# Patient Record
Sex: FEMALE | Race: WHITE | Employment: FULL TIME | URBAN - METROPOLITAN AREA
[De-identification: names, ages, dates, MRNs, and addresses within clinical notes are randomized per-mention and may not be internally consistent; named-entity substitution may affect disease eponyms.]

---

## 2021-08-24 ENCOUNTER — OFFICE VISIT (OUTPATIENT)
Dept: SURGERY | Age: 56
End: 2021-08-24
Payer: COMMERCIAL

## 2021-08-24 VITALS
BODY MASS INDEX: 26.52 KG/M2 | DIASTOLIC BLOOD PRESSURE: 97 MMHG | SYSTOLIC BLOOD PRESSURE: 175 MMHG | WEIGHT: 165 LBS | HEIGHT: 66 IN

## 2021-08-24 DIAGNOSIS — N61.1 BREAST ABSCESS: Primary | ICD-10-CM

## 2021-08-24 PROCEDURE — 10160 PNXR ASPIR ABSC HMTMA BULLA: CPT | Performed by: NURSE PRACTITIONER

## 2021-08-24 PROCEDURE — 76642 ULTRASOUND BREAST LIMITED: CPT | Performed by: NURSE PRACTITIONER

## 2021-08-24 PROCEDURE — 99203 OFFICE O/P NEW LOW 30 MIN: CPT | Performed by: NURSE PRACTITIONER

## 2021-08-24 RX ORDER — SULFAMETHOXAZOLE AND TRIMETHOPRIM 800; 160 MG/1; MG/1
TABLET ORAL
COMMUNITY
Start: 2021-08-23 | End: 2022-01-20

## 2021-08-24 NOTE — PROGRESS NOTES
HISTORY OF PRESENT ILLNESS  Christi Villegas is a 61 y.o. female. HPI  New patient presents for evaluation of LEFT breast abscess. About 6 weeks ago noticed a red spot to LEFT lateral breast.  Became larger, felt like there was fluid under it and had some \"prickly\" pain. She started Bactrim yesterday and reports that it is improving. Breast history -  Referring - Felipa Dogal      Family history -   Mom - breast cancer at 76      OB History    No obstetric history on file. Obstetric Comments   Menarche 14, LMP 2018, # of children 2, age of 4st delivery 33, Hysterectomy/oophorectomy no/no, Breast bx no, history of breast feeding yes, BCP no, Hormone therapy no               ROS    Physical Exam  Chest:         BREAST ULTRASOUND  Indication: Left  breast mass - 9:00  Technique: The area was scanned using a high-frequency linear-array near-field transducer  Findings: Approx 2 cm hypoechoic area  Impression: LEFT breast abscess  Disposition: observation with antibiotics vs aspiration vs incision and drainage - opted for aspiration     [facility]  OFFICE PROCEDURE PROGRESS NOTE        Chart reviewed for the following:               I, Ellie Dailey NP, have reviewed the History, Physical and updated the Allergic reactions for [address] [place] [address] performed immediately prior to start of procedure:               Martha Harris NP, have performed the following reviews on Christi Villegas prior to the start of the procedure:            * Patient was identified by name and date of birth   * Agreement on procedure being performed was verified  * Risks and Benefits explained to the patient  * Procedure site verified and marked as necessary  * Patient was positioned for comfort  * Consent was signed and verified                 Time: 230 PM     Date of procedure: 8/24/21     Procedure performed by:   Ellie Dailey NP     Provider assisted by: None     Patient assisted by: Self     How tolerated by patient: tolerated the procedure well with no complications     Post Procedural Pain Scale: 3 - No Hurt     Comments: none    Aspiration of an abscess:  Indication : Left breast abscess 9:00  Prep : alcohol. Anesthesia : Lidocaine 1% with epi, 3ml   Yield : 7ml purulent fluid drained  Irrigation: none  Diposition: Keep wound covered PRN      Visit Vitals  BP (!) 175/97 (BP 1 Location: Right arm, BP Patient Position: Sitting, BP Cuff Size: Adult)   Ht 5' 6\" (1.676 m)   Wt 165 lb (74.8 kg)   BMI 26.63 kg/m²       ASSESSMENT and PLAN  Encounter Diagnoses   Name Primary?  Breast abscess Yes       LEFT breast abscess on exam and US. No skin breakdown and discussed aspiration vs incision and drainage. Opted for aspiration. Procedure as above with 7ml of purulent fluid aspirated. Continue on Bactrim - prescribed 10 days of treatment by GYN yesterday. Reviewed s/sx of worsening abscess and sepsis. Discussed need for possible additional aspiration or incision and drainage if abscess does not resolve. Follow-up here PRN. She is comfortable with this plan. All questions answered and she stated understanding. Total time spent for this patient - 30 minutes.

## 2021-08-24 NOTE — PATIENT INSTRUCTIONS

## 2021-09-02 ENCOUNTER — TELEPHONE (OUTPATIENT)
Dept: SURGERY | Age: 56
End: 2021-09-02

## 2021-09-02 NOTE — TELEPHONE ENCOUNTER
Returned pt's call, re: still feels a hard \"quarter-size\" area to her breast which she describes as still uncomfortable and red. She completed antibiotics today. Transferred her call to Hossein Uriostegui, to make appt with Agatha Diaz tomorrow.

## 2021-09-03 ENCOUNTER — OFFICE VISIT (OUTPATIENT)
Dept: SURGERY | Age: 56
End: 2021-09-03
Payer: COMMERCIAL

## 2021-09-03 VITALS
HEART RATE: 85 BPM | HEIGHT: 66 IN | BODY MASS INDEX: 26.52 KG/M2 | WEIGHT: 165 LBS | SYSTOLIC BLOOD PRESSURE: 175 MMHG | DIASTOLIC BLOOD PRESSURE: 81 MMHG

## 2021-09-03 DIAGNOSIS — N61.1 BREAST ABSCESS: Primary | ICD-10-CM

## 2021-09-03 PROCEDURE — 99213 OFFICE O/P EST LOW 20 MIN: CPT | Performed by: NURSE PRACTITIONER

## 2021-09-03 PROCEDURE — 76642 ULTRASOUND BREAST LIMITED: CPT | Performed by: NURSE PRACTITIONER

## 2021-09-03 PROCEDURE — 10060 I&D ABSCESS SIMPLE/SINGLE: CPT | Performed by: NURSE PRACTITIONER

## 2021-09-03 NOTE — PROGRESS NOTES
HISTORY OF PRESENT ILLNESS  Angel Farias is a 61 y.o. female. HPI Established patient presents for evaluation of LEFT breast abscess. Breast abscess was aspirated 10 days ago, but has not healed and is larger and tender. No drainage, but reports a fluctuant skin lesion. Denies fever and chills.       Breast history -  Referring - Bryan Leigh MD  8/24/21 - LEFT breast abscess - Aspiration and course of Bactrim   9/3/21 - LEFT breast abscess - I&D      Family history -   Mom - breast cancer at 76      OB History    No obstetric history on file. Obstetric Comments   Menarche 14, LMP 2018, # of children 2, age of 4st delivery 33, Hysterectomy/oophorectomy no/no, Breast bx no, history of breast feeding yes, BCP no, Hormone therapy no               ROS    Physical Exam  Chest:           BREAST ULTRASOUND  Indication: Left  breast abscess  Technique: The area was scanned using a high-frequency linear-array near-field transducer  Findings: Large hypoechoic area underlying skin changes  Impression: Breast abscess  Disposition: I&D since redeveloped post aspiration          [facility]  OFFICE PROCEDURE PROGRESS NOTE        Chart reviewed for the following:               I, Emilia Gaspar NP, have reviewed the History, Physical and updated the Allergic reactions for [address] [place] [address] performed immediately prior to start of procedure:               Chelsey Little NP, have performed the following reviews on Angel Farias prior to the start of the procedure:            * Patient was identified by name and date of birth   * Agreement on procedure being performed was verified  * Risks and Benefits explained to the patient  * Procedure site verified and marked as necessary  * Patient was positioned for comfort  * Consent was signed and verified                 Time: 1130     Date of procedure: 9/3/21     Procedure performed by:   Emilia Gaspar NP     Provider assisted by: None     Patient assisted by: Self     How tolerated by patient: tolerated the procedure well with no complications     Post Procedural Pain Scale: 3 - No Hurt     Comments: none        Incision and Drainage  Indication : Left breast abscess  Prep : alcohol. Anesthesia : Lidocaine 1% with epi 4ml   Yield : 15ml purulent fluid drained  Irrigation: None    Packin/\" nugauze, approximately 8 cm  Diposition: Okay to shower. Change dressing as needed. Follow up 4 to have packing removed. Visit Vitals  BP (!) 175/81 (BP 1 Location: Left upper arm, BP Patient Position: Sitting, BP Cuff Size: Adult)   Pulse 85   Ht 5' 6\" (1.676 m)   Wt 165 lb (74.8 kg)   BMI 26.63 kg/m²       ASSESSMENT and PLAN  Encounter Diagnoses   Name Primary?  Breast abscess Yes       LEFT breast abscess - I&D after abscess persisted s/sp aspiration. Continue antibiotics. Changes dressing as needed. Follow-up in 4 days to have packing removed/changed. She is comfortable with this plan. All questions answered and she stated understanding. Total time spent for this patient - 20 minutes.

## 2021-09-03 NOTE — PATIENT INSTRUCTIONS

## 2021-09-07 ENCOUNTER — OFFICE VISIT (OUTPATIENT)
Dept: SURGERY | Age: 56
End: 2021-09-07
Payer: COMMERCIAL

## 2021-09-07 VITALS
WEIGHT: 165 LBS | SYSTOLIC BLOOD PRESSURE: 155 MMHG | DIASTOLIC BLOOD PRESSURE: 96 MMHG | HEIGHT: 66 IN | BODY MASS INDEX: 26.52 KG/M2 | HEART RATE: 109 BPM

## 2021-09-07 DIAGNOSIS — N61.1 BREAST ABSCESS: Primary | ICD-10-CM

## 2021-09-07 PROCEDURE — 99024 POSTOP FOLLOW-UP VISIT: CPT | Performed by: NURSE PRACTITIONER

## 2021-09-07 NOTE — PROGRESS NOTES
HISTORY OF PRESENT ILLNESS  Chad Chamorro is a 61 y.o. female. HPI Established patient presents for follow-up for LEFT breast abscess I&D. Reports it is healing well, but still draining and feels hard. Breast history -  Referring - Betty Contreras MD  8/24/21 - LEFT breast abscess - Aspiration and course of Bactrim   9/3/21 - LEFT breast abscess - I&D      Family history -   Mom - breast cancer at 76    ROS    Physical Exam  Chest:           Visit Vitals  BP (!) 155/96 (BP 1 Location: Right arm, BP Patient Position: Sitting, BP Cuff Size: Adult)   Pulse (!) 109   Ht 5' 6\" (1.676 m)   Wt 165 lb (74.8 kg)   BMI 26.63 kg/m²         ASSESSMENT and PLAN  Encounter Diagnoses   Name Primary?  Breast abscess Yes      LEFT breast abscess - continues to drain, but granulating. Completed antibiotics. Changes dressings at home. Follow-up on Friday for packing change. She is comfortable with this plan. All questions answered and she stated understanding.

## 2021-09-07 NOTE — PATIENT INSTRUCTIONS

## 2021-09-09 NOTE — PROGRESS NOTES
HISTORY OF PRESENT ILLNESS  Jesika Blanchard is a 61 y.o. female. HPI Established patient presents for follow-up for LEFT breast abscess I&D. Reports it is healing well, but still draining and feels hard.          Breast history -  Referring - Shy Roque MD  8/24/21 - LEFT breast abscess - Aspiration and course of Bactrim   9/3/21 - LEFT breast abscess - I&D      Family history -   Mom - breast cancer at 76    ROS    Physical Exam  Chest:           Visit Vitals  BP (!) 160/88   Pulse 78   Ht 5' 6\" (1.676 m)   Wt 165 lb (74.8 kg)   BMI 26.63 kg/m²         ASSESSMENT and PLAN  Encounter Diagnoses   Name Primary?  Breast abscess Yes        LEFT breast abscess - continues to drain, but granulating. .  Changes dressings at home. Follow-up on Tuesday and then Thursday for packing change. She is comfortable with this plan. All questions answered and she stated understanding. 59.84

## 2021-09-10 ENCOUNTER — OFFICE VISIT (OUTPATIENT)
Dept: SURGERY | Age: 56
End: 2021-09-10
Payer: COMMERCIAL

## 2021-09-10 VITALS
DIASTOLIC BLOOD PRESSURE: 88 MMHG | WEIGHT: 165 LBS | HEIGHT: 66 IN | HEART RATE: 78 BPM | BODY MASS INDEX: 26.52 KG/M2 | SYSTOLIC BLOOD PRESSURE: 160 MMHG

## 2021-09-10 DIAGNOSIS — N61.1 BREAST ABSCESS: Primary | ICD-10-CM

## 2021-09-10 PROCEDURE — 99024 POSTOP FOLLOW-UP VISIT: CPT | Performed by: NURSE PRACTITIONER

## 2021-09-10 NOTE — PATIENT INSTRUCTIONS

## 2021-09-14 ENCOUNTER — OFFICE VISIT (OUTPATIENT)
Dept: SURGERY | Age: 56
End: 2021-09-14
Payer: COMMERCIAL

## 2021-09-14 VITALS
DIASTOLIC BLOOD PRESSURE: 49 MMHG | WEIGHT: 165 LBS | BODY MASS INDEX: 26.52 KG/M2 | HEIGHT: 66 IN | SYSTOLIC BLOOD PRESSURE: 173 MMHG

## 2021-09-14 DIAGNOSIS — N61.1 BREAST ABSCESS: Primary | ICD-10-CM

## 2021-09-14 PROCEDURE — 99213 OFFICE O/P EST LOW 20 MIN: CPT | Performed by: NURSE PRACTITIONER

## 2021-09-14 PROCEDURE — 76642 ULTRASOUND BREAST LIMITED: CPT | Performed by: NURSE PRACTITIONER

## 2021-09-14 NOTE — PATIENT INSTRUCTIONS

## 2021-09-14 NOTE — PROGRESS NOTES
HISTORY OF PRESENT ILLNESS  Jackson Stroud is a 61 y.o. female. HPI Established patient presents for follow-up for LEFT breast abscess I&D.  Reports it is healing well, but still draining and feels hard.       Breast history -  Referring - Brianna Burden MD  8/24/21 - LEFT breast abscess - Aspiration and course of Bactrim   9/3/21 - LEFT breast abscess - I&D      Family history -   Mom - breast cancer at 76    OB History    No obstetric history on file. Obstetric Comments   Menarche 14, LMP 2018, # of children 2, age of 4st delivery 33, Hysterectomy/oophorectomy no/no, Breast bx no, history of breast feeding yes, BCP no, Hormone therapy no               ROS    Physical Exam  Constitutional:       Appearance: Normal appearance. Chest:       Neurological:      Mental Status: She is alert. Psychiatric:         Attention and Perception: Attention normal.         Mood and Affect: Mood normal.         Speech: Speech normal.         Behavior: Behavior normal.         BREAST ULTRASOUND  Indication: Left  Breast abscess  Technique: The area was scanned using a high-frequency linear-array near-field transducer  Findings: 2.5 cm hypoechoic area  Impression: Breast abscess cavity  Disposition: Observation - see A/P        Visit Vitals  BP (!) 173/49 (BP 1 Location: Right arm, BP Patient Position: Sitting, BP Cuff Size: Adult)   Ht 5' 6\" (1.676 m)   Wt 165 lb (74.8 kg)   BMI 26.63 kg/m²         ASSESSMENT and PLAN  Encounter Diagnoses   Name Primary?  Breast abscess Yes        LEFT breast abscess - continues to drain, but granulating. Resistance when removing the packing as the skin has been healing and trying to approximate. Abscess cavity still present. Discussed continued packing (with possible extension of the incision to allow for this) vs allowing the area to close. Discussed possibility of abscess recurrence and subsequent I&D. She opted to discontinue packing at this time and monitor the area. Changes dressings at home PRN until wound is closed. Follow-up PRN.  She is comfortable with this plan.  All questions answered and she stated understanding. Total time spent for this patient - 20 minutes.

## 2021-10-04 ENCOUNTER — TELEPHONE (OUTPATIENT)
Dept: SURGERY | Age: 56
End: 2021-10-04

## 2021-10-06 ENCOUNTER — OFFICE VISIT (OUTPATIENT)
Dept: SURGERY | Age: 56
End: 2021-10-06
Payer: COMMERCIAL

## 2021-10-06 ENCOUNTER — DOCUMENTATION ONLY (OUTPATIENT)
Dept: SURGERY | Age: 56
End: 2021-10-06

## 2021-10-06 ENCOUNTER — HOSPITAL ENCOUNTER (OUTPATIENT)
Dept: LAB | Age: 56
Discharge: HOME | End: 2021-10-06

## 2021-10-06 VITALS — HEIGHT: 66 IN | BODY MASS INDEX: 26.52 KG/M2 | WEIGHT: 165 LBS

## 2021-10-06 DIAGNOSIS — N63.20 LEFT BREAST MASS: ICD-10-CM

## 2021-10-06 DIAGNOSIS — R92.8 ABNORMAL ULTRASOUND OF BREAST: Primary | ICD-10-CM

## 2021-10-06 DIAGNOSIS — N61.1 ABSCESS OF BREAST, LEFT: ICD-10-CM

## 2021-10-06 PROCEDURE — 76642 ULTRASOUND BREAST LIMITED: CPT | Performed by: SURGERY

## 2021-10-06 PROCEDURE — 99213 OFFICE O/P EST LOW 20 MIN: CPT | Performed by: SURGERY

## 2021-10-06 PROCEDURE — 19083 BX BREAST 1ST LESION US IMAG: CPT | Performed by: SURGERY

## 2021-10-06 RX ORDER — HYDROCODONE BITARTRATE AND ACETAMINOPHEN 5; 325 MG/1; MG/1
1 TABLET ORAL
Qty: 25 TABLET | Refills: 0 | Status: SHIPPED | OUTPATIENT
Start: 2021-10-06 | End: 2021-10-13

## 2021-10-06 RX ORDER — AMOXICILLIN AND CLAVULANATE POTASSIUM 875; 125 MG/1; MG/1
1 TABLET, FILM COATED ORAL 2 TIMES DAILY
Qty: 20 TABLET | Refills: 0 | Status: SHIPPED | OUTPATIENT
Start: 2021-10-06 | End: 2022-03-24

## 2021-10-06 NOTE — PROGRESS NOTES
HISTORY OF PRESENT ILLNESS  Roberto Carlos Rogel is a 61 y.o. female. HPI  ESTABLISHED patient here for follow up for LEFT breast abscess that is not healing. She states that the area is painful and has not improved since visit with Rylie Crawford NP on 9-14-21. She would like to discuss surgical options.      Breast history -  Referring - Renee Abad MD  8/24/21 - LEFT breast abscess - Aspiration and course of Bactrim   9/3/21 - LEFT breast abscess - I&D       No past medical history on file. No past surgical history on file. Social History     Socioeconomic History    Marital status:      Spouse name: Not on file    Number of children: Not on file    Years of education: Not on file    Highest education level: Not on file   Occupational History    Not on file   Tobacco Use    Smoking status: Never Smoker    Smokeless tobacco: Never Used   Substance and Sexual Activity    Alcohol use: Not on file    Drug use: Not on file    Sexual activity: Not on file   Other Topics Concern    Not on file   Social History Narrative    Not on file     Social Determinants of Health     Financial Resource Strain:     Difficulty of Paying Living Expenses:    Food Insecurity:     Worried About Running Out of Food in the Last Year:     [address] in the Last Year:    Transportation Needs:     Lack of Transportation (Medical):      Lack of Transportation (Non-Medical):    Physical Activity:     Days of Exercise per Week:     Minutes of Exercise per Session:    Stress:     Feeling of Stress :    Social Connections:     Frequency of Communication with Friends and Family:     Frequency of Social Gatherings with Friends and Family:     Attends Mosque Services:     Active Member of Clubs or Organizations:     Attends Club or Organization Meetings:     Marital Status:    Intimate Partner Violence:     Fear of Current or Ex-Partner:     Emotionally Abused:     Physically Abused:     Sexually Abused: Current Outpatient Medications on File Prior to Visit   Medication Sig Dispense Refill    trimethoprim-sulfamethoxazole (BACTRIM DS, SEPTRA DS) 160-800 mg per tablet        No current facility-administered medications on file prior to visit. No Known Allergies    OB History    No obstetric history on file. Obstetric Comments   Menarche 14, LMP 2018, # of children 2, age of 4st delivery 33, Hysterectomy/oophorectomy no/no, Breast bx no, history of breast feeding yes, BCP no, Hormone therapy no             ROS    Physical Exam  Exam conducted with a chaperone present. Cardiovascular:      Rate and Rhythm: Normal rate and regular rhythm. Heart sounds: Normal heart sounds. Pulmonary:      Breath sounds: Normal breath sounds. Chest:      Breasts: Breasts are symmetrical.         Right: Normal. No swelling, bleeding, inverted nipple, mass, nipple discharge, skin change or tenderness. Left: Mass and tenderness present. No swelling, bleeding, inverted nipple, nipple discharge or skin change. Lymphadenopathy:      Cervical:      Right cervical: No superficial, deep or posterior cervical adenopathy. Left cervical: No superficial, deep or posterior cervical adenopathy. Upper Body:      Right upper body: No supraclavicular or axillary adenopathy. Left upper body: No supraclavicular or axillary adenopathy. BREAST ULTRASOUND  Indication: Superficial fluctuant area at 3:00 lateral LEFT breast, with draining sinus in the center  Technique: The area was scanned using a high-frequency linear-array near-field transducer  Findings: What appears to be a sold mass that is bigger than the abscess cavity  Impression: Breast abscess or suspicious mass  Disposition: Aspiration      Abscess Aspiration - US Guided  Indication: LEFT breast abscess, 3:00  Findings: We used Ultrasound for Lesion location and guidance for the procedure. Prep:  We cleaned the skin with alcohol. Anesthesia: We anesthetized with Xylocaine. Guidance: We used Ultrasound for guidance. Aspiration: We advanced an 18 gauge needle into the fluid collection. Yield: Nil. Disposition: Ultrasound-guided biopsy performed      US-GUIDED CORE BIOPSY  Following detailed explanation and description of the biopsy procedure, its risks, benefits and possible alternatives, the patient signed the informed consent. Indication: Mass, Ultrasound Visible, LEFT breast 3:00   Prep: We cleansed the skin with alcohol. Anesthesia: We anesthetized the skin and underlying tissues with 1% lidocaine with epinephrine. Device: We advanced the [facility] device through the lesion and captured tissue with real-time ultrasound confirmation. Core Sampling: We repeated this sampling for the following number of cores, 3. Marker: We placed a marking clip to godwin the biopsy site. Marker Type: HydroMARK. Dressing: We then closed the incision with steristrips and placed a sterile dressing. Instructions: The patient was instructed regarding post-procedure care and activities. Pathology: Pending at this time. Patient tolerated procedure well and discharged in stable condition. Informed patient that they will be notified of pathology results in 3 to 5 days. ASSESSMENT and PLAN    ICD-10-CM ICD-9-CM    1. Abnormal ultrasound of breast  R92.8 793.89 SURGICAL PATHOLOGY   2. Abscess of breast, left  N61.1 611.0 HYDROcodone-acetaminophen (NORCO) 5-325 mg per tablet   3. Left breast mass  N63.20 611.72 SURGICAL PATHOLOGY      Patient presents to follow up on LEFT breast abscess, and is doing well overall. Tender, palpable superficial fluctuant area on exam at 3:00 lateral LEFT breast, with draining sinus in the center. LEFT breast US visualizes what appears to be a sold mass that is bigger than the abscess cavity. Discussed aspiration, and pt elected to proceed.  No fluid aspirated, so we discussed bx of this mass and pt agreed to proceed. She tolerated the procedure well, and 3 cores were taken, [facility] marker placed. Prescriptions sent for Augmentin and Lortab. Pt may have her COVID-19 vaccine in the RIGHT arm. Will send bx specimens for PATH and f/u with results and for further planning. This plan was reviewed with the patient and patient agrees. All questions were answered. Total time spent was 20 minutes.     Written by Jasvir Nguyen, as dictated by Dr. Renuka Saravia MD.

## 2021-10-06 NOTE — PROGRESS NOTES
[facility]  OFFICE PROCEDURE PROGRESS NOTE        Chart reviewed for the following:   I, Dr. Lucía Baltazar, have reviewed the History, Physical and updated the Allergic reactions for [address] performed immediately prior to start of procedure:   I, Dr. Lucía Baltazar, have performed the following reviews on April Honer prior to the start of the procedure:            * Patient was identified by name and date of birth   * Agreement on procedure being performed was verified  * Risks and Benefits explained to the patient  * Procedure site verified and marked as necessary  * Patient was positioned for comfort  * Consent was signed and verified     Time: 11:40am      Date of procedure: 10/6/2021    Procedure performed by:  Gi Monroy MD    Provider assisted by: Ra Rao MA    Patient assisted by: nursing attendant    How tolerated by patient: tolerated the procedure well with no complications    Post Procedural Pain Scale: 0 - No Hurt    Comments: none, Patient tolerated the procedure well without complications. Denies pain post biopsy.

## 2021-10-06 NOTE — PROGRESS NOTES
HISTORY OF PRESENT ILLNESS  Alejo Zamora is a 61 y.o. female. HPI ESTABLISHED Patient here for follow up abscess that is not healing. She states that the area is painful and has not improved since visit with Alec Sagastume on 9-14-21. She would like to discuss surgical options.      Breast history -  Referring - Milo Delgado MD  8/24/21 - LEFT breast abscess - Aspiration and course of Bactrim   9/3/21 - LEFT breast abscess - I&D     ROS    Physical Exam    ASSESSMENT and PLAN  {ASSESSMENT/PLAN:59350}

## 2021-10-10 ENCOUNTER — TELEPHONE (OUTPATIENT)
Dept: SURGERY | Age: 56
End: 2021-10-10

## 2021-10-10 DIAGNOSIS — C50.912 BREAST CANCER, STAGE 2, LEFT (HCC): Primary | ICD-10-CM

## 2021-10-11 ENCOUNTER — NURSE NAVIGATOR (OUTPATIENT)
Dept: CASE MANAGEMENT | Age: 56
End: 2021-10-11

## 2021-10-11 ENCOUNTER — TELEPHONE (OUTPATIENT)
Dept: SURGERY | Age: 56
End: 2021-10-11

## 2021-10-11 ENCOUNTER — DOCUMENTATION ONLY (OUTPATIENT)
Dept: SURGERY | Age: 56
End: 2021-10-11

## 2021-10-11 DIAGNOSIS — C50.912 MALIGNANT NEOPLASM OF LEFT FEMALE BREAST, UNSPECIFIED ESTROGEN RECEPTOR STATUS, UNSPECIFIED SITE OF BREAST (HCC): Primary | ICD-10-CM

## 2021-10-11 NOTE — PROGRESS NOTES
[facility]   Breast Navigator Encounter    Name:    Kimberly Alvarez  Age:    61 y.o. Diagnosis:   LEFT breast cancer, HER-2+    Interdisciplinary Team:  Med-Onc:    Dr. Glenis Reyes  Surg-Onc:    Dr. Ohara Smaller:    Plastics:    :    Johanna Harvey LCSW  Nurse Navigator:  Janneth Hernandez RN, BSN, CBCN      Encounter type:  []Patient Initiated  []Navigator Follow-up []Pre-op  []Post-op  []Check-in Prior to First Treatment []Treatment Modality Change  []Survivorship Transition [x]Other:   Initial Navigator Encounter    Narrative:    Dr. Vi Sheppard called patient yesterday to inform her of new diagnosis of LEFT breast cancer. I called her today to follow-up. We discussed:    · Chemo regimen. Probable TCH-P x 6, three weeks apart. Discussed that she would like and can work from home, but needs a note. I told her Dr. Valarie Burleson office can supply this for her. Discussed that Herceptin/Perjeta can be given in an injection now, does not have to be an infusion. Asked about time at chemo. I told her to plan on the majority of the day. Explained that pharmacy at [facility] is under construction so meds have to come from [facility] which adds some time onto the day. Explained that she would have labs, see Dr. Danna Tavarez and then go back down for her infusion. · Cold caps. She is interested. Advised she go on to the [facility] site where there is a lot of information. · Port-a cath, to be placed on RIGHT side, may be quite tender at first, can be used immediately. Explained Lidocaine Cream and how the port is accessed. I told her this will probably be scheduled for next week, and chemo will start soon after. · Visit with Dr. Glenis Reyes later in the week. Recommended that she bring her . Discussion will be about treatment and plan. · She may not see Dr. Vi Sheppard again until later in her treatment, maybe correction or shortly thereafter to discuss surgery.   I let her know surgery will be performed even if she has a complete response. · Short term disability. Recommended she reach out to her HR department to get forms needed. · She has great family support. She is not interested in talking about her diagnosis, as in support groups, etc.  She just wants to get her treatment and continue on as best she can. I advised her of the multiple resources available if she changes her mind and is interested. Recommended some apps for her phone with meditation and mindfulness. Provided the patient with my contact information and discussed my role in her care. Will continue to follow patient throughout  the care continuum. Provided her with my e-mail so she can e-mail me questions when she thinks of them. Referrals/Handouts:    Recommended support groups, meditation, but she is not interested at this time.           Carissa Sims RN, BSN, [facility]  Oncology Breast Navigator     [address] [place] [address]   [address] [place] [address], [address] [place] [address] 22.  W: [phone]  F: [phone]  [email or web address]  Good Help to Those in [facility]

## 2021-10-11 NOTE — TELEPHONE ENCOUNTER
Called and spoke with patient - let her know that I was aware of her diagnosis and to let us know if she had any questions as her treatment progressed.

## 2021-10-11 NOTE — PROGRESS NOTES
[facility] to request last office note with Dr. Roselia Juan and latest mammogram.  Was transferred to MR. Left patient info, needed documents and ED [facility] fax number to be faxed. Skyler King

## 2021-10-13 ENCOUNTER — DOCUMENTATION ONLY (OUTPATIENT)
Dept: ONCOLOGY | Age: 56
End: 2021-10-13

## 2021-10-13 ENCOUNTER — TELEPHONE (OUTPATIENT)
Dept: SURGERY | Age: 56
End: 2021-10-13

## 2021-10-13 ENCOUNTER — NURSE NAVIGATOR (OUTPATIENT)
Dept: CASE MANAGEMENT | Age: 56
End: 2021-10-13

## 2021-10-13 ENCOUNTER — HOSPITAL ENCOUNTER (OUTPATIENT)
Dept: MRI IMAGING | Age: 56
Discharge: HOME | End: 2021-10-13
Attending: SURGERY
Payer: COMMERCIAL

## 2021-10-13 ENCOUNTER — OFFICE VISIT (OUTPATIENT)
Dept: ONCOLOGY | Age: 56
End: 2021-10-13
Payer: COMMERCIAL

## 2021-10-13 ENCOUNTER — TRANSCRIBE ORDER (OUTPATIENT)
Dept: REGISTRATION | Age: 56
End: 2021-10-13

## 2021-10-13 VITALS
DIASTOLIC BLOOD PRESSURE: 84 MMHG | HEART RATE: 77 BPM | SYSTOLIC BLOOD PRESSURE: 149 MMHG | WEIGHT: 164.5 LBS | TEMPERATURE: 97.9 F | OXYGEN SATURATION: 97 % | BODY MASS INDEX: 26.44 KG/M2 | HEIGHT: 66 IN

## 2021-10-13 DIAGNOSIS — C50.912 BREAST CANCER, STAGE 2, LEFT (HCC): ICD-10-CM

## 2021-10-13 DIAGNOSIS — Z17.1 MALIGNANT NEOPLASM OF LEFT BREAST IN FEMALE, ESTROGEN RECEPTOR NEGATIVE, UNSPECIFIED SITE OF BREAST (HCC): Primary | ICD-10-CM

## 2021-10-13 DIAGNOSIS — N64.4 BREAST PAIN, LEFT: ICD-10-CM

## 2021-10-13 DIAGNOSIS — Z78.0 POST-MENOPAUSAL: ICD-10-CM

## 2021-10-13 DIAGNOSIS — C50.912 MALIGNANT NEOPLASM OF LEFT BREAST IN FEMALE, ESTROGEN RECEPTOR NEGATIVE, UNSPECIFIED SITE OF BREAST (HCC): Primary | ICD-10-CM

## 2021-10-13 DIAGNOSIS — C50.912 MALIGNANT NEOPLASM OF LEFT FEMALE BREAST, UNSPECIFIED ESTROGEN RECEPTOR STATUS, UNSPECIFIED SITE OF BREAST (HCC): Primary | ICD-10-CM

## 2021-10-13 DIAGNOSIS — Z01.812 PRE-PROCEDURE LAB EXAM: Primary | ICD-10-CM

## 2021-10-13 PROCEDURE — 99205 OFFICE O/P NEW HI 60 MIN: CPT | Performed by: INTERNAL MEDICINE

## 2021-10-13 PROCEDURE — 77049 MRI BREAST C-+ W/CAD BI: CPT

## 2021-10-13 PROCEDURE — A9575 INJ GADOTERATE MEGLUMI 0.1ML: HCPCS | Performed by: SURGERY

## 2021-10-13 PROCEDURE — 74011000258 HC RX REV CODE- 258: Performed by: SURGERY

## 2021-10-13 PROCEDURE — 74011250636 HC RX REV CODE- 250/636: Performed by: SURGERY

## 2021-10-13 RX ORDER — LIDOCAINE AND PRILOCAINE 25; 25 MG/G; MG/G
CREAM TOPICAL AS NEEDED
Qty: 30 G | Refills: 0 | Status: SHIPPED | OUTPATIENT
Start: 2021-10-13 | End: 2022-03-24

## 2021-10-13 RX ORDER — PROCHLORPERAZINE MALEATE 5 MG
TABLET ORAL
Qty: 30 TABLET | Refills: 1 | Status: SHIPPED | OUTPATIENT
Start: 2021-10-13 | End: 2022-03-24

## 2021-10-13 RX ORDER — GADOTERATE MEGLUMINE 376.9 MG/ML
15 INJECTION INTRAVENOUS
Status: COMPLETED | OUTPATIENT
Start: 2021-10-13 | End: 2021-10-13

## 2021-10-13 RX ORDER — GADOTERATE MEGLUMINE 376.9 MG/ML
INJECTION INTRAVENOUS
Status: DISPENSED
Start: 2021-10-13 | End: 2021-10-13

## 2021-10-13 RX ORDER — DEXAMETHASONE 4 MG/1
TABLET ORAL
Qty: 48 TABLET | Refills: 0 | Status: SHIPPED | OUTPATIENT
Start: 2021-10-13 | End: 2022-01-20 | Stop reason: SDUPTHER

## 2021-10-13 RX ORDER — SODIUM CHLORIDE 0.9 % (FLUSH) 0.9 %
10 SYRINGE (ML) INJECTION
Status: COMPLETED | OUTPATIENT
Start: 2021-10-13 | End: 2021-10-13

## 2021-10-13 RX ORDER — ONDANSETRON 4 MG/1
4-8 TABLET, ORALLY DISINTEGRATING ORAL
Qty: 60 TABLET | Refills: 1 | Status: SHIPPED | OUTPATIENT
Start: 2021-10-13 | End: 2022-03-24

## 2021-10-13 RX ADMIN — Medication 10 ML: at 12:00

## 2021-10-13 RX ADMIN — GADOTERATE MEGLUMINE 15 ML: 376.9 INJECTION INTRAVENOUS at 12:00

## 2021-10-13 RX ADMIN — SODIUM CHLORIDE 100 ML: 900 INJECTION, SOLUTION INTRAVENOUS at 12:00

## 2021-10-13 NOTE — PROGRESS NOTES
[address]  Breast Navigator Encounter    Name:    Bev Crane  Age:    61 y.o. Diagnosis: LEFT breast cancer    Interdisciplinary Team:  Med-Onc:    Dr. Kala Andujar  Surg-Onc:    Dr. Lianet Adams:    Plastics:    :    JESSE TilleyW  Nurse Navigator:  Winnie Guzman RN, BSN, [facility]      Encounter type:  [x]Patient Initiated  []Navigator Follow-up []Pre-op  []Post-op  []Check-in Prior to First Treatment []Treatment Modality Change  []Survivorship Transition []Other:       Narrative:    Left a voicemail asking whether she can still have an MRI with a metal post and crown in her mouth. Checked with Pennie Buerger at [facility]. She says that this is fine. I called patient to let her know.             Winnie Guzman RN, BSN, [facility]  Oncology Breast Navigator     [address]  [address] [place], [place] [address]jhon Delvinivy  22.  W: [phone]  F: [phone]  [email or web address]  Good Help to Those in [place]

## 2021-10-13 NOTE — PROGRESS NOTES
Gera Garcia is a 61 y.o. female  Chief Complaint   Patient presents with    New Patient    Breast Cancer     1. Have you been to the ER, urgent care clinic since your last visit? Hospitalized since your last visit? No.  2. Have you seen or consulted any other health care providers outside of the [address] [place] [address] since your last visit? Include any pap smears or colon screening.  No.

## 2021-10-13 NOTE — PROGRESS NOTES
Pharmacy Note- Chemotherapy Education    Kailash Sahrp is a  61 y. o.female  diagnosed with breast cancer here today for chemotherapy counseling and consent. Ms. Cruz Meek is being treated with TC. Provided education on DOCEtaxel, CARBOplatin, trastuzumab, pertuzumab and premedications - fosaprepitant, palonestron and dexamethasone. Reviewed Pegfilgrastim. Reviewed IV vs. SQ options for the trastuzumab and pertuzumab, patient would like SQ. Side effects of chemotherapy reviewed included s/s infection, anemia, appetite changes, thromboyctopenia, fatigue, hair loss/alopecia, bone pain, skin and nail changes, diarrhea/constipation, fluid retention/infusion reactions and cardiac toxicity. Patient given ways to manage these side effects and when to contact office. [facility] Handout of medications provided to patient. Ms. Cruz Meek verbalized understanding of the information presented and all of the patient's questions were answered.     Charles Pacheco, PharmD, BCPS, BCOP    For Pharmacy Admin Tracking Only     CPA in place: No   Recommendation Provided To: Patient/Caregiver: 1 via In person     Intervention Accepted By: Patient/Caregiver: 1   Time Spent (min): 30

## 2021-10-13 NOTE — PROGRESS NOTES
Neoadjuvant TCHP chemotherapy orders entered into [facility] to start around 10/21/21. Anti-emetics, Decadron, and EMLA sent to pharmacy on file.

## 2021-10-13 NOTE — PROGRESS NOTES
Opportunity to meet with patient and spouse at in office appt today. Patient has been prescribed TCHP and would like to possibly use cold cap therapy for hair loss prevention. Provided information regarding Paxman system and relayed that there is a local [employer] rep who was a cold cap user during her own treatment. Reviewed with patient the Paxman kit, how to use Paxman during treatment to prevent hair loss. Understands that company cannot guarantee full hair loss prevention but that should be 50% or less and that when hair regrows it is expected to return sooner than fully unprotected hair follicles. Patient agreed to proceed with cap fitting and was determined to wear a medium cap with medium cover. Patient remains undecided and agreed to call or send [facility] to RN regarding cold cap use. Patient also received script for cranial prosthesis if decides against Cold Cap product. [employer] enrollment forms completed and signed in anticipation of patient's decision. Forms to MD for signature.

## 2021-10-13 NOTE — PROGRESS NOTES
Oncology Social Work  Psychosocial Assessment    Reason for Assessment:      [] Social Work Referral [x] Initial Assessment [] New Diagnosis [] Other    Advance Care Planning:  Patient does not have an advanced medical directive, and did not express interest in completing one today. Sources of Information:    [x]Patient  [x]Family  []Staff  []Medical Record    Mental Status:    [x]Alert  []Lethargic  []Unresponsive   [] Unable to assess   Oriented to:  [x]Person  [x]Place  [x]Time  [x]Situation      Relationship Status:  []Single  [x]  []Significant Other/Life Partner  []  []  []    Living Circumstances:  []Lives Alone  [x]Family/Significant Other in Household  []Roommates  []Children in the Home  []Paid Caregivers  []Assisted Living Facility/Group Home  []Skilled [address]  []Homeless  []Incarcerated  []Environmental/Care Concerns  []Other:    Employment Status:  [x]Employed Full-time []Employed Part-time []Homemaker  [] Retired [] Short-Term Disability [] [facility]  [] Unemployed     Barriers to Learning:    []Language  []Developmental  []Cognitive  []Altered Mental Status  []Visual/Hearing Impairment  []Unable to Read/Write  []Motivational  [] Challenges Understanding Medical Jargon [x]No Barriers Identified      Financial/Legal Concerns:    []Uninsured  []Limited Income/Resources  []Non-Citizen  []Food Insecurity [x]No Concerns Identified   []Other:    Yazidism/Spiritual/Existential:  Does patient have any spiritual or Pentecostal beliefs? [] Yes [] No  Is the patient involved in a spiritual, larry or Pentecostal community? [] Yes [] No  Patient expressing spiritual/existential angst? [] Yes [x] No  Notes: Patient did not express any spiritual or Pentecostal preferences today.      Support System:    Identified Support Person/Group:  [x]Strong  []Fair  []Limited    Coping with Illness:   [x]  Coping Well  [] Challenges Coping with Serious Illness [] Situational Depression [] Situational Anxiety [] Anticipatory Grief  [] Recent Loss [] Caregiver [place]            Narrative:  Met with the patient and her  Kenya Ramso) during her initial office visit today. Patient is being seen for breast cancer. Living Situation - Patient lives with her . She does not use any DME, and is independent with all ADLs and IADLs. Employment Status - The patient is employed by [employer] in [employer]. She is able to work from home. Her  owns his own architectural and residential [address] [place] [address]. Insurance - [employer] PPO     Social Support - The patient has good support from family and friends. She has a 28year old step-daughter, Jessica Charles, who lives locally. They have two children together - a 32year old daughter, Tyrel Aggarwal, who lives in [place], [place], and a 22year old son, Guru Jose, who lives in [place]. Resources provided -  The patient does not have a PCP. Provided a detailed list of [address] [place] [address] Providers, outlining the various practices around [place] and [facility] that offer primary care services. The patient plans to choose a provider, and call to make an appointment as soon as possible. Patient is interested in the [employer]. Provided education regarding this system, along with pricing information. Explained that if she is interested, nursing would later determine her cap size, and complete paperwork. Explained that more information can be obtained by visiting [employer] and watching educational videos, or by calling Kathleen directly at (P#[phone]). Provided the patient with a list of places to obtain wigs, head scarves, mastectomy bras, prosthesis, and other accessories. Invited the patient to the upcoming Virtual Support Group meetings, led by this .       Provided this 's contact information as well as information regarding the complementary services provided by the [facility], explaining that the center is currently closed due to COVID-19 restrictions. Explained that meditation, yoga, relationship counseling, and music therapy, are being offered virtually. Plan:   1. Introduced self and role of this  in the [employer]. 2.  Informed the patient of the [facility] and available resources there. 3.  Continue to meet with the patient when she returns to the clinic for ongoing assessment of the patients adjustment to her diagnosis and treatment. 4.  Ongoing psychosocial support as desired by patient.       Referral:   Support Groups referral  DME/[facility] referral  Primary Care referral  [facility] Cold Cap referral   Polo Nichols LCSW

## 2021-10-13 NOTE — PROGRESS NOTES
[facility] [place] [address] [address]  W: [phone]  F: [phone]    Reason for Visit:   Noy Sainz is a 61 y.o. female who is seen in consultation at the request of Dr. Ralph Simmons for evaluation of breast cancer. Treatment History:   LEFT breast biopsy 10/6/21      STAGE: clinical 2 ER/NH negative Her2+    History of Present Illness:     Pt seen today for office consult for new breast cancer ER/NH negative Her2 + post biopsy. Initially pt was seeing breast surgery for possible abscess that was draining since at least end of June. Had drained then had biopsy. Path IDC grade 3 ER/NH negative Her2+. Had breast MRI today and report pending. Has mass on LEFT breast that is open/ draining. States has pain at breast site. Here today to set up neoadjuvant chemo. Healthy overall. Ready to do chemo asap. Post menopausal.   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 Here with  today. For second dose vaccine/ hx of COVID. No fevers/ chills/ chest pain/ SOB/ nausea/ vomiting/diarrhea/ pain/fatigue          Past Medical History:   Diagnosis Date    Malignant neoplasm of left breast in female, estrogen receptor negative ([facility] [address] 10/13/2021      History reviewed. No pertinent surgical history. Social History     Tobacco Use    Smoking status: Never Smoker    Smokeless tobacco: Never Used   Substance Use Topics    Alcohol use: Not on file      History reviewed. No pertinent family history. Current Outpatient Medications   Medication Sig    lidocaine-prilocaine (EMLA) topical cream Apply  to affected area as needed for Pain. Apply to port-a-cath site 30-60 minutes prior to chemo    ondansetron (ZOFRAN ODT) 4 mg disintegrating tablet Take 1-2 Tablets by mouth every eight (8) hours as needed for Nausea or Vomiting.     prochlorperazine (Compazine) 5 mg tablet Take 1 tab by mouth every 6 hours as needed for nausea or vomiting    dexAMETHasone (DECADRON) 4 mg tablet Take 2 tabs (8mg) by mouth twice daily the day before chemotherapy and the day after chemotherapy    CRANIAL PROSTHESIS misc Alopecia due to chemo/ wig    HYDROcodone-acetaminophen (NORCO) 5-325 mg per tablet Take 1 Tablet by mouth every four (4) hours as needed for Pain for up to 7 days. Max Daily Amount: 6 Tablets.  amoxicillin-clavulanate (AUGMENTIN) 875-125 mg per tablet Take 1 Tablet by mouth two (2) times a day.  trimethoprim-sulfamethoxazole (BACTRIM DS, SEPTRA DS) 160-800 mg per tablet      No current facility-administered medications for this visit. Facility-Administered Medications Ordered in Other Visits   Medication Dose Route Frequency    gadoterate meglumine (DOTAREM) 0.5 mmol/mL (376.9 mg/mL) contrast solution          No Known Allergies     A complete review of systems was obtained, negative except as described above and as reported on ROS sheet scanned into system. Physical Exam:     Visit Vitals  BP (!) 149/84 (BP 1 Location: Left upper arm, BP Patient Position: Sitting)   Pulse 77   Temp 97.9 °F (36.6 °C) (Temporal)   Ht 5' 6\" (1.676 m)   Wt 164 lb 8 oz (74.6 kg)   SpO2 97%   BMI 26.55 kg/m²     ECOG PS: 0  General: No distress  Eyes: PERRLA, anicteric sclerae  HENT: Atraumatic, wearing mask  Neck: Supple  Lymphatic: No cervical, supraclavicular  Respiratory: CTAB, normal respiratory effort  CV: Normal rate, regular rhythm, no murmurs, no peripheral edema  GI: Soft, nontender, nondistended, no masses  MS: Normal gait and station. Digits without clubbing or cyanosis. Skin: No rashes, ecchymoses, or petechiae. Normal temperature, turgor, and texture. Psych: Alert, oriented, appropriate affect, normal judgment/insight  Neuro non focal  Breast LEFT breast mass with skin changes/ draining. Mass about 5-6 cm.          Results:   No results found for: WBC, HGB, HCT, PLT, MCV, ANEU, HGBPOC, HCTPOC, HGBEXT, HCTEXT, PLTEXT, HGBEXT, HCTEXT, PLTEXT  No results found for: NA, K, CL, CO2, GLU, BUN, CREA, GFRAA, GFRNA, CA, NAPOC, KPOCT, CLPOC, GLUCPOC, IBUN, CREAPOC, ICAI  No results found for: TBILI, ALT, AP, TP, ALB, GLOB      Records reviewed and summarized above. Pathology report(s) reviewed above. Radiology report(s) reviewed above. Assessment:/PLAN     1)  Clinical stage 2B T3N0 LEFT breast cancer ER/VT negative Her2+  post breast biopsy only 10/21. Records and history reviewed with pt today. Reviewed path and data specifically with pt. Discussed dx, stage and treatment of breast cancer. Discussed neoadjuvant and adjuvant chemo today. Discussed no hormonal therapy options. Surgery prefers neoadjuvant chemo. Not sure about type of surgery yet. Discussed staging studies. Pt wants to wait on doing scans for a few weeks but does want to do them. Will order CTs/ bone scan. Discussed chemo options today. Discussed clinical trial options today. Pt does not want to do clinical trial.   Decided on TCHP. We discussed the risks and benefits of TCH-P chemotherapy. Potential side effects include, but are not limited to, nausea, vomiting, constipation, diarrhea, taste changes, myelosuppression, increased risk for infection, myalgias, fatigue, alopecia, mucositis, neuropathy, fluid retention, renal failure, cardiac damage, allergic reactions, infertility, and rarely, death. The patient has consented to beginning chemotherapy. MRI done today and pending  Ordered ECHO today. For port next week  Labs at chemo. Clinically pt is healthy overall. Does not have a PCP and will refer. To start TCHP chemo post port. 2)  Post menopausal. Routine GYN. 3) Psychosocial.  Mood good. Coping well.  here today. Can work from home. Has good support. Fu here in a week or at chemo. Call if questions    I appreciate the opportunity to participate in Ms. Robertha Homans White's care.     Signed By: Rajendra Marroquin,

## 2021-10-14 ENCOUNTER — HOSPITAL ENCOUNTER (OUTPATIENT)
Dept: PREADMISSION TESTING | Age: 56
Discharge: HOME | End: 2021-10-14
Payer: COMMERCIAL

## 2021-10-14 DIAGNOSIS — Z01.812 PRE-PROCEDURE LAB EXAM: ICD-10-CM

## 2021-10-14 PROCEDURE — U0005 INFEC AGEN DETEC AMPLI PROBE: HCPCS

## 2021-10-15 ENCOUNTER — TELEPHONE (OUTPATIENT)
Dept: ONCOLOGY | Age: 56
End: 2021-10-15

## 2021-10-15 LAB
SARS-COV-2, XPLCVT: NOT DETECTED
SOURCE, COVRS: NORMAL

## 2021-10-15 NOTE — PROGRESS NOTES
Patient returned RN, ID verified X 2. States that after careful consideration has elected to not pursue scalp cooling with [employer] system. Plans to obtain wig instead. Has obtained her pre/post meds from pharmacy and is aware of how/when to use. Reviewed location of [facility] and appt times. Requests scheduling for pre chemo labs on day before treatment. Will be using intermittent FMLA while on treatment and will bring her forms to first appointment. Update to Provider/NN/.

## 2021-10-15 NOTE — PROGRESS NOTES
Follow up call to patient regarding [employer] Cold Cap decision. Supplied RN contact info if wishes to call or may send [employer] message. Update to Provider.

## 2021-10-18 NOTE — TELEPHONE ENCOUNTER
10/15/21   Follow up call to patient, ID verified X 2. Patient states has elected to not proceed with [employer]man Cold Cap therapy during chemotherapy.     Update to Provider/NN

## 2021-10-19 ENCOUNTER — ANESTHESIA EVENT (OUTPATIENT)
Dept: SURGERY | Age: 56
End: 2021-10-19
Payer: COMMERCIAL

## 2021-10-19 ENCOUNTER — HOSPITAL ENCOUNTER (OUTPATIENT)
Age: 56
Setting detail: OUTPATIENT SURGERY
Discharge: HOME | End: 2021-10-19
Attending: SURGERY | Admitting: SURGERY
Payer: COMMERCIAL

## 2021-10-19 ENCOUNTER — ANESTHESIA (OUTPATIENT)
Dept: SURGERY | Age: 56
End: 2021-10-19
Payer: COMMERCIAL

## 2021-10-19 ENCOUNTER — APPOINTMENT (OUTPATIENT)
Dept: GENERAL RADIOLOGY | Age: 56
End: 2021-10-19
Attending: SURGERY
Payer: COMMERCIAL

## 2021-10-19 ENCOUNTER — TELEPHONE (OUTPATIENT)
Dept: ONCOLOGY | Age: 56
End: 2021-10-19

## 2021-10-19 ENCOUNTER — HOSPITAL ENCOUNTER (OUTPATIENT)
Dept: GENERAL RADIOLOGY | Age: 56
Setting detail: OUTPATIENT SURGERY
Discharge: HOME | End: 2021-10-19
Attending: SURGERY | Admitting: SURGERY
Payer: COMMERCIAL

## 2021-10-19 VITALS
OXYGEN SATURATION: 96 % | WEIGHT: 164 LBS | DIASTOLIC BLOOD PRESSURE: 77 MMHG | TEMPERATURE: 98.1 F | SYSTOLIC BLOOD PRESSURE: 159 MMHG | BODY MASS INDEX: 26.36 KG/M2 | HEART RATE: 83 BPM | HEIGHT: 66 IN | RESPIRATION RATE: 13 BRPM

## 2021-10-19 DIAGNOSIS — Z17.1 MALIGNANT NEOPLASM OF LEFT BREAST IN FEMALE, ESTROGEN RECEPTOR NEGATIVE, UNSPECIFIED SITE OF BREAST (HCC): ICD-10-CM

## 2021-10-19 DIAGNOSIS — C50.912 MALIGNANT NEOPLASM OF LEFT FEMALE BREAST, UNSPECIFIED ESTROGEN RECEPTOR STATUS, UNSPECIFIED SITE OF BREAST (HCC): ICD-10-CM

## 2021-10-19 DIAGNOSIS — C50.912 MALIGNANT NEOPLASM OF LEFT BREAST IN FEMALE, ESTROGEN RECEPTOR NEGATIVE, UNSPECIFIED SITE OF BREAST (HCC): ICD-10-CM

## 2021-10-19 PROCEDURE — 74011250636 HC RX REV CODE- 250/636

## 2021-10-19 PROCEDURE — C1788 PORT, INDWELLING, IMP: HCPCS | Performed by: SURGERY

## 2021-10-19 PROCEDURE — 2709999900 HC NON-CHARGEABLE SUPPLY: Performed by: SURGERY

## 2021-10-19 PROCEDURE — 76060000033 HC ANESTHESIA 1 TO 1.5 HR: Performed by: SURGERY

## 2021-10-19 PROCEDURE — 10035 PLMT SFT TISS LOCLZJ DEV 1ST: CPT | Performed by: SURGERY

## 2021-10-19 PROCEDURE — 76210000000 HC OR PH I REC 2 TO 2.5 HR: Performed by: SURGERY

## 2021-10-19 PROCEDURE — 77001 FLUOROGUIDE FOR VEIN DEVICE: CPT | Performed by: SURGERY

## 2021-10-19 PROCEDURE — 77030002996 HC SUT SLK J&J -A: Performed by: SURGERY

## 2021-10-19 PROCEDURE — 77030040922 HC BLNKT HYPOTHRM STRY -A

## 2021-10-19 PROCEDURE — 77030031139 HC SUT VCRL2 J&J -A: Performed by: SURGERY

## 2021-10-19 PROCEDURE — 74011000250 HC RX REV CODE- 250: Performed by: NURSE ANESTHETIST, CERTIFIED REGISTERED

## 2021-10-19 PROCEDURE — 74011250636 HC RX REV CODE- 250/636: Performed by: NURSE ANESTHETIST, CERTIFIED REGISTERED

## 2021-10-19 PROCEDURE — 77030002933 HC SUT MCRYL J&J -A: Performed by: SURGERY

## 2021-10-19 PROCEDURE — 76210000020 HC REC RM PH II FIRST 0.5 HR: Performed by: SURGERY

## 2021-10-19 PROCEDURE — 76010000138 HC OR TIME 0.5 TO 1 HR: Performed by: SURGERY

## 2021-10-19 PROCEDURE — 36561 INSERT TUNNELED CV CATH: CPT | Performed by: SURGERY

## 2021-10-19 PROCEDURE — 88305 TISSUE EXAM BY PATHOLOGIST: CPT

## 2021-10-19 PROCEDURE — 38505 NEEDLE BIOPSY LYMPH NODES: CPT | Performed by: SURGERY

## 2021-10-19 PROCEDURE — 74011250637 HC RX REV CODE- 250/637

## 2021-10-19 PROCEDURE — 74011000250 HC RX REV CODE- 250: Performed by: SURGERY

## 2021-10-19 PROCEDURE — 74011250636 HC RX REV CODE- 250/636: Performed by: SURGERY

## 2021-10-19 PROCEDURE — 74011250636 HC RX REV CODE- 250/636: Performed by: ANESTHESIOLOGY

## 2021-10-19 PROCEDURE — 88360 TUMOR IMMUNOHISTOCHEM/MANUAL: CPT

## 2021-10-19 PROCEDURE — 71045 X-RAY EXAM CHEST 1 VIEW: CPT

## 2021-10-19 DEVICE — POWERPORT IMPLANTABLE PORT WITH ATTACHABLE 8F CHRONOFLEX OPEN-ENDED SINGLE-LUMEN VENOUS CATHETER INTERMEDIATE KIT (WITH SUTURE PLUGS)
Type: IMPLANTABLE DEVICE | Site: CHEST | Status: FUNCTIONAL
Brand: POWERPORT, CHRONOFLEX

## 2021-10-19 RX ORDER — DEXAMETHASONE SODIUM PHOSPHATE 4 MG/ML
INJECTION, SOLUTION INTRA-ARTICULAR; INTRALESIONAL; INTRAMUSCULAR; INTRAVENOUS; SOFT TISSUE AS NEEDED
Status: DISCONTINUED | OUTPATIENT
Start: 2021-10-19 | End: 2021-10-19

## 2021-10-19 RX ORDER — MIDAZOLAM HYDROCHLORIDE 1 MG/ML
INJECTION, SOLUTION INTRAMUSCULAR; INTRAVENOUS AS NEEDED
Status: DISCONTINUED | OUTPATIENT
Start: 2021-10-19 | End: 2021-10-19 | Stop reason: HOSPADM

## 2021-10-19 RX ORDER — HEPARIN 100 UNIT/ML
SYRINGE INTRAVENOUS AS NEEDED
Status: DISCONTINUED | OUTPATIENT
Start: 2021-10-19 | End: 2021-10-19 | Stop reason: HOSPADM

## 2021-10-19 RX ORDER — PROPOFOL 10 MG/ML
INJECTION, EMULSION INTRAVENOUS AS NEEDED
Status: DISCONTINUED | OUTPATIENT
Start: 2021-10-19 | End: 2021-10-19 | Stop reason: HOSPADM

## 2021-10-19 RX ORDER — DEXAMETHASONE SODIUM PHOSPHATE 4 MG/ML
INJECTION, SOLUTION INTRA-ARTICULAR; INTRALESIONAL; INTRAMUSCULAR; INTRAVENOUS; SOFT TISSUE AS NEEDED
Status: DISCONTINUED | OUTPATIENT
Start: 2021-10-19 | End: 2021-10-19 | Stop reason: HOSPADM

## 2021-10-19 RX ORDER — EPHEDRINE SULFATE/0.9% NACL/PF 50 MG/5 ML
SYRINGE (ML) INTRAVENOUS AS NEEDED
Status: DISCONTINUED | OUTPATIENT
Start: 2021-10-19 | End: 2021-10-19 | Stop reason: HOSPADM

## 2021-10-19 RX ORDER — SODIUM CHLORIDE, SODIUM LACTATE, POTASSIUM CHLORIDE, CALCIUM CHLORIDE 600; 310; 30; 20 MG/100ML; MG/100ML; MG/100ML; MG/100ML
INJECTION, SOLUTION INTRAVENOUS
Status: DISCONTINUED | OUTPATIENT
Start: 2021-10-19 | End: 2021-10-19 | Stop reason: HOSPADM

## 2021-10-19 RX ORDER — FENTANYL CITRATE 50 UG/ML
INJECTION, SOLUTION INTRAMUSCULAR; INTRAVENOUS
Status: COMPLETED
Start: 2021-10-19 | End: 2021-10-19

## 2021-10-19 RX ORDER — LIDOCAINE HYDROCHLORIDE AND EPINEPHRINE 10; 10 MG/ML; UG/ML
30 INJECTION, SOLUTION INFILTRATION; PERINEURAL ONCE
Status: DISCONTINUED | OUTPATIENT
Start: 2021-10-19 | End: 2021-10-19 | Stop reason: HOSPADM

## 2021-10-19 RX ORDER — DEXMEDETOMIDINE HYDROCHLORIDE 100 UG/ML
INJECTION, SOLUTION INTRAVENOUS AS NEEDED
Status: DISCONTINUED | OUTPATIENT
Start: 2021-10-19 | End: 2021-10-19 | Stop reason: HOSPADM

## 2021-10-19 RX ORDER — ONDANSETRON 2 MG/ML
INJECTION INTRAMUSCULAR; INTRAVENOUS AS NEEDED
Status: DISCONTINUED | OUTPATIENT
Start: 2021-10-19 | End: 2021-10-19 | Stop reason: HOSPADM

## 2021-10-19 RX ORDER — HYDROCODONE BITARTRATE AND ACETAMINOPHEN 5; 325 MG/1; MG/1
1 TABLET ORAL
Qty: 15 TABLET | Refills: 0 | Status: SHIPPED | OUTPATIENT
Start: 2021-10-19 | End: 2021-10-26

## 2021-10-19 RX ORDER — HYDROCODONE BITARTRATE AND ACETAMINOPHEN 5; 325 MG/1; MG/1
TABLET ORAL
Status: COMPLETED
Start: 2021-10-19 | End: 2021-10-19

## 2021-10-19 RX ORDER — PROPOFOL 10 MG/ML
INJECTION, EMULSION INTRAVENOUS
Status: DISCONTINUED | OUTPATIENT
Start: 2021-10-19 | End: 2021-10-19 | Stop reason: HOSPADM

## 2021-10-19 RX ORDER — HYDROCODONE BITARTRATE AND ACETAMINOPHEN 5; 325 MG/1; MG/1
1 TABLET ORAL
Status: COMPLETED | OUTPATIENT
Start: 2021-10-19 | End: 2021-10-19

## 2021-10-19 RX ORDER — BUPIVACAINE HYDROCHLORIDE AND EPINEPHRINE 5; 5 MG/ML; UG/ML
30 INJECTION, SOLUTION EPIDURAL; INTRACAUDAL; PERINEURAL ONCE
Status: DISCONTINUED | OUTPATIENT
Start: 2021-10-19 | End: 2021-10-19 | Stop reason: HOSPADM

## 2021-10-19 RX ORDER — FENTANYL CITRATE 50 UG/ML
25 INJECTION, SOLUTION INTRAMUSCULAR; INTRAVENOUS
Status: DISCONTINUED | OUTPATIENT
Start: 2021-10-19 | End: 2021-10-19 | Stop reason: HOSPADM

## 2021-10-19 RX ORDER — FENTANYL CITRATE 50 UG/ML
INJECTION, SOLUTION INTRAMUSCULAR; INTRAVENOUS AS NEEDED
Status: DISCONTINUED | OUTPATIENT
Start: 2021-10-19 | End: 2021-10-19 | Stop reason: HOSPADM

## 2021-10-19 RX ADMIN — FENTANYL CITRATE 25 MCG: 50 INJECTION, SOLUTION INTRAMUSCULAR; INTRAVENOUS at 08:54

## 2021-10-19 RX ADMIN — Medication 10 MG: at 09:21

## 2021-10-19 RX ADMIN — DEXMEDETOMIDINE HYDROCHLORIDE 4 MCG: 100 INJECTION, SOLUTION, CONCENTRATE INTRAVENOUS at 08:52

## 2021-10-19 RX ADMIN — ONDANSETRON HYDROCHLORIDE 4 MG: 2 INJECTION, SOLUTION INTRAMUSCULAR; INTRAVENOUS at 09:10

## 2021-10-19 RX ADMIN — DEXMEDETOMIDINE HYDROCHLORIDE 8 MCG: 100 INJECTION, SOLUTION, CONCENTRATE INTRAVENOUS at 08:50

## 2021-10-19 RX ADMIN — PROPOFOL 75 MCG/KG/MIN: 10 INJECTION, EMULSION INTRAVENOUS at 08:55

## 2021-10-19 RX ADMIN — FENTANYL CITRATE 25 MCG: 50 INJECTION, SOLUTION INTRAMUSCULAR; INTRAVENOUS at 09:13

## 2021-10-19 RX ADMIN — HYDROCODONE BITARTRATE AND ACETAMINOPHEN 1 TABLET: 5; 325 TABLET ORAL at 11:24

## 2021-10-19 RX ADMIN — WATER 2 G: 1 INJECTION INTRAMUSCULAR; INTRAVENOUS; SUBCUTANEOUS at 08:59

## 2021-10-19 RX ADMIN — FENTANYL CITRATE 25 MCG: 50 INJECTION, SOLUTION INTRAMUSCULAR; INTRAVENOUS at 08:58

## 2021-10-19 RX ADMIN — DEXAMETHASONE SODIUM PHOSPHATE 4 MG: 4 INJECTION, SOLUTION INTRAMUSCULAR; INTRAVENOUS at 09:10

## 2021-10-19 RX ADMIN — FENTANYL CITRATE 25 MCG: 50 INJECTION INTRAMUSCULAR; INTRAVENOUS at 11:22

## 2021-10-19 RX ADMIN — PROPOFOL 40 MG: 10 INJECTION, EMULSION INTRAVENOUS at 08:58

## 2021-10-19 RX ADMIN — MIDAZOLAM 2 MG: 1 INJECTION INTRAMUSCULAR; INTRAVENOUS at 08:47

## 2021-10-19 RX ADMIN — FENTANYL CITRATE 25 MCG: 50 INJECTION INTRAMUSCULAR; INTRAVENOUS at 11:16

## 2021-10-19 RX ADMIN — SODIUM CHLORIDE, POTASSIUM CHLORIDE, SODIUM LACTATE AND CALCIUM CHLORIDE: 600; 310; 30; 20 INJECTION, SOLUTION INTRAVENOUS at 08:46

## 2021-10-19 RX ADMIN — PROPOFOL 40 MG: 10 INJECTION, EMULSION INTRAVENOUS at 08:55

## 2021-10-19 RX ADMIN — DEXMEDETOMIDINE HYDROCHLORIDE 4 MCG: 100 INJECTION, SOLUTION, CONCENTRATE INTRAVENOUS at 08:54

## 2021-10-19 RX ADMIN — FENTANYL CITRATE 25 MCG: 50 INJECTION, SOLUTION INTRAMUSCULAR; INTRAVENOUS at 08:56

## 2021-10-19 RX ADMIN — DEXMEDETOMIDINE HYDROCHLORIDE 4 MCG: 100 INJECTION, SOLUTION, CONCENTRATE INTRAVENOUS at 08:56

## 2021-10-19 NOTE — H&P
HISTORY OF PRESENT ILLNESS  Amita Cabrera is a 61 y.o. female. HPI  ESTABLISHED patient here for follow up for LEFT breast abscess that is not healing. She states that the area is painful and has not improved since visit with Christina Thompson NP on 9-14-21. She would like to discuss surgical options.      Breast history -  Referring - Negar Shelton MD  8/24/21 - LEFT breast abscess - Aspiration and course of Bactrim   9/3/21 - LEFT breast abscess - I&D         No past medical history on file.     No past surgical history on file.     Social History            Socioeconomic History    Marital status:        Spouse name: Not on file    Number of children: Not on file    Years of education: Not on file    Highest education level: Not on file   Occupational History    Not on file   Tobacco Use    Smoking status: Never Smoker    Smokeless tobacco: Never Used   Substance and Sexual Activity    Alcohol use: Not on file    Drug use: Not on file    Sexual activity: Not on file   Other Topics Concern    Not on file   Social History Narrative    Not on file      Social Determinants of Health          Financial Resource Strain:     Difficulty of Paying Living Expenses:    Food Insecurity:     Worried About Running Out of Food in the Last Year:     Ran Out of Food in the Last Year:    Transportation Needs:     Lack of Transportation (Medical):      Lack of Transportation (Non-Medical):    Physical Activity:     Days of Exercise per Week:     Minutes of Exercise per Session:    Stress:     Feeling of Stress :    Social Connections:     Frequency of Communication with Friends and Family:     Frequency of Social Gatherings with Friends and Family:     Attends Christianity Services:     Active Member of Clubs or Organizations:     Attends Club or Organization Meetings:     Marital Status:    Intimate Partner Violence:     Fear of Current or Ex-Partner:     Emotionally Abused:     Physically Abused:     Sexually Abused:                 Current Outpatient Medications on File Prior to Visit   Medication Sig Dispense Refill    trimethoprim-sulfamethoxazole (BACTRIM DS, SEPTRA DS) 160-800 mg per tablet            No current facility-administered medications on file prior to visit.         No Known Allergies     OB History    No obstetric history on file.       Obstetric Comments   Menarche 14, LMP 2018, # of children 2, age of 1st delivery 33, Hysterectomy/oophorectomy no/no, Breast bx no, history of breast feeding yes, BCP no, Hormone therapy no                ROS     Physical Exam  Exam conducted with a chaperone present. Cardiovascular:      Rate and Rhythm: Normal rate and regular rhythm. Heart sounds: Normal heart sounds. Pulmonary:      Breath sounds: Normal breath sounds. Chest:      Breasts: Breasts are symmetrical.         Right: Normal. No swelling, bleeding, inverted nipple, mass, nipple discharge, skin change or tenderness. Left: Mass and tenderness present. No swelling, bleeding, inverted nipple, nipple discharge or skin change. Lymphadenopathy:      Cervical:      Right cervical: No superficial, deep or posterior cervical adenopathy. Left cervical: No superficial, deep or posterior cervical adenopathy. Upper Body:      Right upper body: No supraclavicular or axillary adenopathy. Left upper body: No supraclavicular or axillary adenopathy.       BREAST ULTRASOUND  Indication: Superficial fluctuant area at 3:00 lateral LEFT breast, with draining sinus in the center  Technique: The area was scanned using a high-frequency linear-array near-field transducer  Findings: What appears to be a sold mass that is bigger than the abscess cavity  Impression: Breast abscess or suspicious mass  Disposition: Aspiration        Abscess Aspiration  US Guided  Indication: LEFT breast abscess, 3:00  Findings: We used Ultrasound for Lesion location and guidance for the procedure.   Prep: We cleaned the skin with alcohol. Anesthesia: We anesthetized with Xylocaine. Guidance: We used Ultrasound for guidance. Aspiration: We advanced an 18 gauge needle into the fluid collection. Yield: Nil. Disposition: Ultrasound-guided biopsy performed        US-GUIDED CORE BIOPSY  Following detailed explanation and description of the biopsy procedure, its risks, benefits and possible alternatives, the patient signed the informed consent. Indication: Mass, Ultrasound Visible, LEFT breast 3:00   Prep: We cleansed the skin with alcohol. Anesthesia: We anesthetized the skin and underlying tissues with 1% lidocaine with epinephrine.      Device: We advanced the BARD Marquee device through the lesion and captured tissue with real-time ultrasound confirmation. Core Sampling: We repeated this sampling for the following number of cores, 3. Marker: We placed a marking clip to godwin the biopsy site. Marker Type: HydroMARK. Dressing: We then closed the incision with steristrips and placed a sterile dressing. Instructions: The patient was instructed regarding post-procedure care and activities. Pathology: Pending at this time.      Patient tolerated procedure well and discharged in stable condition. Informed patient that they will be notified of pathology results in 3 to 5 days.        ASSESSMENT and PLAN      ICD-10-CM ICD-9-CM     1. Abnormal ultrasound of breast  R92.8 793.89 SURGICAL PATHOLOGY   2. Abscess of breast, left  N61.1 611.0 HYDROcodone-acetaminophen (NORCO) 5-325 mg per tablet   3. Left breast mass  N63.20 611.72 SURGICAL PATHOLOGY      Patient presents to follow up on LEFT breast abscess, and is doing well overall. Tender, palpable superficial fluctuant area on exam at 3:00 lateral LEFT breast, with draining sinus in the center. LEFT breast US visualizes what appears to be a sold mass that is bigger than the abscess cavity.     Discussed aspiration, and pt elected to proceed.  No fluid aspirated, so we discussed bx of this mass and pt agreed to proceed. She tolerated the procedure well, and 3 cores were taken, HydroMARK marker placed.     Prescriptions sent for Augmentin and Lortab. Pt may have her COVID-19 vaccine in the RIGHT arm. Will send bx specimens for PATH and f/u with results and for further planning. This plan was reviewed with the patient and patient agrees. All questions were answered.

## 2021-10-19 NOTE — TELEPHONE ENCOUNTER
Returned call to [facility], [phone]. Spoke with [employer]. Patient ID verified X 2. Supplied ICD-10 code of C 50.912 per request.  Understanding verbalized. Update to Provider.

## 2021-10-19 NOTE — ANESTHESIA POSTPROCEDURE EVALUATION
Procedure(s):  RIGHT CHEST PORTACATH INSERTION AND LEFT AXILLARY LYMPH NODE CORE BIOPSY. MAC    <BSHSIANPOST>    INITIAL Post-op Vital signs:   Vitals Value Taken Time   BP     Temp     Pulse     Resp 17 10/19/21 0953   SpO2 96 % 10/19/21 0953   Vitals shown include unvalidated device data.

## 2021-10-19 NOTE — PERIOP NOTES
WAITING ON RESULTS OF XRAY. PATIENT GETTING RESTLESS AND WANTED TO GET DRESSED SO I TOOK HER OFF MONITOR AND HEPLOCKED HER IV AND LET HER CHANGE.

## 2021-10-19 NOTE — ROUTINE PROCESS
Patient: Bambi Chang MRN: [id number]  SSN: [id number]   YOB: 1965  Age: 61 y.o. Sex: female     Patient is status post Procedure(s):  RIGHT CHEST PORTACATH INSERTION AND LEFT AXILLARY LYMPH NODE CORE BIOPSY.     Surgeon(s) and Role:     * Jason Carter MD - Primary    Local/Dose/Irrigation:  SEE INTRAOP MEDS                Venous Access Device power port 8fr implantable 10/19/21 Upper chest (subclavicular area, right (Active)      Peripheral IV 10/19/21 Right Hand (Active)   Site Assessment Clean, dry, & intact 10/19/21 0809   Phlebitis Assessment 0 10/19/21 0809   Dressing Status Clean, dry, & intact 10/19/21 0809   Dressing Type Transparent 10/19/21 0809   Hub Color/Line Status Infusing 10/19/21 0809   Alcohol Cap Used Yes 10/19/21 0809                           Dressing/Packing:  Incision 10/19/21 Chest Right-Dressing/Treatment:  (DERMABOND) (10/19/21 0900)  Incision 10/19/21 Axilla Left-Dressing/Treatment:  (DERMABOND) (10/19/21 0900) Consent: Written consent was obtained and risks were reviewed including but not limited to scarring, infection, bleeding, scabbing, incomplete removal, nerve damage and allergy to anesthesia.

## 2021-10-19 NOTE — OP NOTES
[address]   OPERATIVE REPORT    Name:  Brandon Quintero  MR#:  [id number]  :  1965  ACCOUNT #:  [de-identified]  DATE OF SERVICE:  10/19/2021      PREOPERATIVE DIAGNOSES:  1. Carcinoma of the left breast with need for neoadjuvant chemotherapy. 2.  Abnormal MRI with left axillary adenopathy. POSTOPERATIVE DIAGNOSES:  1. Carcinoma of the left breast with need for neoadjuvant chemotherapy. 2.  Abnormal MRI with left axillary adenopathy. PROCEDURES PERFORMED:  Insertion of venous Port-A-Cath for neoadjuvant chemotherapy and ultrasound-guided core biopsy of left axillary lymph node. SURGEON:  Eve Alvarez MD    ASSISTANT:  OR staff. ANESTHESIA:  Local standby. COMPLICATIONS:  None. SPECIMENS REMOVED:  Left axillary lymph node core biopsy. IMPLANTS:  None. ESTIMATED BLOOD LOSS:  Minimal.    INDICATIONS:  The patient is a 13-year-old female who has a locally advanced carcinoma of the left breast with need for neoadjuvant chemotherapy and an abnormal lymph node on MRI. PROCEDURE:  After adequate IV sedation, sterile prep and drape, local anesthesia with 1% lidocaine mixed with 0.5% Marcaine, attention was turned to left axilla where an intraoperative ultrasound was performed. Pathologic lymph node was identified in the axilla and using ultrasound guidance, 3 core biopsies were taken with Bard Marquee device. HydroMARK clip was placed in the lymph node and the specimen was sent to Pathology. The stick site was closed with Dermabond and was hemostatic with direct pressure. Attention was then turned to the right side where the patient was prepped and draped and the right subclavian vein was cannulated on the first pass after anesthesia with 0.5% Marcaine and lidocaine. Wire was passed into superior vena cava and position confirmed fluoroscopy. An anterior chest wall pocket was made.   The 8-Iraqi PowerPort was tunneled from the chest wall pocket to the original stick site, cut to length using a combination of dilator breakaway sheath. Catheter was placed in the superior vena cava and position again confirmed fluoroscopy. The catheter aspirated easily and was flushed with heparinized saline and final Hep-Lock flush. The stick site was closed with a single U stitch of 4-0 Monocryl and the skin was closed with interrupted 3-0 Vicryl and a running subcuticular 4-0 Monocryl on the skin. The patient tolerated the procedure well. No immediate complications. She was taken to recovery room in stable condition.         Remy Robertson MD      [id number]  D:  10/19/2021 9:48  T:  10/19/2021 12:46  JOB #:  [id number]  CC:  Marilu Marine, MD Jayme Apley, DO

## 2021-10-19 NOTE — DISCHARGE INSTRUCTIONS
Discharge Instructions from Dr. Vinayak Evans    · I will call you with the pathology results, typically within 1 week from today. · You may shower, but no hot tubs, swimming pools, or baths until your incision is healed. · No heavy lifting with the affected extremity (nothing greater than 5 pounds), and limit its use for the next 4-5 days. · You may use an ice pack for comfort for the next couple of days, but do not place ice directly on the skin. Rather, use a towel or clothing to serve as a barrier between skin and ice to prevent injury. · If I placed a drain, follow the drain instructions provided, especially as you keep a record of the drain output. · Follow medication instructions carefully. · Watch for signs of infection as listed below. · Redness  · Swelling  · Drainage from the incision or from your nipple that appears infected  · Fever over 101 degrees for consecutive readings, or over 99.5 if you are currently undergoing chemotherapy. · Call our office (number is below) for a follow-up appointment. · If you have any problems, our phone number is [phone].    ______________________________________________________________________    Anesthesia Discharge Instructions    After general anesthesia or intervenous sedation, for 24 hours or while taking prescription Narcotics:  · Limit your activities  · Do not drive or operate hazardous machinery  · If you have not urinated within 8 hours after discharge, please contact your surgeon on call.   · Do not make important personal or business decisions  · Do not drink alcoholic beverages    Report the following to your surgeon:  · Excessive pain, swelling, redness or odor of or around the surgical area  · Temperature over 100.5 degrees  · Nausea and vomiting lasting longer than 4 hours or if unable to take medication  · Any signs of decreased circulation or nerve impairment to extremity:  Change in color, persistent numbness, tingling, coldness or increased pain.   · Any questions

## 2021-10-19 NOTE — TELEPHONE ENCOUNTER
[facility] called needing a DX for her wig. Nothing listed on the RX that was sent.        # [phone]

## 2021-10-20 ENCOUNTER — HOSPITAL ENCOUNTER (OUTPATIENT)
Dept: INFUSION THERAPY | Age: 56
Discharge: HOME | End: 2021-10-20
Payer: COMMERCIAL

## 2021-10-20 ENCOUNTER — TELEPHONE (OUTPATIENT)
Dept: SURGERY | Age: 56
End: 2021-10-20

## 2021-10-20 ENCOUNTER — TELEPHONE (OUTPATIENT)
Dept: ONCOLOGY | Age: 56
End: 2021-10-20

## 2021-10-20 VITALS
DIASTOLIC BLOOD PRESSURE: 86 MMHG | RESPIRATION RATE: 18 BRPM | HEART RATE: 81 BPM | SYSTOLIC BLOOD PRESSURE: 150 MMHG | TEMPERATURE: 97.1 F | OXYGEN SATURATION: 97 %

## 2021-10-20 LAB
ALBUMIN SERPL-MCNC: 3.6 G/DL (ref 3.5–5)
ALBUMIN/GLOB SERPL: 0.8 {RATIO} (ref 1.1–2.2)
ALP SERPL-CCNC: 113 U/L (ref 45–117)
ALT SERPL-CCNC: 41 U/L (ref 12–78)
ANION GAP SERPL CALC-SCNC: 5 MMOL/L (ref 5–15)
AST SERPL-CCNC: 28 U/L (ref 15–37)
BASOPHILS # BLD: 0 K/UL (ref 0–0.1)
BASOPHILS NFR BLD: 0 % (ref 0–1)
BILIRUB SERPL-MCNC: 0.5 MG/DL (ref 0.2–1)
BUN SERPL-MCNC: 14 MG/DL (ref 6–20)
BUN/CREAT SERPL: 21 (ref 12–20)
CALCIUM SERPL-MCNC: 9.4 MG/DL (ref 8.5–10.1)
CHLORIDE SERPL-SCNC: 104 MMOL/L (ref 97–108)
CO2 SERPL-SCNC: 26 MMOL/L (ref 21–32)
CREAT SERPL-MCNC: 0.66 MG/DL (ref 0.55–1.02)
DIFFERENTIAL METHOD BLD: ABNORMAL
EOSINOPHIL # BLD: 0 K/UL (ref 0–0.4)
EOSINOPHIL NFR BLD: 0 % (ref 0–7)
ERYTHROCYTE [DISTWIDTH] IN BLOOD BY AUTOMATED COUNT: 12.8 % (ref 11.5–14.5)
GLOBULIN SER CALC-MCNC: 4.7 G/DL (ref 2–4)
GLUCOSE SERPL-MCNC: 141 MG/DL (ref 65–100)
HBV SURFACE AB SER QL: NONREACTIVE
HBV SURFACE AB SER-ACNC: <3.1 MIU/ML
HBV SURFACE AG SER QL: 0.5 INDEX
HBV SURFACE AG SER QL: NEGATIVE
HCT VFR BLD AUTO: 36.5 % (ref 35–47)
HGB BLD-MCNC: 12.6 G/DL (ref 11.5–16)
IMM GRANULOCYTES # BLD AUTO: 0.1 K/UL (ref 0–0.04)
IMM GRANULOCYTES NFR BLD AUTO: 1 % (ref 0–0.5)
LYMPHOCYTES # BLD: 1.1 K/UL (ref 0.8–3.5)
LYMPHOCYTES NFR BLD: 11 % (ref 12–49)
MCH RBC QN AUTO: 31.8 PG (ref 26–34)
MCHC RBC AUTO-ENTMCNC: 34.5 G/DL (ref 30–36.5)
MCV RBC AUTO: 92.2 FL (ref 80–99)
MONOCYTES # BLD: 0.1 K/UL (ref 0–1)
MONOCYTES NFR BLD: 1 % (ref 5–13)
NEUTS SEG # BLD: 9.1 K/UL (ref 1.8–8)
NEUTS SEG NFR BLD: 87 % (ref 32–75)
NRBC # BLD: 0 K/UL (ref 0–0.01)
NRBC BLD-RTO: 0 PER 100 WBC
PLATELET # BLD AUTO: 188 K/UL (ref 150–400)
PMV BLD AUTO: 10.5 FL (ref 8.9–12.9)
POTASSIUM SERPL-SCNC: 4 MMOL/L (ref 3.5–5.1)
PROT SERPL-MCNC: 8.3 G/DL (ref 6.4–8.2)
RBC # BLD AUTO: 3.96 M/UL (ref 3.8–5.2)
SODIUM SERPL-SCNC: 135 MMOL/L (ref 136–145)
WBC # BLD AUTO: 10.4 K/UL (ref 3.6–11)

## 2021-10-20 PROCEDURE — 86704 HEP B CORE ANTIBODY TOTAL: CPT

## 2021-10-20 PROCEDURE — 87340 HEPATITIS B SURFACE AG IA: CPT

## 2021-10-20 PROCEDURE — 86706 HEP B SURFACE ANTIBODY: CPT

## 2021-10-20 PROCEDURE — 36415 COLL VENOUS BLD VENIPUNCTURE: CPT

## 2021-10-20 PROCEDURE — 85025 COMPLETE CBC W/AUTO DIFF WBC: CPT

## 2021-10-20 PROCEDURE — 80053 COMPREHEN METABOLIC PANEL: CPT

## 2021-10-20 NOTE — PROGRESS NOTES
[facility] Lab Visit:        1600:  Pt arrived ambulatory to [facility] in stable condition, for lab draw. Labs drawn peripherally from Right AC arm and sent for processing. Pt tolerated well. Visit Vitals  BP (!) 150/86 (BP 1 Location: Right upper arm, BP Patient Position: Sitting)   Pulse 81   Temp 97.1 °F (36.2 °C)   Resp 18   SpO2 97%       1615: No new concerns voiced. D/cd home ambulatory in no distress. Pt aware of next [facility] appointment scheduled. Labs available in CC once resulted.

## 2021-10-20 NOTE — TELEPHONE ENCOUNTER
Call to patient, left VM to contact RN ASAP. Contact info supplied. [id number] Follow up call to patient, ID verified X 2. Provider message relayed that treatment on hold until ECHO completed, has ECHO scheduled for 11/1, but understands must complete prior to start of therapy. Understandably upset. Also had prechemo labs done today. Call to JACQUELINE Domingo in [facility], able to schedule patient at [facility] for 10/22/21 at 0900. She will contact [facility] ECHO on 10/21 to see if scan can be moved there. Updated patient with appt for ECHO, location of [facility]. Understands will try to move scan to [facility] and that chemo will be moved. Notified [facility] Charge YRIS Baca RN.

## 2021-10-21 ENCOUNTER — HOSPITAL ENCOUNTER (OUTPATIENT)
Dept: NON INVASIVE DIAGNOSTICS | Age: 56
Discharge: HOME | End: 2021-10-21
Attending: NURSE PRACTITIONER
Payer: COMMERCIAL

## 2021-10-21 ENCOUNTER — TELEPHONE (OUTPATIENT)
Dept: MRI IMAGING | Age: 56
End: 2021-10-21

## 2021-10-21 ENCOUNTER — HOSPITAL ENCOUNTER (OUTPATIENT)
Dept: INFUSION THERAPY | Age: 56
End: 2021-10-21

## 2021-10-21 DIAGNOSIS — Z79.899 ENCOUNTER FOR MONITORING CARDIOTOXIC DRUG THERAPY: ICD-10-CM

## 2021-10-21 DIAGNOSIS — Z51.81 ENCOUNTER FOR MONITORING CARDIOTOXIC DRUG THERAPY: ICD-10-CM

## 2021-10-21 DIAGNOSIS — C50.912 MALIGNANT NEOPLASM OF LEFT BREAST IN FEMALE, ESTROGEN RECEPTOR NEGATIVE, UNSPECIFIED SITE OF BREAST (HCC): Primary | ICD-10-CM

## 2021-10-21 DIAGNOSIS — C50.912 MALIGNANT NEOPLASM OF LEFT BREAST IN FEMALE, ESTROGEN RECEPTOR NEGATIVE, UNSPECIFIED SITE OF BREAST (HCC): ICD-10-CM

## 2021-10-21 DIAGNOSIS — Z17.1 MALIGNANT NEOPLASM OF LEFT BREAST IN FEMALE, ESTROGEN RECEPTOR NEGATIVE, UNSPECIFIED SITE OF BREAST (HCC): ICD-10-CM

## 2021-10-21 DIAGNOSIS — Z17.1 MALIGNANT NEOPLASM OF LEFT BREAST IN FEMALE, ESTROGEN RECEPTOR NEGATIVE, UNSPECIFIED SITE OF BREAST (HCC): Primary | ICD-10-CM

## 2021-10-21 LAB
ECHO AO ROOT DIAM: 3.41 CM
ECHO AV AREA PEAK VELOCITY: 2.1 CM2
ECHO AV PEAK GRADIENT: 8.93 MMHG
ECHO AV PEAK VELOCITY: 149.42 CM/S
ECHO EST RA PRESSURE: 5 MMHG
ECHO LA AREA 4C: 20.06 CM2
ECHO LA MAJOR AXIS: 3.86 CM
ECHO LA VOL 2C: 82.01 ML (ref 22–52)
ECHO LA VOL 4C: 56.1 ML (ref 22–52)
ECHO LA VOL BP: 76.36 ML (ref 22–52)
ECHO LV INTERNAL DIMENSION DIASTOLIC: 3.94 CM (ref 3.9–5.3)
ECHO LV INTERNAL DIMENSION SYSTOLIC: 2.52 CM
ECHO LV IVSD: 1.07 CM (ref 0.6–0.9)
ECHO LV MASS 2D: 124.1 G (ref 67–162)
ECHO LV POSTERIOR WALL DIASTOLIC: 0.93 CM (ref 0.6–0.9)
ECHO LVOT DIAM: 1.82 CM
ECHO LVOT PEAK GRADIENT: 5.77 MMHG
ECHO LVOT PEAK VELOCITY: 120.13 CM/S
ECHO MV A VELOCITY: 78.77 CM/S
ECHO MV E DECELERATION TIME (DT): 152.75 MS
ECHO MV E VELOCITY: 67.13 CM/S
ECHO MV E/A RATIO: 0.85
ECHO PV PEAK INSTANTANEOUS GRADIENT SYSTOLIC: 2.83 MMHG
ECHO RIGHT VENTRICULAR SYSTOLIC PRESSURE (RVSP): 27.48 MMHG
ECHO RV TAPSE: 2.63 CM (ref 1.5–2)
ECHO TV REGURGITANT MAX VELOCITY: 237.09 CM/S
ECHO TV REGURGITANT PEAK GRADIENT: 22.48 MMHG
GLOBAL LONGITUDINAL STRAIN 4 CHAMBER: -19.4 PERCENT
GLOBAL LONGITUDINAL STRAIN LONG AXIS: -15.4 PERCENT
HBV CORE AB SERPL QL IA: NEGATIVE
HBV CORE AB SERPL QL IA: NEGATIVE
HBV CORE IGM SERPL QL IA: NEGATIVE
LA VOL DISK BP: 70.11 ML (ref 22–52)

## 2021-10-21 PROCEDURE — 93306 TTE W/DOPPLER COMPLETE: CPT

## 2021-10-21 PROCEDURE — 93306 TTE W/DOPPLER COMPLETE: CPT | Performed by: SPECIALIST

## 2021-10-21 RX ORDER — DIPHENHYDRAMINE HYDROCHLORIDE 50 MG/ML
50 INJECTION, SOLUTION INTRAMUSCULAR; INTRAVENOUS AS NEEDED
Status: CANCELLED
Start: 2021-10-25

## 2021-10-21 RX ORDER — ONDANSETRON 2 MG/ML
8 INJECTION INTRAMUSCULAR; INTRAVENOUS AS NEEDED
Status: CANCELLED | OUTPATIENT
Start: 2021-10-25

## 2021-10-21 RX ORDER — SODIUM CHLORIDE 9 MG/ML
25 INJECTION, SOLUTION INTRAVENOUS CONTINUOUS
Status: CANCELLED | OUTPATIENT
Start: 2021-10-25

## 2021-10-21 RX ORDER — DIPHENHYDRAMINE HYDROCHLORIDE 50 MG/ML
25 INJECTION, SOLUTION INTRAMUSCULAR; INTRAVENOUS AS NEEDED
Status: CANCELLED
Start: 2021-10-25

## 2021-10-21 RX ORDER — ACETAMINOPHEN 325 MG/1
650 TABLET ORAL AS NEEDED
Status: CANCELLED
Start: 2021-10-25

## 2021-10-21 RX ORDER — PALONOSETRON 0.05 MG/ML
0.25 INJECTION, SOLUTION INTRAVENOUS ONCE
Status: CANCELLED | OUTPATIENT
Start: 2021-10-25 | End: 2021-10-21

## 2021-10-21 RX ORDER — ALBUTEROL SULFATE 0.83 MG/ML
2.5 SOLUTION RESPIRATORY (INHALATION) AS NEEDED
Status: CANCELLED
Start: 2021-10-25

## 2021-10-21 RX ORDER — SODIUM CHLORIDE 0.9 % (FLUSH) 0.9 %
10 SYRINGE (ML) INJECTION AS NEEDED
Status: CANCELLED | OUTPATIENT
Start: 2021-10-25

## 2021-10-21 RX ORDER — HEPARIN 100 UNIT/ML
300-500 SYRINGE INTRAVENOUS AS NEEDED
Status: CANCELLED
Start: 2021-10-25

## 2021-10-21 RX ORDER — SODIUM CHLORIDE 9 MG/ML
10 INJECTION INTRAMUSCULAR; INTRAVENOUS; SUBCUTANEOUS AS NEEDED
Status: CANCELLED | OUTPATIENT
Start: 2021-10-25

## 2021-10-21 RX ORDER — DEXAMETHASONE SODIUM PHOSPHATE 100 MG/10ML
10 INJECTION INTRAMUSCULAR; INTRAVENOUS ONCE
Status: CANCELLED | OUTPATIENT
Start: 2021-10-25 | End: 2021-10-21

## 2021-10-21 RX ORDER — EPINEPHRINE 1 MG/ML
0.3 INJECTION, SOLUTION, CONCENTRATE INTRAVENOUS AS NEEDED
Status: CANCELLED | OUTPATIENT
Start: 2021-10-25

## 2021-10-21 RX ORDER — HYDROCORTISONE SODIUM SUCCINATE 100 MG/2ML
100 INJECTION, POWDER, FOR SOLUTION INTRAMUSCULAR; INTRAVENOUS AS NEEDED
Status: CANCELLED | OUTPATIENT
Start: 2021-10-25

## 2021-10-22 ENCOUNTER — PATIENT MESSAGE (OUTPATIENT)
Dept: SURGERY | Age: 56
End: 2021-10-22

## 2021-10-22 ENCOUNTER — TELEPHONE (OUTPATIENT)
Dept: CARDIOLOGY CLINIC | Age: 56
End: 2021-10-22

## 2021-10-22 DIAGNOSIS — R93.1 ABNORMAL ECHOCARDIOGRAM: ICD-10-CM

## 2021-10-22 DIAGNOSIS — Z17.1 MALIGNANT NEOPLASM OF LEFT BREAST IN FEMALE, ESTROGEN RECEPTOR NEGATIVE, UNSPECIFIED SITE OF BREAST (HCC): Primary | ICD-10-CM

## 2021-10-22 DIAGNOSIS — C50.912 MALIGNANT NEOPLASM OF LEFT BREAST IN FEMALE, ESTROGEN RECEPTOR NEGATIVE, UNSPECIFIED SITE OF BREAST (HCC): Primary | ICD-10-CM

## 2021-10-22 NOTE — TELEPHONE ENCOUNTER
10/21/21    ECHO ADULT COMPLETE 10/21/2021 10/21/2021    Interpretation Summary  · LV: Estimated LVEF is 55 - 60%. Normal cavity size, wall thickness and systolic function (ejection fraction normal). Wall motion: normal. Abnormal left ventricular strain. Global longitudinal strain is -15.4%. · TV: Pulmonary hypertension not suggested by Doppler findings.     Signed by: Herberth Daigle MD on 10/21/2021  8:32 PM

## 2021-10-22 NOTE — PROGRESS NOTES
[facility] [place] [address], [address], [place]: [phone]  F: [phone]    Reason for Visit:   Tyrel Zhou is a 61 y.o. female seen today in office for follow up of Left Breast Cancer. Treatment History:   · LEFT Breast Biopsy 10/6/21: PATH - Invasive ductal carcinoma, favor grade 3  · ER/[place] negative, HER2+  · Breast MRI 10/13/21: RIGHT BREAST: Background parenchymal enhancement: Mild. There is no suspicious masslike or nonmasslike enhancement within the right breast to indicate breast carcinoma. There are no suspicious internal mammary or axillary chain lymph nodes. LEFT BREAST: Background parenchymal enhancement: Mild There is a rounded mass with central fluid attenuation/necrosis in the lateral aspect of the left breast, deep 3rd, measuring approximately 3.3 cm in diameter with enhancement extending along the adjacent dermis suggesting skin invasion. There is nonmasslike enhancement extending both inferior, medial and anterior to the primary mass over approximately 6 x 5.5 x 4 cm. There are discontinuous areas of fluid attenuation associated with the nonmasslike enhancement which may represent cystic spaces and/or necrosis. There is an enlarged, left axillary lymph node with other adjacent, prominent lymph nodes. There is a lymph node node deep to the pectoralis minor muscle which measures 1.1 x 0.7 cm  · Left Axillary LN Biopsy 10/19/21: PATH - Metastatic breast cancer, measuring up to 17 mm in a single core  · ER/WY negative, HER2+  · Neoadjuvant TCHP 10/21/21 -    STAGE: Clinical 2 ER/WY negative Her2+    History of Present Illness:   Tyrel Zhou is a 61 y.o. female seen today in office for follow up of new left breast cancer ER/WY negative Her2 + post biopsy. She had a port placed on 10/19/21 without issues. She had left axillary LN biopsy on 10/19/21 and path showed metastatic beast cancer, same markers.  She is here today for Cycle 1 of neoadjuvant TCHP chemotherapy. She reports that she feels well overall today. She continues to have some breast pain and bruising from biopies. She denies fever, chills, mouth sores, cough, SOB, CP, nausea, vomiting, diarrhea, and constipation. She has a history of COVID-19 infection and is due for second dose of vaccine soon. She will have another MRI biopsy on 10/27/21. CBC and CMP were stable/good on 10/20/21. She is ready to start chemo today. Her supportive  is here today. Past Medical History:   Diagnosis Date    Malignant neoplasm of left breast in female, estrogen receptor negative ([facility] [address] 10/13/2021      History reviewed. No pertinent surgical history. Social History     Tobacco Use    Smoking status: Never Smoker    Smokeless tobacco: Never Used   Substance Use Topics    Alcohol use: Not on file      History reviewed. No pertinent family history. Current Outpatient Medications   Medication Sig    lidocaine-prilocaine (EMLA) topical cream Apply  to affected area as needed for Pain. Apply to port-a-cath site 30-60 minutes prior to chemo    dexAMETHasone (DECADRON) 4 mg tablet Take 2 tabs (8mg) by mouth twice daily the day before chemotherapy and the day after chemotherapy    HYDROcodone-acetaminophen (NORCO) 5-325 mg per tablet Take 1 Tablet by mouth every four (4) hours as needed for Pain for up to 7 days. Max Daily Amount: 6 Tablets.  ondansetron (ZOFRAN ODT) 4 mg disintegrating tablet Take 1-2 Tablets by mouth every eight (8) hours as needed for Nausea or Vomiting. (Patient not taking: Reported on 10/19/2021)    prochlorperazine (Compazine) 5 mg tablet Take 1 tab by mouth every 6 hours as needed for nausea or vomiting (Patient not taking: Reported on 10/19/2021)    CRANIAL PROSTHESIS misc Alopecia due to chemo/ wig (Patient not taking: Reported on 10/19/2021)    amoxicillin-clavulanate (AUGMENTIN) 875-125 mg per tablet Take 1 Tablet by mouth two (2) times a day.     trimethoprim-sulfamethoxazole (BACTRIM DS, SEPTRA DS) 160-800 mg per tablet  (Patient not taking: Reported on 10/25/2021)     No current facility-administered medications for this visit. Facility-Administered Medications Ordered in Other Visits   Medication Dose Route Frequency    0.9% sodium chloride infusion  25 mL/hr IntraVENous CONTINUOUS    pertuzumab 1,200 mg-trastuzumab 600 mg-hyaluronidase-zzxf 30,000 units/15 mL  15 mL SubCUTAneous ONCE    fosaprepitant (EMEND) 150 mg in 0.9% sodium chloride 150 mL IVPB  150 mg IntraVENous ONCE    dexamethasone (PF) (DECADRON) 10 mg/mL injection 10 mg  10 mg IntraVENous ONCE    palonosetron HCl (ALOXI) injection 0.25 mg  0.25 mg IntraVENous ONCE    DOCEtaxeL (TAXOTERE) 140 mg in 0.9% sodium chloride 250 mL, overfill volume 25 mL chemo infusion  75 mg/m2 (Treatment Plan Recorded) IntraVENous ONCE    CARBOplatin (PARAPLATIN) 817 mg in 0.9% sodium chloride 250 mL, overfill volume 25 mL chemo infusion  817 mg IntraVENous ONCE    pegfilgrastim (NEULASTA) wearable SQ injector 6 mg  6 mg SubCUTAneous ONCE    sodium chloride (NS) flush 10 mL  10 mL IntraVENous PRN    0.9% sodium chloride injection 10 mL  10 mL IntraVENous PRN    heparin (porcine) pf 300-500 Units  300-500 Units InterCATHeter PRN      No Known Allergies     Review of Systems:  A complete review of systems was obtained, negative except as described above and as reported on ROS sheet scanned into system.      Physical Exam:     Visit Vitals  /75 (BP 1 Location: Left upper arm, BP Patient Position: Sitting)   Pulse (!) 105   Temp 97.3 °F (36.3 °C) (Temporal)   Ht 5' 6\" (1.676 m)   Wt 167 lb 1.6 oz (75.8 kg)   BMI 26.97 kg/m²     ECOG PS: 0  General: No distress  Eyes: Anicteric sclerae  HENT: Atraumatic, wearing a mask  Neck: Supple  Lymphatic: No cervical, supraclavicular LAD  Respiratory: CTAB, normal respiratory effort  CV: Normal rate, regular rhythm, no murmurs, no peripheral edema  GI: Soft, nontender, non-distended   MS: Normal gait and station. Skin: No rashes, ecchymoses, or petechiae. Normal temperature, turgor, and texture. Port site without redness/swelling  Psych: Alert, oriented, appropriate affect, normal judgment/insight  Breast: LEFT breast mass with bruising from biopsies. Mass about 5-6 cm. Neuro: Non focal        10/25/21      Results:     Lab Results   Component Value Date/Time    WBC 10.4 10/20/2021 04:11 PM    HGB 12.6 10/20/2021 04:11 PM    HCT 36.5 10/20/2021 04:11 PM    PLATELET 351 18/78/1393 04:11 PM    MCV 92.2 10/20/2021 04:11 PM    ABS. NEUTROPHILS 9.1 (H) 10/20/2021 04:11 PM     Lab Results   Component Value Date/Time    Sodium 135 (L) 10/20/2021 04:11 PM    Potassium 4.0 10/20/2021 04:11 PM    Chloride 104 10/20/2021 04:11 PM    CO2 26 10/20/2021 04:11 PM    Glucose 141 (H) 10/20/2021 04:11 PM    BUN 14 10/20/2021 04:11 PM    Creatinine 0.66 10/20/2021 04:11 PM    GFR est AA >60 10/20/2021 04:11 PM    GFR est non-AA >60 10/20/2021 04:11 PM    Calcium 9.4 10/20/2021 04:11 PM     Lab Results   Component Value Date/Time    Bilirubin, total 0.5 10/20/2021 04:11 PM    ALT (SGPT) 41 10/20/2021 04:11 PM    Alk. phosphatase 113 10/20/2021 04:11 PM    Protein, total 8.3 (H) 10/20/2021 04:11 PM    Albumin 3.6 10/20/2021 04:11 PM    Globulin 4.7 (H) 10/20/2021 04:11 PM     10/6/21  FINAL PATHOLOGIC DIAGNOSIS   Breast, left mass, core biopsy:   Invasive ductal carcinoma, favor grade 3 (see synoptic table)   INVASIVE CARCINOMA OF THE BREAST: Biopsy   TUMOR   Tumor Site: Clock position - 3 o'clock   Histologic Type:  Invasive carcinoma of no special type (ductal)   Glandular (Acinar) / Tubular Differentiation: Score 3   Nuclear Pleomorphism: Score 3   Mitotic Rate: Score 3   Overall Grade: Grade 3 (scores of 8 or 9)   Tumor Size: 13 mm   Ductal Carcinoma In Situ (DCIS): Not identified   Lymphovascular Invasion: Not identified   Microcalcifications: Not identified     10/19/21  FINAL PATHOLOGIC DIAGNOSIS   Lymph node, left axillary, core biopsy:   Metastatic breast cancer, measuring up to 17 mm in a single core     10/21/21    ECHO ADULT COMPLETE 10/21/2021 10/21/2021    Interpretation Summary  · LV: Estimated LVEF is 55 - 60%. Normal cavity size, wall thickness and systolic function (ejection fraction normal). Wall motion: normal. Abnormal left ventricular strain. Global longitudinal strain is -15.4%. · TV: Pulmonary hypertension not suggested by Doppler findings. Signed by: Maya Torres MD on 10/21/2021  8:32 PM    Records reviewed and summarized above. Pathology report(s) reviewed above. Radiology report(s) reviewed above. Assessment:/PLAN     1) Clinical Stage 2B T3N0 LEFT Breast Cancer ER/RI negative Her2+  post breast biopsy only 10/21  Records and history reviewed. Reviewed path and data. Discussed dx, stage and treatment of breast cancer. Discussed neoadjuvant and adjuvant chemo today. Discussed no hormonal therapy options. Surgery prefers neoadjuvant chemo. Not sure about type of surgery yet. Discussed staging studies. Patient wants to wait on doing scans for a few weeks but does want to do them. Discussed chemo options. Discussed clinical trial options. Patient does not want to do clinical trial.   Decided on neoadjuvant TCHP. Teaching and consent with pharmacy. MRI Breast 10/13/21 showed a rounded mass with central fluid attenuation/necrosis in the lateral aspect of the left breast, deep 3rd, measuring approximately 3.3 cm in diameter with enhancement extending along the adjacent dermis suggesting skin invasion. There is nonmasslike enhancement extending both inferior, medial and anterior to the primary mass over approximately 6 x 5.5 x 4 cm. There are discontinuous areas of fluid attenuation associated with the nonmasslike enhancement which may represent cystic spaces and/or necrosis.   There is an enlarged, left axillary lymph node with other adjacent, prominent lymph nodes. There is a lymph node node deep to the pectoralis minor muscle which measures 1.1 x 0.7 cm  She had a left axillary LN biopsy on 10/19/21 and path showed metastatic beast cancer. She had a port placed on 10/19/21 with no issues. Pre chemo ECHO showed EF 55-60% with abnormal left ventricular strain. Referred to Cardiology for further evaluation. Patient is here today for Cycle 1 of neoadjuvant TCHP chemotherapy. She is clinically healthy overall and stable today. Labs (CBC and CMP) from 10/20/21 reviewed. She will have another MRI biopsy on 10/27/21. Patient is ready for chemo today. Follow up in 3 weeks with Cycle 2. Patient agrees with plan. We discussed the risks and benefits of TCH-P chemotherapy. Potential side effects include, but are not limited to, nausea, vomiting, constipation, diarrhea, taste changes, myelosuppression, increased risk for infection, myalgias, fatigue, alopecia, mucositis, neuropathy, fluid retention, renal failure, cardiac damage, allergic reactions, infertility, and rarely, death. The patient has consented to beginning chemotherapy. 2)  Postmenopausal  Continue routine GYN follow up. 3) Management of High Risk Medications - Chemotherapy  No toxicities as patient is starting treatment today. Pre chemo ECHO from 10/21/21 reviewed - EF 55-60% with abnormal left ventricular strain. Will proceed with chemo today but refer to Cardiology for further evaluation/management of strain. Labs (CBC and CMP) from 10/20/21 reviewed today. No dose adjustments needed today. Will monitor for side effects. 4) Psychosocial  Mood good, coping well. 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 Her  is very supportive and is here today. SW/NN support as needed. Call if questions. Follow up in 3 weeks with Cycle 2. This patient was seen in conjunction with Renetta Howard NP.   I personally performed a face to face diagnostic evaluation on this patient. I personally reviewed the history and performed the key points on the exam.   I personally reviewed all points in the assessment and created treatment plan with the patient. Specifically, pt seen by me today  Feels well today  Ready for chemo TCHP today  Pt is for another MRI biopsy in two days. Not sure about this since has a lot of bruising. Will d/w surgery. Labs good today. Proceed with chemo as ordered. I appreciate the opportunity to participate in Ms. Eve Landin's care.     Signed By: Sharon Rosario, DO

## 2021-10-22 NOTE — PROGRESS NOTES
ILDEFONSO Verified. Called pt on mobile phone number listed. Patient was made aware of her ECHO results show abnormal ventricular strain and per these results Manav Barrios is referring her to Cardiology with [facility]. Patient is concerned over these results but was made aware Manav Barrios will discuss into further detail at her next appointment 10/25/21. Patient verbalized understanding and expressed no question! MA has faxed Cardiology referral to [place] [address] office to fax number: [phone], fax confirmation was received back! Their office should be contacting patient once records/referral are reviewed. Patient is aware of this! Will follow up.   Ana Moore

## 2021-10-22 NOTE — TELEPHONE ENCOUNTER
----- Message from Bruce [facility] MD sent at 10/22/2021  3:04 PM EDT -----  Absolutely  We can get her in week of Nov 1 for an OV and go over echo monitoring, and then we can repeat GLS study in office in next few weeks  I dont think she needs a beta blocker just yet, lets watch her and compare to her baseline  My nurse can call her monday  ----- Message -----  From: Chio Arango NP  Sent: 10/22/2021  11:57 AM EDT  To: Bruce [facility] MD    Hi Dr. Nicky Singh,     This patient had her pre-chemo ECHO yesterday and you read the results:    LV: Estimated LVEF is 55 - 60%. Normal cavity size, wall thickness and systolic function (ejection fraction normal). Wall motion: normal. Abnormal left ventricular strain. Global longitudinal strain is -15.4%. She is scheduled to start TCHP (Taxotere, Carboplatin, Herceptin, Perjeta) on Monday 10/25/21. I talked with Dr. Joaquín Mendez and she she thinks it would be okay to start as scheduled on Monday but wants patient to see Cardio for further eval of abnormal strain. Would you be willing to see her in clinic or give us your recommendations? Thanks so much!   MICHELLE Serrato

## 2021-10-22 NOTE — PROGRESS NOTES
Discussed ECHO with Dr. Lili will to start chemo as planned on 10/25/21. Will refer to Cardiology for further evaluation of abnormal left ventricular strain. Please let patient know that EF is good but ECHO showed abnormal left ventricular strain and we are referring to Cardiology for further evaluation. We can discuss this in more detail at her appointment on Monday.

## 2021-10-25 ENCOUNTER — DOCUMENTATION ONLY (OUTPATIENT)
Dept: ONCOLOGY | Age: 56
End: 2021-10-25

## 2021-10-25 ENCOUNTER — OFFICE VISIT (OUTPATIENT)
Dept: ONCOLOGY | Age: 56
End: 2021-10-25

## 2021-10-25 ENCOUNTER — HOSPITAL ENCOUNTER (OUTPATIENT)
Dept: INFUSION THERAPY | Age: 56
Discharge: HOME | End: 2021-10-25
Payer: COMMERCIAL

## 2021-10-25 VITALS
HEART RATE: 80 BPM | DIASTOLIC BLOOD PRESSURE: 92 MMHG | HEIGHT: 66 IN | TEMPERATURE: 97.3 F | SYSTOLIC BLOOD PRESSURE: 144 MMHG | BODY MASS INDEX: 26.52 KG/M2 | RESPIRATION RATE: 18 BRPM | WEIGHT: 165 LBS

## 2021-10-25 VITALS
TEMPERATURE: 97.3 F | WEIGHT: 167.1 LBS | BODY MASS INDEX: 26.86 KG/M2 | HEART RATE: 105 BPM | SYSTOLIC BLOOD PRESSURE: 123 MMHG | DIASTOLIC BLOOD PRESSURE: 75 MMHG | HEIGHT: 66 IN

## 2021-10-25 DIAGNOSIS — Z17.1 MALIGNANT NEOPLASM OF LEFT BREAST IN FEMALE, ESTROGEN RECEPTOR NEGATIVE, UNSPECIFIED SITE OF BREAST (HCC): Primary | ICD-10-CM

## 2021-10-25 DIAGNOSIS — N63.20 LEFT BREAST MASS: ICD-10-CM

## 2021-10-25 DIAGNOSIS — Z78.0 POST-MENOPAUSAL: ICD-10-CM

## 2021-10-25 DIAGNOSIS — C50.912 MALIGNANT NEOPLASM OF LEFT BREAST IN FEMALE, ESTROGEN RECEPTOR NEGATIVE, UNSPECIFIED SITE OF BREAST (HCC): Primary | ICD-10-CM

## 2021-10-25 DIAGNOSIS — Z95.828 PORT-A-CATH IN PLACE: ICD-10-CM

## 2021-10-25 DIAGNOSIS — Z51.11 ENCOUNTER FOR ANTINEOPLASTIC CHEMOTHERAPY: ICD-10-CM

## 2021-10-25 DIAGNOSIS — Z79.899 ENCOUNTER FOR MONITORING CARDIOTOXIC DRUG THERAPY: ICD-10-CM

## 2021-10-25 DIAGNOSIS — Z51.81 ENCOUNTER FOR MONITORING CARDIOTOXIC DRUG THERAPY: ICD-10-CM

## 2021-10-25 PROCEDURE — 74011250636 HC RX REV CODE- 250/636: Performed by: INTERNAL MEDICINE

## 2021-10-25 PROCEDURE — 96377 APPLICATON ON-BODY INJECTOR: CPT

## 2021-10-25 PROCEDURE — 74011000258 HC RX REV CODE- 258: Performed by: INTERNAL MEDICINE

## 2021-10-25 PROCEDURE — 96375 TX/PRO/DX INJ NEW DRUG ADDON: CPT

## 2021-10-25 PROCEDURE — 96417 CHEMO IV INFUS EACH ADDL SEQ: CPT

## 2021-10-25 PROCEDURE — 96367 TX/PROPH/DG ADDL SEQ IV INF: CPT

## 2021-10-25 PROCEDURE — 96401 CHEMO ANTI-NEOPL SQ/IM: CPT

## 2021-10-25 PROCEDURE — 96413 CHEMO IV INFUSION 1 HR: CPT

## 2021-10-25 PROCEDURE — 99215 OFFICE O/P EST HI 40 MIN: CPT | Performed by: INTERNAL MEDICINE

## 2021-10-25 PROCEDURE — 77030012965 HC NDL HUBR BBMI -A

## 2021-10-25 RX ORDER — PALONOSETRON 0.05 MG/ML
0.25 INJECTION, SOLUTION INTRAVENOUS ONCE
Status: COMPLETED | OUTPATIENT
Start: 2021-10-25 | End: 2021-10-25

## 2021-10-25 RX ORDER — HEPARIN 100 UNIT/ML
300-500 SYRINGE INTRAVENOUS AS NEEDED
Status: ACTIVE | OUTPATIENT
Start: 2021-10-25 | End: 2021-10-25

## 2021-10-25 RX ORDER — SODIUM CHLORIDE 9 MG/ML
25 INJECTION, SOLUTION INTRAVENOUS CONTINUOUS
Status: DISPENSED | OUTPATIENT
Start: 2021-10-25 | End: 2021-10-25

## 2021-10-25 RX ORDER — DEXAMETHASONE SODIUM PHOSPHATE 10 MG/ML
10 INJECTION INTRAMUSCULAR; INTRAVENOUS ONCE
Status: COMPLETED | OUTPATIENT
Start: 2021-10-25 | End: 2021-10-25

## 2021-10-25 RX ORDER — SODIUM CHLORIDE 9 MG/ML
10 INJECTION INTRAMUSCULAR; INTRAVENOUS; SUBCUTANEOUS AS NEEDED
Status: ACTIVE | OUTPATIENT
Start: 2021-10-25 | End: 2021-10-25

## 2021-10-25 RX ORDER — SODIUM CHLORIDE 0.9 % (FLUSH) 0.9 %
10 SYRINGE (ML) INJECTION AS NEEDED
Status: DISPENSED | OUTPATIENT
Start: 2021-10-25 | End: 2021-10-25

## 2021-10-25 RX ADMIN — SODIUM CHLORIDE 25 ML/HR: 900 INJECTION, SOLUTION INTRAVENOUS at 10:43

## 2021-10-25 RX ADMIN — SODIUM CHLORIDE 150 MG: 900 INJECTION, SOLUTION INTRAVENOUS at 10:53

## 2021-10-25 RX ADMIN — DOCETAXEL ANHYDROUS 140 MG: 10 INJECTION, SOLUTION INTRAVENOUS at 12:01

## 2021-10-25 RX ADMIN — CARBOPLATIN 817 MG: 10 INJECTION, SOLUTION INTRAVENOUS at 13:04

## 2021-10-25 RX ADMIN — PERTUZUMAB, TRASTUZUMAB, AND HYALURONIDASE-ZZXF 15 ML: 1200; 600; 2000 INJECTION, SOLUTION SUBCUTANEOUS at 11:20

## 2021-10-25 RX ADMIN — HEPARIN 500 UNITS: 100 SYRINGE at 13:39

## 2021-10-25 RX ADMIN — SODIUM CHLORIDE 10 ML: 9 INJECTION INTRAMUSCULAR; INTRAVENOUS; SUBCUTANEOUS at 13:39

## 2021-10-25 RX ADMIN — PALONOSETRON 0.25 MG: 0.05 INJECTION, SOLUTION INTRAVENOUS at 10:43

## 2021-10-25 RX ADMIN — PEGFILGRASTIM 6 MG: KIT SUBCUTANEOUS at 13:45

## 2021-10-25 RX ADMIN — DEXAMETHASONE SODIUM PHOSPHATE 10 MG: 10 INJECTION INTRAMUSCULAR; INTRAVENOUS at 10:45

## 2021-10-25 NOTE — PROGRESS NOTES
Pharmacy Note- Chemotherapy Education     Kathy Sherman is a  61 y. o.female  diagnosed with breast cancer here today for Cycle 1, Day 1 chemotherapy counseling. Ms. Delfina Ramirez is being treated with TCHP. Provided education on DOCEtaxel, CARBOplatin, trastuzumab, pertuzumab and premedications - fosaprepitant, palonestron and dexamethasone.     Provided Ms. Landin with handouts on home supportive care regimen and Neulasta On-Pro, and each was reviewed. Provided Neulasta On-Pro demonstration.      Reviewed  SQ administration for the trastuzumab and pertuzumab.      Reviewed side effects of chemotherapy to include s/s infection, anemia, appetite changes, thromboyctopenia, fatigue, hair loss/alopecia, bone pain, skin and nail changes, diarrhea/constipation, fluid retention/infusion reactions, peripheral neuropathy and cardiac toxicity.     Patient given ways to manage these side effects and when to contact office.      Ms. Landin verbalized understanding of the information presented and all of the patient's questions were answered.     Bulmaro Ibarra, PharmD, BCPS, [place] [address] in place: No   Recommendation Provided To: Patient/Caregiver: 1 via In person     Intervention Accepted By: Patient/Caregiver: 1   Time Spent (min): 20

## 2021-10-25 NOTE — PROGRESS NOTES
Jeanna Flores is a 61 y.o. female  Chief Complaint   Patient presents with    Follow-up    Breast Cancer     1. Have you been to the ER, urgent care clinic since your last visit? Hospitalized since your last visit? No.    2. Have you seen or consulted any other health care providers outside of the [address] [place] [address] since your last visit? Include any pap smears or colon screening.  No.

## 2021-10-25 NOTE — PROGRESS NOTES
\[facility]\"" Chemo Progress Note    Date: October 25, 2021      0730: Ms. Maged Fletcher Arrived to [facility] for  C1D1 Phesgo + Taxotere + Cytoxan ambulatory in stable condition. Assessment was completed. Echo completed on 10/21/21. EF = 55 to 60%. Port accessed with positive blood return. Port site with healing ecchymosis. Labs drawn on 10/20/21, reviewed today. Went to provider appointment with Medical Oncology. 0840: Returned from provider appointment. Chemotherapy Flowsheet 10/25/2021   Cycle C1D1   Date 10/25/2021   Drug / Regimen Phesgo/Taxotere/Cytoxan   Pre Meds Given   Notes Given + OB Neulasta       Ms. Landin's vitals were reviewed. Patient Vitals for the past 12 hrs:   Temp Pulse Resp BP   10/25/21 1358 -- 80 18 (!) 144/92   10/25/21 0732 97.3 °F (36.3 °C) (!) 105 18 (!) 157/100       Pre-medications  were administered as ordered and chemotherapy was initiated.   Medications Administered     0.9% sodium chloride infusion     Admin Date  10/25/2021 Action  New Bag Dose  25 mL/hr Rate  25 mL/hr Route  IntraVENous Administered By  Rodrigo Pope          0.9% sodium chloride injection 10 mL     Admin Date  10/25/2021 Action  Given Dose  10 mL Route  IntraVENous Administered By  Rodrigo Pope          CARBOplatin (PARAPLATIN) 817 mg in 0.9% sodium chloride 250 mL, overfill volume 25 mL chemo infusion     Admin Date  10/25/2021 Action  New Bag Dose  817 mg Rate  713.4 mL/hr Route  IntraVENous Administered By  Rodrigo Pope          dexamethasone (PF) (DECADRON) 10 mg/mL injection 10 mg     Admin Date  10/25/2021 Action  Given Dose  10 mg Route  IntraVENous Administered By  Rodrigo Pope          DOCEtaxeL (TAXOTERE) 140 mg in 0.9% sodium chloride 250 mL, overfill volume 25 mL chemo infusion     Admin Date  10/25/2021 Action  New Bag Dose  140 mg Rate  289 mL/hr Route  IntraVENous Administered By  Rodrigo Pope          fosaprepitant (EMEND) 150 mg in 0.9% sodium chloride 150 mL IVPB     Admin Date  10/25/2021 Action  New Bag Dose  150 mg Rate  450 mL/hr Route  IntraVENous Administered By  Giuliana Guerrero          heparin (porcine) pf 300-500 Units     Admin Date  10/25/2021 Action  Given Dose  500 Units Route  InterCATHeter Administered By  Giuliana Guerrero          palonosetron HCl (ALOXI) injection 0.25 mg     Admin Date  10/25/2021 Action  Given Dose  0.25 mg Route  IntraVENous Administered By  Giuliana Guerrero          pegfilgrastim (NEULASTA) wearable SQ injector 6 mg     Admin Date  10/25/2021 Action  Given Dose  6 mg Route  SubCUTAneous Administered By  Giuliana Guerrero          pertuzumab 1,200 mg-trastuzumab 600 mg-hyaluronidase-zzxf 30,000 units/15 mL     Admin Date  10/25/2021 Action  Given Dose  15 mL Route  SubCUTAneous Administered By  Giuliana Guerrero                OB Neulasta placed to right upper arm. Education provided to patient about Neulasta On Body Injector including: side effects, how/when to remove the device, as well as what to do in the event of device malfunction. Opportunity for questions was provided and all questions were answered. Patient verbalized understanding. 1400: Patient tolerated treatment well. Port maintained positive blood return throughout treatment. Port flushed, heparinized and de accessed per protocol. Patient was discharged in stable condition. Patient is aware of next scheduled [facility] appointment on 11/17/21.     Future Appointments   Date Time Provider Ryan Daly   10/25/2021  8:15 AM Leobardo Chairez  [address] BS AMB   10/27/2021  7:45 AM [facility] 2 [facility]   11/17/2021  4:00 PM H3 [facility]FI LAB [facility]   11/18/2021  8:30 AM F1 RAFI LONG TX [facility]   12/8/2021  4:00 PM H3 RAFI LAB [facility]   12/9/2021  8:30 AM F1 RAFI LONG TX [facility]   12/29/2021  4:00 PM H3 RAFI LAB [facility]   12/30/2021  8:30 AM F1 RAFI LONG TX [facility]   1/19/2022  4:00 PM H3 RAFI LAB [facility]   1/20/2022  8:30 AM F1 RAFI LONG [address] [facility]   2/9/2022  4:00 PM H3 RAFI LAB [facility]   2/10/2022  8:30 AM F1 RAFI LONG [address] [facility]         Rubi Riley RN  October 25, 2021

## 2021-10-26 ENCOUNTER — TELEPHONE (OUTPATIENT)
Dept: ONCOLOGY | Age: 56
End: 2021-10-26

## 2021-10-26 NOTE — TELEPHONE ENCOUNTER
Follow up call to patient, [phone]. S/P C 1, D 1 TCHP. Left VM to contact RN. Contact info supplied.

## 2021-10-27 ENCOUNTER — HOSPITAL ENCOUNTER (OUTPATIENT)
Dept: MRI IMAGING | Age: 56
Discharge: HOME | End: 2021-10-27
Attending: SURGERY
Payer: COMMERCIAL

## 2021-10-27 ENCOUNTER — HOSPITAL ENCOUNTER (OUTPATIENT)
Dept: MAMMOGRAPHY | Age: 56
Discharge: HOME | End: 2021-10-27
Attending: SURGERY
Payer: COMMERCIAL

## 2021-10-27 DIAGNOSIS — R92.8 ABNORMAL MRI, BREAST: ICD-10-CM

## 2021-10-27 PROCEDURE — 74011250636 HC RX REV CODE- 250/636

## 2021-10-27 PROCEDURE — 88305 TISSUE EXAM BY PATHOLOGIST: CPT

## 2021-10-27 PROCEDURE — A9575 INJ GADOTERATE MEGLUMI 0.1ML: HCPCS

## 2021-10-27 PROCEDURE — 74011000258 HC RX REV CODE- 258: Performed by: SURGERY

## 2021-10-27 PROCEDURE — 74011000250 HC RX REV CODE- 250: Performed by: SURGERY

## 2021-10-27 PROCEDURE — 77065 DX MAMMO INCL CAD UNI: CPT

## 2021-10-27 PROCEDURE — 77012000007 MRI BX BREAST VAC LT 1ST LESION W/CLIP AND SPECIMEN

## 2021-10-27 RX ORDER — SODIUM BICARBONATE 84 MG/ML
10 INJECTION, SOLUTION INTRAVENOUS ONCE
Status: COMPLETED | OUTPATIENT
Start: 2021-10-27 | End: 2021-10-27

## 2021-10-27 RX ORDER — LIDOCAINE HYDROCHLORIDE AND EPINEPHRINE 10; 10 MG/ML; UG/ML
24 INJECTION, SOLUTION INFILTRATION; PERINEURAL ONCE
Status: COMPLETED | OUTPATIENT
Start: 2021-10-27 | End: 2021-10-27

## 2021-10-27 RX ORDER — SODIUM BICARBONATE 84 MG/ML
INJECTION, SOLUTION INTRAVENOUS
Status: DISPENSED
Start: 2021-10-27 | End: 2021-10-27

## 2021-10-27 RX ORDER — LIDOCAINE HYDROCHLORIDE 10 MG/ML
16 INJECTION, SOLUTION EPIDURAL; INFILTRATION; INTRACAUDAL; PERINEURAL ONCE
Status: COMPLETED | OUTPATIENT
Start: 2021-10-27 | End: 2021-10-27

## 2021-10-27 RX ORDER — GADOTERATE MEGLUMINE 376.9 MG/ML
INJECTION INTRAVENOUS
Status: COMPLETED
Start: 2021-10-27 | End: 2021-10-27

## 2021-10-27 RX ORDER — LIDOCAINE HYDROCHLORIDE AND EPINEPHRINE 10; 10 MG/ML; UG/ML
INJECTION, SOLUTION INFILTRATION; PERINEURAL
Status: DISPENSED
Start: 2021-10-27 | End: 2021-10-27

## 2021-10-27 RX ORDER — SODIUM CHLORIDE 0.9 % (FLUSH) 0.9 %
10 SYRINGE (ML) INJECTION ONCE
Status: COMPLETED | OUTPATIENT
Start: 2021-10-27 | End: 2021-10-27

## 2021-10-27 RX ORDER — LIDOCAINE HYDROCHLORIDE 10 MG/ML
INJECTION, SOLUTION EPIDURAL; INFILTRATION; INTRACAUDAL; PERINEURAL
Status: DISPENSED
Start: 2021-10-27 | End: 2021-10-27

## 2021-10-27 RX ADMIN — SODIUM BICARBONATE 10 MEQ: 84 INJECTION, SOLUTION INTRAVENOUS at 08:10

## 2021-10-27 RX ADMIN — Medication 10 ML: at 08:16

## 2021-10-27 RX ADMIN — SODIUM CHLORIDE 75 ML: 900 INJECTION, SOLUTION INTRAVENOUS at 08:17

## 2021-10-27 RX ADMIN — GADOTERATE MEGLUMINE 15 ML: 376.9 INJECTION INTRAVENOUS at 08:15

## 2021-10-27 RX ADMIN — LIDOCAINE HYDROCHLORIDE AND EPINEPHRINE 240 MG: 10; 10 INJECTION, SOLUTION INFILTRATION; PERINEURAL at 08:10

## 2021-10-27 RX ADMIN — LIDOCAINE HYDROCHLORIDE 16 ML: 10 INJECTION, SOLUTION EPIDURAL; INFILTRATION; INTRACAUDAL; PERINEURAL at 08:10

## 2021-10-27 NOTE — TELEPHONE ENCOUNTER
Follow up call to patient, ID verified X 2. S/P C 1, D 1 TCHP with Neulasta On Pro. States feels \"better than expected. \"  Denies issues at this time, understands to contact office with questions or issues. Informed that Provider had signed FMLA forms, requested fax to [facility], does not need copy at this time. To Provider.

## 2021-10-28 NOTE — PROGRESS NOTES
Patient tolerated left breast biopsy well with minimal bleeding. Biopsy site was bandaged using steri strips, gauze and tegaderm, ice pack placed on site. Discharge instructions were reviewed with the patient who expresses understanding. She was provided with a written copy as well. Advised patient that results should be available by Friday, and that she will receive a phone call with these results. Encouraged her to call with any questions or concerns.

## 2021-10-29 ENCOUNTER — TELEPHONE (OUTPATIENT)
Dept: SURGERY | Age: 56
End: 2021-10-29

## 2021-11-04 ENCOUNTER — HOSPITAL ENCOUNTER (OUTPATIENT)
Dept: INFUSION THERAPY | Age: 56
End: 2021-11-04

## 2021-11-04 ENCOUNTER — OFFICE VISIT (OUTPATIENT)
Dept: CARDIOLOGY CLINIC | Age: 56
End: 2021-11-04
Payer: COMMERCIAL

## 2021-11-04 VITALS
RESPIRATION RATE: 14 BRPM | SYSTOLIC BLOOD PRESSURE: 150 MMHG | HEIGHT: 66 IN | HEART RATE: 97 BPM | BODY MASS INDEX: 26.2 KG/M2 | OXYGEN SATURATION: 98 % | DIASTOLIC BLOOD PRESSURE: 90 MMHG | WEIGHT: 163 LBS

## 2021-11-04 DIAGNOSIS — R93.1 ABNORMAL ECHOCARDIOGRAM: ICD-10-CM

## 2021-11-04 DIAGNOSIS — Z17.1 MALIGNANT NEOPLASM OF LEFT BREAST IN FEMALE, ESTROGEN RECEPTOR NEGATIVE, UNSPECIFIED SITE OF BREAST (HCC): ICD-10-CM

## 2021-11-04 DIAGNOSIS — C50.912 MALIGNANT NEOPLASM OF LEFT BREAST IN FEMALE, ESTROGEN RECEPTOR NEGATIVE, UNSPECIFIED SITE OF BREAST (HCC): ICD-10-CM

## 2021-11-04 DIAGNOSIS — Z79.899 ENCOUNTER FOR MONITORING CARDIOTOXIC DRUG THERAPY: Primary | ICD-10-CM

## 2021-11-04 DIAGNOSIS — Z51.81 ENCOUNTER FOR MONITORING CARDIOTOXIC DRUG THERAPY: Primary | ICD-10-CM

## 2021-11-04 PROCEDURE — 99204 OFFICE O/P NEW MOD 45 MIN: CPT | Performed by: SPECIALIST

## 2021-11-04 PROCEDURE — 93000 ELECTROCARDIOGRAM COMPLETE: CPT | Performed by: SPECIALIST

## 2021-11-04 NOTE — PATIENT INSTRUCTIONS
You have been scheduled for a limited echocardiogram. We will send results via [employer] and see you back in 2 months.

## 2021-11-04 NOTE — PROGRESS NOTES
Ho Landin     1965       Ludin Brown MD, [facility]  Date of Visit-11/4/2021   PCP is None   [facility] [place]  [facility]  HPI:  Gisela Porter is a 61 y.o. female   Pt had pre-chemo echo with an EF of 55-60%. The GLS was -15. She was to start on [facility] which includes herceptin. She has been treated for breast cancer. EKG shows incomplete BBB. She takes no cardiac meds. Today. .. Pt states that her original diagnosis was that it was a cyst with an abscess, and was determined to be breast cancer. Denies chest pain, edema, syncope or shortness of breath at rest, has no tachycardia, palpitations or sense of arrhythmia. EKG: Sinus  Rhythm Low voltage in precordial leads.  -Incomplete right bundle branch block.  -Left atrial enlargement. Assessment/Plan:     Patient Instructions   You have been scheduled for a limited echocardiogram. We will send results via [employer] and see you back in 2 months. 1. Encounter for monitoring cardiotoxic drug therapy  Pt has a GLS of -15.4 %. She is asymptomatic with no evidence of heart failure and her EF is normal. We have discussed continuing chemo with monitoring. I will get an echo in a months and anticipate we will have a frequency of 1-3 months during chemo. We discussed the implications of GLS and EF.   - AMB POC EKG ROUTINE W/ 12 LEADS, INTER & REP    2. Abnormal echocardiogram  EF as stated is normal. GLS number is compared to normative for current echo machine. Will try to keep testing on consistent basis with type of machine and reader. 3. Malignant neoplasm of left breast in female, estrogen receptor negative, unspecified site of breast ([facility] [address]  F/u with Dr. Teresa Lott and Dr. Jose F Aguilar. F/u in 2 months     Impression:   1. Encounter for monitoring cardiotoxic drug therapy    2. Abnormal echocardiogram    3.  Malignant neoplasm of left breast in female, estrogen receptor negative, unspecified site of breast [facility])       Cardiac History:   No specialty comments available. NKDA   No prior cath, non smoker, no etoh, one cup coffee , A/P specialist, , reading piano  Mother 80 with dementia, Father 80 no CAD   ROS see scanned negative  Future Appointments   Date Time Provider Ryan Daly   11/4/2021  1:30 PM H3 RAFI LAB [facility]   11/17/2021  4:00 PM H3 RAFI LAB [facility]   11/18/2021  8:30 AM F1 RAFI LONG TX [facility]   11/18/2021  8:45 AM John Jorge Alberto,  [address] BS AMB   12/8/2021  4:00 PM H3 RAFI LAB [facility]   12/9/2021  8:30 AM F1 RAFI LONG TX [facility]   12/29/2021  4:00 PM H3 RAFI LAB [facility]   12/30/2021  8:30 AM F1 RAFI LONG TX [facility]   1/19/2022  4:00 PM H3 RAFI LAB [facility]   1/20/2022  8:30 AM F1 RAFI LONG TX [facility]   2/9/2022  4:00 PM H3 RAFI LAB [facility]   2/10/2022  8:30 AM F1 RAFI LONG TX [facility]        ROS-except as noted above. . A complete cardiac and respiratory are reviewed and negative except as above ; Resp-denies wheezing  or productive cough,. Const- No unusual weight loss or fever; Neuro-no recent seizure or CVA ; GI- No BRBPR, abdom pain, bloating ; - no  hematuria   see supplement sheet, initialed and to be scanned by staff  Past Medical History:   Diagnosis Date    Malignant neoplasm of left breast in female, estrogen receptor negative ([facility] [address] 10/13/2021      Social Hx= reports that she has never smoked. She has never used smokeless tobacco.     Exam and Labs:  BP (!) 150/90 (BP 1 Location: Left arm, BP Patient Position: Sitting, BP Cuff Size: Adult)   Pulse 97   Resp 14   Ht 5' 6\" (1.676 m)   Wt 163 lb (73.9 kg)   SpO2 98%   BMI 26.31 kg/m² Constitutional:  NAD, comfortable  Head: NC,AT. Eyes: No scleral icterus. Neck:  Neck supple. No JVD present. Throat: moist mucous membranes.   Chest: Effort normal & normal respiratory excursion .Neurological: alert, conversant and oriented . Skin: Skin is not cold. No obvious systemic rash noted. Not diaphoretic. No erythema. Psychiatric:  Grossly normal mood and affect. Behavior appears normal. Extremities:  no clubbing or cyanosis. Abdomen: non distended    Lungs:breath sounds normal. No stridor. distress, wheezes or  Rales. Heart: normal rate, regular rhythm, normal S1, S2, no murmurs, rubs, clicks or gallops , PMI non displaced. Edema: Edema is none. No results found for: CHOL, CHOLX, CHLST, CHOLV, HDL, HDLP, LDL, LDLC, DLDLP, TGLX, TRIGL, TRIGP, CHHD, CHHDX  Lab Results   Component Value Date/Time    Sodium 135 (L) 10/20/2021 04:11 PM    Potassium 4.0 10/20/2021 04:11 PM    Chloride 104 10/20/2021 04:11 PM    CO2 26 10/20/2021 04:11 PM    Anion gap 5 10/20/2021 04:11 PM    Glucose 141 (H) 10/20/2021 04:11 PM    BUN 14 10/20/2021 04:11 PM    Creatinine 0.66 10/20/2021 04:11 PM    BUN/Creatinine ratio 21 (H) 10/20/2021 04:11 PM    GFR est AA >60 10/20/2021 04:11 PM    GFR est non-AA >60 10/20/2021 04:11 PM    Calcium 9.4 10/20/2021 04:11 PM      Wt Readings from Last 3 Encounters:   11/04/21 163 lb (73.9 kg)   10/25/21 165 lb (74.8 kg)   10/25/21 167 lb 1.6 oz (75.8 kg)      BP Readings from Last 3 Encounters:   11/04/21 (!) 150/90   10/25/21 (!) 144/92   10/25/21 123/75      Current Outpatient Medications   Medication Sig    lidocaine-prilocaine (EMLA) topical cream Apply  to affected area as needed for Pain. Apply to port-a-cath site 30-60 minutes prior to chemo    ondansetron (ZOFRAN ODT) 4 mg disintegrating tablet Take 1-2 Tablets by mouth every eight (8) hours as needed for Nausea or Vomiting.     prochlorperazine (Compazine) 5 mg tablet Take 1 tab by mouth every 6 hours as needed for nausea or vomiting    dexAMETHasone (DECADRON) 4 mg tablet Take 2 tabs (8mg) by mouth twice daily the day before chemotherapy and the day after chemotherapy    CRANIAL PROSTHESIS misc Alopecia due to chemo/ wig (Patient not taking: Reported on 10/19/2021)    amoxicillin-clavulanate (AUGMENTIN) 875-125 mg per tablet Take 1 Tablet by mouth two (2) times a day. (Patient not taking: Reported on 11/4/2021)    trimethoprim-sulfamethoxazole (BACTRIM DS, SEPTRA DS) 160-800 mg per tablet  (Patient not taking: Reported on 10/25/2021)     No current facility-administered medications for this visit. Impression see above.       Written by Fatmata Anaya, as dictated by Chris Ramey MD.

## 2021-11-10 ENCOUNTER — APPOINTMENT (OUTPATIENT)
Dept: INFUSION THERAPY | Age: 56
End: 2021-11-10

## 2021-11-10 RX ORDER — HYDROCORTISONE SODIUM SUCCINATE 100 MG/2ML
100 INJECTION, POWDER, FOR SOLUTION INTRAMUSCULAR; INTRAVENOUS AS NEEDED
Status: CANCELLED | OUTPATIENT
Start: 2021-11-18

## 2021-11-10 RX ORDER — DIPHENHYDRAMINE HYDROCHLORIDE 50 MG/ML
50 INJECTION, SOLUTION INTRAMUSCULAR; INTRAVENOUS AS NEEDED
Status: CANCELLED
Start: 2021-11-18

## 2021-11-10 RX ORDER — HEPARIN 100 UNIT/ML
300-500 SYRINGE INTRAVENOUS AS NEEDED
Status: CANCELLED
Start: 2021-11-18

## 2021-11-10 RX ORDER — DIPHENHYDRAMINE HYDROCHLORIDE 50 MG/ML
25 INJECTION, SOLUTION INTRAMUSCULAR; INTRAVENOUS AS NEEDED
Status: CANCELLED
Start: 2021-11-18

## 2021-11-10 RX ORDER — SODIUM CHLORIDE 9 MG/ML
25 INJECTION, SOLUTION INTRAVENOUS CONTINUOUS
Status: CANCELLED | OUTPATIENT
Start: 2021-11-18

## 2021-11-10 RX ORDER — SODIUM CHLORIDE 9 MG/ML
10 INJECTION INTRAMUSCULAR; INTRAVENOUS; SUBCUTANEOUS AS NEEDED
Status: CANCELLED | OUTPATIENT
Start: 2021-11-18

## 2021-11-10 RX ORDER — PALONOSETRON 0.05 MG/ML
0.25 INJECTION, SOLUTION INTRAVENOUS ONCE
Status: CANCELLED | OUTPATIENT
Start: 2021-11-18 | End: 2021-11-18

## 2021-11-10 RX ORDER — DEXAMETHASONE SODIUM PHOSPHATE 100 MG/10ML
10 INJECTION INTRAMUSCULAR; INTRAVENOUS ONCE
Status: CANCELLED | OUTPATIENT
Start: 2021-11-18 | End: 2021-11-18

## 2021-11-10 RX ORDER — SODIUM CHLORIDE 0.9 % (FLUSH) 0.9 %
10 SYRINGE (ML) INJECTION AS NEEDED
Status: CANCELLED | OUTPATIENT
Start: 2021-11-18

## 2021-11-10 RX ORDER — DIPHENHYDRAMINE HYDROCHLORIDE 50 MG/ML
50 INJECTION, SOLUTION INTRAMUSCULAR; INTRAVENOUS
Status: CANCELLED | OUTPATIENT
Start: 2021-11-18

## 2021-11-10 RX ORDER — EPINEPHRINE 1 MG/ML
0.3 INJECTION, SOLUTION, CONCENTRATE INTRAVENOUS AS NEEDED
Status: CANCELLED | OUTPATIENT
Start: 2021-11-18

## 2021-11-10 RX ORDER — ALBUTEROL SULFATE 0.83 MG/ML
2.5 SOLUTION RESPIRATORY (INHALATION) AS NEEDED
Status: CANCELLED
Start: 2021-11-18

## 2021-11-10 RX ORDER — ONDANSETRON 2 MG/ML
8 INJECTION INTRAMUSCULAR; INTRAVENOUS AS NEEDED
Status: CANCELLED | OUTPATIENT
Start: 2021-11-18

## 2021-11-10 RX ORDER — ACETAMINOPHEN 325 MG/1
650 TABLET ORAL
Status: CANCELLED | OUTPATIENT
Start: 2021-11-18

## 2021-11-10 RX ORDER — ACETAMINOPHEN 325 MG/1
650 TABLET ORAL AS NEEDED
Status: CANCELLED
Start: 2021-11-18

## 2021-11-11 ENCOUNTER — APPOINTMENT (OUTPATIENT)
Dept: INFUSION THERAPY | Age: 56
End: 2021-11-11

## 2021-11-17 ENCOUNTER — HOSPITAL ENCOUNTER (OUTPATIENT)
Dept: INFUSION THERAPY | Age: 56
Discharge: HOME | End: 2021-11-17
Payer: COMMERCIAL

## 2021-11-17 VITALS
HEART RATE: 91 BPM | DIASTOLIC BLOOD PRESSURE: 83 MMHG | RESPIRATION RATE: 18 BRPM | OXYGEN SATURATION: 98 % | SYSTOLIC BLOOD PRESSURE: 136 MMHG | TEMPERATURE: 97 F

## 2021-11-17 LAB
ALBUMIN SERPL-MCNC: 3.5 G/DL (ref 3.5–5)
ALBUMIN/GLOB SERPL: 0.9 {RATIO} (ref 1.1–2.2)
ALP SERPL-CCNC: 142 U/L (ref 45–117)
ALT SERPL-CCNC: 77 U/L (ref 12–78)
ANION GAP SERPL CALC-SCNC: 6 MMOL/L (ref 5–15)
AST SERPL-CCNC: 35 U/L (ref 15–37)
BASOPHILS # BLD: 0 K/UL (ref 0–0.1)
BASOPHILS NFR BLD: 0 % (ref 0–1)
BILIRUB SERPL-MCNC: 0.3 MG/DL (ref 0.2–1)
BUN SERPL-MCNC: 16 MG/DL (ref 6–20)
BUN/CREAT SERPL: 23 (ref 12–20)
CALCIUM SERPL-MCNC: 9.1 MG/DL (ref 8.5–10.1)
CHLORIDE SERPL-SCNC: 108 MMOL/L (ref 97–108)
CO2 SERPL-SCNC: 24 MMOL/L (ref 21–32)
CREAT SERPL-MCNC: 0.69 MG/DL (ref 0.55–1.02)
DIFFERENTIAL METHOD BLD: ABNORMAL
EOSINOPHIL # BLD: 0 K/UL (ref 0–0.4)
EOSINOPHIL NFR BLD: 0 % (ref 0–7)
ERYTHROCYTE [DISTWIDTH] IN BLOOD BY AUTOMATED COUNT: 13.5 % (ref 11.5–14.5)
GLOBULIN SER CALC-MCNC: 3.7 G/DL (ref 2–4)
GLUCOSE SERPL-MCNC: 166 MG/DL (ref 65–100)
HCT VFR BLD AUTO: 35.1 % (ref 35–47)
HGB BLD-MCNC: 11.6 G/DL (ref 11.5–16)
IMM GRANULOCYTES # BLD AUTO: 0.1 K/UL (ref 0–0.04)
IMM GRANULOCYTES NFR BLD AUTO: 1 % (ref 0–0.5)
LYMPHOCYTES # BLD: 0.9 K/UL (ref 0.8–3.5)
LYMPHOCYTES NFR BLD: 11 % (ref 12–49)
MCH RBC QN AUTO: 31.5 PG (ref 26–34)
MCHC RBC AUTO-ENTMCNC: 33 G/DL (ref 30–36.5)
MCV RBC AUTO: 95.4 FL (ref 80–99)
MONOCYTES # BLD: 0.1 K/UL (ref 0–1)
MONOCYTES NFR BLD: 1 % (ref 5–13)
NEUTS SEG # BLD: 7.2 K/UL (ref 1.8–8)
NEUTS SEG NFR BLD: 87 % (ref 32–75)
NRBC # BLD: 0 K/UL (ref 0–0.01)
NRBC BLD-RTO: 0 PER 100 WBC
PLATELET # BLD AUTO: 298 K/UL (ref 150–400)
PMV BLD AUTO: 9.8 FL (ref 8.9–12.9)
POTASSIUM SERPL-SCNC: 4 MMOL/L (ref 3.5–5.1)
PROT SERPL-MCNC: 7.2 G/DL (ref 6.4–8.2)
RBC # BLD AUTO: 3.68 M/UL (ref 3.8–5.2)
SODIUM SERPL-SCNC: 138 MMOL/L (ref 136–145)
WBC # BLD AUTO: 8.2 K/UL (ref 3.6–11)

## 2021-11-17 PROCEDURE — 36415 COLL VENOUS BLD VENIPUNCTURE: CPT

## 2021-11-17 PROCEDURE — 80053 COMPREHEN METABOLIC PANEL: CPT

## 2021-11-17 PROCEDURE — 85025 COMPLETE CBC W/AUTO DIFF WBC: CPT

## 2021-11-18 ENCOUNTER — HOSPITAL ENCOUNTER (OUTPATIENT)
Dept: INFUSION THERAPY | Age: 56
Discharge: HOME | End: 2021-11-18
Payer: COMMERCIAL

## 2021-11-18 ENCOUNTER — OFFICE VISIT (OUTPATIENT)
Dept: ONCOLOGY | Age: 56
End: 2021-11-18
Payer: COMMERCIAL

## 2021-11-18 VITALS
HEIGHT: 66 IN | DIASTOLIC BLOOD PRESSURE: 85 MMHG | WEIGHT: 168.7 LBS | BODY MASS INDEX: 27.11 KG/M2 | HEART RATE: 101 BPM | SYSTOLIC BLOOD PRESSURE: 136 MMHG | OXYGEN SATURATION: 97 % | TEMPERATURE: 97 F

## 2021-11-18 VITALS — DIASTOLIC BLOOD PRESSURE: 88 MMHG | SYSTOLIC BLOOD PRESSURE: 133 MMHG | HEART RATE: 76 BPM

## 2021-11-18 DIAGNOSIS — Z17.1 MALIGNANT NEOPLASM OF LEFT BREAST IN FEMALE, ESTROGEN RECEPTOR NEGATIVE, UNSPECIFIED SITE OF BREAST (HCC): Primary | ICD-10-CM

## 2021-11-18 DIAGNOSIS — T45.1X5A CHEMOTHERAPY-INDUCED NAUSEA: ICD-10-CM

## 2021-11-18 DIAGNOSIS — C50.912 MALIGNANT NEOPLASM OF LEFT BREAST IN FEMALE, ESTROGEN RECEPTOR NEGATIVE, UNSPECIFIED SITE OF BREAST (HCC): Primary | ICD-10-CM

## 2021-11-18 DIAGNOSIS — Z95.828 PORT-A-CATH IN PLACE: ICD-10-CM

## 2021-11-18 DIAGNOSIS — Z09 CHEMOTHERAPY FOLLOW-UP EXAMINATION: ICD-10-CM

## 2021-11-18 DIAGNOSIS — R11.0 CHEMOTHERAPY-INDUCED NAUSEA: ICD-10-CM

## 2021-11-18 DIAGNOSIS — Z78.0 POST-MENOPAUSAL: ICD-10-CM

## 2021-11-18 DIAGNOSIS — N63.20 LEFT BREAST MASS: ICD-10-CM

## 2021-11-18 DIAGNOSIS — T45.1X5A CHEMOTHERAPY-INDUCED FATIGUE: ICD-10-CM

## 2021-11-18 DIAGNOSIS — R53.83 CHEMOTHERAPY-INDUCED FATIGUE: ICD-10-CM

## 2021-11-18 PROCEDURE — 74011250636 HC RX REV CODE- 250/636: Performed by: INTERNAL MEDICINE

## 2021-11-18 PROCEDURE — 77030012965 HC NDL HUBR BBMI -A

## 2021-11-18 PROCEDURE — 96417 CHEMO IV INFUS EACH ADDL SEQ: CPT

## 2021-11-18 PROCEDURE — 99215 OFFICE O/P EST HI 40 MIN: CPT | Performed by: NURSE PRACTITIONER

## 2021-11-18 PROCEDURE — 96375 TX/PRO/DX INJ NEW DRUG ADDON: CPT

## 2021-11-18 PROCEDURE — 96413 CHEMO IV INFUSION 1 HR: CPT

## 2021-11-18 PROCEDURE — 96402 CHEMO HORMON ANTINEOPL SQ/IM: CPT

## 2021-11-18 PROCEDURE — 96377 APPLICATON ON-BODY INJECTOR: CPT

## 2021-11-18 PROCEDURE — 74011000258 HC RX REV CODE- 258: Performed by: INTERNAL MEDICINE

## 2021-11-18 PROCEDURE — 96367 TX/PROPH/DG ADDL SEQ IV INF: CPT

## 2021-11-18 RX ORDER — SODIUM CHLORIDE 9 MG/ML
25 INJECTION, SOLUTION INTRAVENOUS CONTINUOUS
Status: DISPENSED | OUTPATIENT
Start: 2021-11-18 | End: 2021-11-18

## 2021-11-18 RX ORDER — DEXAMETHASONE SODIUM PHOSPHATE 10 MG/ML
10 INJECTION INTRAMUSCULAR; INTRAVENOUS ONCE
Status: COMPLETED | OUTPATIENT
Start: 2021-11-18 | End: 2021-11-18

## 2021-11-18 RX ORDER — PALONOSETRON 0.05 MG/ML
0.25 INJECTION, SOLUTION INTRAVENOUS ONCE
Status: COMPLETED | OUTPATIENT
Start: 2021-11-18 | End: 2021-11-18

## 2021-11-18 RX ORDER — SODIUM CHLORIDE 9 MG/ML
10 INJECTION INTRAMUSCULAR; INTRAVENOUS; SUBCUTANEOUS AS NEEDED
Status: ACTIVE | OUTPATIENT
Start: 2021-11-18 | End: 2021-11-18

## 2021-11-18 RX ORDER — HEPARIN 100 UNIT/ML
300-500 SYRINGE INTRAVENOUS AS NEEDED
Status: ACTIVE | OUTPATIENT
Start: 2021-11-18 | End: 2021-11-18

## 2021-11-18 RX ORDER — SODIUM CHLORIDE 0.9 % (FLUSH) 0.9 %
10 SYRINGE (ML) INJECTION AS NEEDED
Status: DISPENSED | OUTPATIENT
Start: 2021-11-18 | End: 2021-11-18

## 2021-11-18 RX ADMIN — Medication 10 ML: at 13:27

## 2021-11-18 RX ADMIN — PEGFILGRASTIM 6 MG: KIT SUBCUTANEOUS at 13:27

## 2021-11-18 RX ADMIN — PALONOSETRON 0.25 MG: 0.05 INJECTION, SOLUTION INTRAVENOUS at 10:50

## 2021-11-18 RX ADMIN — FOSAPREPITANT 150 MG: 150 INJECTION, POWDER, LYOPHILIZED, FOR SOLUTION INTRAVENOUS at 10:25

## 2021-11-18 RX ADMIN — SODIUM CHLORIDE 10 ML: 9 INJECTION INTRAMUSCULAR; INTRAVENOUS; SUBCUTANEOUS at 10:08

## 2021-11-18 RX ADMIN — DOCETAXEL 140 MG: 10 INJECTION, SOLUTION INTRAVENOUS at 11:35

## 2021-11-18 RX ADMIN — DEXAMETHASONE SODIUM PHOSPHATE 10 MG: 10 INJECTION INTRAMUSCULAR; INTRAVENOUS at 10:55

## 2021-11-18 RX ADMIN — SODIUM CHLORIDE 25 ML/HR: 900 INJECTION, SOLUTION INTRAVENOUS at 10:10

## 2021-11-18 RX ADMIN — CARBOPLATIN 802 MG: 10 INJECTION INTRAVENOUS at 12:51

## 2021-11-18 RX ADMIN — PERTUZUMAB, TRASTUZUMAB, AND HYALURONIDASE-ZZXF 10 ML: 600; 600; 2000 INJECTION, SOLUTION SUBCUTANEOUS at 11:18

## 2021-11-18 RX ADMIN — HEPARIN 500 UNITS: 100 SYRINGE at 13:27

## 2021-11-18 NOTE — PROGRESS NOTES
[facility] [place] at [address], [id number] [place] [address] Road, [facility] [phone]  F: [phone]    Reason for Visit:   Ebony Florez is a 61 y.o. female seen today in office for follow up of Left Breast Cancer. Treatment History:   · LEFT Breast Biopsy 10/6/21: PATH - Invasive ductal carcinoma, favor grade 3  · ER/TN negative, HER2+  · Breast MRI 10/13/21: RIGHT BREAST: Background parenchymal enhancement: Mild. There is no suspicious masslike or nonmasslike enhancement within the right breast to indicate breast carcinoma. There are no suspicious internal mammary or axillary chain lymph nodes. LEFT BREAST: Background parenchymal enhancement: Mild There is a rounded mass with central fluid attenuation/necrosis in the lateral aspect of the left breast, deep 3rd, measuring approximately 3.3 cm in diameter with enhancement extending along the adjacent dermis suggesting skin invasion. There is nonmasslike enhancement extending both inferior, medial and anterior to the primary mass over approximately 6 x 5.5 x 4 cm. There are discontinuous areas of fluid attenuation associated with the nonmasslike enhancement which may represent cystic spaces and/or necrosis. There is an enlarged, left axillary lymph node with other adjacent, prominent lymph nodes. There is a lymph node node deep to the pectoralis minor muscle which measures 1.1 x 0.7 cm  · Left Axillary LN Biopsy 10/19/21: PATH - Metastatic breast cancer, measuring up to 17 mm in a single core  · ER/TN negative, HER2+  · Neoadjuvant TCHP 10/21/21 - Current   · Breast Biopsy 10/27/21: PATH - 5 mm IDC with DCIS    STAGE: Clinical 2 ER/TN negative Her2+    History of Present Illness:   Ebony Florez is a 61 y.o. female seen today in office for follow up of new left breast cancer ER/TN negative Her2 + post biopsy. She had a port placed on 10/19/21 without issues.  She had left axillary LN biopsy on 10/19/21 and path showed metastatic beast cancer, same markers. She started neoadjuvant TCHP chemotherapy on 10/21/21. She had another breast biopsy on 10/27/21 that showed 5 mm IDC with DCIS. She is here today for Cycle 2 of neoadjuvant TCHP chemotherapy. She reports that she feels well overall today. She tolerated first chemo well overall with some mild side effects. She had some mild nausea and took anti-emetics which helped. She had no vomiting. She also had some fatigue. Her appetite is stable although she has lost taste buds. She had some constipation and then some diarrhea but nothing too bad per patient. She continues to have some breast pain and bruising from biopsies but much better overall. She denies fever, chills, mouth sores, cough, SOB, CP, vomiting, and neuropathy. She has a history of COVID-19 infection and is due for second dose of vaccine soon. CBC and CMP were stable/good on 11/1721. She is ready for chemo today. She is here alone today. Past Medical History:   Diagnosis Date    Malignant neoplasm of left breast in female, estrogen receptor negative ([facility] [address] 10/13/2021      History reviewed. No pertinent surgical history. Social History     Tobacco Use    Smoking status: Never Smoker    Smokeless tobacco: Never Used   Substance Use Topics    Alcohol use: Not on file      History reviewed. No pertinent family history. Current Outpatient Medications   Medication Sig    lidocaine-prilocaine (EMLA) topical cream Apply  to affected area as needed for Pain. Apply to port-a-cath site 30-60 minutes prior to chemo    ondansetron (ZOFRAN ODT) 4 mg disintegrating tablet Take 1-2 Tablets by mouth every eight (8) hours as needed for Nausea or Vomiting.     prochlorperazine (Compazine) 5 mg tablet Take 1 tab by mouth every 6 hours as needed for nausea or vomiting    dexAMETHasone (DECADRON) 4 mg tablet Take 2 tabs (8mg) by mouth twice daily the day before chemotherapy and the day after chemotherapy    CRANIAL PROSTHESIS misc Alopecia due to chemo/ wig (Patient not taking: Reported on 10/19/2021)    amoxicillin-clavulanate (AUGMENTIN) 875-125 mg per tablet Take 1 Tablet by mouth two (2) times a day. (Patient not taking: Reported on 11/4/2021)    trimethoprim-sulfamethoxazole (BACTRIM DS, SEPTRA DS) 160-800 mg per tablet  (Patient not taking: Reported on 10/25/2021)     No current facility-administered medications for this visit. Facility-Administered Medications Ordered in Other Visits   Medication Dose Route Frequency    pertuzumab 600 mg-trastuzumab 600 mg-hyaluronidase-zzxf 20,000 units/10 mL  10 mL SubCUTAneous ONCE    fosaprepitant (EMEND) 150 mg in 0.9% sodium chloride 150 mL IVPB  150 mg IntraVENous ONCE    dexamethasone (DECADRON) 10 mg/mL injection 10 mg  10 mg IntraVENous ONCE    dexamethasone (DECADRON) 12 mg in 0.9% sodium chloride 50 mL IVPB  12 mg IntraVENous ONCE    palonosetron HCl (ALOXI) injection 0.25 mg  0.25 mg IntraVENous ONCE    DOCEtaxeL (TAXOTERE) 140 mg in 0.9% sodium chloride 250 mL, overfill volume 25 mL chemo infusion  75 mg/m2 (Treatment Plan Recorded) IntraVENous ONCE    CARBOplatin (PARAPLATIN) 802 mg in 0.9% sodium chloride 250 mL, overfill volume 25 mL chemo infusion  802 mg IntraVENous ONCE    pegfilgrastim (NEULASTA) wearable SQ injector 6 mg  6 mg SubCUTAneous ONCE    sodium chloride (NS) flush 10 mL  10 mL IntraVENous PRN    0.9% sodium chloride injection 10 mL  10 mL IntraVENous PRN    heparin (porcine) pf 300-500 Units  300-500 Units InterCATHeter PRN    0.9% sodium chloride infusion  25 mL/hr IntraVENous CONTINUOUS      No Known Allergies     Review of Systems:  A complete review of systems was obtained, negative except as described above and as reported on ROS sheet scanned into system.      Physical Exam:     Visit Vitals  /85 (BP 1 Location: Right arm, BP Patient Position: Sitting)   Pulse (!) 101   Temp 97 °F (36.1 °C) (Temporal)   Ht 5' 6\" (1.676 m)   Wt 168 lb 11.2 oz (76.5 kg)   SpO2 97%   BMI 27.23 kg/m²     ECOG PS: 0  General: No distress  Eyes: Anicteric sclerae  HENT: Atraumatic, wearing a mask  Neck: Supple  Lymphatic: No cervical, supraclavicular LAD  Respiratory: CTAB, normal respiratory effort  CV: Normal rate, regular rhythm, no murmurs, no peripheral edema  GI: Soft, nontender, non-distended   MS: Normal gait and station. Skin: No rashes, ecchymoses, or petechiae. Normal temperature, turgor, and texture. Port site without redness/swelling  Psych: Alert, oriented, appropriate affect, normal judgment/insight  Breast: LEFT breast mass with bruising from biopsies. Mass about 5-6 cm. Neuro: Non focal        10/25/21      Results:     Lab Results   Component Value Date/Time    WBC 8.2 11/17/2021 04:07 PM    HGB 11.6 11/17/2021 04:07 PM    HCT 35.1 11/17/2021 04:07 PM    PLATELET 932 40/66/9565 04:07 PM    MCV 95.4 11/17/2021 04:07 PM    ABS. NEUTROPHILS 7.2 11/17/2021 04:07 PM     Lab Results   Component Value Date/Time    Sodium 138 11/17/2021 04:07 PM    Potassium 4.0 11/17/2021 04:07 PM    Chloride 108 11/17/2021 04:07 PM    CO2 24 11/17/2021 04:07 PM    Glucose 166 (H) 11/17/2021 04:07 PM    BUN 16 11/17/2021 04:07 PM    Creatinine 0.69 11/17/2021 04:07 PM    GFR est AA >60 11/17/2021 04:07 PM    GFR est non-AA >60 11/17/2021 04:07 PM    Calcium 9.1 11/17/2021 04:07 PM     Lab Results   Component Value Date/Time    Bilirubin, total 0.3 11/17/2021 04:07 PM    ALT (SGPT) 77 11/17/2021 04:07 PM    Alk. phosphatase 142 (H) 11/17/2021 04:07 PM    Protein, total 7.2 11/17/2021 04:07 PM    Albumin 3.5 11/17/2021 04:07 PM    Globulin 3.7 11/17/2021 04:07 PM     10/6/21  FINAL PATHOLOGIC DIAGNOSIS   Breast, left mass, core biopsy:   Invasive ductal carcinoma, favor grade 3 (see synoptic table)   INVASIVE CARCINOMA OF THE BREAST: Biopsy   TUMOR   Tumor Site: Clock position - 3 o'clock   Histologic Type:  Invasive carcinoma of no special type (ductal)   Glandular (Acinar) / Tubular Differentiation: Score 3   Nuclear Pleomorphism: Score 3   Mitotic Rate: Score 3   Overall Grade: Grade 3 (scores of 8 or 9)   Tumor Size: 13 mm   Ductal Carcinoma In Situ (DCIS): Not identified   Lymphovascular Invasion: Not identified   Microcalcifications: Not identified     10/19/21  FINAL PATHOLOGIC DIAGNOSIS   Lymph node, left axillary, core biopsy:   Metastatic breast cancer, measuring up to 17 mm in a single core     10/21/21    ECHO ADULT COMPLETE 10/21/2021 10/21/2021    Interpretation Summary  · LV: Estimated LVEF is 55 - 60%. Normal cavity size, wall thickness and systolic function (ejection fraction normal). Wall motion: normal. Abnormal left ventricular strain. Global longitudinal strain is -15.4%. · TV: Pulmonary hypertension not suggested by Doppler findings. 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 Pathology report(s) reviewed above. Radiology report(s) reviewed above. Assessment:/PLAN     1) Clinical Stage 2B T3N0 LEFT Breast Cancer ER/VT negative Her2+  post breast biopsy only 10/21  Records and history reviewed. Reviewed path and data. Discussed dx, stage and treatment of breast cancer. Discussed neoadjuvant and adjuvant chemo. Discussed no hormonal therapy options. Surgery prefers neoadjuvant chemo. Not sure about type of surgery yet. Discussed chemo options. Discussed clinical trial options. Patient does not want to do clinical trial.   Decided on neoadjuvant TCHP. Teaching and consent with pharmacy.     MRI Breast 10/13/21 showed a rounded mass with central fluid attenuation/necrosis in the lateral aspect of the left breast, deep 3rd, measuring approximately 3.3 cm in diameter with enhancement extending along the adjacent dermis suggesting skin invasion. There is nonmasslike enhancement extending both inferior, medial and anterior to the primary mass over approximately 6 x 5.5 x 4 cm. There are discontinuous areas of fluid attenuation associated with the nonmasslike enhancement which may represent cystic spaces and/or necrosis. There is an enlarged, left axillary lymph node with other adjacent, prominent lymph nodes. There is a lymph node node deep to the pectoralis minor muscle which measures 1.1 x 0.7 cm  She had a left axillary LN biopsy on 10/19/21 and path showed metastatic beast cancer. She had a port placed on 10/19/21 with no issues. Pre chemo ECHO showed EF 55-60% with abnormal left ventricular strain. Referred to Cardiology for further evaluation - has follow up ECHO on 12/6/21 per Cardio. Patient started neoadjuvant TCHP chemotherapy on 10/25/21. She had another breast biopsy on 10/27/21 that showed 5 mm IDC with DCIS. Patient is here today for Cycle 2 of neoadjuvant TCHP chemotherapy. She tolerated first chemo well overall with some side nausea/fatigue. She is clinically healthy overall and stable today. Labs (CBC and CMP) from 11/17/21 reviewed. No dose adjustments needed today. Patient is ready for chemo today. Follow up in 3 weeks with Cycle 3. Patient agrees with plan. 2)  Postmenopausal  Continue routine GYN follow up. 3) Management of High Risk Medications - Chemotherapy  Toxicities include Grade 1 Nausea and Grade 1 Fatigue. Pre chemo ECHO from 10/21/21 reviewed - EF 55-60% with abnormal left ventricular strain. Referred to Cardiology for further evaluation/management of strain. She will have a follow up ECHO on 12/6/21 per Cardiology. Labs (CBC and CMP) from 11/17/21 reviewed today. No dose adjustments needed today. Will monitor for side effects. 4) Psychosocial  Mood good, coping well. 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 Her  is very supportive. SW/NN support as needed. She is here alone today. Call if questions. Follow up in 3 weeks with Cycle 3. This patient was seen in conjunction with Henrietta Burgos NP. I personally performed a face to face diagnostic evaluation on this patient. I personally reviewed the history and performed the key points on the exam.   I personally reviewed all points in the assessment and created treatment plan with the patient. Specifically, pt seen by me today  Feels well today  Tolerated cycle 1 of chemo TCHP with manageable side effects. Had another MRI biopsy and path same as prior. No new markers. Labs good today. Breast mass with significant response to treatment on exam today. Very good. Proceed with chemo as ordered. I appreciate the opportunity to participate in Ms. Kaylie Landin's care.     Signed By: Alice Schlatter, DO

## 2021-11-18 NOTE — PROGRESS NOTES
[facility] Chem[facility] Progress Note    Date: November 18, 2021      0825: Ms. Robert Young Arrived to [facility] for  C2 Phesgo + Taxotere + Carbo ambulatory in stable condition. Assessment was completed. No new concerns voiced. Port accessed with positive blood return. Labs drawn on 11/17/21, reviewed today. Went to provider appointment with Medical Oncology. Chemotherapy Flowsheet 11/18/2021   Cycle C2   Date 11/18/2021   Drug / Regimen Phesgo/Taxotere/   Pre Meds given   Notes given (left thigh)+ bindu REDDYI       Ms. Landin's vitals were reviewed. Patient Vitals for the past 12 hrs:   Pulse BP   11/18/21 1339 76 133/88       Pre-medications  were administered as ordered and chemotherapy was initiated.   Medications Administered     0.9% sodium chloride infusion     Admin Date  11/18/2021 Action  New Bag Dose  25 mL/hr Rate  25 mL/hr Route  IntraVENous Administered By  Gracia Ramirez RN          0.9% sodium chloride injection 10 mL     Admin Date  11/18/2021 Action  Given Dose  10 mL Route  IntraVENous Administered By  Gracia Ramirez RN          CARBOplatin (PARAPLATIN) 802 mg in 0.9% sodium chloride 250 mL, overfill volume 25 mL chemo infusion     Admin Date  11/18/2021 Action  New Bag Dose  802 mg Rate  710.4 mL/hr Route  IntraVENous Administered By  Gracia Ramirez RN          dexamethasone (PF) (DECADRON) 10 mg/mL injection 10 mg     Admin Date  11/18/2021 Action  Given Dose  10 mg Route  IntraVENous Administered By  Gracia Ramirez RN          DOCEtaxeL (TAXOTERE) 140 mg in 0.9% sodium chloride 250 mL, overfill volume 25 mL chemo infusion     Admin Date  11/18/2021 Action  New Bag Dose  140 mg Rate  289 mL/hr Route  IntraVENous Administered By  Gracia Ramirez RN          fosaprepitant (EMEND) 150 mg in 0.9% sodium chloride 150 mL IVPB     Admin Date  11/18/2021 Action  New Bag Dose  150 mg Rate  450 mL/hr Route  IntraVENous Administered By  Gracia Ramirez RN          heparin (porcine) pf 300-500 Units     Admin Date  11/18/2021 Action  Given Dose  500 Units Route  InterCATHeter Administered By  Elizabeth Collins RN          palonosetron HCl (ALOXI) injection 0.25 mg     Admin Date  11/18/2021 Action  Given Dose  0.25 mg Route  IntraVENous Administered By  Elizabeth Collins RN          pegfilgrastim (NEULASTA) wearable SQ injector 6 mg     Admin Date  11/18/2021 Action  Given Dose  6 mg Route  SubCUTAneous Administered By  Elizabeth Collins RN          pertuzumab 600 mg-trastuzumab 600 mg-hyaluronidase-zzxf 20,000 units/10 mL     Admin Date  11/18/2021 Action  Given Dose  10 mL Route  SubCUTAneous Administered By  Elizabeth Collins RN          sodium chloride (NS) flush 10 mL     Admin Date  11/18/2021 Action  Given Dose  10 mL Route  IntraVENous Administered By  Elizabeth Collins RN            Phesgo-SQ left thigh  OBI-left arm          OBI Neulasta placed to left upper arm. Education provided; Patient verbalized understanding. 1340: Patient tolerated treatment well. Port maintained positive blood return throughout treatment. Port flushed, heparinized and de accessed per protocol. Patient was discharged in stable condition.  Patient is aware of next scheduled [facility] appointment on  Future Appointments   Date Time Provider Ryan Daly   12/6/2021  3:00 PM BONI DU BS AMB   12/8/2021  4:00 [facility]   12/9/2021  8:30 AM F1 RAFI LONG [place] [facility]   12/9/2021  8:45 AM Jessica Parmar,  [address] BS AMB   12/29/2021  4:00 PM H3 RAFI LAB [facility]   12/30/2021  8:30 AM F1 RAFI LONG [facility] H   1/6/2022  3:20 PM Ludin Euceda MD [facility] BS AMB   1/19/2022  4:00 PM H3 RAFI LAB [facility]   1/20/2022  8:30 AM F1 RAFI LONG [place] [facility]   2/9/2022  4:00 PM H3 RAFI LAB [facility]   2/10/2022  8:30 AM F1 RAFI LONG [address] RN, RN  November 18, 2021

## 2021-11-18 NOTE — PROGRESS NOTES
Panda Cardenas is a 61 y.o. female  Chief Complaint   Patient presents with    Breast Cancer    Chemotherapy     1. Have you been to the ER, urgent care clinic since your last visit? Hospitalized since your last visit? No.  2. Have you seen or consulted any other health care providers outside of the [address] [place] [address] since your last visit? Include any pap smears or colon screening.  No.

## 2021-12-01 ENCOUNTER — APPOINTMENT (OUTPATIENT)
Dept: INFUSION THERAPY | Age: 56
End: 2021-12-01

## 2021-12-01 RX ORDER — SODIUM CHLORIDE 9 MG/ML
25 INJECTION, SOLUTION INTRAVENOUS CONTINUOUS
Status: CANCELLED | OUTPATIENT
Start: 2021-12-09

## 2021-12-01 RX ORDER — SODIUM CHLORIDE 9 MG/ML
10 INJECTION INTRAMUSCULAR; INTRAVENOUS; SUBCUTANEOUS AS NEEDED
Status: CANCELLED | OUTPATIENT
Start: 2021-12-09

## 2021-12-01 RX ORDER — DEXAMETHASONE SODIUM PHOSPHATE 100 MG/10ML
10 INJECTION INTRAMUSCULAR; INTRAVENOUS ONCE
Status: CANCELLED | OUTPATIENT
Start: 2021-12-09 | End: 2021-12-09

## 2021-12-01 RX ORDER — DIPHENHYDRAMINE HYDROCHLORIDE 50 MG/ML
25 INJECTION, SOLUTION INTRAMUSCULAR; INTRAVENOUS AS NEEDED
Status: CANCELLED
Start: 2021-12-09

## 2021-12-01 RX ORDER — HYDROCORTISONE SODIUM SUCCINATE 100 MG/2ML
100 INJECTION, POWDER, FOR SOLUTION INTRAMUSCULAR; INTRAVENOUS AS NEEDED
Status: CANCELLED | OUTPATIENT
Start: 2021-12-09

## 2021-12-01 RX ORDER — DIPHENHYDRAMINE HYDROCHLORIDE 50 MG/ML
50 INJECTION, SOLUTION INTRAMUSCULAR; INTRAVENOUS
Status: CANCELLED | OUTPATIENT
Start: 2021-12-09

## 2021-12-01 RX ORDER — PALONOSETRON 0.05 MG/ML
0.25 INJECTION, SOLUTION INTRAVENOUS ONCE
Status: CANCELLED | OUTPATIENT
Start: 2021-12-09 | End: 2021-12-09

## 2021-12-01 RX ORDER — HEPARIN 100 UNIT/ML
300-500 SYRINGE INTRAVENOUS AS NEEDED
Status: CANCELLED
Start: 2021-12-09

## 2021-12-01 RX ORDER — EPINEPHRINE 1 MG/ML
0.3 INJECTION, SOLUTION, CONCENTRATE INTRAVENOUS AS NEEDED
Status: CANCELLED | OUTPATIENT
Start: 2021-12-09

## 2021-12-01 RX ORDER — ACETAMINOPHEN 325 MG/1
650 TABLET ORAL AS NEEDED
Status: CANCELLED
Start: 2021-12-09

## 2021-12-01 RX ORDER — ALBUTEROL SULFATE 0.83 MG/ML
2.5 SOLUTION RESPIRATORY (INHALATION) AS NEEDED
Status: CANCELLED
Start: 2021-12-09

## 2021-12-01 RX ORDER — SODIUM CHLORIDE 0.9 % (FLUSH) 0.9 %
10 SYRINGE (ML) INJECTION AS NEEDED
Status: CANCELLED | OUTPATIENT
Start: 2021-12-09

## 2021-12-01 RX ORDER — ONDANSETRON 2 MG/ML
8 INJECTION INTRAMUSCULAR; INTRAVENOUS AS NEEDED
Status: CANCELLED | OUTPATIENT
Start: 2021-12-09

## 2021-12-01 RX ORDER — DIPHENHYDRAMINE HYDROCHLORIDE 50 MG/ML
50 INJECTION, SOLUTION INTRAMUSCULAR; INTRAVENOUS AS NEEDED
Status: CANCELLED
Start: 2021-12-09

## 2021-12-01 RX ORDER — ACETAMINOPHEN 325 MG/1
650 TABLET ORAL
Status: CANCELLED | OUTPATIENT
Start: 2021-12-09

## 2021-12-02 ENCOUNTER — APPOINTMENT (OUTPATIENT)
Dept: INFUSION THERAPY | Age: 56
End: 2021-12-02

## 2021-12-06 ENCOUNTER — ANCILLARY PROCEDURE (OUTPATIENT)
Dept: CARDIOLOGY CLINIC | Age: 56
End: 2021-12-06
Payer: COMMERCIAL

## 2021-12-06 VITALS — HEIGHT: 66 IN | BODY MASS INDEX: 27 KG/M2 | WEIGHT: 168 LBS

## 2021-12-06 DIAGNOSIS — Z09 CHEMOTHERAPY FOLLOW-UP EXAMINATION: ICD-10-CM

## 2021-12-06 DIAGNOSIS — Z51.81 ENCOUNTER FOR THERAPEUTIC DRUG MONITORING: ICD-10-CM

## 2021-12-06 PROCEDURE — 93308 TTE F-UP OR LMTD: CPT | Performed by: SPECIALIST

## 2021-12-08 ENCOUNTER — HOSPITAL ENCOUNTER (OUTPATIENT)
Dept: INFUSION THERAPY | Age: 56
Discharge: HOME | End: 2021-12-08
Payer: COMMERCIAL

## 2021-12-08 VITALS
DIASTOLIC BLOOD PRESSURE: 81 MMHG | OXYGEN SATURATION: 99 % | BODY MASS INDEX: 27.22 KG/M2 | SYSTOLIC BLOOD PRESSURE: 142 MMHG | HEART RATE: 84 BPM | RESPIRATION RATE: 18 BRPM | TEMPERATURE: 97.9 F | WEIGHT: 168.65 LBS

## 2021-12-08 LAB
ALBUMIN SERPL-MCNC: 3.6 G/DL (ref 3.5–5)
ALBUMIN/GLOB SERPL: 0.8 {RATIO} (ref 1.1–2.2)
ALP SERPL-CCNC: 216 U/L (ref 45–117)
ALT SERPL-CCNC: 71 U/L (ref 12–78)
ANION GAP SERPL CALC-SCNC: 9 MMOL/L (ref 5–15)
AST SERPL-CCNC: 44 U/L (ref 15–37)
BASOPHILS # BLD: 0 K/UL (ref 0–0.1)
BASOPHILS NFR BLD: 0 % (ref 0–1)
BILIRUB SERPL-MCNC: 0.5 MG/DL (ref 0.2–1)
BUN SERPL-MCNC: 9 MG/DL (ref 6–20)
BUN/CREAT SERPL: 16 (ref 12–20)
CALCIUM SERPL-MCNC: 9.8 MG/DL (ref 8.5–10.1)
CHLORIDE SERPL-SCNC: 100 MMOL/L (ref 97–108)
CO2 SERPL-SCNC: 24 MMOL/L (ref 21–32)
CREAT SERPL-MCNC: 0.55 MG/DL (ref 0.55–1.02)
DIFFERENTIAL METHOD BLD: ABNORMAL
EOSINOPHIL # BLD: 0 K/UL (ref 0–0.4)
EOSINOPHIL NFR BLD: 0 % (ref 0–7)
ERYTHROCYTE [DISTWIDTH] IN BLOOD BY AUTOMATED COUNT: 14.4 % (ref 11.5–14.5)
GLOBULIN SER CALC-MCNC: 4.5 G/DL (ref 2–4)
GLUCOSE SERPL-MCNC: 141 MG/DL (ref 65–100)
HCT VFR BLD AUTO: 32.4 % (ref 35–47)
HGB BLD-MCNC: 11 G/DL (ref 11.5–16)
IMM GRANULOCYTES # BLD AUTO: 0 K/UL (ref 0–0.04)
IMM GRANULOCYTES NFR BLD AUTO: 0 % (ref 0–0.5)
LYMPHOCYTES # BLD: 1.1 K/UL (ref 0.8–3.5)
LYMPHOCYTES NFR BLD: 14 % (ref 12–49)
MCH RBC QN AUTO: 31.9 PG (ref 26–34)
MCHC RBC AUTO-ENTMCNC: 34 G/DL (ref 30–36.5)
MCV RBC AUTO: 93.9 FL (ref 80–99)
MONOCYTES # BLD: 0.1 K/UL (ref 0–1)
MONOCYTES NFR BLD: 1 % (ref 5–13)
NEUTS SEG # BLD: 6.4 K/UL (ref 1.8–8)
NEUTS SEG NFR BLD: 85 % (ref 32–75)
NRBC # BLD: 0 K/UL (ref 0–0.01)
NRBC BLD-RTO: 0 PER 100 WBC
PLATELET # BLD AUTO: 272 K/UL (ref 150–400)
PMV BLD AUTO: 9.5 FL (ref 8.9–12.9)
POTASSIUM SERPL-SCNC: 3.4 MMOL/L (ref 3.5–5.1)
PROT SERPL-MCNC: 8.1 G/DL (ref 6.4–8.2)
RBC # BLD AUTO: 3.45 M/UL (ref 3.8–5.2)
SODIUM SERPL-SCNC: 133 MMOL/L (ref 136–145)
WBC # BLD AUTO: 7.7 K/UL (ref 3.6–11)

## 2021-12-08 PROCEDURE — 85025 COMPLETE CBC W/AUTO DIFF WBC: CPT

## 2021-12-08 PROCEDURE — 80053 COMPREHEN METABOLIC PANEL: CPT

## 2021-12-08 PROCEDURE — 36415 COLL VENOUS BLD VENIPUNCTURE: CPT

## 2021-12-09 ENCOUNTER — OFFICE VISIT (OUTPATIENT)
Dept: ONCOLOGY | Age: 56
End: 2021-12-09

## 2021-12-09 ENCOUNTER — HOSPITAL ENCOUNTER (OUTPATIENT)
Dept: INFUSION THERAPY | Age: 56
Discharge: HOME | End: 2021-12-09
Payer: COMMERCIAL

## 2021-12-09 VITALS
SYSTOLIC BLOOD PRESSURE: 152 MMHG | HEART RATE: 81 BPM | TEMPERATURE: 97.3 F | BODY MASS INDEX: 25.78 KG/M2 | WEIGHT: 160.4 LBS | OXYGEN SATURATION: 98 % | DIASTOLIC BLOOD PRESSURE: 70 MMHG | RESPIRATION RATE: 16 BRPM | HEIGHT: 66 IN

## 2021-12-09 VITALS
SYSTOLIC BLOOD PRESSURE: 169 MMHG | DIASTOLIC BLOOD PRESSURE: 81 MMHG | RESPIRATION RATE: 18 BRPM | WEIGHT: 160 LBS | HEART RATE: 80 BPM | TEMPERATURE: 97.3 F | OXYGEN SATURATION: 98 % | BODY MASS INDEX: 25.82 KG/M2

## 2021-12-09 DIAGNOSIS — Z51.81 ENCOUNTER FOR MONITORING CARDIOTOXIC DRUG THERAPY: ICD-10-CM

## 2021-12-09 DIAGNOSIS — Z17.1 MALIGNANT NEOPLASM OF LEFT BREAST IN FEMALE, ESTROGEN RECEPTOR NEGATIVE, UNSPECIFIED SITE OF BREAST (HCC): Primary | ICD-10-CM

## 2021-12-09 DIAGNOSIS — R53.83 CHEMOTHERAPY-INDUCED FATIGUE: ICD-10-CM

## 2021-12-09 DIAGNOSIS — R11.0 CHEMOTHERAPY-INDUCED NAUSEA: ICD-10-CM

## 2021-12-09 DIAGNOSIS — Z09 CHEMOTHERAPY FOLLOW-UP EXAMINATION: ICD-10-CM

## 2021-12-09 DIAGNOSIS — Z78.0 POST-MENOPAUSAL: ICD-10-CM

## 2021-12-09 DIAGNOSIS — C50.912 MALIGNANT NEOPLASM OF LEFT BREAST IN FEMALE, ESTROGEN RECEPTOR NEGATIVE, UNSPECIFIED SITE OF BREAST (HCC): Primary | ICD-10-CM

## 2021-12-09 DIAGNOSIS — T45.1X5A CHEMOTHERAPY-INDUCED NAUSEA: ICD-10-CM

## 2021-12-09 DIAGNOSIS — N63.20 LEFT BREAST MASS: ICD-10-CM

## 2021-12-09 DIAGNOSIS — T45.1X5A CHEMOTHERAPY-INDUCED FATIGUE: ICD-10-CM

## 2021-12-09 DIAGNOSIS — Z95.828 PORT-A-CATH IN PLACE: ICD-10-CM

## 2021-12-09 DIAGNOSIS — Z79.899 ENCOUNTER FOR MONITORING CARDIOTOXIC DRUG THERAPY: ICD-10-CM

## 2021-12-09 PROCEDURE — 77030012965 HC NDL HUBR BBMI -A

## 2021-12-09 PROCEDURE — 96375 TX/PRO/DX INJ NEW DRUG ADDON: CPT

## 2021-12-09 PROCEDURE — 99214 OFFICE O/P EST MOD 30 MIN: CPT | Performed by: INTERNAL MEDICINE

## 2021-12-09 PROCEDURE — 74011000258 HC RX REV CODE- 258: Performed by: INTERNAL MEDICINE

## 2021-12-09 PROCEDURE — 74011250636 HC RX REV CODE- 250/636: Performed by: INTERNAL MEDICINE

## 2021-12-09 PROCEDURE — 96413 CHEMO IV INFUSION 1 HR: CPT

## 2021-12-09 PROCEDURE — 96417 CHEMO IV INFUS EACH ADDL SEQ: CPT

## 2021-12-09 PROCEDURE — 96401 CHEMO ANTI-NEOPL SQ/IM: CPT

## 2021-12-09 PROCEDURE — 74011250637 HC RX REV CODE- 250/637: Performed by: NURSE PRACTITIONER

## 2021-12-09 PROCEDURE — 96367 TX/PROPH/DG ADDL SEQ IV INF: CPT

## 2021-12-09 RX ORDER — SODIUM CHLORIDE 9 MG/ML
10 INJECTION INTRAMUSCULAR; INTRAVENOUS; SUBCUTANEOUS AS NEEDED
Status: ACTIVE | OUTPATIENT
Start: 2021-12-09 | End: 2021-12-09

## 2021-12-09 RX ORDER — HEPARIN 100 UNIT/ML
300-500 SYRINGE INTRAVENOUS AS NEEDED
Status: ACTIVE | OUTPATIENT
Start: 2021-12-09 | End: 2021-12-09

## 2021-12-09 RX ORDER — PALONOSETRON 0.05 MG/ML
0.25 INJECTION, SOLUTION INTRAVENOUS ONCE
Status: COMPLETED | OUTPATIENT
Start: 2021-12-09 | End: 2021-12-09

## 2021-12-09 RX ORDER — POTASSIUM CHLORIDE 750 MG/1
40 TABLET, FILM COATED, EXTENDED RELEASE ORAL
Status: COMPLETED | OUTPATIENT
Start: 2021-12-09 | End: 2021-12-09

## 2021-12-09 RX ORDER — DEXAMETHASONE SODIUM PHOSPHATE 4 MG/ML
10 INJECTION, SOLUTION INTRA-ARTICULAR; INTRALESIONAL; INTRAMUSCULAR; INTRAVENOUS; SOFT TISSUE ONCE
Status: COMPLETED | OUTPATIENT
Start: 2021-12-09 | End: 2021-12-09

## 2021-12-09 RX ORDER — SODIUM CHLORIDE 9 MG/ML
25 INJECTION, SOLUTION INTRAVENOUS CONTINUOUS
Status: DISPENSED | OUTPATIENT
Start: 2021-12-09 | End: 2021-12-09

## 2021-12-09 RX ADMIN — PEGFILGRASTIM 6 MG: KIT SUBCUTANEOUS at 14:27

## 2021-12-09 RX ADMIN — SODIUM CHLORIDE 25 ML/HR: 900 INJECTION, SOLUTION INTRAVENOUS at 11:37

## 2021-12-09 RX ADMIN — CARBOPLATIN 900 MG: 10 INJECTION, SOLUTION INTRAVENOUS at 13:47

## 2021-12-09 RX ADMIN — SODIUM CHLORIDE 10 ML: 9 INJECTION INTRAMUSCULAR; INTRAVENOUS; SUBCUTANEOUS at 14:30

## 2021-12-09 RX ADMIN — HEPARIN 500 UNITS: 100 SYRINGE at 14:30

## 2021-12-09 RX ADMIN — POTASSIUM CHLORIDE 40 MEQ: 750 TABLET, EXTENDED RELEASE ORAL at 11:37

## 2021-12-09 RX ADMIN — PERTUZUMAB, TRASTUZUMAB, AND HYALURONIDASE-ZZXF 10 ML: 600; 600; 2000 INJECTION, SOLUTION SUBCUTANEOUS at 10:58

## 2021-12-09 RX ADMIN — FOSAPREPITANT 150 MG: 150 INJECTION, POWDER, LYOPHILIZED, FOR SOLUTION INTRAVENOUS at 11:45

## 2021-12-09 RX ADMIN — PALONOSETRON 0.25 MG: 0.05 INJECTION, SOLUTION INTRAVENOUS at 11:38

## 2021-12-09 RX ADMIN — DEXAMETHASONE SODIUM PHOSPHATE 10 MG: 4 INJECTION, SOLUTION INTRA-ARTICULAR; INTRALESIONAL; INTRAMUSCULAR; INTRAVENOUS; SOFT TISSUE at 11:37

## 2021-12-09 RX ADMIN — DOCETAXEL ANHYDROUS 140 MG: 10 INJECTION, SOLUTION INTRAVENOUS at 12:16

## 2021-12-09 NOTE — PROGRESS NOTES
[facility] Chemo Progress Note    Date: 2021    Name: Noy Sainz    MRN: [id number]         : 1965    0820 Ms. Landin Arrived to [place] for Phesgo/TC ambulatory in stable condition. Assessment was completed, no acute issues at this time, no new complaints voiced. Port accessed with positive blood return. Went to provider appointment with Medical Oncology. Chemotherapy Flowsheet 2021   Cycle C3   Date 2021   Drug / Regimen Phesgo/Taxotere/Carbo   Pre Meds give   Notes given+OBI         Patient denies SOB, fever, cough, general not feeling well. Patient denies recent exposure to someone who has tested positive for COVID-19. Patient denies having contact with anyone who has a pending COVID test.      Ms. Yeny Zimmerman vitals were reviewed. Patient Vitals for the past 12 hrs:   Temp Pulse Resp BP SpO2   21 1424 -- 81 -- (!) 152/70 --   21 0819 97.3 °F (36.3 °C) 80 16 (!) 169/81 98 %           Pre-medications  were administered as ordered and chemotherapy was initiated.   Medications Administered     0.9% sodium chloride infusion     Admin Date  2021 Action  New Bag Dose  25 mL/hr Rate  25 mL/hr Route  IntraVENous Administered By  Rashard Butler RN          0.9% sodium chloride injection 10 mL     Admin Date  2021 Action  Given Dose  10 mL Route  IntraVENous Administered By  Rashard Butler RN          CARBOplatin (PARAPLATIN) 900 mg in 0.9% sodium chloride 250 mL, overfill volume 25 mL chemo infusion     Admin Date  2021 Action  New Bag Dose  900 mg Rate  730 mL/hr Route  IntraVENous Administered By  Rashard Butler RN          dexamethasone (DECADRON) 4 mg/mL injection 10 mg     Admin Date  2021 Action  Given Dose  10 mg Route  IntraVENous Administered By  Rashard Butler RN          DOCEtaxeL (TAXOTERE) 140 mg in 0.9% sodium chloride 250 mL, overfill volume 25 mL chemo infusion     Admin Date  2021 Action  New Bag Dose  140 mg Rate  289 mL/hr Route  IntraVENous Administered By  Wilfredo George RN          fosaprepitant (EMEND) 150 mg in 0.9% sodium chloride 150 mL IVPB     Admin Date  12/09/2021 Action  New Bag Dose  150 mg Rate  450 mL/hr Route  IntraVENous Administered By  Wilfredo George RN          heparin (porcine) pf 300-500 Units     Admin Date  12/09/2021 Action  Given Dose  500 Units Route  InterCATHeter Administered By  Wilfredo George RN          palonosetron HCl (ALOXI) injection 0.25 mg     Admin Date  12/09/2021 Action  Given Dose  0.25 mg Route  IntraVENous Administered By  Wilfredo George RN          pegfilgrastim (NEULASTA) wearable SQ injector 6 mg     Admin Date  12/09/2021 Action  Given Dose  6 mg Route  SubCUTAneous Administered By  iWlfredo George RN          pertuzumab 600 mg-trastuzumab 600 mg-hyaluronidase-zzxf 20,000 units/10 mL     Admin Date  12/09/2021 Action  Given Dose  10 mL Route  SubCUTAneous Administered By  Wilfredo George RN          potassium chloride SR (KLOR-CON 10) tablet 40 mEq     Admin Date  12/09/2021 Action  Given Dose  40 mEq Route  Oral Administered By  Wilfredo George RN              Phesgo: given in the Right Thigh   OBI placed on the Left arm       1430 Patient tolerated treatment well. Port maintained positive blood return throughout treatment. Port flushed, heparinized and de accessed per protocol. Patient was discharged from [facility] in stable condition. Patient aware of next appointment.      Future Appointments   Date Time Provider Ryan Malika   [id number]  2:88 PM Tomy Ball MD [facility] BS AMB   12/29/2021  4:00 PM H3 RAFI LAB [facility]   12/30/2021  8:30 AM F1 RAFI LONG [place] [facility]   12/30/2021  8:45 AM John Azul, MICHELLE [address] BS AMB   1/6/2022  3:20 PM Ludin Euceda MD [facility] BS AMB   1/19/2022  4:00 PM H3 RAFI LAB [facility]   1/20/2022  8:30 AM F1 RAFI LONG [place] [facility]   2/9/2022  4:00 PM H3 RAFI LAB [facility]   2/10/2022  8:30 AM  RAFI SOFIA Steve RN  December 9, 2021

## 2021-12-09 NOTE — PROGRESS NOTES
[facility] [place] [address] [address], [address], [place]: [phone]  F: [phone]    Reason for Visit:   Ebony Florez is a 61 y.o. female seen today in office for follow up of Left Breast Cancer. Treatment History:   · LEFT Breast Biopsy 10/6/21: PATH - Invasive ductal carcinoma, favor grade 3  · ER/KY negative, HER2+  · Breast MRI 10/13/21: RIGHT BREAST: Background parenchymal enhancement: Mild. There is no suspicious masslike or nonmasslike enhancement within the right breast to indicate breast carcinoma. There are no suspicious internal mammary or axillary chain lymph nodes. LEFT BREAST: Background parenchymal enhancement: Mild There is a rounded mass with central fluid attenuation/necrosis in the lateral aspect of the left breast, deep 3rd, measuring approximately 3.3 cm in diameter with enhancement extending along the adjacent dermis suggesting skin invasion. There is nonmasslike enhancement extending both inferior, medial and anterior to the primary mass over approximately 6 x 5.5 x 4 cm. There are discontinuous areas of fluid attenuation associated with the nonmasslike enhancement which may represent cystic spaces and/or necrosis. There is an enlarged, left axillary lymph node with other adjacent, prominent lymph nodes. There is a lymph node node deep to the pectoralis minor muscle which measures 1.1 x 0.7 cm  · Left Axillary LN Biopsy 10/19/21: PATH - Metastatic breast cancer, measuring up to 17 mm in a single core  · ER/KY negative, HER2+  · Neoadjuvant TCHP 10/21/21 - Current   · Breast Biopsy 10/27/21: PATH - 5 mm IDC with DCIS    STAGE: Clinical 2 ER/KY negative Her2+    History of Present Illness:   Ebony Florez is a 61 y.o. female seen today in office for follow up of new left breast cancer ER/KY negative Her2 + post biopsy. She had a port placed on 10/19/21 without issues.  She had left axillary LN biopsy on 10/19/21 and path showed metastatic beast cancer, same markers. She started neoadjuvant TCHP chemotherapy on 10/21/21. She had another breast biopsy on 10/27/21 that showed 5 mm IDC with DCIS. She is here today for Cycle 3 of neoadjuvant TCHP chemotherapy. She reports that she feels well overall today. She is tolerating chemo well overall with some mild side effects. She had some mild nausea and took anti-emetics which helped. She had no vomiting. She continues to have some fatigue. Her appetite is stable although she has lost taste buds and taste continues to be impaired. She had very mild diarrhea. She denies fever, chills, mouth sores, cough, SOB, CP, vomiting, constipation, and neuropathy. She denies pain today. She has a history of COVID-19 infection and is due for second dose of vaccine soon. CBC and CMP were stable/good on 12/8/21. She is ready for chemo today. She is here alone today. Past Medical History:   Diagnosis Date    Malignant neoplasm of left breast in female, estrogen receptor negative ([facility] [address] 10/13/2021      No past surgical history on file. Social History     Tobacco Use    Smoking status: Never Smoker    Smokeless tobacco: Never Used   Substance Use Topics    Alcohol use: Not on file      No family history on file. Current Outpatient Medications   Medication Sig    lidocaine-prilocaine (EMLA) topical cream Apply  to affected area as needed for Pain. Apply to port-a-cath site 30-60 minutes prior to chemo    ondansetron (ZOFRAN ODT) 4 mg disintegrating tablet Take 1-2 Tablets by mouth every eight (8) hours as needed for Nausea or Vomiting.     prochlorperazine (Compazine) 5 mg tablet Take 1 tab by mouth every 6 hours as needed for nausea or vomiting    dexAMETHasone (DECADRON) 4 mg tablet Take 2 tabs (8mg) by mouth twice daily the day before chemotherapy and the day after chemotherapy    CRANIAL PROSTHESIS misc Alopecia due to chemo/ wig    amoxicillin-clavulanate (AUGMENTIN) 875-125 mg per tablet Take 1 Tablet by mouth two (2) times a day.  trimethoprim-sulfamethoxazole (BACTRIM DS, SEPTRA DS) 160-800 mg per tablet      No current facility-administered medications for this visit. No Known Allergies     Review of Systems:  A complete review of systems was obtained, negative except as described above and as reported on ROS sheet scanned into system. Physical Exam:     Visit Vitals  BP (!) 169/81 (BP 1 Location: Left upper arm, BP Patient Position: Sitting)   Pulse 80   Temp 97.3 °F (36.3 °C)   Resp 18   Wt 160 lb (72.6 kg)   SpO2 98%   BMI 25.82 kg/m²     ECOG PS: 0  General: No distress  Eyes: Anicteric sclerae  HENT: Atraumatic, wearing a mask  Neck: Supple  Lymphatic: No cervical, supraclavicular LAD  Respiratory: CTAB, normal respiratory effort  CV: Normal rate, regular rhythm, no murmurs, no peripheral edema  GI: Soft, nontender, non-distended   MS: Normal gait and station. Skin: No rashes, ecchymoses, or petechiae. Normal temperature, turgor, and texture. Port site without redness/swelling  Psych: Alert, oriented, appropriate affect, normal judgment/insight  Breast: LEFT breast mass with bruising from biopsies. Mass was initially 5-6 cm and much smaller/softer overall today   Neuro: Non focal    Results:     Lab Results   Component Value Date/Time    WBC 7.7 12/08/2021 04:12 PM    HGB 11.0 (L) 12/08/2021 04:12 PM    HCT 32.4 (L) 12/08/2021 04:12 PM    PLATELET 998 94/50/1282 04:12 PM    MCV 93.9 12/08/2021 04:12 PM    ABS.  NEUTROPHILS 6.4 12/08/2021 04:12 PM     Lab Results   Component Value Date/Time    Sodium 133 (L) 12/08/2021 04:12 PM    Potassium 3.4 (L) 12/08/2021 04:12 PM    Chloride 100 12/08/2021 04:12 PM    CO2 24 12/08/2021 04:12 PM    Glucose 141 (H) 12/08/2021 04:12 PM    BUN 9 12/08/2021 04:12 PM    Creatinine 0.55 12/08/2021 04:12 PM    GFR est AA >60 12/08/2021 04:12 PM    GFR est non-AA >60 12/08/2021 04:12 PM    Calcium 9.8 12/08/2021 04:12 PM     Lab Results   Component Value Date/Time    Bilirubin, total 0.5 12/08/2021 04:12 PM    ALT (SGPT) 71 12/08/2021 04:12 PM    Alk. phosphatase 216 (H) 12/08/2021 04:12 PM    Protein, total 8.1 12/08/2021 04:12 PM    Albumin 3.6 12/08/2021 04:12 PM    Globulin 4.5 (H) 12/08/2021 04:12 PM     10/6/21  FINAL PATHOLOGIC DIAGNOSIS   Breast, left mass, core biopsy:   Invasive ductal carcinoma, favor grade 3 (see synoptic table)   INVASIVE CARCINOMA OF THE BREAST: Biopsy   TUMOR   Tumor Site: Clock position - 3 o'clock   Histologic Type: Invasive carcinoma of no special type (ductal)   Glandular (Acinar) / Tubular Differentiation: Score 3   Nuclear Pleomorphism: Score 3   Mitotic Rate: Score 3   Overall Grade: Grade 3 (scores of 8 or 9)   Tumor Size: 13 mm   Ductal Carcinoma In Situ (DCIS): Not identified   Lymphovascular Invasion: Not identified   Microcalcifications: Not identified     10/19/21  FINAL PATHOLOGIC DIAGNOSIS   Lymph node, left axillary, core biopsy:   Metastatic breast cancer, measuring up to 17 mm in a single core     10/21/21    ECHO ADULT COMPLETE 10/21/2021 10/21/2021    Interpretation Summary  · LV: Estimated LVEF is 55 - 60%. Normal cavity size, wall thickness and systolic function (ejection fraction normal). Wall motion: normal. Abnormal left ventricular strain. Global longitudinal strain is -15.4%. · TV: Pulmonary hypertension not suggested by Doppler findings. 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 Pathology report(s) reviewed above. Radiology report(s) reviewed above.     Assessment:/PLAN     1) Clinical Stage 2B T3N0 LEFT Breast Cancer ER/ND negative Her2+  post breast biopsy only 10/21  Records and history reviewed. Reviewed path and data. Discussed dx, stage and treatment of breast cancer. Discussed neoadjuvant and adjuvant chemo. Discussed no hormonal therapy options. Surgery prefers neoadjuvant chemo. Not sure about type of surgery yet. Discussed chemo options. Discussed clinical trial options. Patient does not want to do clinical trial.   Decided on neoadjuvant TCHP. Teaching and consent with pharmacy. MRI Breast 10/13/21 showed a rounded mass with central fluid attenuation/necrosis in the lateral aspect of the left breast, deep 3rd, measuring approximately 3.3 cm in diameter with enhancement extending along the adjacent dermis suggesting skin invasion. There is nonmasslike enhancement extending both inferior, medial and anterior to the primary mass over approximately 6 x 5.5 x 4 cm. There are discontinuous areas of fluid attenuation associated with the nonmasslike enhancement which may represent cystic spaces and/or necrosis. There is an enlarged, left axillary lymph node with other adjacent, prominent lymph nodes. There is a lymph node node deep to the pectoralis minor muscle which measures 1.1 x 0.7 cm  She had a left axillary LN biopsy on 10/19/21 and path showed metastatic beast cancer. She had a port placed on 10/19/21 with no issues. Pre chemo ECHO showed EF 55-60% with abnormal left ventricular strain. Referred to Cardiology for further evaluation - has follow up ECHO on 12/6/21 per Cardio. Patient started neoadjuvant TCHP chemotherapy on 10/25/21. She had another breast biopsy on 10/27/21 that showed 5 mm IDC with DCIS. She had a follow up ECHO on 12/6/21 per Cardiology - EF 58% with no strain. Patient is here today for Cycle 3 of neoadjuvant TCHP chemotherapy. She is tolerating chemo well overall with some side nausea/fatigue. She is clinically healthy overall and stable today. Labs (CBC and CMP) from 11/17/21 reviewed. K is 3.4 and will replace orally today in [facility]. No dose adjustments needed today. Patient is ready for chemo today. Follow up in 3 weeks with Cycle 4. Patient agrees with plan. 2)  Postmenopausal  Continue routine GYN follow up. 3) Management of High Risk Medications - Chemotherapy  Toxicities include Grade 1 Nausea and Grade 1 Fatigue. Pre chemo ECHO from 10/21/21 reviewed - EF 55-60% with abnormal left ventricular strain. Referred to Cardiology for further evaluation/management of strain. She had a follow up ECHO on 12/6/21 per Cardiology - EF 58% with no strain on prelim result. Labs (CBC and CMP) from 12/8/21 reviewed today. K is 3.4 and will replace orally today in [facility]. No dose adjustments needed today. Will monitor for side effects. 4) Psychosocial  Mood good, coping well. 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 Her  is very supportive. SW/NN support as needed. She is here alone today. Call if questions. Follow up in 3 weeks with Cycle 4. This patient was seen in conjunction with Maynor Wooten NP. I personally performed a face to face diagnostic evaluation on this patient. I personally reviewed the history and performed the key points on the exam.   I personally reviewed all points in the assessment and created treatment plan with the patient. Specifically, pt seen by me today  Feels well today  Tolerating chemo TCHP with manageable side effects. Reviewed chemo side effects and plan overall today. Labs good today. Breast mass with significant response to treatment on exam today. Very good. Proceed with chemo as ordered. I appreciate the opportunity to participate in Ms. Hui Landin's care.     Signed By: Prosper Oro DO

## 2021-12-09 NOTE — PROGRESS NOTES
Mone Lopez is a 61 y.o. female    Chief Complaint   Patient presents with    Chemotherapy     Left Breast Cancer       1. Have you been to the ER, urgent care clinic since your last visit? Hospitalized since your last visit? No    2. Have you seen or consulted any other health care providers outside of the [address] [place] [address] since your last visit? Include any pap smears or colon screening.  No

## 2021-12-13 LAB
ECHO LV EDV A2C: 69.51 ML
ECHO LV EDV A4C: 64.12 ML
ECHO LV EDV BP: 69.35 ML (ref 56–104)
ECHO LV EDV INDEX A4C: 34 ML/M2
ECHO LV EDV INDEX BP: 37 ML/M2
ECHO LV EDV NDEX A2C: 37 ML/M2
ECHO LV EJECTION FRACTION A2C: 56 PERCENT
ECHO LV EJECTION FRACTION A4C: 58 PERCENT
ECHO LV EJECTION FRACTION BIPLANE: 58 PERCENT (ref 55–100)
ECHO LV ESV A2C: 30.51 ML
ECHO LV ESV A4C: 26.87 ML
ECHO LV ESV BP: 29.1 ML (ref 19–49)
ECHO LV ESV INDEX A2C: 16 ML/M2
ECHO LV ESV INDEX A4C: 14 ML/M2
ECHO LV ESV INDEX BP: 16 ML/M2
ECHO LV FRACTIONAL SHORTENING: 30 % (ref 28–44)
ECHO LV GLOBAL LONGITUDINAL STRAIN (GLS): -20.3 PERCENT
ECHO LV INTERNAL DIMENSION DIASTOLE INDEX: 2.01 CM/M2
ECHO LV INTERNAL DIMENSION DIASTOLIC: 3.73 CM (ref 3.9–5.3)
ECHO LV INTERNAL DIMENSION SYSTOLIC INDEX: 1.4 CM/M2
ECHO LV INTERNAL DIMENSION SYSTOLIC: 2.6 CM
ECHO LV IVSD: 1.36 CM (ref 0.6–0.9)
ECHO LV MASS 2D: 174.5 G (ref 67–162)
ECHO LV MASS INDEX 2D: 93.8 G/M2 (ref 43–95)
ECHO LV POSTERIOR WALL DIASTOLIC: 1.3 CM (ref 0.6–0.9)
ECHO LV RELATIVE WALL THICKNESS RATIO: 0.7
ECHO MV A VELOCITY: 75.35 CM/S
ECHO MV E DECELERATION TIME (DT): 173.84 MS
ECHO MV E VELOCITY: 61.04 CM/S
GLOBAL LONGITUDINAL STRAIN 2 CHAMBER: -21 PERCENT
GLOBAL LONGITUDINAL STRAIN 4 CHAMBER: -19 PERCENT
GLOBAL LONGITUDINAL STRAIN LONG AXIS: -21.1 PERCENT

## 2021-12-15 ENCOUNTER — OFFICE VISIT (OUTPATIENT)
Dept: SURGERY | Age: 56
End: 2021-12-15
Payer: COMMERCIAL

## 2021-12-15 VITALS
SYSTOLIC BLOOD PRESSURE: 145 MMHG | BODY MASS INDEX: 24.69 KG/M2 | HEART RATE: 101 BPM | DIASTOLIC BLOOD PRESSURE: 78 MMHG | WEIGHT: 153 LBS

## 2021-12-15 DIAGNOSIS — Z09 POST CHEMO EVALUATION: Primary | ICD-10-CM

## 2021-12-15 DIAGNOSIS — Z17.1 MALIGNANT NEOPLASM OF LEFT BREAST IN FEMALE, ESTROGEN RECEPTOR NEGATIVE, UNSPECIFIED SITE OF BREAST (HCC): ICD-10-CM

## 2021-12-15 DIAGNOSIS — C50.912 MALIGNANT NEOPLASM OF LEFT BREAST IN FEMALE, ESTROGEN RECEPTOR NEGATIVE, UNSPECIFIED SITE OF BREAST (HCC): ICD-10-CM

## 2021-12-15 PROCEDURE — 76642 ULTRASOUND BREAST LIMITED: CPT | Performed by: SURGERY

## 2021-12-15 PROCEDURE — 99213 OFFICE O/P EST LOW 20 MIN: CPT | Performed by: SURGERY

## 2021-12-15 NOTE — PROGRESS NOTES
HISTORY OF PRESENT ILLNESS  Mone Lopez is a 61 y.o. female. HPI  ESTABLISHED patient here for follow up of LEFT breast cancer and chemotherapy progress. prison through chemotherapy treatment, pt denies any breast symptoms or problems. Breast history -  Referring - Ryan Plata MD  8/24/21 - LEFT breast abscess - Aspiration and course of Bactrim   9/3/21 - LEFT breast abscess - I&D    10/27/21: LEFT breast bx. PATH: IDC, 5mm, favor grade 3, ER-/ND-/HER2+(3)/Ki-67 70%. DCIS with high-grade nuclear features and comedonecrosis. Microcalcifications not present. Past Medical History:   Diagnosis Date    Malignant neoplasm of left breast in female, estrogen receptor negative ([facility] [address] 10/13/2021       No past surgical history on file. Social History     Socioeconomic History    Marital status:      Spouse name: Not on file    Number of children: Not on file    Years of education: Not on file    Highest education level: Not on file   Occupational History    Not on file   Tobacco Use    Smoking status: Never Smoker    Smokeless tobacco: Never Used   Substance and Sexual Activity    Alcohol use: Not on file    Drug use: Not on file    Sexual activity: Not on file   Other Topics Concern    Not on file   Social History Narrative    Not on file     Social Determinants of Health     Financial Resource Strain:     Difficulty of Paying Living Expenses: Not on file   Food Insecurity:     Worried About Running Out of Food in the Last Year: Not on file    Eunice of Food in the Last Year: Not on file   Transportation Needs:     Lack of Transportation (Medical): Not on file    Lack of Transportation (Non-Medical):  Not on file   Physical Activity:     Days of Exercise per Week: Not on file    Minutes of Exercise per Session: Not on file   Stress:     Feeling of Stress : Not on file   Social Connections: Unknown    Frequency of Communication with Friends and Family: More than three times a week    Frequency of Social Gatherings with Friends and Family: More than three times a week    Attends Oriental orthodox Services: Not on file   [employer]s or Organizations: Not on file    Attends Club or Organization Meetings: Not on file    Marital Status:    Intimate Partner Violence:     Fear of Current or Ex-Partner: Not on file    Emotionally Abused: Not on file    Physically Abused: Not on file    Sexually Abused: Not on file   Housing Stability:     Unable to Pay for Housing in the Last Year: Not on file    Number of [place] in the Last Year: Not on file    Unstable Housing in the Last Year: Not on file       Current Outpatient Medications on File Prior to Visit   Medication Sig Dispense Refill    lidocaine-prilocaine (EMLA) topical cream Apply  to affected area as needed for Pain. Apply to port-a-cath site 30-60 minutes prior to chemo 30 g 0    ondansetron (ZOFRAN ODT) 4 mg disintegrating tablet Take 1-2 Tablets by mouth every eight (8) hours as needed for Nausea or Vomiting. 60 Tablet 1    prochlorperazine (Compazine) 5 mg tablet Take 1 tab by mouth every 6 hours as needed for nausea or vomiting 30 Tablet 1    dexAMETHasone (DECADRON) 4 mg tablet Take 2 tabs (8mg) by mouth twice daily the day before chemotherapy and the day after chemotherapy 48 Tablet 0    CRANIAL PROSTHESIS misc Alopecia due to chemo/ wig 1 Each 1    amoxicillin-clavulanate (AUGMENTIN) 875-125 mg per tablet Take 1 Tablet by mouth two (2) times a day. 20 Tablet 0    trimethoprim-sulfamethoxazole (BACTRIM DS, SEPTRA DS) 160-800 mg per tablet        No current facility-administered medications on file prior to visit. No Known Allergies    OB History    No obstetric history on file.       Obstetric Comments   Menarche 14, LMP 2018, # of children 2, age of 4st delivery 33, Hysterectomy/oophorectomy no/no, Breast bx no, history of breast feeding yes, BCP no, Hormone therapy no ROS      Physical Exam  Exam conducted with a chaperone present. Cardiovascular:      Rate and Rhythm: Normal rate and regular rhythm. Heart sounds: Normal heart sounds. Pulmonary:      Breath sounds: Normal breath sounds. Chest:   Breasts: Breasts are symmetrical.      Right: Normal. No swelling, bleeding, inverted nipple, mass, nipple discharge, skin change, tenderness, axillary adenopathy or supraclavicular adenopathy. Left: Mass and skin change present. No swelling, bleeding, inverted nipple, nipple discharge, tenderness, axillary adenopathy or supraclavicular adenopathy. Lymphadenopathy:      Cervical:      Right cervical: No superficial, deep or posterior cervical adenopathy. Left cervical: No superficial, deep or posterior cervical adenopathy. Upper Body:      Right upper body: No supraclavicular or axillary adenopathy. Left upper body: No supraclavicular or axillary adenopathy. BREAST ULTRASOUND  Indication: LEFT breast mass UOQ, LEFT LN  Technique: The area was scanned using a high-frequency linear-array near-field transducer  Findings: Mass with clips  Impression: Minimal mass associated  Disposition: Chemotherapy    ASSESSMENT and PLAN    ICD-10-CM ICD-9-CM    1. Post chemo evaluation  Z09 V67.2 MRI BREAST BI W WO CONT   2. Malignant neoplasm of left breast in female, estrogen receptor negative, [facility]  C50.912 174.9 MRI BREAST BI W WO CONT    Z17.1 V86.1      Patient presents to follow up on chemotherapy progress, and is doing well overall. Upon physical examination, some skin change from tumor in UOQ, but no palpable mass. All 3 clips visualized, with 2 clips in LEFT breast and 1 in the LN with minimal associated mass. Discussed risk of recurrence, and risk reduction with the treatments discussed today. Discussed option for oncoplastic reduction and contralateral symmetry mastopexy in conjunction with plastic surgeon.  Reviewed recovery time after lumpectomy of about 1-2 weeks. Reviewed US imaging with pt per pt request. F/U in 1 year. This plan was reviewed with the patient and patient agrees. All questions were answered. Total time spent was 20 minutes.     Written by Cristi Ruby, as dictated by Dr. Sobia Quinteros MD.

## 2021-12-15 NOTE — PROGRESS NOTES
HISTORY OF PRESENT ILLNESS  Kathy Sherman is a 61 y.o. female. HPI ESTABLISHED patient here for follow for LEFT breast cancer. Half through chemo , feels great today,     Breast history -  Referring - Denton Driver MD  8/24/21 - LEFT breast abscess - Aspiration and course of Bactrim   9/3/21 - LEFT breast abscess - I&D    Review of Systems   All other systems reviewed and are negative.       Physical Exam    ASSESSMENT and PLAN  {ASSESSMENT/PLAN:70552}

## 2021-12-15 NOTE — PATIENT INSTRUCTIONS
Breast Cancer: Care Instructions  Your Care Instructions     Breast cancer occurs when abnormal cells grow out of control in the breast. These cancer cells can spread within the breast, to nearby lymph nodes and other tissues, and to other parts of the body. Being treated for cancer can weaken your body, and you may feel very tired. Get the rest your body needs so you can feel better. Finding out that you have cancer is scary. You may feel many emotions and may need some help coping. Seek out family, friends, and counselors for support. You also can do things at home to make yourself feel better while you go through treatment. Call the [employer] (0-324.857.8580) or visit its website at [employer] for more information. Follow-up care is a key part of your treatment and safety. Be sure to make and go to all appointments, and call your doctor if you are having problems. It's also a good idea to know your test results and keep a list of the medicines you take. How can you care for yourself at home? · Take your medicines exactly as prescribed. Call your doctor if you think you are having a problem with your medicine. You may get medicine for nausea and vomiting if you have these side effects. · Follow your doctor's instructions to relieve pain. Pain from cancer and surgery can almost always be controlled. Use pain medicine when you first notice pain, before it becomes severe. · Eat healthy food. If you do not feel like eating, try to eat food that has protein and extra calories to keep up your strength and prevent weight loss. Drink liquid meal replacements for extra calories and protein. Try to eat your main meal early. · Get some physical activity every day, but do not get too tired. Keep doing the hobbies you enjoy as your energy allows. · Do not smoke. Smoking can make your cancer worse. If you need help quitting, talk to your doctor about stop-smoking programs and medicines.  These can increase your chances of quitting for good. · Take steps to control your stress and workload. Learn relaxation techniques. ? Share your feelings. Stress and tension affect our emotions. By expressing your feelings to others, you may be able to understand and cope with them. ? Consider joining a support group. Talking about a problem with your spouse, a good friend, or other people with similar problems is a good way to reduce tension and stress. ? Express yourself through art. Try writing, crafts, dance, or art to relieve stress. Some dance, writing, or art groups may be available just for people who have cancer. ? Be kind to your body and mind. Getting enough sleep, eating a healthy diet, and taking time to do things you enjoy can contribute to an overall feeling of balance in your life and can help reduce stress. ? Get help if you need it. Discuss your concerns with your doctor or counselor. · If you are vomiting or have diarrhea:  ? Drink plenty of fluids to prevent dehydration. Choose water and other clear liquids. If you have kidney, heart, or liver disease and have to limit fluids, talk with your doctor before you increase the amount of fluids you drink. ? When you are able to eat, try clear soups, mild foods, and liquids until all symptoms are gone for 12 to 48 hours. Other good choices include dry toast, crackers, cooked cereal, and gelatin dessert, such as Jell-O.  · If you have not already done so, prepare a list of advance directives. Advance directives are instructions to your doctor and family members about what kind of care you want if you become unable to speak or express yourself. When should you call for help? Call 911 anytime you think you may need emergency care. For example, call if:    · You passed out (lost consciousness).    Call your doctor now or seek immediate medical care if:    · You have a fever.     · You have abnormal bleeding.     · You think you have an infection.     · You have new or worse pain.     · You have new symptoms, such as a cough, belly pain, vomiting, diarrhea, or a rash. Watch closely for changes in your health, and be sure to contact your doctor if:    · You are much more tired than usual.     · You have swollen glands in your armpits, groin, or neck.     · You do not get better as expected. Where can you learn more? Go to http://www.bazan.com/  Enter V321 in the search box to learn more about \"Breast Cancer: Care Instructions. \"  Current as of: December 17, 2020               Content Version: 13.0  © 7626-6217 [employer]. Care instructions adapted under license by [employer] (which disclaims liability or warranty for this information). If you have questions about a medical condition or this instruction, always ask your healthcare professional. [address] any warranty or liability for your use of this information.

## 2021-12-22 ENCOUNTER — APPOINTMENT (OUTPATIENT)
Dept: INFUSION THERAPY | Age: 56
End: 2021-12-22

## 2021-12-22 RX ORDER — EPINEPHRINE 1 MG/ML
0.3 INJECTION, SOLUTION, CONCENTRATE INTRAVENOUS AS NEEDED
Status: CANCELLED | OUTPATIENT
Start: 2021-12-30

## 2021-12-22 RX ORDER — PALONOSETRON 0.05 MG/ML
0.25 INJECTION, SOLUTION INTRAVENOUS ONCE
Status: CANCELLED | OUTPATIENT
Start: 2021-12-30 | End: 2021-12-29

## 2021-12-22 RX ORDER — DIPHENHYDRAMINE HYDROCHLORIDE 50 MG/ML
25 INJECTION, SOLUTION INTRAMUSCULAR; INTRAVENOUS AS NEEDED
Status: CANCELLED
Start: 2021-12-30

## 2021-12-22 RX ORDER — SODIUM CHLORIDE 0.9 % (FLUSH) 0.9 %
10 SYRINGE (ML) INJECTION AS NEEDED
Status: CANCELLED | OUTPATIENT
Start: 2021-12-30

## 2021-12-22 RX ORDER — SODIUM CHLORIDE 9 MG/ML
10 INJECTION INTRAMUSCULAR; INTRAVENOUS; SUBCUTANEOUS AS NEEDED
Status: CANCELLED | OUTPATIENT
Start: 2021-12-30

## 2021-12-22 RX ORDER — HEPARIN 100 UNIT/ML
300-500 SYRINGE INTRAVENOUS AS NEEDED
Status: CANCELLED
Start: 2021-12-30

## 2021-12-22 RX ORDER — HYDROCORTISONE SODIUM SUCCINATE 100 MG/2ML
100 INJECTION, POWDER, FOR SOLUTION INTRAMUSCULAR; INTRAVENOUS AS NEEDED
Status: CANCELLED | OUTPATIENT
Start: 2021-12-30

## 2021-12-22 RX ORDER — ACETAMINOPHEN 325 MG/1
650 TABLET ORAL
Status: CANCELLED | OUTPATIENT
Start: 2021-12-30

## 2021-12-22 RX ORDER — ONDANSETRON 2 MG/ML
8 INJECTION INTRAMUSCULAR; INTRAVENOUS AS NEEDED
Status: CANCELLED | OUTPATIENT
Start: 2021-12-30

## 2021-12-22 RX ORDER — ALBUTEROL SULFATE 0.83 MG/ML
2.5 SOLUTION RESPIRATORY (INHALATION) AS NEEDED
Status: CANCELLED
Start: 2021-12-30

## 2021-12-22 RX ORDER — SODIUM CHLORIDE 9 MG/ML
25 INJECTION, SOLUTION INTRAVENOUS CONTINUOUS
Status: CANCELLED | OUTPATIENT
Start: 2021-12-30

## 2021-12-22 RX ORDER — DIPHENHYDRAMINE HYDROCHLORIDE 50 MG/ML
50 INJECTION, SOLUTION INTRAMUSCULAR; INTRAVENOUS AS NEEDED
Status: CANCELLED
Start: 2021-12-30

## 2021-12-22 RX ORDER — DEXAMETHASONE SODIUM PHOSPHATE 100 MG/10ML
10 INJECTION INTRAMUSCULAR; INTRAVENOUS ONCE
Status: CANCELLED | OUTPATIENT
Start: 2021-12-30 | End: 2021-12-29

## 2021-12-22 RX ORDER — DIPHENHYDRAMINE HYDROCHLORIDE 50 MG/ML
50 INJECTION, SOLUTION INTRAMUSCULAR; INTRAVENOUS
Status: CANCELLED | OUTPATIENT
Start: 2021-12-30

## 2021-12-22 RX ORDER — ACETAMINOPHEN 325 MG/1
650 TABLET ORAL AS NEEDED
Status: CANCELLED
Start: 2021-12-30

## 2021-12-23 ENCOUNTER — APPOINTMENT (OUTPATIENT)
Dept: INFUSION THERAPY | Age: 56
End: 2021-12-23

## 2021-12-29 ENCOUNTER — HOSPITAL ENCOUNTER (OUTPATIENT)
Dept: INFUSION THERAPY | Age: 56
Discharge: HOME | End: 2021-12-29
Payer: COMMERCIAL

## 2021-12-29 VITALS — HEART RATE: 92 BPM | SYSTOLIC BLOOD PRESSURE: 140 MMHG | DIASTOLIC BLOOD PRESSURE: 95 MMHG | TEMPERATURE: 96.8 F

## 2021-12-29 LAB
ALBUMIN SERPL-MCNC: 3.2 G/DL (ref 3.5–5)
ALBUMIN/GLOB SERPL: 0.8 {RATIO} (ref 1.1–2.2)
ALP SERPL-CCNC: 231 U/L (ref 45–117)
ALT SERPL-CCNC: 65 U/L (ref 12–78)
ANION GAP SERPL CALC-SCNC: 9 MMOL/L (ref 5–15)
AST SERPL-CCNC: 54 U/L (ref 15–37)
BASOPHILS # BLD: 0 K/UL (ref 0–0.1)
BASOPHILS NFR BLD: 0 % (ref 0–1)
BILIRUB SERPL-MCNC: 0.4 MG/DL (ref 0.2–1)
BUN SERPL-MCNC: 12 MG/DL (ref 6–20)
BUN/CREAT SERPL: 21 (ref 12–20)
CALCIUM SERPL-MCNC: 9.3 MG/DL (ref 8.5–10.1)
CHLORIDE SERPL-SCNC: 95 MMOL/L (ref 97–108)
CO2 SERPL-SCNC: 28 MMOL/L (ref 21–32)
CREAT SERPL-MCNC: 0.58 MG/DL (ref 0.55–1.02)
DIFFERENTIAL METHOD BLD: ABNORMAL
EOSINOPHIL # BLD: 0 K/UL (ref 0–0.4)
EOSINOPHIL NFR BLD: 0 % (ref 0–7)
ERYTHROCYTE [DISTWIDTH] IN BLOOD BY AUTOMATED COUNT: 15.6 % (ref 11.5–14.5)
GLOBULIN SER CALC-MCNC: 4.2 G/DL (ref 2–4)
GLUCOSE SERPL-MCNC: 142 MG/DL (ref 65–100)
HCT VFR BLD AUTO: 28.7 % (ref 35–47)
HGB BLD-MCNC: 9.7 G/DL (ref 11.5–16)
IMM GRANULOCYTES # BLD AUTO: 0.1 K/UL (ref 0–0.04)
IMM GRANULOCYTES NFR BLD AUTO: 1 % (ref 0–0.5)
LYMPHOCYTES # BLD: 1.3 K/UL (ref 0.8–3.5)
LYMPHOCYTES NFR BLD: 16 % (ref 12–49)
MCH RBC QN AUTO: 32.6 PG (ref 26–34)
MCHC RBC AUTO-ENTMCNC: 33.8 G/DL (ref 30–36.5)
MCV RBC AUTO: 96.3 FL (ref 80–99)
MONOCYTES # BLD: 0.1 K/UL (ref 0–1)
MONOCYTES NFR BLD: 2 % (ref 5–13)
NEUTS SEG # BLD: 6.6 K/UL (ref 1.8–8)
NEUTS SEG NFR BLD: 81 % (ref 32–75)
NRBC # BLD: 0 K/UL (ref 0–0.01)
NRBC BLD-RTO: 0 PER 100 WBC
PLATELET # BLD AUTO: 209 K/UL (ref 150–400)
PMV BLD AUTO: 10.2 FL (ref 8.9–12.9)
POTASSIUM SERPL-SCNC: 2.8 MMOL/L (ref 3.5–5.1)
PROT SERPL-MCNC: 7.4 G/DL (ref 6.4–8.2)
RBC # BLD AUTO: 2.98 M/UL (ref 3.8–5.2)
SODIUM SERPL-SCNC: 132 MMOL/L (ref 136–145)
WBC # BLD AUTO: 8.1 K/UL (ref 3.6–11)

## 2021-12-29 PROCEDURE — 80053 COMPREHEN METABOLIC PANEL: CPT

## 2021-12-29 PROCEDURE — 36415 COLL VENOUS BLD VENIPUNCTURE: CPT

## 2021-12-29 PROCEDURE — 85025 COMPLETE CBC W/AUTO DIFF WBC: CPT

## 2021-12-29 NOTE — PROGRESS NOTES
[facility] Progress Note    Date: 2021    Name: Hector Oakley    MRN: [id number]         : 1965      1555:  Pt arrived ambulatory and in no distress, for lab visit. Labs drawn via Left AC, patient tolerated well. Visit Vitals  BP (!) 140/95 (BP 1 Location: Right upper arm, BP Patient Position: Sitting)   Pulse 92   Temp 96.8 °F (36 °C)     Departed [facility] ambulatory and in no distress.     Future Appointments   Date Time Provider [facility]   2021  8:30 AM F1 RAFI LONG [facility]   2021  8:45 AM Melany Jhaveri  [address] BS AMB   2022  3:20 PM MD DEBBIE Mello BS AMB   2022  4:00 PM H3 [facility] LAB [facility]   2022  8:30 AM F1 RAFI LONG [place] [facility]   2022  4:00 PM H3 RAFI LAB [facility]   2/10/2022  8:30 AM F1 RAFI LONG [place] [facility]   6715  7:41 PM Paula Carrasco MD [facility] BS AMB       Alma Cadena, RN  2021

## 2021-12-30 ENCOUNTER — OFFICE VISIT (OUTPATIENT)
Dept: ONCOLOGY | Age: 56
End: 2021-12-30
Payer: COMMERCIAL

## 2021-12-30 ENCOUNTER — HOSPITAL ENCOUNTER (OUTPATIENT)
Dept: INFUSION THERAPY | Age: 56
Discharge: HOME | End: 2021-12-30
Payer: COMMERCIAL

## 2021-12-30 VITALS — DIASTOLIC BLOOD PRESSURE: 99 MMHG | SYSTOLIC BLOOD PRESSURE: 169 MMHG | HEART RATE: 96 BPM | RESPIRATION RATE: 18 BRPM

## 2021-12-30 VITALS
BODY MASS INDEX: 24.53 KG/M2 | OXYGEN SATURATION: 97 % | WEIGHT: 152.6 LBS | SYSTOLIC BLOOD PRESSURE: 140 MMHG | HEIGHT: 66 IN | DIASTOLIC BLOOD PRESSURE: 95 MMHG | TEMPERATURE: 96.8 F | HEART RATE: 86 BPM

## 2021-12-30 DIAGNOSIS — Z95.828 PORT-A-CATH IN PLACE: ICD-10-CM

## 2021-12-30 DIAGNOSIS — F41.8 ANXIETY ABOUT HEALTH: ICD-10-CM

## 2021-12-30 DIAGNOSIS — R11.0 CHEMOTHERAPY-INDUCED NAUSEA: ICD-10-CM

## 2021-12-30 DIAGNOSIS — N63.20 LEFT BREAST MASS: ICD-10-CM

## 2021-12-30 DIAGNOSIS — C50.912 MALIGNANT NEOPLASM OF LEFT BREAST IN FEMALE, ESTROGEN RECEPTOR NEGATIVE, UNSPECIFIED SITE OF BREAST (HCC): Primary | ICD-10-CM

## 2021-12-30 DIAGNOSIS — Z79.899 ENCOUNTER FOR MONITORING CARDIOTOXIC DRUG THERAPY: ICD-10-CM

## 2021-12-30 DIAGNOSIS — Z17.1 MALIGNANT NEOPLASM OF LEFT BREAST IN FEMALE, ESTROGEN RECEPTOR NEGATIVE, UNSPECIFIED SITE OF BREAST (HCC): Primary | ICD-10-CM

## 2021-12-30 DIAGNOSIS — T45.1X5A CHEMOTHERAPY-INDUCED FATIGUE: ICD-10-CM

## 2021-12-30 DIAGNOSIS — Z78.0 POST-MENOPAUSAL: ICD-10-CM

## 2021-12-30 DIAGNOSIS — T45.1X5A CHEMOTHERAPY-INDUCED NAUSEA: ICD-10-CM

## 2021-12-30 DIAGNOSIS — Z51.81 ENCOUNTER FOR MONITORING CARDIOTOXIC DRUG THERAPY: ICD-10-CM

## 2021-12-30 DIAGNOSIS — R53.83 CHEMOTHERAPY-INDUCED FATIGUE: ICD-10-CM

## 2021-12-30 DIAGNOSIS — G62.0 CHEMOTHERAPY-INDUCED NEUROPATHY (HCC): ICD-10-CM

## 2021-12-30 DIAGNOSIS — T45.1X5A CHEMOTHERAPY-INDUCED NEUROPATHY (HCC): ICD-10-CM

## 2021-12-30 DIAGNOSIS — Z09 CHEMOTHERAPY FOLLOW-UP EXAMINATION: ICD-10-CM

## 2021-12-30 PROCEDURE — 96402 CHEMO HORMON ANTINEOPL SQ/IM: CPT

## 2021-12-30 PROCEDURE — 77030012965 HC NDL HUBR BBMI -A

## 2021-12-30 PROCEDURE — 74011250637 HC RX REV CODE- 250/637: Performed by: NURSE PRACTITIONER

## 2021-12-30 PROCEDURE — 74011250636 HC RX REV CODE- 250/636: Performed by: INTERNAL MEDICINE

## 2021-12-30 PROCEDURE — 96413 CHEMO IV INFUSION 1 HR: CPT

## 2021-12-30 PROCEDURE — 96417 CHEMO IV INFUS EACH ADDL SEQ: CPT

## 2021-12-30 PROCEDURE — 96367 TX/PROPH/DG ADDL SEQ IV INF: CPT

## 2021-12-30 PROCEDURE — 74011250636 HC RX REV CODE- 250/636: Performed by: NURSE PRACTITIONER

## 2021-12-30 PROCEDURE — 96375 TX/PRO/DX INJ NEW DRUG ADDON: CPT

## 2021-12-30 PROCEDURE — 99215 OFFICE O/P EST HI 40 MIN: CPT | Performed by: INTERNAL MEDICINE

## 2021-12-30 PROCEDURE — 96377 APPLICATON ON-BODY INJECTOR: CPT

## 2021-12-30 PROCEDURE — 74011000258 HC RX REV CODE- 258: Performed by: INTERNAL MEDICINE

## 2021-12-30 RX ORDER — DEXAMETHASONE SODIUM PHOSPHATE 10 MG/ML
10 INJECTION INTRAMUSCULAR; INTRAVENOUS ONCE
Status: COMPLETED | OUTPATIENT
Start: 2021-12-30 | End: 2021-12-30

## 2021-12-30 RX ORDER — DIPHENHYDRAMINE HYDROCHLORIDE 50 MG/ML
25 INJECTION, SOLUTION INTRAMUSCULAR; INTRAVENOUS AS NEEDED
Status: ACTIVE | OUTPATIENT
Start: 2021-12-30 | End: 2021-12-30

## 2021-12-30 RX ORDER — ONDANSETRON 2 MG/ML
8 INJECTION INTRAMUSCULAR; INTRAVENOUS AS NEEDED
Status: ACTIVE | OUTPATIENT
Start: 2021-12-30 | End: 2021-12-30

## 2021-12-30 RX ORDER — PALONOSETRON 0.05 MG/ML
0.25 INJECTION, SOLUTION INTRAVENOUS ONCE
Status: COMPLETED | OUTPATIENT
Start: 2021-12-30 | End: 2021-12-30

## 2021-12-30 RX ORDER — SODIUM CHLORIDE 9 MG/ML
10 INJECTION INTRAMUSCULAR; INTRAVENOUS; SUBCUTANEOUS AS NEEDED
Status: ACTIVE | OUTPATIENT
Start: 2021-12-30 | End: 2021-12-30

## 2021-12-30 RX ORDER — ACETAMINOPHEN 325 MG/1
650 TABLET ORAL AS NEEDED
Status: ACTIVE | OUTPATIENT
Start: 2021-12-30 | End: 2021-12-30

## 2021-12-30 RX ORDER — SODIUM CHLORIDE 9 MG/ML
25 INJECTION, SOLUTION INTRAVENOUS CONTINUOUS
Status: DISPENSED | OUTPATIENT
Start: 2021-12-30 | End: 2021-12-30

## 2021-12-30 RX ORDER — HEPARIN 100 UNIT/ML
300-500 SYRINGE INTRAVENOUS AS NEEDED
Status: ACTIVE | OUTPATIENT
Start: 2021-12-30 | End: 2021-12-30

## 2021-12-30 RX ORDER — POTASSIUM CHLORIDE 750 MG/1
40 TABLET, FILM COATED, EXTENDED RELEASE ORAL
Status: COMPLETED | OUTPATIENT
Start: 2021-12-30 | End: 2021-12-30

## 2021-12-30 RX ORDER — SODIUM CHLORIDE 0.9 % (FLUSH) 0.9 %
10 SYRINGE (ML) INJECTION AS NEEDED
Status: DISPENSED | OUTPATIENT
Start: 2021-12-30 | End: 2021-12-30

## 2021-12-30 RX ORDER — POTASSIUM CHLORIDE 7.45 MG/ML
10 INJECTION INTRAVENOUS
Status: COMPLETED | OUTPATIENT
Start: 2021-12-30 | End: 2021-12-30

## 2021-12-30 RX ORDER — ALPRAZOLAM 0.5 MG/1
0.5 TABLET ORAL
Qty: 30 TABLET | Refills: 0 | Status: SHIPPED | OUTPATIENT
Start: 2021-12-30 | End: 2022-08-29

## 2021-12-30 RX ORDER — LORAZEPAM 0.5 MG/1
TABLET ORAL
Qty: 4 TABLET | Refills: 0 | Status: SHIPPED | OUTPATIENT
Start: 2021-12-30 | End: 2022-03-24

## 2021-12-30 RX ADMIN — SODIUM CHLORIDE 25 ML/HR: 9 INJECTION, SOLUTION INTRAVENOUS at 09:48

## 2021-12-30 RX ADMIN — SODIUM CHLORIDE 10 ML: 9 INJECTION INTRAMUSCULAR; INTRAVENOUS; SUBCUTANEOUS at 09:47

## 2021-12-30 RX ADMIN — POTASSIUM CHLORIDE 40 MEQ: 750 TABLET, FILM COATED, EXTENDED RELEASE ORAL at 13:20

## 2021-12-30 RX ADMIN — PERTUZUMAB, TRASTUZUMAB, AND HYALURONIDASE-ZZXF 10 ML: 600; 600; 2000 INJECTION, SOLUTION SUBCUTANEOUS at 13:26

## 2021-12-30 RX ADMIN — DEXAMETHASONE SODIUM PHOSPHATE 10 MG: 10 INJECTION, SOLUTION INTRAMUSCULAR; INTRAVENOUS at 13:21

## 2021-12-30 RX ADMIN — Medication 10 ML: at 16:04

## 2021-12-30 RX ADMIN — POTASSIUM CHLORIDE 10 MEQ: 7.46 INJECTION, SOLUTION INTRAVENOUS at 11:58

## 2021-12-30 RX ADMIN — DOCETAXEL ANHYDROUS 140 MG: 10 INJECTION, SOLUTION INTRAVENOUS at 14:21

## 2021-12-30 RX ADMIN — POTASSIUM CHLORIDE 10 MEQ: 7.46 INJECTION, SOLUTION INTRAVENOUS at 10:58

## 2021-12-30 RX ADMIN — PEGFILGRASTIM 6 MG: KIT SUBCUTANEOUS at 15:31

## 2021-12-30 RX ADMIN — PALONOSETRON 0.25 MG: 0.05 INJECTION, SOLUTION INTRAVENOUS at 13:21

## 2021-12-30 RX ADMIN — HEPARIN 500 UNITS: 100 SYRINGE at 16:04

## 2021-12-30 RX ADMIN — CARBOPLATIN 851 MG: 10 INJECTION, SOLUTION INTRAVENOUS at 15:30

## 2021-12-30 RX ADMIN — FOSAPREPITANT 150 MG: 150 INJECTION, POWDER, LYOPHILIZED, FOR SOLUTION INTRAVENOUS at 13:37

## 2021-12-30 RX ADMIN — POTASSIUM CHLORIDE 10 MEQ: 7.46 INJECTION, SOLUTION INTRAVENOUS at 09:49

## 2021-12-30 NOTE — PROGRESS NOTES
Hecotr Oakley is a 61 y.o. female  Chief Complaint   Patient presents with    Chemotherapy    Breast Cancer     1. Have you been to the ER, urgent care clinic since your last visit? Hospitalized since your last visit? No.    2. Have you seen or consulted any other health care providers outside of the [address] [place] [address] since your last visit? Include any pap smears or colon screening.  Yes, patient states she had gone to see Arnie Rene (Plastic Surgeon)

## 2021-12-30 NOTE — PROGRESS NOTES
Spiritual Care Partner Volunteer visited patient at [facility] in [address] [place] [address] on 12/30/2021   Documented by:  Dominic Gray  [phone]

## 2021-12-30 NOTE — PROGRESS NOTES
[facility] [place] [address] [address], [address], [place]: [phone]  F: [phone]    Reason for Visit:   Abdulaziz Pacheco is a 61 y.o. female seen today in office for follow up of Left Breast Cancer. Treatment History:   · LEFT Breast Biopsy 10/6/21: PATH - Invasive ductal carcinoma, favor grade 3  · ER/[place] negative, HER2+  · Breast MRI 10/13/21: RIGHT BREAST: Background parenchymal enhancement: Mild. There is no suspicious masslike or nonmasslike enhancement within the right breast to indicate breast carcinoma. There are no suspicious internal mammary or axillary chain lymph nodes. LEFT BREAST: Background parenchymal enhancement: Mild There is a rounded mass with central fluid attenuation/necrosis in the lateral aspect of the left breast, deep 3rd, measuring approximately 3.3 cm in diameter with enhancement extending along the adjacent dermis suggesting skin invasion. There is nonmasslike enhancement extending both inferior, medial and anterior to the primary mass over approximately 6 x 5.5 x 4 cm. There are discontinuous areas of fluid attenuation associated with the nonmasslike enhancement which may represent cystic spaces and/or necrosis. There is an enlarged, left axillary lymph node with other adjacent, prominent lymph nodes. There is a lymph node node deep to the pectoralis minor muscle which measures 1.1 x 0.7 cm  · Left Axillary LN Biopsy 10/19/21: PATH - Metastatic breast cancer, measuring up to 17 mm in a single core  · ER/[place] negative, HER2+  · Neoadjuvant TCHP 10/21/21 - Current   · Breast Biopsy 10/27/21: PATH - 5 mm IDC with DCIS    STAGE: Clinical 2 ER/W[place] negative Her2+    History of Present Illness:   Abdulaziz Pacheco is a 61 y.o. female seen today in office for follow up of left breast cancer ER/[place] negative Her2 + post biopsy. She had a port placed on 10/19/21 without issues.  She had left axillary LN biopsy on 10/19/21 and path showed metastatic breast cancer, same markers. She started neoadjuvant TCHP chemotherapy on 10/21/21. She had another breast biopsy on 10/27/21 that showed 5 mm IDC with DCIS. She is here today for Cycle 4 of neoadjuvant TCHP chemotherapy. She reports that she feels well overall today. She is tolerating chemo well overall with some mild side effects. She continues to have some mild nausea and takes anti-emetics as needed which help. She had no vomiting. She continues to have some fatigue. Her appetite is stable although she has lost taste buds and taste continues to be impaired. She has mild neuropathy in left foot only. She saw Dr. Jame Lesches on 23/3951 and breast mass is responding well to chemo. She saw Plastics, Dr. Esteban Jo, yesterday and plans is for lumpectomy/reduction. Post chemo Breast MRI has been ordered. She denies fever, chills, mouth sores, cough, SOB, CP, vomiting, diarrhea, and constipation. She denies pain today. She has a history of COVID-19 infection and is due for second dose of vaccine soon. CBC and CMP from 12/29/21 reviewed today. She is ready for chemo today. She is here alone today. Past Medical History:   Diagnosis Date    Malignant neoplasm of left breast in female, estrogen receptor negative ([facility] [address] 10/13/2021      History reviewed. No pertinent surgical history. Social History     Tobacco Use    Smoking status: Never Smoker    Smokeless tobacco: Never Used   Substance Use Topics    Alcohol use: Not on file      History reviewed. No pertinent family history. Current Outpatient Medications   Medication Sig    LORazepam (ATIVAN) 0.5 mg tablet Take 1 tab by mouth 30-60 minutes prior to Breast MRI. May take additional dose if needed.  lidocaine-prilocaine (EMLA) topical cream Apply  to affected area as needed for Pain.  Apply to port-a-cath site 30-60 minutes prior to chemo    ondansetron (ZOFRAN ODT) 4 mg disintegrating tablet Take 1-2 Tablets by mouth every eight (8) hours as needed for Nausea or Vomiting.  prochlorperazine (Compazine) 5 mg tablet Take 1 tab by mouth every 6 hours as needed for nausea or vomiting    dexAMETHasone (DECADRON) 4 mg tablet Take 2 tabs (8mg) by mouth twice daily the day before chemotherapy and the day after chemotherapy    CRANIAL PROSTHESIS misc Alopecia due to chemo/ wig    amoxicillin-clavulanate (AUGMENTIN) 875-125 mg per tablet Take 1 Tablet by mouth two (2) times a day.  ALPRAZolam (XANAX) 0.5 mg tablet Take 1 Tablet by mouth three (3) times daily as needed for Anxiety. Max Daily Amount: 1.5 mg. Indications: anxious    trimethoprim-sulfamethoxazole (BACTRIM DS, SEPTRA DS) 160-800 mg per tablet  (Patient not taking: Reported on 12/30/2021)     No current facility-administered medications for this visit. No Known Allergies     Review of Systems:  A complete review of systems was obtained, negative except as described above and as reported on ROS sheet scanned into system. Physical Exam:     Visit Vitals  BP (!) 140/95 (BP 1 Location: Left upper arm, BP Patient Position: Sitting)   Pulse 86   Temp 96.8 °F (36 °C) (Temporal)   Ht 5' 6\" (1.676 m)   Wt 152 lb 9.6 oz (69.2 kg)   SpO2 97%   BMI 24.63 kg/m²     ECOG PS: 0  General: No distress  Eyes: Anicteric sclerae  HENT: Atraumatic, wearing a mask  Neck: Supple  Lymphatic: No cervical, supraclavicular LAD  Respiratory: CTAB, normal respiratory effort  CV: Normal rate, regular rhythm, no murmurs, no peripheral edema  GI: Soft, nontender, non-distended   MS: Normal gait and station. Skin: No rashes, ecchymoses, or petechiae. Normal temperature, turgor, and texture. Port site without redness/swelling  Psych: Alert, oriented, appropriate affect, normal judgment/insight  Breast: LEFT breast mass with bruising from biopsies.  Mass was initially 5-6 cm and much smaller/softer overall today   Neuro: Non focal    Results:     Lab Results   Component Value Date/Time    WBC 8.1 12/29/2021 04:01 PM    HGB 9.7 (L) 12/29/2021 04:01 PM    HCT 28.7 (L) 12/29/2021 04:01 PM    PLATELET 799 45/62/3120 04:01 PM    MCV 96.3 12/29/2021 04:01 PM    ABS. NEUTROPHILS 6.6 12/29/2021 04:01 PM     Lab Results   Component Value Date/Time    Sodium 132 (L) 12/29/2021 04:01 PM    Potassium 2.8 (L) 12/29/2021 04:01 PM    Chloride 95 (L) 12/29/2021 04:01 PM    CO2 28 12/29/2021 04:01 PM    Glucose 142 (H) 12/29/2021 04:01 PM    BUN 12 12/29/2021 04:01 PM    Creatinine 0.58 12/29/2021 04:01 PM    GFR est AA >60 12/29/2021 04:01 PM    GFR est non-AA >60 12/29/2021 04:01 PM    Calcium 9.3 12/29/2021 04:01 PM     Lab Results   Component Value Date/Time    Bilirubin, total 0.4 12/29/2021 04:01 PM    ALT (SGPT) 65 12/29/2021 04:01 PM    Alk. phosphatase 231 (H) 12/29/2021 04:01 PM    Protein, total 7.4 12/29/2021 04:01 PM    Albumin 3.2 (L) 12/29/2021 04:01 PM    Globulin 4.2 (H) 12/29/2021 04:01 PM     10/6/21  FINAL PATHOLOGIC DIAGNOSIS   Breast, left mass, core biopsy:   Invasive ductal carcinoma, favor grade 3 (see synoptic table)   INVASIVE CARCINOMA OF THE BREAST: Biopsy   TUMOR   Tumor Site: Clock position - 3 o'clock   Histologic Type: Invasive carcinoma of no special type (ductal)   Glandular (Acinar) / Tubular Differentiation: Score 3   Nuclear Pleomorphism: Score 3   Mitotic Rate: Score 3   Overall Grade: Grade 3 (scores of 8 or 9)   Tumor Size: 13 mm   Ductal Carcinoma In Situ (DCIS): Not identified   Lymphovascular Invasion: Not identified   Microcalcifications: Not identified     10/19/21  FINAL PATHOLOGIC DIAGNOSIS   Lymph node, left axillary, core biopsy:   Metastatic breast cancer, measuring up to 17 mm in a single core     10/21/21    ECHO ADULT COMPLETE 10/21/2021 10/21/2021    Interpretation Summary  · LV: Estimated LVEF is 55 - 60%. Normal cavity size, wall thickness and systolic function (ejection fraction normal). Wall motion: normal. Abnormal left ventricular strain. Global longitudinal strain is -15.4%.   · TV: Pulmonary hypertension not suggested by Doppler findings. Signed by: Arron Lopez MD on 10/21/2021  8:32 PM    10/27/21  FINAL PATHOLOGIC DIAGNOSIS   Breast, left, core biopsy:   Invasive ductal carcinoma, 5mm, favor grade 3 (see synoptic table)   Ductal carcinoma in situ (DCIS) with high-grade nuclear features and comedonecrosis   Microcalcifications present within DCIS     Records reviewed and summarized above. Pathology report(s) reviewed above. Radiology report(s) reviewed above. Assessment:/PLAN     1) Clinical Stage 2B T3N0 LEFT Breast Cancer ER/[place] negative Her2+  post breast biopsy only 10/21  Records and history reviewed. Reviewed path and data. Discussed dx, stage and treatment of breast cancer. Discussed neoadjuvant and adjuvant chemo. Discussed no hormonal therapy options. Surgery prefers neoadjuvant chemo. Not sure about type of surgery yet. Discussed chemo options. Discussed clinical trial options. Patient does not want to do clinical trial.   Decided on neoadjuvant TCHP. Teaching and consent with pharmacy. MRI Breast 10/13/21 showed a rounded mass with central fluid attenuation/necrosis in the lateral aspect of the left breast, deep 3rd, measuring approximately 3.3 cm in diameter with enhancement extending along the adjacent dermis suggesting skin invasion. There is nonmasslike enhancement extending both inferior, medial and anterior to the primary mass over approximately 6 x 5.5 x 4 cm. There are discontinuous areas of fluid attenuation associated with the nonmasslike enhancement which may represent cystic spaces and/or necrosis. There is an enlarged, left axillary lymph node with other adjacent, prominent lymph nodes. There is a lymph node node deep to the pectoralis minor muscle which measures 1.1 x 0.7 cm  She had a left axillary LN biopsy on 10/19/21 and path showed metastatic beast cancer. She had a port placed on 10/19/21 with no issues.   Pre chemo ECHO showed EF 55-60% with abnormal left ventricular strain. Referred to Cardiology for further evaluation - has follow up ECHO on 12/6/21 per Cardio. Patient started neoadjuvant TCHP chemotherapy on 10/25/21. She had another breast biopsy on 10/27/21 that showed 5 mm IDC with DCIS. She had a follow up ECHO on 12/6/21 per Cardiology - EF 58% with no strain. Patient is here today for Cycle 3 of neoadjuvant TCHP chemotherapy. She is tolerating chemo well overall with some side nausea/fatigue. She saw Dr. Guy Burleson on 83/3090 and breast mass is responding well to chemo. She saw Plastics, Dr. Cooper Burris, yesterday. Post chemo Breast MRI has been ordered. Plan is for lumpectomy/reduction then XRT. Rx for Ativan today to take pre Breast MRI. She is clinically healthy overall and stable today. Labs (CBC and CMP) from 12/29/21 reviewed. Potassium 2.8 and will replace with IV and PO. No dose adjustments needed today. Patient is ready for chemo today. Follow up in 3 weeks with Cycle 5. Patient agrees with plan. 2)  Postmenopausal  Continue routine GYN follow up. 3) Management of High Risk Medications - Chemotherapy  Toxicities include Grade 1 Nausea, Grade 1 Fatigue, and Grade 1 Neuropathy. Pre chemo ECHO from 10/21/21 reviewed - EF 55-60% with abnormal left ventricular strain. Referred to Cardiology for further evaluation/management of strain. She had a follow up ECHO on 12/6/21 per Cardiology - EF 58% with no strain. Labs (CBC and CMP) from 12/29/21 reviewed today. Potassium 2.8 and will replace with IV and PO. No dose adjustments needed today. Will monitor for side effects. 4) Psychosocial  Mood good, coping well. 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 Her  is very supportive. SW/NN support as needed. She is here alone today. Call if questions. Follow up in 3 weeks with Cycle 5. This patient was seen in conjunction with Selina Flores NP.   I personally performed a face to face diagnostic evaluation on this patient. I personally reviewed the history and performed the key points on the exam.   I personally reviewed all points in the assessment and created treatment plan with the patient. Specifically, pt seen by me today  Feels well today  Tolerating chemo TCHP with manageable side effects. Reviewed chemo side effects and plan overall today. Labs good today. Breast mass with significant response to treatment  Proceed with chemo as ordered. I appreciate the opportunity to participate in Ms. Sury Landin's care.     Signed By: Brandon Qiu DO

## 2021-12-30 NOTE — PROGRESS NOTES
[facility] Chemo Progress Note    Date: 2021    Name: Jd Tavarez    MRN: [id number]         : 1965    0830 Ms. Landin Arrived to [facility] for C4 Phesgo/Taxotere/Carbo ambulatory in stable condition. Assessment was completed, no acute issues at this time, no new complaints voiced. Port accessed with positive blood return. Labs drawn at previous appt. Went to provider appointment with Medical Oncology    Chemotherapy Flowsheet 2021   Cycle C4   Date 2021   Drug / Regimen Phesgo/Taxotere/Carbo   Pre Meds given   Notes given w/OBI+K     Ms. Landin's vitals were reviewed. Patient Vitals for the past 12 hrs:   Pulse Resp BP   21 1605 96 18 (!) 169/99     Pre-medications  were administered as ordered and chemotherapy was initiated.   Medications Administered     0.9% sodium chloride infusion     Admin Date  2021 Action  New Bag Dose  25 mL/hr Rate  25 mL/hr Route  IntraVENous Administered By  Germaine Lo, CHRISTOPHER          0.9% sodium chloride injection 10 mL     Admin Date  2021 Action  Given Dose  10 mL Route  IntraVENous Administered By  Germaine Lo RN          CARBOplatin (PARAPLATIN) 851 mg in 0.9% sodium chloride 250 mL, overfill volume 25 mL chemo infusion     Admin Date  2021 Action  New Bag Dose  851 mg Rate  720.2 mL/hr Route  IntraVENous Administered By  Germaine Lo, CHRISTOPHER          dexamethasone (PF) (DECADRON) 10 mg/mL injection 10 mg     Admin Date  2021 Action  Given Dose  10 mg Route  IntraVENous Administered By  Germaine Lo, RN          DOCEtaxeL (TAXOTERE) 140 mg in 0.9% sodium chloride 250 mL, overfill volume 25 mL chemo infusion     Admin Date  2021 Action  New Bag Dose  140 mg Rate  289 mL/hr Route  IntraVENous Administered By  Germaine Lo RN          fosaprepitant (EMEND) 150 mg in 0.9% sodium chloride 150 mL IVPB     Admin Date  2021 Action  New Bag Dose  150 mg Rate  450 mL/hr Route  IntraVENous Administered By  Tavia Lugo RN          heparin (porcine) pf 300-500 Units     Admin Date  12/30/2021 Action  Given Dose  500 Units Route  InterCATHeter Administered By  Tavia Lugo RN          palonosetron HCl (ALOXI) injection 0.25 mg     Admin Date  12/30/2021 Action  Given Dose  0.25 mg Route  IntraVENous Administered By  Tavia Lugo RN          pegfilgrastim (NEULASTA) wearable SQ injector 6 mg     Admin Date  12/30/2021 Action  Given Dose  6 mg Route  SubCUTAneous Administered By  Tavia Lugo, CHRISTOPHER          pertuzumab 600 mg-trastuzumab 600 mg-hyaluronidase-zzxf 20,000 units/10 mL     Admin Date  12/30/2021 Action  Given Dose  10 mL Route  SubCUTAneous Administered By  Tavia Lugo RN          potassium chloride 10 mEq in 100 ml IVPB     Admin Date  12/30/2021 Action  New Bag Dose  10 mEq Rate  100 mL/hr Route  IntraVENous Administered By  Tavia Lugo RN           Admin Date  12/30/2021 Action  New Bag Dose  10 mEq Rate  100 mL/hr Route  IntraVENous Administered By  Tavia Lugo, CHRISTOPHER           Admin Date  12/30/2021 Action  New Bag Dose  10 mEq Rate  100 mL/hr Route  IntraVENous Administered By  Tavia Lugo, CHRISTOPHER          potassium chloride SR (KLOR-CON 10) tablet 40 mEq     Admin Date  12/30/2021 Action  Given Dose  40 mEq Route  Oral Administered By  Tavia Lugo RN          sodium chloride (NS) flush 10 mL     Admin Date  12/30/2021 Action  Given Dose  10 mL Route  IntraVENous Administered By  Tavia Lugo, CHRISTOPHER              4607 Patient tolerated treatment well. Port maintained positive blood return throughout treatment. Neulasta OBI placed to left arm and secured for removal tomorrow. Line flushed, heparinized and de accessed per protocol. Patient was discharged from [facility] in stable condition. Patient aware of next appointment.      Future Appointments   Date Time Provider Ryan Daly   1/19/2022  4:00 [facility]   1/20/2022  8:30 AM F1 [address]   1/20/2022  8:45 AM Therese Riley  [address] BS AMB   2/9/2022  4:00 PM H3 [facility]FI LAB [facility]   2/10/2022  8:30 AM F1 RAFI LONG [place] [facility]   2/10/2022  8:45 AM Therese Riley  [address] BS AMB   45/5/1127  0:10 PM Chiquis Holcomb MD [facility] BS AMB     Jewels Calix, CHRISTOPHER  December 30, 2021

## 2022-01-11 ENCOUNTER — OFFICE VISIT (OUTPATIENT)
Dept: CARDIOLOGY CLINIC | Age: 57
End: 2022-01-11
Payer: COMMERCIAL

## 2022-01-11 VITALS
BODY MASS INDEX: 23.63 KG/M2 | OXYGEN SATURATION: 99 % | HEART RATE: 103 BPM | SYSTOLIC BLOOD PRESSURE: 120 MMHG | WEIGHT: 147 LBS | DIASTOLIC BLOOD PRESSURE: 70 MMHG | RESPIRATION RATE: 16 BRPM | HEIGHT: 66 IN

## 2022-01-11 DIAGNOSIS — Z51.81 ENCOUNTER FOR MONITORING CARDIOTOXIC DRUG THERAPY: Primary | ICD-10-CM

## 2022-01-11 DIAGNOSIS — C50.912 MALIGNANT NEOPLASM OF LEFT BREAST IN FEMALE, ESTROGEN RECEPTOR NEGATIVE, UNSPECIFIED SITE OF BREAST (HCC): ICD-10-CM

## 2022-01-11 DIAGNOSIS — R93.1 ABNORMAL ECHOCARDIOGRAM: ICD-10-CM

## 2022-01-11 DIAGNOSIS — Z79.899 ENCOUNTER FOR MONITORING CARDIOTOXIC DRUG THERAPY: Primary | ICD-10-CM

## 2022-01-11 DIAGNOSIS — Z17.1 MALIGNANT NEOPLASM OF LEFT BREAST IN FEMALE, ESTROGEN RECEPTOR NEGATIVE, UNSPECIFIED SITE OF BREAST (HCC): ICD-10-CM

## 2022-01-11 PROCEDURE — 99213 OFFICE O/P EST LOW 20 MIN: CPT | Performed by: SPECIALIST

## 2022-01-11 NOTE — PROGRESS NOTES
Prudence Odor White     1965       Ludin Rouse MD, [facility] [place]  Date of Visit-1/11/2022   PCP is None   [facility] [place]  [facility]  HPI:  Matilde Hwang is a 61 y.o. female   Stamina down, getting chemo  Pt had pre-chemo echo with an EF of 55-60%. The GLS was -15. She was to start on [facility] which includes herceptin. She has been treated for breast cancer. Today. .. Fu echo in December with GLS of -20.3 and remains with normal EF  In midst of chemo and feels some fatigue, notably SOB very mildly getting up on exam table-mild more fatigue , not CHF  Denies chest pain, edema, syncope or shortness of breath at rest, has no tachycardia, palpitations or sense of arrhythmia. 12/06/21    ECHO ADULT FOLLOW-UP OR LIMITED 12/13/2021 12/13/2021    Interpretation Summary  · LV: Calculated LVEF is 58%. Biplane method used to measure ejection fraction. Normal cavity size and systolic function (ejection fraction normal). Mild concentric hypertrophy. Normal left ventricular strain. Global longitudinal strain is -20.3%. · GLS 10/21/21 -15.4%, Today -20.3%    Signed by: Kole Arias MD on 12/13/2021  2:44 PM      Assessment/Plan:     Patient Instructions   We will see you back in 2 months with a limited echocardiogram about a week prior. 1. Encounter for monitoring cardiotoxic drug therapy  Pt had a GLS of -15.4 %. She is relatively asymptomatic with no evidence of heart failure and her EF is normal. Her fatigue seems mild. We have discussed continuing monitoring with chemo. F/u echo looks good, always caution with GLS number, dont necessarily stop chemo but drives monitoring  Will plan repeat echo in 2 months ( 3 months from last) which will be after last 2 cycles and pre XRT    2. Abnormal echocardiogram  EF as stated is normal. GLS number is compared to normative for current echo machine.  Will try to keep testing on consistent basis with type of machine and reader. 3. Malignant neoplasm of left breast in female, estrogen receptor negative, unspecified site of breast ([facility] [address]  F/u with Dr. Avtar Rosa and Dr. Lilia Smiley. F/u in 2 months     Impression:   1. Encounter for monitoring cardiotoxic drug therapy    2. Abnormal echocardiogram    3. Malignant neoplasm of left breast in female, estrogen receptor negative, unspecified site of breast [facility])       Cardiac History:   No specialty comments available. NKDA   No prior cath, non smoker, no etoh, one cup coffee , A/P specialist, , reading piano  Mother 80 with dementia, Father 80 no CAD   ROS see scanned negative  Future Appointments   Date Time Provider Ryan Daly   1/19/2022  4:00 PM H3 [facility]   1/20/2022  8:30 AM F1 RAFI LONG [address] [facility]   1/20/2022  8:45 AM Tramaine Hernández  [address] BS AMB   2/9/2022  4:00 PM H3 [facility]FI LAB [facility]   2/10/2022  8:30 AM F1 RAFI LONG [place] [facility]   2/10/2022  8:45 AM Tramaine Hernández  [address] BS AMB   43/7/7012  9:74 PM Eva Long MD [facility] BS AMB        ROS-except as noted above. . A complete cardiac and respiratory are reviewed and negative except as above ; Resp-denies wheezing  or productive cough,. Const- No unusual weight loss or fever; Neuro-no recent seizure or CVA ; GI- No BRBPR, abdom pain, bloating ; - no  hematuria   see supplement sheet, initialed and to be scanned by staff  Past Medical History:   Diagnosis Date    Malignant neoplasm of left breast in female, estrogen receptor negative ([facility] [address] 10/13/2021      Social Hx= reports that she has never smoked. She has never used smokeless tobacco.     Exam and Labs:  /70   Pulse (!) 103   Resp 16   Ht 5' 6\" (1.676 m)   Wt 147 lb (66.7 kg)   SpO2 99%   BMI 23.73 kg/m² Constitutional:  NAD, comfortable  Head: NC,AT. Eyes: No scleral icterus. Neck:  Neck supple. No JVD present. Throat: moist mucous membranes.   Chest: Effort normal & normal respiratory excursion . Neurological: alert, conversant and oriented . Skin: Skin is not cold. No obvious systemic rash noted. Not diaphoretic. No erythema. Psychiatric:  Grossly normal mood and affect. Behavior appears normal. Extremities:  no clubbing or cyanosis. Abdomen: non distended    Lungs:breath sounds normal. No stridor. distress, wheezes or  Rales. Heart: normal rate, regular rhythm, normal S1, S2, no murmurs, rubs, clicks or gallops , PMI non displaced. Edema: Edema is none. No results found for: CHOL, CHOLX, CHLST, CHOLV, HDL, HDLP, LDL, LDLC, DLDLP, TGLX, TRIGL, TRIGP, CHHD, CHHDX  Lab Results   Component Value Date/Time    Sodium 132 (L) 12/29/2021 04:01 PM    Potassium 2.8 (L) 12/29/2021 04:01 PM    Chloride 95 (L) 12/29/2021 04:01 PM    CO2 28 12/29/2021 04:01 PM    Anion gap 9 12/29/2021 04:01 PM    Glucose 142 (H) 12/29/2021 04:01 PM    BUN 12 12/29/2021 04:01 PM    Creatinine 0.58 12/29/2021 04:01 PM    BUN/Creatinine ratio 21 (H) 12/29/2021 04:01 PM    GFR est AA >60 12/29/2021 04:01 PM    GFR est non-AA >60 12/29/2021 04:01 PM    Calcium 9.3 12/29/2021 04:01 PM      Wt Readings from Last 3 Encounters:   01/11/22 147 lb (66.7 kg)   12/30/21 152 lb 9.6 oz (69.2 kg)   12/15/21 153 lb (69.4 kg)      BP Readings from Last 3 Encounters:   01/11/22 120/70   12/30/21 (!) 169/99   12/30/21 (!) 140/95      Current Outpatient Medications   Medication Sig    LORazepam (ATIVAN) 0.5 mg tablet Take 1 tab by mouth 30-60 minutes prior to Breast MRI. May take additional dose if needed.  ALPRAZolam (XANAX) 0.5 mg tablet Take 1 Tablet by mouth three (3) times daily as needed for Anxiety. Max Daily Amount: 1.5 mg. Indications: anxious    lidocaine-prilocaine (EMLA) topical cream Apply  to affected area as needed for Pain.  Apply to port-a-cath site 30-60 minutes prior to chemo    ondansetron (ZOFRAN ODT) 4 mg disintegrating tablet Take 1-2 Tablets by mouth every eight (8) hours as needed for Nausea or Vomiting.  prochlorperazine (Compazine) 5 mg tablet Take 1 tab by mouth every 6 hours as needed for nausea or vomiting    dexAMETHasone (DECADRON) 4 mg tablet Take 2 tabs (8mg) by mouth twice daily the day before chemotherapy and the day after chemotherapy    CRANIAL PROSTHESIS misc Alopecia due to chemo/ wig    amoxicillin-clavulanate (AUGMENTIN) 875-125 mg per tablet Take 1 Tablet by mouth two (2) times a day.  trimethoprim-sulfamethoxazole (BACTRIM DS, SEPTRA DS) 160-800 mg per tablet  (Patient not taking: Reported on 12/30/2021)     No current facility-administered medications for this visit. Impression see above.

## 2022-01-12 ENCOUNTER — APPOINTMENT (OUTPATIENT)
Dept: INFUSION THERAPY | Age: 57
End: 2022-01-12

## 2022-01-12 RX ORDER — ACETAMINOPHEN 325 MG/1
650 TABLET ORAL
Status: CANCELLED | OUTPATIENT
Start: 2022-01-20

## 2022-01-12 RX ORDER — HEPARIN 100 UNIT/ML
300-500 SYRINGE INTRAVENOUS AS NEEDED
Status: CANCELLED
Start: 2022-01-20

## 2022-01-12 RX ORDER — HYDROCORTISONE SODIUM SUCCINATE 100 MG/2ML
100 INJECTION, POWDER, FOR SOLUTION INTRAMUSCULAR; INTRAVENOUS AS NEEDED
Status: CANCELLED | OUTPATIENT
Start: 2022-01-20

## 2022-01-12 RX ORDER — SODIUM CHLORIDE 9 MG/ML
10 INJECTION INTRAMUSCULAR; INTRAVENOUS; SUBCUTANEOUS AS NEEDED
Status: CANCELLED | OUTPATIENT
Start: 2022-01-20

## 2022-01-12 RX ORDER — DIPHENHYDRAMINE HYDROCHLORIDE 50 MG/ML
50 INJECTION, SOLUTION INTRAMUSCULAR; INTRAVENOUS AS NEEDED
Status: CANCELLED
Start: 2022-01-20

## 2022-01-12 RX ORDER — DIPHENHYDRAMINE HYDROCHLORIDE 50 MG/ML
25 INJECTION, SOLUTION INTRAMUSCULAR; INTRAVENOUS AS NEEDED
Status: CANCELLED
Start: 2022-01-20

## 2022-01-12 RX ORDER — ACETAMINOPHEN 325 MG/1
650 TABLET ORAL AS NEEDED
Status: CANCELLED
Start: 2022-01-20

## 2022-01-12 RX ORDER — ALBUTEROL SULFATE 0.83 MG/ML
2.5 SOLUTION RESPIRATORY (INHALATION) AS NEEDED
Status: CANCELLED
Start: 2022-01-20

## 2022-01-12 RX ORDER — DEXAMETHASONE SODIUM PHOSPHATE 100 MG/10ML
10 INJECTION INTRAMUSCULAR; INTRAVENOUS ONCE
Status: CANCELLED | OUTPATIENT
Start: 2022-01-20 | End: 2022-01-20

## 2022-01-12 RX ORDER — ONDANSETRON 2 MG/ML
8 INJECTION INTRAMUSCULAR; INTRAVENOUS AS NEEDED
Status: CANCELLED | OUTPATIENT
Start: 2022-01-20

## 2022-01-12 RX ORDER — EPINEPHRINE 1 MG/ML
0.3 INJECTION, SOLUTION, CONCENTRATE INTRAVENOUS AS NEEDED
Status: CANCELLED | OUTPATIENT
Start: 2022-01-20

## 2022-01-12 RX ORDER — PALONOSETRON 0.05 MG/ML
0.25 INJECTION, SOLUTION INTRAVENOUS ONCE
Status: CANCELLED | OUTPATIENT
Start: 2022-01-20 | End: 2022-01-20

## 2022-01-12 RX ORDER — SODIUM CHLORIDE 0.9 % (FLUSH) 0.9 %
10 SYRINGE (ML) INJECTION AS NEEDED
Status: CANCELLED | OUTPATIENT
Start: 2022-01-20

## 2022-01-12 RX ORDER — DIPHENHYDRAMINE HYDROCHLORIDE 50 MG/ML
50 INJECTION, SOLUTION INTRAMUSCULAR; INTRAVENOUS
Status: CANCELLED | OUTPATIENT
Start: 2022-01-20

## 2022-01-12 RX ORDER — SODIUM CHLORIDE 9 MG/ML
25 INJECTION, SOLUTION INTRAVENOUS CONTINUOUS
Status: CANCELLED | OUTPATIENT
Start: 2022-01-20

## 2022-01-13 ENCOUNTER — APPOINTMENT (OUTPATIENT)
Dept: INFUSION THERAPY | Age: 57
End: 2022-01-13

## 2022-01-19 ENCOUNTER — HOSPITAL ENCOUNTER (OUTPATIENT)
Dept: INFUSION THERAPY | Age: 57
Discharge: HOME | End: 2022-01-19
Payer: COMMERCIAL

## 2022-01-19 VITALS
TEMPERATURE: 96.8 F | HEART RATE: 83 BPM | DIASTOLIC BLOOD PRESSURE: 82 MMHG | RESPIRATION RATE: 16 BRPM | SYSTOLIC BLOOD PRESSURE: 134 MMHG

## 2022-01-19 LAB
ALBUMIN SERPL-MCNC: 2.9 G/DL (ref 3.5–5)
ALBUMIN/GLOB SERPL: 0.7 {RATIO} (ref 1.1–2.2)
ALP SERPL-CCNC: 217 U/L (ref 45–117)
ALT SERPL-CCNC: 52 U/L (ref 12–78)
ANION GAP SERPL CALC-SCNC: 7 MMOL/L (ref 5–15)
AST SERPL-CCNC: 51 U/L (ref 15–37)
BASOPHILS # BLD: 0 K/UL (ref 0–0.1)
BASOPHILS NFR BLD: 0 % (ref 0–1)
BILIRUB SERPL-MCNC: 0.4 MG/DL (ref 0.2–1)
BUN SERPL-MCNC: 13 MG/DL (ref 6–20)
BUN/CREAT SERPL: 25 (ref 12–20)
CALCIUM SERPL-MCNC: 8.6 MG/DL (ref 8.5–10.1)
CHLORIDE SERPL-SCNC: 96 MMOL/L (ref 97–108)
CO2 SERPL-SCNC: 30 MMOL/L (ref 21–32)
CREAT SERPL-MCNC: 0.52 MG/DL (ref 0.55–1.02)
DIFFERENTIAL METHOD BLD: ABNORMAL
EOSINOPHIL # BLD: 0 K/UL (ref 0–0.4)
EOSINOPHIL NFR BLD: 0 % (ref 0–7)
ERYTHROCYTE [DISTWIDTH] IN BLOOD BY AUTOMATED COUNT: 18.3 % (ref 11.5–14.5)
GLOBULIN SER CALC-MCNC: 4.2 G/DL (ref 2–4)
GLUCOSE SERPL-MCNC: 143 MG/DL (ref 65–100)
HCT VFR BLD AUTO: 28.1 % (ref 35–47)
HGB BLD-MCNC: 9.2 G/DL (ref 11.5–16)
IMM GRANULOCYTES # BLD AUTO: 0.1 K/UL (ref 0–0.04)
IMM GRANULOCYTES NFR BLD AUTO: 1 % (ref 0–0.5)
LYMPHOCYTES # BLD: 1 K/UL (ref 0.8–3.5)
LYMPHOCYTES NFR BLD: 11 % (ref 12–49)
MCH RBC QN AUTO: 33.5 PG (ref 26–34)
MCHC RBC AUTO-ENTMCNC: 32.7 G/DL (ref 30–36.5)
MCV RBC AUTO: 102.2 FL (ref 80–99)
MONOCYTES # BLD: 0.2 K/UL (ref 0–1)
MONOCYTES NFR BLD: 2 % (ref 5–13)
NEUTS SEG # BLD: 7.6 K/UL (ref 1.8–8)
NEUTS SEG NFR BLD: 86 % (ref 32–75)
NRBC # BLD: 0 K/UL (ref 0–0.01)
NRBC BLD-RTO: 0 PER 100 WBC
PLATELET # BLD AUTO: 169 K/UL (ref 150–400)
PMV BLD AUTO: 10.2 FL (ref 8.9–12.9)
POTASSIUM SERPL-SCNC: 3.3 MMOL/L (ref 3.5–5.1)
PROT SERPL-MCNC: 7.1 G/DL (ref 6.4–8.2)
RBC # BLD AUTO: 2.75 M/UL (ref 3.8–5.2)
SODIUM SERPL-SCNC: 133 MMOL/L (ref 136–145)
WBC # BLD AUTO: 8.9 K/UL (ref 3.6–11)

## 2022-01-19 PROCEDURE — 85025 COMPLETE CBC W/AUTO DIFF WBC: CPT

## 2022-01-19 PROCEDURE — 80053 COMPREHEN METABOLIC PANEL: CPT

## 2022-01-19 NOTE — PROGRESS NOTES
[facility] Short Note                       Date: 2022    Name: Alicia Tejeda    MRN: [id number]         : 1965    Treatment: Lab draw day prior to treatment    [facility]IC COVID-19 SCREENING      The patient was asked the following questions and answered as documented below:    1. Do you have any symptoms of COVID-19? SOB, coughing, fever, or generally not feeling well? NO  2. Have you been exposed to COVID-19 recently? NO  3. Have you had any recent contact with family/friend that has a pending COVID test? NO    Patient was assessed; no acute issues at this time. Ms. Simon Smoker vitals were reviewed prior to and after treatment. Patient Vitals for the past 12 hrs:   Temp Pulse Resp BP   22 1600 96.8 °F (36 °C) 83 16 134/82         Lab results were obtained peripherally and reviewed. Recent Results (from the past 12 hour(s))   CBC WITH AUTOMATED DIFF    Collection Time: 22  4:18 PM   Result Value Ref Range    WBC 8.9 3.6 - 11.0 K/uL    RBC 2.75 (L) 3.80 - 5.20 M/uL    HGB 9.2 (L) 11.5 - 16.0 g/dL    HCT 28.1 (L) 35.0 - 47.0 %    .2 (H) 80.0 - 99.0 FL    MCH 33.5 26.0 - 34.0 PG    MCHC 32.7 30.0 - 36.5 g/dL    RDW 18.3 (H) 11.5 - 14.5 %    PLATELET 721 504 - 027 K/uL    MPV 10.2 8.9 - 12.9 FL    NRBC 0.0 0  WBC    ABSOLUTE NRBC 0.00 0.00 - 0.01 K/uL    NEUTROPHILS 86 (H) 32 - 75 %    LYMPHOCYTES 11 (L) 12 - 49 %    MONOCYTES 2 (L) 5 - 13 %    EOSINOPHILS 0 0 - 7 %    BASOPHILS 0 0 - 1 %    IMMATURE GRANULOCYTES 1 (H) 0.0 - 0.5 %    ABS. NEUTROPHILS 7.6 1.8 - 8.0 K/UL    ABS. LYMPHOCYTES 1.0 0.8 - 3.5 K/UL    ABS. MONOCYTES 0.2 0.0 - 1.0 K/UL    ABS. EOSINOPHILS 0.0 0.0 - 0.4 K/UL    ABS. BASOPHILS 0.0 0.0 - 0.1 K/UL    ABS. IMM.  GRANS. 0.1 (H) 0.00 - 0.04 K/UL    DF AUTOMATED     METABOLIC PANEL, COMPREHENSIVE    Collection Time: 22  4:18 PM   Result Value Ref Range    Sodium 133 (L) 136 - 145 mmol/L    Potassium 3.3 (L) 3.5 - 5.1 mmol/L    Chloride 96 (L) 97 - 108 mmol/L CO2 30 21 - 32 mmol/L    Anion gap 7 5 - 15 mmol/L    Glucose 143 (H) 65 - 100 mg/dL    BUN 13 6 - 20 MG/DL    Creatinine 0.52 (L) 0.55 - 1.02 MG/DL    BUN/Creatinine ratio 25 (H) 12 - 20      GFR est AA >60 >60 ml/min/1.73m2    GFR est non-AA >60 >60 ml/min/1.73m2    Calcium 8.6 8.5 - 10.1 MG/DL    Bilirubin, total 0.4 0.2 - 1.0 MG/DL    ALT (SGPT) 52 12 - 78 U/L    AST (SGOT) 51 (H) 15 - 37 U/L    Alk. phosphatase 217 (H) 45 - 117 U/L    Protein, total 7.1 6.4 - 8.2 g/dL    Albumin 2.9 (L) 3.5 - 5.0 g/dL    Globulin 4.2 (H) 2.0 - 4.0 g/dL    A-G Ratio 0.7 (L) 1.1 - 2.2           Ms. Landin tolerated the treatment without complaints. Ms. Aj Marie was discharged from [place] [address] in stable condition at 1600. Patient aware of future appointments.       Future Appointments   Date Time Provider Ryan Daly   1/20/2022  8:30 AM F1 RAFI LONG [place] [facility]   1/20/2022  8:45 AM Rajiv Molina  [address] BS AMB   2/9/2022  4:00 PM H3 [facility]FI LAB [facility]   2/10/2022  8:30 AM F1 RAFI LONG [place] [facility]   2/10/2022  8:45 AM Rajiv Molina  [address] BS AMB   2/21/2022 12:15 PM [facility] [place] [facility] 2 [facility]   3/11/2022 11:00 AM BONI DU BS AMB   3/15/2022 10:00 AM MD DEBBIE King BS AMB   25/3/8730  2:28 PM Dee Dee Trinidad MD [facility] BS AMB       Ranjit Muhammad, CHRISTOPHER  January 19, 2022  6:42 PM

## 2022-01-20 ENCOUNTER — HOSPITAL ENCOUNTER (OUTPATIENT)
Dept: INFUSION THERAPY | Age: 57
Discharge: HOME | End: 2022-01-20
Payer: COMMERCIAL

## 2022-01-20 ENCOUNTER — OFFICE VISIT (OUTPATIENT)
Dept: ONCOLOGY | Age: 57
End: 2022-01-20
Payer: COMMERCIAL

## 2022-01-20 VITALS
BODY MASS INDEX: 24.11 KG/M2 | DIASTOLIC BLOOD PRESSURE: 79 MMHG | HEIGHT: 66 IN | TEMPERATURE: 96.8 F | SYSTOLIC BLOOD PRESSURE: 131 MMHG | HEART RATE: 109 BPM | OXYGEN SATURATION: 98 % | WEIGHT: 150 LBS

## 2022-01-20 VITALS
HEART RATE: 96 BPM | TEMPERATURE: 96.8 F | HEIGHT: 66 IN | RESPIRATION RATE: 18 BRPM | DIASTOLIC BLOOD PRESSURE: 70 MMHG | BODY MASS INDEX: 24.11 KG/M2 | SYSTOLIC BLOOD PRESSURE: 138 MMHG | WEIGHT: 150 LBS

## 2022-01-20 DIAGNOSIS — Z95.828 PORT-A-CATH IN PLACE: ICD-10-CM

## 2022-01-20 DIAGNOSIS — C50.912 MALIGNANT NEOPLASM OF LEFT BREAST IN FEMALE, ESTROGEN RECEPTOR NEGATIVE, UNSPECIFIED SITE OF BREAST (HCC): ICD-10-CM

## 2022-01-20 DIAGNOSIS — N63.20 LEFT BREAST MASS: ICD-10-CM

## 2022-01-20 DIAGNOSIS — Z51.81 ENCOUNTER FOR MONITORING CARDIOTOXIC DRUG THERAPY: ICD-10-CM

## 2022-01-20 DIAGNOSIS — Z79.899 ENCOUNTER FOR MONITORING CARDIOTOXIC DRUG THERAPY: ICD-10-CM

## 2022-01-20 DIAGNOSIS — C50.912 MALIGNANT NEOPLASM OF LEFT BREAST IN FEMALE, ESTROGEN RECEPTOR NEGATIVE, UNSPECIFIED SITE OF BREAST (HCC): Primary | ICD-10-CM

## 2022-01-20 DIAGNOSIS — Z17.1 MALIGNANT NEOPLASM OF LEFT BREAST IN FEMALE, ESTROGEN RECEPTOR NEGATIVE, UNSPECIFIED SITE OF BREAST (HCC): Primary | ICD-10-CM

## 2022-01-20 DIAGNOSIS — Z17.1 MALIGNANT NEOPLASM OF LEFT BREAST IN FEMALE, ESTROGEN RECEPTOR NEGATIVE, UNSPECIFIED SITE OF BREAST (HCC): ICD-10-CM

## 2022-01-20 DIAGNOSIS — Z09 CHEMOTHERAPY FOLLOW-UP EXAMINATION: ICD-10-CM

## 2022-01-20 PROCEDURE — 96417 CHEMO IV INFUS EACH ADDL SEQ: CPT

## 2022-01-20 PROCEDURE — 74011250636 HC RX REV CODE- 250/636: Performed by: INTERNAL MEDICINE

## 2022-01-20 PROCEDURE — 96368 THER/DIAG CONCURRENT INF: CPT

## 2022-01-20 PROCEDURE — 96413 CHEMO IV INFUSION 1 HR: CPT

## 2022-01-20 PROCEDURE — 99214 OFFICE O/P EST MOD 30 MIN: CPT | Performed by: INTERNAL MEDICINE

## 2022-01-20 PROCEDURE — 74011250637 HC RX REV CODE- 250/637: Performed by: NURSE PRACTITIONER

## 2022-01-20 PROCEDURE — 74011000258 HC RX REV CODE- 258: Performed by: INTERNAL MEDICINE

## 2022-01-20 PROCEDURE — 96375 TX/PRO/DX INJ NEW DRUG ADDON: CPT

## 2022-01-20 PROCEDURE — 96377 APPLICATON ON-BODY INJECTOR: CPT

## 2022-01-20 PROCEDURE — 96367 TX/PROPH/DG ADDL SEQ IV INF: CPT

## 2022-01-20 PROCEDURE — 74011000250 HC RX REV CODE- 250: Performed by: INTERNAL MEDICINE

## 2022-01-20 PROCEDURE — 96401 CHEMO ANTI-NEOPL SQ/IM: CPT

## 2022-01-20 PROCEDURE — 77030012965 HC NDL HUBR BBMI -A

## 2022-01-20 RX ORDER — DEXAMETHASONE 4 MG/1
TABLET ORAL
Qty: 4 TABLET | Refills: 0 | Status: SHIPPED | OUTPATIENT
Start: 2022-01-20 | End: 2022-03-24

## 2022-01-20 RX ORDER — DEXAMETHASONE SODIUM PHOSPHATE 4 MG/ML
10 INJECTION, SOLUTION INTRA-ARTICULAR; INTRALESIONAL; INTRAMUSCULAR; INTRAVENOUS; SOFT TISSUE ONCE
Status: COMPLETED | OUTPATIENT
Start: 2022-01-20 | End: 2022-01-20

## 2022-01-20 RX ORDER — PALONOSETRON 0.05 MG/ML
0.25 INJECTION, SOLUTION INTRAVENOUS ONCE
Status: COMPLETED | OUTPATIENT
Start: 2022-01-20 | End: 2022-01-20

## 2022-01-20 RX ORDER — HEPARIN 100 UNIT/ML
300-500 SYRINGE INTRAVENOUS AS NEEDED
Status: ACTIVE | OUTPATIENT
Start: 2022-01-20 | End: 2022-01-20

## 2022-01-20 RX ORDER — SODIUM CHLORIDE 0.9 % (FLUSH) 0.9 %
10 SYRINGE (ML) INJECTION AS NEEDED
Status: DISPENSED | OUTPATIENT
Start: 2022-01-20 | End: 2022-01-20

## 2022-01-20 RX ORDER — SODIUM CHLORIDE 9 MG/ML
25 INJECTION, SOLUTION INTRAVENOUS CONTINUOUS
Status: DISPENSED | OUTPATIENT
Start: 2022-01-20 | End: 2022-01-20

## 2022-01-20 RX ORDER — POTASSIUM CHLORIDE 750 MG/1
40 TABLET, FILM COATED, EXTENDED RELEASE ORAL
Status: COMPLETED | OUTPATIENT
Start: 2022-01-20 | End: 2022-01-20

## 2022-01-20 RX ADMIN — HEPARIN 500 UNITS: 100 SYRINGE at 13:26

## 2022-01-20 RX ADMIN — PERTUZUMAB, TRASTUZUMAB, AND HYALURONIDASE-ZZXF 10 ML: 600; 600; 2000 INJECTION, SOLUTION SUBCUTANEOUS at 11:15

## 2022-01-20 RX ADMIN — DOCETAXEL ANHYDROUS 140 MG: 10 INJECTION, SOLUTION INTRAVENOUS at 11:43

## 2022-01-20 RX ADMIN — SODIUM CHLORIDE, PRESERVATIVE FREE 10 ML: 5 INJECTION INTRAVENOUS at 13:26

## 2022-01-20 RX ADMIN — PALONOSETRON 0.25 MG: 0.05 INJECTION, SOLUTION INTRAVENOUS at 11:08

## 2022-01-20 RX ADMIN — POTASSIUM CHLORIDE 40 MEQ: 750 TABLET, EXTENDED RELEASE ORAL at 08:35

## 2022-01-20 RX ADMIN — DEXAMETHASONE SODIUM PHOSPHATE 10 MG: 4 INJECTION, SOLUTION INTRA-ARTICULAR; INTRALESIONAL; INTRAMUSCULAR; INTRAVENOUS; SOFT TISSUE at 11:10

## 2022-01-20 RX ADMIN — PEGFILGRASTIM 6 MG: KIT SUBCUTANEOUS at 13:26

## 2022-01-20 RX ADMIN — SODIUM CHLORIDE 150 MG: 900 INJECTION, SOLUTION INTRAVENOUS at 10:35

## 2022-01-20 RX ADMIN — CARBOPLATIN 900 MG: 10 INJECTION, SOLUTION INTRAVENOUS at 12:55

## 2022-01-20 RX ADMIN — SODIUM CHLORIDE 25 ML/HR: 9 INJECTION, SOLUTION INTRAVENOUS at 10:33

## 2022-01-20 NOTE — PROGRESS NOTES
[facility] Chemo Progress Note    0825 Ms. Landin Arrived to [facility] for Phesgo/TC (C5) ambulatory in stable condition. Assessment was completed, no acute issues at this time, no new complaints voiced. Patient left [facility] for medical oncology appointment. Patient denies SOB, fever, cough, general not feeling well. Patient denies recent exposure to someone who has tested positive for COVID-19. Patient denies having contact with anyone who has a pending COVID test.      4554 Patient returned to [facility]. Labs reviewed from yesterday and WDL for treatment. Medication ordered from pharmacy. Port accessed with positive blood return. Chemotherapy Flowsheet 1/20/2022   Cycle C5   Date 1/20/2022   Drug / Regimen Phesgo/Taxotere/Carbo   Pre Meds given   Notes given (R thigh) + OBI     Ms. Landin's vitals were reviewed. Patient Vitals for the past 12 hrs:   Temp Pulse Resp BP   01/20/22 1327  96  138/70   01/20/22 0825 96.8 °F (36 °C) (!) 105 18 131/79     Pre-medications  were administered as ordered and chemotherapy was initiated.   Medications Administered     0.9% sodium chloride infusion     Admin Date  01/20/2022 Action  New Bag Dose  25 mL/hr Rate  25 mL/hr Route  IntraVENous Administered By  Cindy Alcazar RN          CARBOplatin (PARAPLATIN) 900 mg in 0.9% sodium chloride 250 mL, overfill volume 25 mL chemo infusion     Admin Date  01/20/2022 Action  New Bag Dose  900 mg Rate  730 mL/hr Route  IntraVENous Administered By  Cindy Alcazar RN          dexamethasone (DECADRON) 4 mg/mL injection 10 mg     Admin Date  01/20/2022 Action  Given Dose  10 mg Route  IntraVENous Administered By  Cindy Alcazar RN          DOCEtaxeL (TAXOTERE) 140 mg in 0.9% sodium chloride 250 mL, overfill volume 25 mL chemo infusion     Admin Date  01/20/2022 Action  New Bag Dose  140 mg Rate  289 mL/hr Route  IntraVENous Administered By  Cindy Alcazar RN          fosaprepitant (EMEND) 150 mg in 0.9% sodium chloride 150 mL IVPB     Admin Date  01/20/2022 Action  New Bag Dose  150 mg Rate  450 mL/hr Route  IntraVENous Administered By  Ligia Dunn RN          heparin (porcine) pf 300-500 Units     Admin Date  01/20/2022 Action  Given Dose  500 Units Route  InterCATHeter Administered By  Ligia Dunn RN          palonosetron HCl (ALOXI) injection 0.25 mg     Admin Date  01/20/2022 Action  Given Dose  0.25 mg Route  IntraVENous Administered By  Ligia Dunn RN          pegfilgrastim (NEULASTA) wearable SQ injector 6 mg     Admin Date  01/20/2022 Action  Given Dose  6 mg Route  SubCUTAneous Administered By  Ligia Dunn RN          pertuzumab 600 mg-trastuzumab 600 mg-hyaluronidase-zzxf 20,000 units/10 mL     Admin Date  01/20/2022 Action  Given Dose  10 mL Route  SubCUTAneous Administered By  Ligia Dunn RN          potassium chloride SR (KLOR-CON 10) tablet 40 mEq     Admin Date  01/20/2022 Action  Given Dose  40 mEq Route  Oral Administered By  Ligia Dunn RN          sodium chloride (NS) flush 10 mL     Admin Date  01/20/2022 Action  Given Dose  10 mL Route  IntraVENous Administered By  Ligia Dunn RN              Phesgo given in R thigh     Education provided to patient about Neulasta On Body Injector including: side effects, how/when to remove the device, as well as what to do in the event of device malfunction. Opportunity for questions was provided and all questions were answered. Patient verbalized understanding. 1330 Patient tolerated treatment well. Port maintained positive blood return throughout treatment. Port flushed, heparinized and de accessed per protocol. Patient was discharged from [facility] in stable condition. Patient aware of next appointment.      Future Appointments   Date Time Provider Ryan Daly   2/9/2022  4:00 PM H3 RAFI LAB [facility] H   2/10/2022  8:30 AM F1 RAFI LONG [address] [facility] H   2/10/2022  8:45 AM Therese Riley,  [address] BS AMB   2/21/2022 12:15 PM [facility] MRI 2 [facility]    3/11/2022 11:00 AM BONI DU BS AMB   3/15/2022 10:00 AM MD DEBBIE King BS AMB   94/9/8902  0:06 PM Gretchen Jonas MD [facility] BS AMB         Jo-Ann Yeboah RN  January 20, 2022

## 2022-01-20 NOTE — PROGRESS NOTES
[facility] [place] [address] [address], [address], [place]: [phone]  F: [phone]    Reason for Visit:   Herrera Amezquita is a 61 y.o. female seen today in office for follow up of Left Breast Cancer. Treatment History:   · LEFT Breast Biopsy 10/6/21: PATH - Invasive ductal carcinoma, favor grade 3  · ER/NV negative, HER2+  · Breast MRI 10/13/21: RIGHT BREAST: Background parenchymal enhancement: Mild. There is no suspicious masslike or nonmasslike enhancement within the right breast to indicate breast carcinoma. There are no suspicious internal mammary or axillary chain lymph nodes. LEFT BREAST: Background parenchymal enhancement: Mild There is a rounded mass with central fluid attenuation/necrosis in the lateral aspect of the left breast, deep 3rd, measuring approximately 3.3 cm in diameter with enhancement extending along the adjacent dermis suggesting skin invasion. There is nonmasslike enhancement extending both inferior, medial and anterior to the primary mass over approximately 6 x 5.5 x 4 cm. There are discontinuous areas of fluid attenuation associated with the nonmasslike enhancement which may represent cystic spaces and/or necrosis. There is an enlarged, left axillary lymph node with other adjacent, prominent lymph nodes. There is a lymph node node deep to the pectoralis minor muscle which measures 1.1 x 0.7 cm  · Left Axillary LN Biopsy 10/19/21: PATH - Metastatic breast cancer, measuring up to 17 mm in a single core  · ER/NV negative, HER2+  · Neoadjuvant TCHP 10/21/21 - Current   · Breast Biopsy 10/27/21: PATH - 5 mm IDC with DCIS    STAGE: Clinical 2 ER/NV negative Her2+    History of Present Illness:   Herrera Amezquita is a 61 y.o. female seen today in office for follow up of left breast cancer ER/NV negative Her2 + post biopsy on neoadjuvant chemo TCHP. Doing well overall with chemo. Has a tiny bit of neuropathy. Cycle 5 today.  Does not want dose changes. Does not need to see cardio anymore per pt. Labs done and good. No fevers/ chills/ chest pain/ SOB/ nausea/ vomiting/diarrhea/ pain/fatigue    Last visit:  She had a port placed on 10/19/21 without issues. She had left axillary LN biopsy on 10/19/21 and path showed metastatic breast cancer, same markers. She started neoadjuvant TCHP chemotherapy on 10/21/21. She had another breast biopsy on 10/27/21 that showed 5 mm IDC with DCIS. She is here today for Cycle 4 of neoadjuvant TCHP chemotherapy. She reports that she feels well overall today. She is tolerating chemo well overall with some mild side effects. She continues to have some mild nausea and takes anti-emetics as needed which help. She had no vomiting. She continues to have some fatigue. Her appetite is stable although she has lost taste buds and taste continues to be impaired. She has mild neuropathy in left foot only. She saw Dr. Ho Bruce on 15/7615 and breast mass is responding well to chemo. She saw Plastics, Dr. Heather Sullivan, yesterday and plans is for lumpectomy/reduction. Post chemo Breast MRI has been ordered. She denies fever, chills, mouth sores, cough, SOB, CP, vomiting, diarrhea, and constipation. She denies pain today. She has a history of COVID-19 infection and is due for second dose of vaccine soon. CBC and CMP from 12/29/21 reviewed today. She is ready for chemo today. She is here alone today. Past Medical History:   Diagnosis Date    Malignant neoplasm of left breast in female, estrogen receptor negative ([facility] [address] 10/13/2021      History reviewed. No pertinent surgical history. Social History     Tobacco Use    Smoking status: Never Smoker    Smokeless tobacco: Never Used   Substance Use Topics    Alcohol use: Not on file      History reviewed. No pertinent family history. Current Outpatient Medications   Medication Sig    LORazepam (ATIVAN) 0.5 mg tablet Take 1 tab by mouth 30-60 minutes prior to Breast MRI.  May take additional dose if needed.  ALPRAZolam (XANAX) 0.5 mg tablet Take 1 Tablet by mouth three (3) times daily as needed for Anxiety. Max Daily Amount: 1.5 mg. Indications: anxious    lidocaine-prilocaine (EMLA) topical cream Apply  to affected area as needed for Pain. Apply to port-a-cath site 30-60 minutes prior to chemo    ondansetron (ZOFRAN ODT) 4 mg disintegrating tablet Take 1-2 Tablets by mouth every eight (8) hours as needed for Nausea or Vomiting.  prochlorperazine (Compazine) 5 mg tablet Take 1 tab by mouth every 6 hours as needed for nausea or vomiting    dexAMETHasone (DECADRON) 4 mg tablet Take 2 tabs (8mg) by mouth twice daily the day before chemotherapy and the day after chemotherapy    CRANIAL PROSTHESIS misc Alopecia due to chemo/ wig    amoxicillin-clavulanate (AUGMENTIN) 875-125 mg per tablet Take 1 Tablet by mouth two (2) times a day.  trimethoprim-sulfamethoxazole (BACTRIM DS, SEPTRA DS) 160-800 mg per tablet  (Patient not taking: Reported on 12/30/2021)     No current facility-administered medications for this visit. Facility-Administered Medications Ordered in Other Visits   Medication Dose Route Frequency    sodium chloride (NS) flush 10 mL  10 mL IntraVENous PRN    heparin (porcine) pf 300-500 Units  300-500 Units InterCATHeter PRN      No Known Allergies     Review of Systems:  A complete review of systems was obtained, negative except as described above and as reported on ROS sheet scanned into system.      Physical Exam:     Visit Vitals  /79 (BP 1 Location: Left upper arm, BP Patient Position: Sitting)   Pulse (!) 109   Temp 96.8 °F (36 °C) (Temporal)   Ht 5' 6\" (1.676 m)   Wt 150 lb (68 kg)   SpO2 98%   BMI 24.21 kg/m²     ECOG PS: 0  General: No distress  Eyes: Anicteric sclerae  HENT: Atraumatic, wearing a mask  Neck: Supple  Respiratory: CTAB, normal respiratory effort  CV: Normal rate, regular rhythm, no murmurs, no peripheral edema  GI: Soft, nontender, non-distended   MS: Normal gait and station. Skin: No rashes, ecchymoses, or petechiae. Normal temperature, turgor, and texture. Port site without redness/swelling  Psych: Alert, oriented, appropriate affect, normal judgment/insight  Neuro: Non focal    Results:     Lab Results   Component Value Date/Time    WBC 8.9 01/19/2022 04:18 PM    HGB 9.2 (L) 01/19/2022 04:18 PM    HCT 28.1 (L) 01/19/2022 04:18 PM    PLATELET 629 00/07/7969 04:18 PM    .2 (H) 01/19/2022 04:18 PM    ABS. NEUTROPHILS 7.6 01/19/2022 04:18 PM     Lab Results   Component Value Date/Time    Sodium 133 (L) 01/19/2022 04:18 PM    Potassium 3.3 (L) 01/19/2022 04:18 PM    Chloride 96 (L) 01/19/2022 04:18 PM    CO2 30 01/19/2022 04:18 PM    Glucose 143 (H) 01/19/2022 04:18 PM    BUN 13 01/19/2022 04:18 PM    Creatinine 0.52 (L) 01/19/2022 04:18 PM    GFR est AA >60 01/19/2022 04:18 PM    GFR est non-AA >60 01/19/2022 04:18 PM    Calcium 8.6 01/19/2022 04:18 PM     Lab Results   Component Value Date/Time    Bilirubin, total 0.4 01/19/2022 04:18 PM    ALT (SGPT) 52 01/19/2022 04:18 PM    Alk. phosphatase 217 (H) 01/19/2022 04:18 PM    Protein, total 7.1 01/19/2022 04:18 PM    Albumin 2.9 (L) 01/19/2022 04:18 PM    Globulin 4.2 (H) 01/19/2022 04:18 PM     10/6/21  FINAL PATHOLOGIC DIAGNOSIS   Breast, left mass, core biopsy:   Invasive ductal carcinoma, favor grade 3 (see synoptic table)   INVASIVE CARCINOMA OF THE BREAST: Biopsy   TUMOR   Tumor Site: Clock position - 3 o'clock   Histologic Type:  Invasive carcinoma of no special type (ductal)   Glandular (Acinar) / Tubular Differentiation: Score 3   Nuclear Pleomorphism: Score 3   Mitotic Rate: Score 3   Overall Grade: Grade 3 (scores of 8 or 9)   Tumor Size: 13 mm   Ductal Carcinoma In Situ (DCIS): Not identified   Lymphovascular Invasion: Not identified   Microcalcifications: Not identified     10/19/21  FINAL PATHOLOGIC DIAGNOSIS   Lymph node, left axillary, core biopsy:   Metastatic breast cancer, measuring up to 17 mm in a single core     10/21/21    ECHO ADULT COMPLETE 10/21/2021 10/21/2021    Interpretation Summary  · LV: Estimated LVEF is 55 - 60%. Normal cavity size, wall thickness and systolic function (ejection fraction normal). Wall motion: normal. Abnormal left ventricular strain. Global longitudinal strain is -15.4%. · TV: Pulmonary hypertension not suggested by Doppler findings. 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 Pathology report(s) reviewed above. Radiology report(s) reviewed above. Assessment:/PLAN     1) Clinical Stage 2B T3N0 LEFT Breast Cancer ER/GA negative Her2+  post breast biopsy only 10/21  Records and history reviewed. Reviewed path and data. Discussed dx, stage and treatment of breast cancer. Discussed neoadjuvant and adjuvant chemo. Discussed no hormonal therapy options. Surgery prefers neoadjuvant chemo. Not sure about type of surgery yet. Discussed chemo options. Discussed clinical trial options. Patient does not want to do clinical trial.   Decided on neoadjuvant TCHP. Teaching and consent with pharmacy. MRI Breast 10/13/21 showed a rounded mass with central fluid attenuation/necrosis in the lateral aspect of the left breast, deep 3rd, measuring approximately 3.3 cm in diameter with enhancement extending along the adjacent dermis suggesting skin invasion. There is nonmasslike enhancement extending both inferior, medial and anterior to the primary mass over approximately 6 x 5.5 x 4 cm.  There are discontinuous areas of fluid attenuation associated with the nonmasslike enhancement which may represent cystic spaces and/or necrosis. There is an enlarged, left axillary lymph node with other adjacent, prominent lymph nodes. There is a lymph node node deep to the pectoralis minor muscle which measures 1.1 x 0.7 cm  She had a left axillary LN biopsy on 10/19/21 and path showed metastatic beast cancer. She had a port placed on 10/19/21 with no issues. Pre chemo ECHO showed EF 55-60% with abnormal left ventricular strain. Referred to Cardiology for further evaluation - has follow up ECHO on 12/6/21 per Cardio. Patient started neoadjuvant TCHP chemotherapy on 10/25/21. Patient is here today for Cycle 5 of neoadjuvant TCHP chemotherapy. She is tolerating chemo well overall with some side nausea/fatigue. She saw Dr. Lazara Hicks on [id number]/8176 and breast mass is responding well to chemo. She saw Plastics, Dr. Terry Fung, yesterday. Post chemo Breast MRI has been ordered. Plan is for lumpectomy/reduction then XRT. She is clinically healthy overall and stable today. Labs (CBC and CMP) from 12/29/21 reviewed. No dose adjustments needed today. Patient is ready for chemo today. Follow up in 3 weeks with Cycle 6. Patient agrees with plan. 2)  Postmenopausal  Continue routine GYN follow up. 3) Management of High Risk Medications - Chemotherapy  Toxicities include Grade 1 Nausea, Grade 1 Fatigue, and Grade 1 Neuropathy. Pre chemo ECHO from 10/21/21 reviewed - EF 55-60% with abnormal left ventricular strain. Referred to Cardiology for further evaluation/management of strain. She had a follow up ECHO on 12/6/21 per Cardiology - EF 58% with no strain. Labs (CBC and CMP) from 12/29/21 reviewed today. No dose adjustments needed today. Will monitor for side effects. 4) Psychosocial  Mood good, coping well. 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 Her  is very supportive. SW/NN support as needed. She is here alone today. Call if questions. Follow up in 3 weeks with Cycle 5.     I appreciate the opportunity to participate in Ms. Rylie Landin's care.     Signed By: Leisa Toledo DO

## 2022-01-20 NOTE — PROGRESS NOTES
Matilde Hwang is a 61 y.o. female  Chief Complaint   Patient presents with    Chemotherapy    Breast Cancer     1. Have you been to the ER, urgent care clinic since your last visit? Hospitalized since your last visit? No.    2. Have you seen or consulted any other health care providers outside of the [address] [place] [address] since your last visit? Include any pap smears or colon screening.  Yes, patient went to see her Cardiologist.

## 2022-01-20 NOTE — PROGRESS NOTES
Spiritual Care Partner Volunteer visited patient in [facility] on 1/20/22. Documented by:   Chaplain Luna MDiv, MACE  [phone]

## 2022-02-02 ENCOUNTER — APPOINTMENT (OUTPATIENT)
Dept: INFUSION THERAPY | Age: 57
End: 2022-02-02

## 2022-02-02 RX ORDER — ACETAMINOPHEN 325 MG/1
650 TABLET ORAL AS NEEDED
Status: CANCELLED
Start: 2022-02-10

## 2022-02-02 RX ORDER — DIPHENHYDRAMINE HYDROCHLORIDE 50 MG/ML
25 INJECTION, SOLUTION INTRAMUSCULAR; INTRAVENOUS AS NEEDED
Status: CANCELLED
Start: 2022-02-10

## 2022-02-02 RX ORDER — PALONOSETRON 0.05 MG/ML
0.25 INJECTION, SOLUTION INTRAVENOUS ONCE
Status: CANCELLED | OUTPATIENT
Start: 2022-02-10 | End: 2022-02-10

## 2022-02-02 RX ORDER — ONDANSETRON 2 MG/ML
8 INJECTION INTRAMUSCULAR; INTRAVENOUS AS NEEDED
Status: CANCELLED | OUTPATIENT
Start: 2022-02-10

## 2022-02-02 RX ORDER — HEPARIN 100 UNIT/ML
300-500 SYRINGE INTRAVENOUS AS NEEDED
Status: CANCELLED
Start: 2022-02-10

## 2022-02-02 RX ORDER — SODIUM CHLORIDE 9 MG/ML
10 INJECTION INTRAMUSCULAR; INTRAVENOUS; SUBCUTANEOUS AS NEEDED
Status: CANCELLED | OUTPATIENT
Start: 2022-02-10

## 2022-02-02 RX ORDER — DEXAMETHASONE SODIUM PHOSPHATE 100 MG/10ML
10 INJECTION INTRAMUSCULAR; INTRAVENOUS ONCE
Status: CANCELLED | OUTPATIENT
Start: 2022-02-10 | End: 2022-02-10

## 2022-02-02 RX ORDER — SODIUM CHLORIDE 0.9 % (FLUSH) 0.9 %
10 SYRINGE (ML) INJECTION AS NEEDED
Status: CANCELLED | OUTPATIENT
Start: 2022-02-10

## 2022-02-02 RX ORDER — HYDROCORTISONE SODIUM SUCCINATE 100 MG/2ML
100 INJECTION, POWDER, FOR SOLUTION INTRAMUSCULAR; INTRAVENOUS AS NEEDED
Status: CANCELLED | OUTPATIENT
Start: 2022-02-10

## 2022-02-02 RX ORDER — SODIUM CHLORIDE 9 MG/ML
25 INJECTION, SOLUTION INTRAVENOUS CONTINUOUS
Status: CANCELLED | OUTPATIENT
Start: 2022-02-10

## 2022-02-02 RX ORDER — EPINEPHRINE 1 MG/ML
0.3 INJECTION, SOLUTION, CONCENTRATE INTRAVENOUS AS NEEDED
Status: CANCELLED | OUTPATIENT
Start: 2022-02-10

## 2022-02-02 RX ORDER — ACETAMINOPHEN 325 MG/1
650 TABLET ORAL
Status: CANCELLED | OUTPATIENT
Start: 2022-02-10

## 2022-02-02 RX ORDER — DIPHENHYDRAMINE HYDROCHLORIDE 50 MG/ML
50 INJECTION, SOLUTION INTRAMUSCULAR; INTRAVENOUS AS NEEDED
Status: CANCELLED
Start: 2022-02-10

## 2022-02-02 RX ORDER — DIPHENHYDRAMINE HYDROCHLORIDE 50 MG/ML
50 INJECTION, SOLUTION INTRAMUSCULAR; INTRAVENOUS
Status: CANCELLED | OUTPATIENT
Start: 2022-02-10

## 2022-02-02 RX ORDER — ALBUTEROL SULFATE 0.83 MG/ML
2.5 SOLUTION RESPIRATORY (INHALATION) AS NEEDED
Status: CANCELLED
Start: 2022-02-10

## 2022-02-03 ENCOUNTER — APPOINTMENT (OUTPATIENT)
Dept: INFUSION THERAPY | Age: 57
End: 2022-02-03

## 2022-02-09 ENCOUNTER — HOSPITAL ENCOUNTER (OUTPATIENT)
Dept: INFUSION THERAPY | Age: 57
Discharge: HOME | End: 2022-02-09
Payer: COMMERCIAL

## 2022-02-09 VITALS
SYSTOLIC BLOOD PRESSURE: 138 MMHG | HEART RATE: 97 BPM | RESPIRATION RATE: 16 BRPM | TEMPERATURE: 96.8 F | WEIGHT: 149.91 LBS | DIASTOLIC BLOOD PRESSURE: 72 MMHG | BODY MASS INDEX: 24.2 KG/M2 | OXYGEN SATURATION: 97 %

## 2022-02-09 LAB
ALBUMIN SERPL-MCNC: 2.9 G/DL (ref 3.5–5)
ALBUMIN/GLOB SERPL: 0.7 {RATIO} (ref 1.1–2.2)
ALP SERPL-CCNC: 356 U/L (ref 45–117)
ALT SERPL-CCNC: 43 U/L (ref 12–78)
ANION GAP SERPL CALC-SCNC: 8 MMOL/L (ref 5–15)
AST SERPL-CCNC: 49 U/L (ref 15–37)
BASOPHILS # BLD: 0 K/UL (ref 0–0.1)
BASOPHILS NFR BLD: 0 % (ref 0–1)
BILIRUB SERPL-MCNC: 0.5 MG/DL (ref 0.2–1)
BUN SERPL-MCNC: 11 MG/DL (ref 6–20)
BUN/CREAT SERPL: 18 (ref 12–20)
CALCIUM SERPL-MCNC: 8.7 MG/DL (ref 8.5–10.1)
CHLORIDE SERPL-SCNC: 90 MMOL/L (ref 97–108)
CO2 SERPL-SCNC: 29 MMOL/L (ref 21–32)
CREAT SERPL-MCNC: 0.6 MG/DL (ref 0.55–1.02)
DIFFERENTIAL METHOD BLD: ABNORMAL
EOSINOPHIL # BLD: 0 K/UL (ref 0–0.4)
EOSINOPHIL NFR BLD: 0 % (ref 0–7)
ERYTHROCYTE [DISTWIDTH] IN BLOOD BY AUTOMATED COUNT: 18.4 % (ref 11.5–14.5)
GLOBULIN SER CALC-MCNC: 4 G/DL (ref 2–4)
GLUCOSE SERPL-MCNC: 159 MG/DL (ref 65–100)
HCT VFR BLD AUTO: 26 % (ref 35–47)
HGB BLD-MCNC: 8.5 G/DL (ref 11.5–16)
IMM GRANULOCYTES # BLD AUTO: 0.1 K/UL (ref 0–0.04)
IMM GRANULOCYTES NFR BLD AUTO: 2 % (ref 0–0.5)
LYMPHOCYTES # BLD: 1.2 K/UL (ref 0.8–3.5)
LYMPHOCYTES NFR BLD: 17 % (ref 12–49)
MCH RBC QN AUTO: 34.4 PG (ref 26–34)
MCHC RBC AUTO-ENTMCNC: 32.7 G/DL (ref 30–36.5)
MCV RBC AUTO: 105.3 FL (ref 80–99)
MONOCYTES # BLD: 0.1 K/UL (ref 0–1)
MONOCYTES NFR BLD: 2 % (ref 5–13)
NEUTS SEG # BLD: 5.3 K/UL (ref 1.8–8)
NEUTS SEG NFR BLD: 79 % (ref 32–75)
NRBC # BLD: 0 K/UL (ref 0–0.01)
NRBC BLD-RTO: 0 PER 100 WBC
PLATELET # BLD AUTO: 160 K/UL (ref 150–400)
PMV BLD AUTO: 10.1 FL (ref 8.9–12.9)
POTASSIUM SERPL-SCNC: 3.2 MMOL/L (ref 3.5–5.1)
PROT SERPL-MCNC: 6.9 G/DL (ref 6.4–8.2)
RBC # BLD AUTO: 2.47 M/UL (ref 3.8–5.2)
SODIUM SERPL-SCNC: 127 MMOL/L (ref 136–145)
WBC # BLD AUTO: 6.7 K/UL (ref 3.6–11)

## 2022-02-09 PROCEDURE — 85025 COMPLETE CBC W/AUTO DIFF WBC: CPT

## 2022-02-09 PROCEDURE — 80053 COMPREHEN METABOLIC PANEL: CPT

## 2022-02-09 NOTE — PROGRESS NOTES
[facility] Progress Note    Date: 2022    Name: Mar Kraus    MRN: [id number]         : 1965      1555:  Pt arrived ambulatory and in no distress, for lab visit. Labs drawn via Left AC, patient tolerated well. Visit Vitals  /72   Pulse 97   Temp 96.8 °F (36 °C)   Resp 16   Wt 68 kg (149 lb 14.6 oz)   SpO2 97%   BMI 24.20 kg/m²     Departed [facility] ambulatory and in no distress.     Future Appointments   Date Time Provider Ryan Urbanoi   2/10/2022  8:30 AM F1 RAFI LONG [place] [facility]   2/10/2022  8:45 AM Ella Aguilar  N Mariela Purdy BS AMB   2022 12:15 PM [facility] PSYCHIATRIC [place] [facility] 2 [facility]   3/11/2022 11:00 AM BONI DU BS AMB   3/15/2022 10:00 AM MD DEBBIE Mccray BS AMB     6:84 PM Esteban Vasques MD [facility] BS AMB       Queta Virgen, CHRISTOPHER  2022

## 2022-02-10 ENCOUNTER — HOSPITAL ENCOUNTER (OUTPATIENT)
Dept: INFUSION THERAPY | Age: 57
Discharge: HOME | End: 2022-02-10
Payer: COMMERCIAL

## 2022-02-10 ENCOUNTER — OFFICE VISIT (OUTPATIENT)
Dept: ONCOLOGY | Age: 57
End: 2022-02-10
Payer: COMMERCIAL

## 2022-02-10 VITALS
SYSTOLIC BLOOD PRESSURE: 111 MMHG | DIASTOLIC BLOOD PRESSURE: 68 MMHG | RESPIRATION RATE: 18 BRPM | TEMPERATURE: 96.5 F | HEART RATE: 85 BPM

## 2022-02-10 VITALS
DIASTOLIC BLOOD PRESSURE: 86 MMHG | BODY MASS INDEX: 22.45 KG/M2 | OXYGEN SATURATION: 98 % | HEIGHT: 66 IN | TEMPERATURE: 96.5 F | SYSTOLIC BLOOD PRESSURE: 126 MMHG | HEART RATE: 96 BPM | WEIGHT: 139.7 LBS

## 2022-02-10 DIAGNOSIS — Z95.828 PORT-A-CATH IN PLACE: ICD-10-CM

## 2022-02-10 DIAGNOSIS — R63.0 DECREASED APPETITE: ICD-10-CM

## 2022-02-10 DIAGNOSIS — G62.0 CHEMOTHERAPY-INDUCED NEUROPATHY (HCC): ICD-10-CM

## 2022-02-10 DIAGNOSIS — N63.20 LEFT BREAST MASS: ICD-10-CM

## 2022-02-10 DIAGNOSIS — T45.1X5A CHEMOTHERAPY-INDUCED NEUROPATHY (HCC): ICD-10-CM

## 2022-02-10 DIAGNOSIS — Z78.0 POST-MENOPAUSAL: ICD-10-CM

## 2022-02-10 DIAGNOSIS — Z51.81 ENCOUNTER FOR MONITORING CARDIOTOXIC DRUG THERAPY: ICD-10-CM

## 2022-02-10 DIAGNOSIS — Z79.899 ENCOUNTER FOR MONITORING CARDIOTOXIC DRUG THERAPY: ICD-10-CM

## 2022-02-10 DIAGNOSIS — Z09 CHEMOTHERAPY FOLLOW-UP EXAMINATION: ICD-10-CM

## 2022-02-10 DIAGNOSIS — R11.0 CHEMOTHERAPY-INDUCED NAUSEA: ICD-10-CM

## 2022-02-10 DIAGNOSIS — R53.83 CHEMOTHERAPY-INDUCED FATIGUE: ICD-10-CM

## 2022-02-10 DIAGNOSIS — Z17.1 MALIGNANT NEOPLASM OF LEFT BREAST IN FEMALE, ESTROGEN RECEPTOR NEGATIVE, UNSPECIFIED SITE OF BREAST (HCC): Primary | ICD-10-CM

## 2022-02-10 DIAGNOSIS — C50.912 MALIGNANT NEOPLASM OF LEFT BREAST IN FEMALE, ESTROGEN RECEPTOR NEGATIVE, UNSPECIFIED SITE OF BREAST (HCC): Primary | ICD-10-CM

## 2022-02-10 DIAGNOSIS — T45.1X5A CHEMOTHERAPY-INDUCED FATIGUE: ICD-10-CM

## 2022-02-10 DIAGNOSIS — T45.1X5A CHEMOTHERAPY-INDUCED NAUSEA: ICD-10-CM

## 2022-02-10 PROCEDURE — 96377 APPLICATON ON-BODY INJECTOR: CPT

## 2022-02-10 PROCEDURE — 74011250637 HC RX REV CODE- 250/637: Performed by: NURSE PRACTITIONER

## 2022-02-10 PROCEDURE — 96367 TX/PROPH/DG ADDL SEQ IV INF: CPT

## 2022-02-10 PROCEDURE — 96401 CHEMO ANTI-NEOPL SQ/IM: CPT

## 2022-02-10 PROCEDURE — 96417 CHEMO IV INFUS EACH ADDL SEQ: CPT

## 2022-02-10 PROCEDURE — 99215 OFFICE O/P EST HI 40 MIN: CPT | Performed by: INTERNAL MEDICINE

## 2022-02-10 PROCEDURE — 74011000250 HC RX REV CODE- 250: Performed by: INTERNAL MEDICINE

## 2022-02-10 PROCEDURE — 74011250636 HC RX REV CODE- 250/636: Performed by: NURSE PRACTITIONER

## 2022-02-10 PROCEDURE — 74011250636 HC RX REV CODE- 250/636: Performed by: INTERNAL MEDICINE

## 2022-02-10 PROCEDURE — 96413 CHEMO IV INFUSION 1 HR: CPT

## 2022-02-10 PROCEDURE — 96375 TX/PRO/DX INJ NEW DRUG ADDON: CPT

## 2022-02-10 PROCEDURE — 74011000258 HC RX REV CODE- 258: Performed by: INTERNAL MEDICINE

## 2022-02-10 PROCEDURE — 77030012965 HC NDL HUBR BBMI -A

## 2022-02-10 RX ORDER — ALBUTEROL SULFATE 0.83 MG/ML
2.5 SOLUTION RESPIRATORY (INHALATION) AS NEEDED
Status: CANCELLED
Start: 2022-03-24

## 2022-02-10 RX ORDER — PALONOSETRON 0.05 MG/ML
0.25 INJECTION, SOLUTION INTRAVENOUS ONCE
Status: COMPLETED | OUTPATIENT
Start: 2022-02-10 | End: 2022-02-10

## 2022-02-10 RX ORDER — DIPHENHYDRAMINE HYDROCHLORIDE 50 MG/ML
50 INJECTION, SOLUTION INTRAMUSCULAR; INTRAVENOUS AS NEEDED
Status: CANCELLED
Start: 2022-03-03

## 2022-02-10 RX ORDER — SODIUM CHLORIDE 9 MG/ML
25 INJECTION, SOLUTION INTRAVENOUS CONTINUOUS
Status: CANCELLED | OUTPATIENT
Start: 2022-03-03

## 2022-02-10 RX ORDER — ACETAMINOPHEN 325 MG/1
650 TABLET ORAL AS NEEDED
Status: CANCELLED
Start: 2022-03-24

## 2022-02-10 RX ORDER — ONDANSETRON 2 MG/ML
8 INJECTION INTRAMUSCULAR; INTRAVENOUS AS NEEDED
Status: CANCELLED | OUTPATIENT
Start: 2022-03-03

## 2022-02-10 RX ORDER — ACETAMINOPHEN 325 MG/1
650 TABLET ORAL AS NEEDED
Status: ACTIVE | OUTPATIENT
Start: 2022-02-10 | End: 2022-02-10

## 2022-02-10 RX ORDER — SODIUM CHLORIDE 9 MG/ML
10 INJECTION INTRAMUSCULAR; INTRAVENOUS; SUBCUTANEOUS AS NEEDED
Status: CANCELLED | OUTPATIENT
Start: 2022-03-24

## 2022-02-10 RX ORDER — DIPHENHYDRAMINE HYDROCHLORIDE 50 MG/ML
50 INJECTION, SOLUTION INTRAMUSCULAR; INTRAVENOUS AS NEEDED
Status: CANCELLED
Start: 2022-03-24

## 2022-02-10 RX ORDER — HYDROCORTISONE SODIUM SUCCINATE 100 MG/2ML
100 INJECTION, POWDER, FOR SOLUTION INTRAMUSCULAR; INTRAVENOUS AS NEEDED
Status: CANCELLED | OUTPATIENT
Start: 2022-03-24

## 2022-02-10 RX ORDER — DEXAMETHASONE SODIUM PHOSPHATE 10 MG/ML
10 INJECTION INTRAMUSCULAR; INTRAVENOUS ONCE
Status: COMPLETED | OUTPATIENT
Start: 2022-02-10 | End: 2022-02-10

## 2022-02-10 RX ORDER — SODIUM CHLORIDE 9 MG/ML
10 INJECTION INTRAMUSCULAR; INTRAVENOUS; SUBCUTANEOUS AS NEEDED
Status: CANCELLED | OUTPATIENT
Start: 2022-03-03

## 2022-02-10 RX ORDER — POTASSIUM CHLORIDE 750 MG/1
40 TABLET, FILM COATED, EXTENDED RELEASE ORAL
Status: COMPLETED | OUTPATIENT
Start: 2022-02-10 | End: 2022-02-10

## 2022-02-10 RX ORDER — DIPHENHYDRAMINE HYDROCHLORIDE 50 MG/ML
25 INJECTION, SOLUTION INTRAMUSCULAR; INTRAVENOUS AS NEEDED
Status: CANCELLED
Start: 2022-03-24

## 2022-02-10 RX ORDER — ACETAMINOPHEN 325 MG/1
650 TABLET ORAL AS NEEDED
Status: CANCELLED
Start: 2022-03-03

## 2022-02-10 RX ORDER — HEPARIN 100 UNIT/ML
300-500 SYRINGE INTRAVENOUS AS NEEDED
Status: ACTIVE | OUTPATIENT
Start: 2022-02-10 | End: 2022-02-10

## 2022-02-10 RX ORDER — SODIUM CHLORIDE 9 MG/ML
10 INJECTION INTRAMUSCULAR; INTRAVENOUS; SUBCUTANEOUS AS NEEDED
Status: ACTIVE | OUTPATIENT
Start: 2022-02-10 | End: 2022-02-10

## 2022-02-10 RX ORDER — SODIUM CHLORIDE 0.9 % (FLUSH) 0.9 %
10 SYRINGE (ML) INJECTION AS NEEDED
Status: CANCELLED | OUTPATIENT
Start: 2022-03-03

## 2022-02-10 RX ORDER — DIPHENHYDRAMINE HYDROCHLORIDE 50 MG/ML
25 INJECTION, SOLUTION INTRAMUSCULAR; INTRAVENOUS AS NEEDED
Status: ACTIVE | OUTPATIENT
Start: 2022-02-10 | End: 2022-02-10

## 2022-02-10 RX ORDER — SODIUM CHLORIDE 0.9 % (FLUSH) 0.9 %
10 SYRINGE (ML) INJECTION AS NEEDED
Status: CANCELLED | OUTPATIENT
Start: 2022-03-24

## 2022-02-10 RX ORDER — EPINEPHRINE 1 MG/ML
0.3 INJECTION, SOLUTION, CONCENTRATE INTRAVENOUS AS NEEDED
Status: CANCELLED | OUTPATIENT
Start: 2022-03-03

## 2022-02-10 RX ORDER — SODIUM CHLORIDE 0.9 % (FLUSH) 0.9 %
10 SYRINGE (ML) INJECTION AS NEEDED
Status: DISPENSED | OUTPATIENT
Start: 2022-02-10 | End: 2022-02-10

## 2022-02-10 RX ORDER — ALBUTEROL SULFATE 0.83 MG/ML
2.5 SOLUTION RESPIRATORY (INHALATION) AS NEEDED
Status: CANCELLED
Start: 2022-03-03

## 2022-02-10 RX ORDER — HEPARIN 100 UNIT/ML
300-500 SYRINGE INTRAVENOUS AS NEEDED
Status: CANCELLED
Start: 2022-03-03

## 2022-02-10 RX ORDER — DIPHENHYDRAMINE HYDROCHLORIDE 50 MG/ML
50 INJECTION, SOLUTION INTRAMUSCULAR; INTRAVENOUS
Status: CANCELLED | OUTPATIENT
Start: 2022-03-24

## 2022-02-10 RX ORDER — ACETAMINOPHEN 325 MG/1
650 TABLET ORAL
Status: CANCELLED | OUTPATIENT
Start: 2022-03-24

## 2022-02-10 RX ORDER — SODIUM CHLORIDE 9 MG/ML
25 INJECTION, SOLUTION INTRAVENOUS CONTINUOUS
Status: CANCELLED | OUTPATIENT
Start: 2022-03-24

## 2022-02-10 RX ORDER — EPINEPHRINE 1 MG/ML
0.3 INJECTION, SOLUTION, CONCENTRATE INTRAVENOUS AS NEEDED
Status: CANCELLED | OUTPATIENT
Start: 2022-03-24

## 2022-02-10 RX ORDER — HEPARIN 100 UNIT/ML
300-500 SYRINGE INTRAVENOUS AS NEEDED
Status: CANCELLED
Start: 2022-03-24

## 2022-02-10 RX ORDER — HYDROCORTISONE SODIUM SUCCINATE 100 MG/2ML
100 INJECTION, POWDER, FOR SOLUTION INTRAMUSCULAR; INTRAVENOUS AS NEEDED
Status: CANCELLED | OUTPATIENT
Start: 2022-03-03

## 2022-02-10 RX ORDER — DIPHENHYDRAMINE HYDROCHLORIDE 50 MG/ML
25 INJECTION, SOLUTION INTRAMUSCULAR; INTRAVENOUS AS NEEDED
Status: CANCELLED
Start: 2022-03-03

## 2022-02-10 RX ORDER — ONDANSETRON 2 MG/ML
8 INJECTION INTRAMUSCULAR; INTRAVENOUS AS NEEDED
Status: CANCELLED | OUTPATIENT
Start: 2022-03-24

## 2022-02-10 RX ORDER — ACETAMINOPHEN 325 MG/1
650 TABLET ORAL
Status: CANCELLED | OUTPATIENT
Start: 2022-03-03

## 2022-02-10 RX ORDER — DIPHENHYDRAMINE HYDROCHLORIDE 50 MG/ML
50 INJECTION, SOLUTION INTRAMUSCULAR; INTRAVENOUS
Status: CANCELLED | OUTPATIENT
Start: 2022-03-03

## 2022-02-10 RX ORDER — ONDANSETRON 2 MG/ML
8 INJECTION INTRAMUSCULAR; INTRAVENOUS AS NEEDED
Status: ACTIVE | OUTPATIENT
Start: 2022-02-10 | End: 2022-02-10

## 2022-02-10 RX ORDER — SODIUM CHLORIDE 9 MG/ML
25 INJECTION, SOLUTION INTRAVENOUS CONTINUOUS
Status: DISPENSED | OUTPATIENT
Start: 2022-02-10 | End: 2022-02-10

## 2022-02-10 RX ADMIN — SODIUM CHLORIDE, PRESERVATIVE FREE 10 ML: 5 INJECTION INTRAVENOUS at 14:10

## 2022-02-10 RX ADMIN — CARBOPLATIN 643 MG: 10 INJECTION INTRAVENOUS at 13:33

## 2022-02-10 RX ADMIN — DEXAMETHASONE SODIUM PHOSPHATE 10 MG: 10 INJECTION INTRAMUSCULAR; INTRAVENOUS at 11:38

## 2022-02-10 RX ADMIN — SODIUM CHLORIDE 25 ML/HR: 9 INJECTION, SOLUTION INTRAVENOUS at 11:33

## 2022-02-10 RX ADMIN — PERTUZUMAB, TRASTUZUMAB, AND HYALURONIDASE-ZZXF 10 ML: 600; 600; 2000 INJECTION, SOLUTION SUBCUTANEOUS at 11:20

## 2022-02-10 RX ADMIN — HEPARIN 500 UNITS: 100 SYRINGE at 14:10

## 2022-02-10 RX ADMIN — PEGFILGRASTIM 6 MG: KIT SUBCUTANEOUS at 14:10

## 2022-02-10 RX ADMIN — PALONOSETRON 0.25 MG: 0.25 INJECTION, SOLUTION INTRAVENOUS at 11:36

## 2022-02-10 RX ADMIN — DOCETAXEL ANHYDROUS 112 MG: 10 INJECTION, SOLUTION INTRAVENOUS at 12:26

## 2022-02-10 RX ADMIN — POTASSIUM CHLORIDE 40 MEQ: 750 TABLET, EXTENDED RELEASE ORAL at 09:54

## 2022-02-10 RX ADMIN — FOSAPREPITANT 150 MG: 150 INJECTION, POWDER, LYOPHILIZED, FOR SOLUTION INTRAVENOUS at 11:41

## 2022-02-10 NOTE — PROGRESS NOTES
[facility] [place] [facility] [place] [address] [address], [address], [place]: [phone]  F: [phone]    Reason for Visit:   Fernand Favre is a 61 y.o. female seen today in office for follow up of Left Breast Cancer. Treatment History:   · LEFT Breast Biopsy 10/6/21: PATH - Invasive ductal carcinoma, favor grade 3  · ER/MS negative, HER2+  · Breast MRI 10/13/21: RIGHT BREAST: Background parenchymal enhancement: Mild. There is no suspicious masslike or nonmasslike enhancement within the right breast to indicate breast carcinoma. There are no suspicious internal mammary or axillary chain lymph nodes. LEFT BREAST: Background parenchymal enhancement: Mild There is a rounded mass with central fluid attenuation/necrosis in the lateral aspect of the left breast, deep 3rd, measuring approximately 3.3 cm in diameter with enhancement extending along the adjacent dermis suggesting skin invasion. There is nonmasslike enhancement extending both inferior, medial and anterior to the primary mass over approximately 6 x 5.5 x 4 cm. There are discontinuous areas of fluid attenuation associated with the nonmasslike enhancement which may represent cystic spaces and/or necrosis. There is an enlarged, left axillary lymph node with other adjacent, prominent lymph nodes. There is a lymph node node deep to the pectoralis minor muscle which measures 1.1 x 0.7 cm  · Left Axillary LN Biopsy 10/19/21: PATH - Metastatic breast cancer, measuring up to 17 mm in a single core  · ER/MS negative, HER2+  · Neoadjuvant TCHP x 6 cycles from 10/21/21 - 2/10/22  · Breast Biopsy 10/27/21: PATH - 5 mm IDC with DCIS    STAGE: Clinical 2 ER/MS negative Her2+    History of Present Illness:   Fernand Favre is a 61 y.o. female seen today in office for follow up of left breast cancer ER/MS negative Her2 + post biopsy on neoadjuvant chemo TCHP. She had a port placed on 10/19/21 without issues.  She had left axillary LN biopsy on 10/19/21 and path showed metastatic breast cancer, same markers. She started neoadjuvant TCHP chemotherapy on 10/21/21. She had another breast biopsy on 10/27/21 that showed 5 mm IDC with DCIS. She saw Dr. Kacy Verma on [phone]/[phone] and breast mass is responding well to chemo. She saw Plastics, Dr. Israel Lockwood and plans is for lumpectomy/reduction. She is here today for Cycle 6 of neoadjuvant TCHP chemotherapy. She reports that she feels okay overall today. She is tolerating chemo with some side effects. She continues to have some mild nausea and takes anti-emetics as needed which help. She has had no vomiting. Fatigue is worsening. Her appetite is very low. She has tried Ensure/Boost. She has mild neuropathy in toes on bilateral feet and fingertips. She denies fever, chills, mouth sores, cough, SOB, CP, vomiting, diarrhea, and constipation. She denies pain today. Post chemo Breast MRI is scheduled for 2/21/22. CBC and CMP from 2/9/22 reviewed today. She is ready for chemo today. She is here alone today. Past Medical History:   Diagnosis Date    Malignant neoplasm of left breast in female, estrogen receptor negative ([facility] [address] 10/13/2021      History reviewed. No pertinent surgical history. Social History     Tobacco Use    Smoking status: Never Smoker    Smokeless tobacco: Never Used   Substance Use Topics    Alcohol use: Not on file      History reviewed. No pertinent family history. Current Outpatient Medications   Medication Sig    dexAMETHasone (DECADRON) 4 mg tablet Take 2 tabs (8mg) by mouth twice daily the day before chemotherapy and the day after chemotherapy    LORazepam (ATIVAN) 0.5 mg tablet Take 1 tab by mouth 30-60 minutes prior to Breast MRI. May take additional dose if needed.  ALPRAZolam (XANAX) 0.5 mg tablet Take 1 Tablet by mouth three (3) times daily as needed for Anxiety. Max Daily Amount: 1.5 mg.  Indications: anxious    lidocaine-prilocaine (EMLA) topical cream Apply  to affected area as needed for Pain. Apply to port-a-cath site 30-60 minutes prior to chemo    ondansetron (ZOFRAN ODT) 4 mg disintegrating tablet Take 1-2 Tablets by mouth every eight (8) hours as needed for Nausea or Vomiting.  prochlorperazine (Compazine) 5 mg tablet Take 1 tab by mouth every 6 hours as needed for nausea or vomiting    CRANIAL PROSTHESIS misc Alopecia due to chemo/ wig    amoxicillin-clavulanate (AUGMENTIN) 875-125 mg per tablet Take 1 Tablet by mouth two (2) times a day. No current facility-administered medications for this visit. No Known Allergies     Review of Systems:  A complete review of systems was obtained, negative except as described above and as reported on ROS sheet scanned into system. Physical Exam:     Visit Vitals  /86 (BP 1 Location: Left upper arm, BP Patient Position: Sitting)   Pulse 96   Temp (!) 96.5 °F (35.8 °C) (Temporal)   Ht 5' 6\" (1.676 m)   Wt 139 lb 11.2 oz (63.4 kg)   SpO2 98%   BMI 22.55 kg/m²     ECOG PS: 0  General: No distress  Eyes: Anicteric sclerae  HENT: Atraumatic, wearing a mask  Neck: Supple  Respiratory: CTAB, normal respiratory effort  CV: Normal rate, regular rhythm, no murmurs, no peripheral edema  GI: Soft, nontender, non-distended   MS: Normal gait and station. Skin: No rashes, ecchymoses, or petechiae. Normal temperature, turgor, and texture. Port site without redness/swelling  Psych: Alert, oriented, appropriate affect, normal judgment/insight  Neuro: Non focal    Results:     Lab Results   Component Value Date/Time    WBC 6.7 02/09/2022 04:04 PM    HGB 8.5 (L) 02/09/2022 04:04 PM    HCT 26.0 (L) 02/09/2022 04:04 PM    PLATELET 584 79/05/8289 04:04 PM    .3 (H) 02/09/2022 04:04 PM    ABS.  NEUTROPHILS 5.3 02/09/2022 04:04 PM     Lab Results   Component Value Date/Time    Sodium 127 (L) 02/09/2022 04:04 PM    Potassium 3.2 (L) 02/09/2022 04:04 PM    Chloride 90 (L) 02/09/2022 04:04 PM    CO2 29 02/09/2022 04:04 PM    Glucose 159 (H) 02/09/2022 04:04 PM    BUN 11 02/09/2022 04:04 PM    Creatinine 0.60 02/09/2022 04:04 PM    GFR est AA >60 02/09/2022 04:04 PM    GFR est non-AA >60 02/09/2022 04:04 PM    Calcium 8.7 02/09/2022 04:04 PM     Lab Results   Component Value Date/Time    Bilirubin, total 0.5 02/09/2022 04:04 PM    ALT (SGPT) 43 02/09/2022 04:04 PM    Alk. phosphatase 356 (H) 02/09/2022 04:04 PM    Protein, total 6.9 02/09/2022 04:04 PM    Albumin 2.9 (L) 02/09/2022 04:04 PM    Globulin 4.0 02/09/2022 04:04 PM     10/6/21  FINAL PATHOLOGIC DIAGNOSIS   Breast, left mass, core biopsy:   Invasive ductal carcinoma, favor grade 3 (see synoptic table)   INVASIVE CARCINOMA OF THE BREAST: Biopsy   TUMOR   Tumor Site: Clock position - 3 o'clock   Histologic Type: Invasive carcinoma of no special type (ductal)   Glandular (Acinar) / Tubular Differentiation: Score 3   Nuclear Pleomorphism: Score 3   Mitotic Rate: Score 3   Overall Grade: Grade 3 (scores of 8 or 9)   Tumor Size: 13 mm   Ductal Carcinoma In Situ (DCIS): Not identified   Lymphovascular Invasion: Not identified   Microcalcifications: Not identified     10/19/21  FINAL PATHOLOGIC DIAGNOSIS   Lymph node, left axillary, core biopsy:   Metastatic breast cancer, measuring up to 17 mm in a single core     10/21/21    ECHO ADULT COMPLETE 10/21/2021 10/21/2021    Interpretation Summary  · LV: Estimated LVEF is 55 - 60%. Normal cavity size, wall thickness and systolic function (ejection fraction normal). Wall motion: normal. Abnormal left ventricular strain. Global longitudinal strain is -15.4%. · TV: Pulmonary hypertension not suggested by Doppler findings.     Signed by: Kendra Souza MD on 10/21/2021  8:32 PM    10/27/21  FINAL PATHOLOGIC DIAGNOSIS   Breast, left, core biopsy:   Invasive ductal carcinoma, 5mm, favor grade 3 (see synoptic table)   Ductal carcinoma in situ (DCIS) with high-grade nuclear features and comedonecrosis   Microcalcifications present within DCIS     Records reviewed and summarized above. Pathology report(s) reviewed above. Radiology report(s) reviewed above. Assessment:/PLAN     1) Clinical Stage 2B T3N0 LEFT Breast Cancer ER/NC negative Her2+  post breast biopsy only 10/21  Records and history reviewed. Reviewed path and data. Discussed dx, stage and treatment of breast cancer. Discussed neoadjuvant and adjuvant chemo. Discussed no hormonal therapy options. Surgery prefers neoadjuvant chemo. Not sure about type of surgery yet. Discussed chemo options. Discussed clinical trial options. Patient does not want to do clinical trial.   Decided on neoadjuvant TCHP. Teaching and consent with pharmacy. MRI Breast 10/13/21 showed a rounded mass with central fluid attenuation/necrosis in the lateral aspect of the left breast, deep 3rd, measuring approximately 3.3 cm in diameter with enhancement extending along the adjacent dermis suggesting skin invasion. There is nonmasslike enhancement extending both inferior, medial and anterior to the primary mass over approximately 6 x 5.5 x 4 cm. There are discontinuous areas of fluid attenuation associated with the nonmasslike enhancement which may represent cystic spaces and/or necrosis. There is an enlarged, left axillary lymph node with other adjacent, prominent lymph nodes. There is a lymph node node deep to the pectoralis minor muscle which measures 1.1 x 0.7 cm  She had a left axillary LN biopsy on 10/19/21 and path showed metastatic beast cancer. She had a port placed on 10/19/21 with no issues. Pre chemo ECHO showed EF 55-60% with abnormal left ventricular strain. Referred to Cardiology for further evaluation - has follow up ECHO on 12/6/21 per Cardio. Patient started neoadjuvant TCHP chemotherapy on 10/25/21. Patient is here today for Cycle 6 of neoadjuvant TCHP chemotherapy. She is tolerating chemo well overall with nausea, fatigue, decreased appetite and neuropathy.    She saw Dr. Sarahy Mejia on 42/0011 and breast mass is responding well to chemo. She saw Plastics, Dr. Diamond Linder. Post chemo Breast MRI is scheduled for 2/21/22. Plan is for lumpectomy/reduction then XRT. She is clinically healthy overall and stable today. Side effect management reviewed today. DR Taxotere to 60 mg/m2 and Carbo to AUC 4 today. Labs (CBC and CMP) from 2/9//22 reviewed. Potassium 3.2 and will replace today. Patient is ready for chemo today. Follow up in 3 weeks with Cycle 7 (HP only). Patient agrees with plan. 2)  Postmenopausal  Continue routine GYN follow up. 3) Management of High Risk Medications - Chemotherapy  Toxicities include Grade 1 Nausea,  Grade 1 Neuropathy, Grade 2 Fatigue, Grade 1-2 Decreased appetite. Side effect management reviewed today. Clear Boost samples given today. Pre chemo ECHO from 10/21/21 reviewed - EF 55-60% with abnormal left ventricular strain. Referred to Cardiology for further evaluation/management of strain. She had a follow up ECHO on 12/6/21 per Cardiology - EF 58% with no strain. She has a follow up ECHO scheduled for 3/11/22 with Cardiology. Labs (CBC and CMP) from 12/29/21 reviewed today. Potassium 3.2 and will order replacement today. DR Taxotere to 60 mg/m2 and Carbo to AUC 4 today. Will monitor for side effects. 4) Psychosocial  Mood good, coping well. 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 Her  is very supportive. SW/NN support as needed. She is here alone today. Call if questions. Follow up in 3 weeks with Cycle 7 (HP only). I personally saw and evaluated the patient and performed the key components of medical decision making. The history, physical exam, and documentation were performed by Dank Charles NP. I reviewed and verified the above documentation and modified it as needed.   Specifically pt doing well with chemo overall  Having more neuropathy and side effects with last chemo and so will reduce last does today  Pt is agreeable to dose reduction  Pt is for MRI soon then surgery fu  Labs personally reviewed today. Continue with HP after this cycle pending surgical path    I appreciate the opportunity to participate in Ms. Chris Landin's care.     Signed By: Cornel Hubbard,

## 2022-02-10 NOTE — PROGRESS NOTES
\[facility] [place] [facility]\"" Chemo Progress Note    Date: February 10, 2022    Name: Fernand Favre    MRN: [id number]         : 1965    0825 Ms. Landin Arrived to [facility] for C6 D1 Docetaxel, Carboplatin. Plus Phesgo injection  ambulatory in stable condition. Assessment was completed, no acute issues at this time, no new complaints voiced. Port accessed with positive blood return. Labs drawn and sent for processing. Went to provider appointment with Medical Oncology    Chemotherapy Flowsheet 2/10/2022   Cycle C6   Date 2/10/2022   Drug / Regimen Phesgo/Taxotere   Pre Meds given   Notes given left thigh+OBI         Patient denies SOB, fever, cough, general not feeling well. Patient denies recent exposure to someone who has tested positive for COVID-19. Patient denies having contact with anyone who has a pending COVID test.      Ms. Glendy Wong vitals were reviewed. Patient Vitals for the past 12 hrs:   Temp Pulse Resp BP   02/10/22 1409  85  111/68   02/10/22 0825 (!) 96.5 °F (35.8 °C) (!) 108 18 126/86         Lab results were obtained and reviewed. No results found for this or any previous visit (from the past 12 hour(s)). Pre-medications  were administered as ordered and chemotherapy was initiated.   Medications Administered     0.9% sodium chloride infusion     Admin Date  02/10/2022 Action  New Bag Dose  25 mL/hr Rate  25 mL/hr Route  IntraVENous Administered By  Ene Willoughby RN          0.9% sodium chloride injection 10 mL     Admin Date  02/10/2022 Action  Given Dose  10 mL Route  IntraVENous Administered By  Ene Willoughby RN          CARBOplatin (PARAPLATIN) 643 mg in 0.9% sodium chloride 250 mL, overfill volume 25 mL chemo infusion     Admin Date  02/10/2022 Action  New Bag Dose  643 mg Rate  678.6 mL/hr Route  IntraVENous Administered By  Ene Willoughby RN          dexamethasone (PF) (DECADRON) 10 mg/mL injection 10 mg     Admin Date  02/10/2022 Action  Given Dose  10 mg Route  IntraVENous Administered By  Julieta Franklin, CHRISTOPHER          DOCEtaxeL (TAXOTERE) 112 mg in 0.9% sodium chloride 250 mL, overfill volume 25 mL chemo infusion     Admin Date  02/10/2022 Action  New Bag Dose  112 mg Rate  286.2 mL/hr Route  IntraVENous Administered By  Julieta Franklin, CHRISTOPHER          fosaprepitant (EMEND) 150 mg in 0.9% sodium chloride 150 mL IVPB     Admin Date  02/10/2022 Action  New Bag Dose  150 mg Rate  450 mL/hr Route  IntraVENous Administered By  Julieta Franklin, CHRISTOPHER          heparin (porcine) pf 300-500 Units     Admin Date  02/10/2022 Action  Given Dose  500 Units Route  InterCATHeter Administered By  Julieta Franklin, CHRISTOPHER          palonosetron HCl (ALOXI) injection 0.25 mg     Admin Date  02/10/2022 Action  Given Dose  0.25 mg Route  IntraVENous Administered By  Julieta Franklin, RN          pegfilgrastim (NEULASTA) wearable SQ injector 6 mg     Admin Date  02/10/2022 Action  Given Dose  6 mg Route  SubCUTAneous Administered By  Julieta Franklin, CHRISTOPHER          pertuzumab 600 mg-trastuzumab 600 mg-hyaluronidase-zzxf 20,000 units/10 mL     Admin Date  02/10/2022 Action  Given Dose  10 mL Route  SubCUTAneous Administered By  Julieta Franklin, CHRISTOPHER          potassium chloride SR (KLOR-CON 10) tablet 40 mEq     Admin Date  02/10/2022 Action  Given Dose  40 mEq Route  Oral Administered By  Julieta Franklin, CHRISTOPHER            On body placed on left arm      1417 Patient tolerated treatment well. Port maintained positive blood return throughout treatment. Port flushed, heparinized and de accessed per protocol. Patient was discharged from [facility] in stable condition. Patient aware of next appointment.      Future Appointments   Date Time Provider Ryan Daly   2/21/2022 12:15 PM [facility]IATRIC [place] MRI 2 [facility]   3/2/2022  3:00 PM H3 [facility] LAB [facility]   3/3/2022  8:00 AM [facility] [address] [facility]   3/3/2022  8:30 AM Kofi Coffman,  [address] BS AMB   3/11/2022 11:00 AM [facility] BS AMB   3/15/2022 10:00 AM Ludin Euceda MD [facility] BS AMB   3/23/2022  4:00 PM H3 RAFI LAB [facility]   3/24/2022 10:00 AM F3 RAFI LONG [place] [facility]   4/13/2022  4:00 PM H3 RAFI LAB [facility]   4/14/2022  8:30 AM F1 RAFI LONG TX [facility]   5/4/2022  4:00 PM H3 RAFI LAB [facility]   29/3/5798  9:93 PM Rukhsana Montes MD [facility] BS AMB         Sherice Torres RN  February 10, 2022

## 2022-02-10 NOTE — PROGRESS NOTES
Jd Tavarez is a 61 y.o. female  Chief Complaint   Patient presents with    Chemotherapy    Breast Cancer     1. Have you been to the ER, urgent care clinic since your last visit? Hospitalized since your last visit? No.    2. Have you seen or consulted any other health care providers outside of the [address] [place] [address] since your last visit? Include any pap smears or colon screening.  No.

## 2022-02-11 ENCOUNTER — TELEPHONE (OUTPATIENT)
Dept: ONCOLOGY | Age: 57
End: 2022-02-11

## 2022-02-11 NOTE — PROGRESS NOTES
Attempted to reach out to patient off mobile phone number listed. No answer, left a voicemail to give the office a call back!   Karan Lea

## 2022-02-11 NOTE — TELEPHONE ENCOUNTER
Returned patient call, ID verified X 2. [phone]. Provider message regarding Result Note relayed to patient. Verified that she is not experiencing nausea and vomiting, confirmed is taking in fluids. Denies need for IV repletion. States she has low appetite and will have some potato chips as she feels she can eat those to boost her sodium. Discussed upcoming treatments, had been having labs drawn day prior to treatment, would like to now have labs on day of treatment - would like to cancel appts on 3/2, 3/23, and 4/13/22 that are currently scheduled lab visits. Expressed relief that chemo part 1 is completed, discussed upcoming MRI, OV, potential for further treatment planning. Understanding verbalized of all. Update to Provider.

## 2022-02-21 ENCOUNTER — HOSPITAL ENCOUNTER (OUTPATIENT)
Dept: MRI IMAGING | Age: 57
Discharge: HOME | End: 2022-02-21
Attending: SURGERY
Payer: COMMERCIAL

## 2022-02-21 DIAGNOSIS — Z09 POST CHEMO EVALUATION: ICD-10-CM

## 2022-02-21 DIAGNOSIS — Z17.1 MALIGNANT NEOPLASM OF LEFT BREAST IN FEMALE, ESTROGEN RECEPTOR NEGATIVE, UNSPECIFIED SITE OF BREAST (HCC): ICD-10-CM

## 2022-02-21 DIAGNOSIS — C50.912 MALIGNANT NEOPLASM OF LEFT BREAST IN FEMALE, ESTROGEN RECEPTOR NEGATIVE, UNSPECIFIED SITE OF BREAST (HCC): ICD-10-CM

## 2022-02-21 PROCEDURE — 74011000250 HC RX REV CODE- 250: Performed by: SURGERY

## 2022-02-21 PROCEDURE — 74011250636 HC RX REV CODE- 250/636

## 2022-02-21 PROCEDURE — 77049 MRI BREAST C-+ W/CAD BI: CPT

## 2022-02-21 PROCEDURE — A9576 INJ PROHANCE MULTIPACK: HCPCS

## 2022-02-21 PROCEDURE — 74011000258 HC RX REV CODE- 258: Performed by: SURGERY

## 2022-02-21 RX ORDER — SODIUM CHLORIDE 0.9 % (FLUSH) 0.9 %
10 SYRINGE (ML) INJECTION
Status: COMPLETED | OUTPATIENT
Start: 2022-02-21 | End: 2022-02-21

## 2022-02-21 RX ADMIN — SODIUM CHLORIDE, PRESERVATIVE FREE 10 ML: 5 INJECTION INTRAVENOUS at 13:20

## 2022-02-21 RX ADMIN — SODIUM CHLORIDE 100 ML: 900 INJECTION, SOLUTION INTRAVENOUS at 13:16

## 2022-02-21 RX ADMIN — GADOTERIDOL 12 ML: 279.3 INJECTION, SOLUTION INTRAVENOUS at 13:14

## 2022-02-25 ENCOUNTER — TELEPHONE (OUTPATIENT)
Dept: SURGERY | Age: 57
End: 2022-02-25

## 2022-03-02 ENCOUNTER — APPOINTMENT (OUTPATIENT)
Dept: INFUSION THERAPY | Age: 57
End: 2022-03-02

## 2022-03-03 ENCOUNTER — DOCUMENTATION ONLY (OUTPATIENT)
Dept: ONCOLOGY | Age: 57
End: 2022-03-03

## 2022-03-03 ENCOUNTER — APPOINTMENT (OUTPATIENT)
Dept: INFUSION THERAPY | Age: 57
End: 2022-03-03
Payer: COMMERCIAL

## 2022-03-03 ENCOUNTER — HOSPITAL ENCOUNTER (OUTPATIENT)
Dept: INFUSION THERAPY | Age: 57
Discharge: HOME | End: 2022-03-03
Payer: COMMERCIAL

## 2022-03-03 VITALS
BODY MASS INDEX: 21.38 KG/M2 | SYSTOLIC BLOOD PRESSURE: 97 MMHG | WEIGHT: 133 LBS | HEART RATE: 93 BPM | TEMPERATURE: 96.8 F | DIASTOLIC BLOOD PRESSURE: 62 MMHG | OXYGEN SATURATION: 99 % | HEIGHT: 66 IN

## 2022-03-03 DIAGNOSIS — C50.912 MALIGNANT NEOPLASM OF LEFT BREAST IN FEMALE, ESTROGEN RECEPTOR NEGATIVE, UNSPECIFIED SITE OF BREAST (HCC): Primary | ICD-10-CM

## 2022-03-03 DIAGNOSIS — Z17.1 MALIGNANT NEOPLASM OF LEFT BREAST IN FEMALE, ESTROGEN RECEPTOR NEGATIVE, UNSPECIFIED SITE OF BREAST (HCC): Primary | ICD-10-CM

## 2022-03-03 DIAGNOSIS — E87.6 HYPOKALEMIA: Primary | ICD-10-CM

## 2022-03-03 LAB
ALBUMIN SERPL-MCNC: 2.6 G/DL (ref 3.5–5)
ALBUMIN/GLOB SERPL: 0.6 {RATIO} (ref 1.1–2.2)
ALP SERPL-CCNC: 345 U/L (ref 45–117)
ALT SERPL-CCNC: 35 U/L (ref 12–78)
ANION GAP SERPL CALC-SCNC: 9 MMOL/L (ref 5–15)
AST SERPL-CCNC: 47 U/L (ref 15–37)
BASOPHILS # BLD: 0 K/UL (ref 0–0.1)
BASOPHILS NFR BLD: 0 % (ref 0–1)
BILIRUB SERPL-MCNC: 0.7 MG/DL (ref 0.2–1)
BUN SERPL-MCNC: 11 MG/DL (ref 6–20)
BUN/CREAT SERPL: 20 (ref 12–20)
CALCIUM SERPL-MCNC: 8.5 MG/DL (ref 8.5–10.1)
CHLORIDE SERPL-SCNC: 90 MMOL/L (ref 97–108)
CO2 SERPL-SCNC: 31 MMOL/L (ref 21–32)
CREAT SERPL-MCNC: 0.55 MG/DL (ref 0.55–1.02)
DIFFERENTIAL METHOD BLD: ABNORMAL
EOSINOPHIL # BLD: 0 K/UL (ref 0–0.4)
EOSINOPHIL NFR BLD: 0 % (ref 0–7)
ERYTHROCYTE [DISTWIDTH] IN BLOOD BY AUTOMATED COUNT: 18.9 % (ref 11.5–14.5)
GLOBULIN SER CALC-MCNC: 4.3 G/DL (ref 2–4)
GLUCOSE SERPL-MCNC: 90 MG/DL (ref 65–100)
HCT VFR BLD AUTO: 25.4 % (ref 35–47)
HGB BLD-MCNC: 8.3 G/DL (ref 11.5–16)
IMM GRANULOCYTES # BLD AUTO: 0.1 K/UL (ref 0–0.04)
IMM GRANULOCYTES NFR BLD AUTO: 2 % (ref 0–0.5)
LYMPHOCYTES # BLD: 1.7 K/UL (ref 0.8–3.5)
LYMPHOCYTES NFR BLD: 23 % (ref 12–49)
MAGNESIUM SERPL-MCNC: 1 MG/DL (ref 1.6–2.4)
MCH RBC QN AUTO: 35.2 PG (ref 26–34)
MCHC RBC AUTO-ENTMCNC: 32.7 G/DL (ref 30–36.5)
MCV RBC AUTO: 107.6 FL (ref 80–99)
MONOCYTES # BLD: 1.2 K/UL (ref 0–1)
MONOCYTES NFR BLD: 16 % (ref 5–13)
NEUTS SEG # BLD: 4.5 K/UL (ref 1.8–8)
NEUTS SEG NFR BLD: 59 % (ref 32–75)
NRBC # BLD: 0 K/UL (ref 0–0.01)
NRBC BLD-RTO: 0 PER 100 WBC
PLATELET # BLD AUTO: 241 K/UL (ref 150–400)
PMV BLD AUTO: 9.5 FL (ref 8.9–12.9)
POTASSIUM SERPL-SCNC: 2.3 MMOL/L (ref 3.5–5.1)
PROT SERPL-MCNC: 6.9 G/DL (ref 6.4–8.2)
RBC # BLD AUTO: 2.36 M/UL (ref 3.8–5.2)
SODIUM SERPL-SCNC: 130 MMOL/L (ref 136–145)
WBC # BLD AUTO: 7.4 K/UL (ref 3.6–11)

## 2022-03-03 PROCEDURE — 96368 THER/DIAG CONCURRENT INF: CPT

## 2022-03-03 PROCEDURE — 83735 ASSAY OF MAGNESIUM: CPT

## 2022-03-03 PROCEDURE — 74011250636 HC RX REV CODE- 250/636: Performed by: NURSE PRACTITIONER

## 2022-03-03 PROCEDURE — 96401 CHEMO ANTI-NEOPL SQ/IM: CPT

## 2022-03-03 PROCEDURE — 80053 COMPREHEN METABOLIC PANEL: CPT

## 2022-03-03 PROCEDURE — 96367 TX/PROPH/DG ADDL SEQ IV INF: CPT

## 2022-03-03 PROCEDURE — 85025 COMPLETE CBC W/AUTO DIFF WBC: CPT

## 2022-03-03 PROCEDURE — 77030012965 HC NDL HUBR BBMI -A

## 2022-03-03 PROCEDURE — 96375 TX/PRO/DX INJ NEW DRUG ADDON: CPT

## 2022-03-03 PROCEDURE — 36415 COLL VENOUS BLD VENIPUNCTURE: CPT

## 2022-03-03 PROCEDURE — 96366 THER/PROPH/DIAG IV INF ADDON: CPT

## 2022-03-03 PROCEDURE — 74011250637 HC RX REV CODE- 250/637: Performed by: NURSE PRACTITIONER

## 2022-03-03 PROCEDURE — 96365 THER/PROPH/DIAG IV INF INIT: CPT

## 2022-03-03 RX ORDER — MAGNESIUM SULFATE HEPTAHYDRATE 40 MG/ML
2 INJECTION, SOLUTION INTRAVENOUS
Status: COMPLETED | OUTPATIENT
Start: 2022-03-03 | End: 2022-03-03

## 2022-03-03 RX ORDER — POTASSIUM CHLORIDE 750 MG/1
40 TABLET, FILM COATED, EXTENDED RELEASE ORAL
Status: COMPLETED | OUTPATIENT
Start: 2022-03-03 | End: 2022-03-03

## 2022-03-03 RX ORDER — POTASSIUM CHLORIDE 7.45 MG/ML
10 INJECTION INTRAVENOUS
Status: COMPLETED | OUTPATIENT
Start: 2022-03-03 | End: 2022-03-03

## 2022-03-03 RX ORDER — POTASSIUM CHLORIDE 20 MEQ/1
20 TABLET, EXTENDED RELEASE ORAL DAILY
Qty: 30 TABLET | Refills: 0 | Status: SHIPPED | OUTPATIENT
Start: 2022-03-03 | End: 2022-03-24 | Stop reason: SDUPTHER

## 2022-03-03 RX ADMIN — POTASSIUM CHLORIDE 10 MEQ: 10 INJECTION, SOLUTION INTRAVENOUS at 09:45

## 2022-03-03 RX ADMIN — MAGNESIUM SULFATE IN WATER 2 G: 40 INJECTION, SOLUTION INTRAVENOUS at 12:50

## 2022-03-03 RX ADMIN — MAGNESIUM SULFATE IN WATER 2 G: 40 INJECTION, SOLUTION INTRAVENOUS at 10:37

## 2022-03-03 RX ADMIN — POTASSIUM CHLORIDE 10 MEQ: 10 INJECTION, SOLUTION INTRAVENOUS at 12:48

## 2022-03-03 RX ADMIN — PERTUZUMAB, TRASTUZUMAB, AND HYALURONIDASE-ZZXF 10 ML: 600; 600; 2000 INJECTION, SOLUTION SUBCUTANEOUS at 08:55

## 2022-03-03 RX ADMIN — POTASSIUM CHLORIDE 10 MEQ: 10 INJECTION, SOLUTION INTRAVENOUS at 10:48

## 2022-03-03 RX ADMIN — POTASSIUM CHLORIDE 10 MEQ: 10 INJECTION, SOLUTION INTRAVENOUS at 11:48

## 2022-03-03 RX ADMIN — POTASSIUM CHLORIDE 40 MEQ: 750 TABLET, FILM COATED, EXTENDED RELEASE ORAL at 09:45

## 2022-03-03 NOTE — PROGRESS NOTES
Potassium 2.3 with labs today. Ordered PO and IV Potassium replacement to be given in [facility] today. Mag level added on. Sent script for Potassium 20 mEq PO daily to pharmacy on file.

## 2022-03-03 NOTE — PROGRESS NOTES
Magnesium 1.0 with labs today. Ordered 2 g IV Mag x 2 doses (4 g total) for replacement in [facility] today.

## 2022-03-03 NOTE — PROGRESS NOTES
[facility] Chemo Progress Note    Date: March 3, 2022      0800 Ms. Landin Arrived to [facility] for Phesgo Injection ambulatory in stable condition. Assessment was completed, no acute issues at this time, no new complaints voiced. Labs drawn peripherally from the left [facility] [place] and sent for processing. Went to provider appointment with Medical Oncology. Chemotherapy Flowsheet 3/3/2022   Cycle C7   Date 3/3/2022   Drug / Regimen Phesgo   Pre Meds -   Notes given in R thigh + repletion       Patient denies any symptoms of COVID-19, including SOB, coughing, fever, or generally not feeling well. Patient denies any recent exposure to COVID-19. Patient denies any recent contact with family or friends that have a pending COVID-19 test.    Ms. [place] Best vitals were reviewed. Visit Vitals  BP 97/62   Pulse 93   Temp 96.8 °F (36 °C)   Ht 5' 6\" (1.676 m)   Wt 60.3 kg (133 lb)   SpO2 99%   Breastfeeding No   BMI 21.47 kg/m²          Lab results were obtained and reviewed. Potassium and magnesium repletion ordered. Recent Results (from the past 12 hour(s))   CBC WITH AUTOMATED DIFF    Collection Time: 03/03/22  8:14 AM   Result Value Ref Range    WBC 7.4 3.6 - 11.0 K/uL    RBC 2.36 (L) 3.80 - 5.20 M/uL    HGB 8.3 (L) 11.5 - 16.0 g/dL    HCT 25.4 (L) 35.0 - 47.0 %    .6 (H) 80.0 - 99.0 FL    MCH 35.2 (H) 26.0 - 34.0 PG    MCHC 32.7 30.0 - 36.5 g/dL    RDW 18.9 (H) 11.5 - 14.5 %    PLATELET 904 846 - 599 K/uL    MPV 9.5 8.9 - 12.9 FL    NRBC 0.0 0  WBC    ABSOLUTE NRBC 0.00 0.00 - 0.01 K/uL    NEUTROPHILS 59 32 - 75 %    LYMPHOCYTES 23 12 - 49 %    MONOCYTES 16 (H) 5 - 13 %    EOSINOPHILS 0 0 - 7 %    BASOPHILS 0 0 - 1 %    IMMATURE GRANULOCYTES 2 (H) 0.0 - 0.5 %    ABS. NEUTROPHILS 4.5 1.8 - 8.0 K/UL    ABS. LYMPHOCYTES 1.7 0.8 - 3.5 K/UL    ABS. MONOCYTES 1.2 (H) 0.0 - 1.0 K/UL    ABS. EOSINOPHILS 0.0 0.0 - 0.4 K/UL    ABS. BASOPHILS 0.0 0.0 - 0.1 K/UL    ABS. IMM.  GRANS. 0.1 (H) 0.00 - 0.04 K/UL    DF AUTOMATED     METABOLIC PANEL, COMPREHENSIVE    Collection Time: 03/03/22  8:14 AM   Result Value Ref Range    Sodium 130 (L) 136 - 145 mmol/L    Potassium 2.3 (LL) 3.5 - 5.1 mmol/L    Chloride 90 (L) 97 - 108 mmol/L    CO2 31 21 - 32 mmol/L    Anion gap 9 5 - 15 mmol/L    Glucose 90 65 - 100 mg/dL    BUN 11 6 - 20 MG/DL    Creatinine 0.55 0.55 - 1.02 MG/DL    BUN/Creatinine ratio 20 12 - 20      GFR est AA >60 >60 ml/min/1.73m2    GFR est non-AA >60 >60 ml/min/1.73m2    Calcium 8.5 8.5 - 10.1 MG/DL    Bilirubin, total 0.7 0.2 - 1.0 MG/DL    ALT (SGPT) 35 12 - 78 U/L    AST (SGOT) 47 (H) 15 - 37 U/L    Alk. phosphatase 345 (H) 45 - 117 U/L    Protein, total 6.9 6.4 - 8.2 g/dL    Albumin 2.6 (L) 3.5 - 5.0 g/dL    Globulin 4.3 (H) 2.0 - 4.0 g/dL    A-G Ratio 0.6 (L) 1.1 - 2.2     MAGNESIUM    Collection Time: 03/03/22  8:14 AM   Result Value Ref Range    Magnesium 1.0 (L) 1.6 - 2.4 mg/dL       Pre-medications  were administered as ordered and chemotherapy was initiated.   Medications Administered     magnesium sulfate 2 g/50 ml IVPB (premix or compounded)     Admin Date  03/03/2022 Action  New Bag Dose  2 g Rate  25 mL/hr Route  IntraVENous Administered By  Nayeli Rosen RN           Admin Date  03/03/2022 Action  New Bag Dose  2 g Rate  25 mL/hr Route  IntraVENous Administered By  Nayeli Rosen RN          pertuzumab 600 mg-trastuzumab 600 mg-hyaluronidase-zzxf 20,000 units/10 mL     Admin Date  03/03/2022 Action  Given Dose  10 mL Route  SubCUTAneous Administered By  Nayeli Rosen RN          potassium chloride 10 mEq in 100 ml IVPB     Admin Date  03/03/2022 Action  New Bag Dose  10 mEq Rate  100 mL/hr Route  IntraVENous Administered By  Nayeli Rosen RN           Admin Date  03/03/2022 Action  New Bag Dose  10 mEq Rate  100 mL/hr Route  IntraVENous Administered By  Nayeli Rosen RN           Admin Date  03/03/2022 Action  New Bag Dose  10 mEq Rate  100 mL/hr Route  IntraVENous Administered By  Nayeli Rosen RN           Admin Date  03/03/2022 Action  New Bag Dose  10 mEq Rate  100 mL/hr Route  IntraVENous Administered By  Abigail Zhou RN          potassium chloride SR (KLOR-CON 10) tablet 40 mEq     Admin Date  03/03/2022 Action  Given Dose  40 mEq Route  Oral Administered By  Abigail Zhou RN                Given in the Right Thigh SQ     1450 Patient tolerated treatment well. Patient was discharged in stable condition. Patient is aware of next scheduled [facility] appointment.     Future Appointments   Date Time Provider Ryan Malika   2/6/7658  4:28 PM Janette Wheeler MD [facility] BS AMB   3/11/2022 11:00 AM [facility] BS AMB   3/15/2022 10:00 AM Ludin Euceda MD [facility] AMB   3/24/2022 10:00 AM F3 RAFI LONG [place] [facility]   3/24/2022 10:30 AM Melina Vergara,  [address] BS AMB   4/14/2022  8:30 AM F1 RAFI LONG [address] [facility]   5/5/2022  8:30 AM F1 RAFI LONG [place] [facility]   5/26/2022  8:30 AM F1 RAFI LONG [place] [facility]   6/16/2022  8:30 AM F1 RAFI LONG [place] [facility]   7/7/2022  8:30 AM F1 RAFI LONG [place] [facility]   13/9/0036  0:51 PM Kwan Jonas., MD [facility] BS AMB         Doe Messina RN  March 3, 2022

## 2022-03-09 ENCOUNTER — OFFICE VISIT (OUTPATIENT)
Dept: SURGERY | Age: 57
End: 2022-03-09
Payer: COMMERCIAL

## 2022-03-09 VITALS
DIASTOLIC BLOOD PRESSURE: 68 MMHG | BODY MASS INDEX: 21.38 KG/M2 | HEART RATE: 110 BPM | WEIGHT: 133 LBS | HEIGHT: 66 IN | SYSTOLIC BLOOD PRESSURE: 112 MMHG

## 2022-03-09 DIAGNOSIS — Z17.1 MALIGNANT NEOPLASM OF LEFT BREAST IN FEMALE, ESTROGEN RECEPTOR NEGATIVE, UNSPECIFIED SITE OF BREAST (HCC): Primary | ICD-10-CM

## 2022-03-09 DIAGNOSIS — C50.912 MALIGNANT NEOPLASM OF LEFT BREAST IN FEMALE, ESTROGEN RECEPTOR NEGATIVE, UNSPECIFIED SITE OF BREAST (HCC): Primary | ICD-10-CM

## 2022-03-09 PROCEDURE — 99215 OFFICE O/P EST HI 40 MIN: CPT | Performed by: SURGERY

## 2022-03-09 PROCEDURE — 76642 ULTRASOUND BREAST LIMITED: CPT | Performed by: SURGERY

## 2022-03-09 NOTE — PATIENT INSTRUCTIONS
Learning About Breast Cancer Treatments  Your Care Instructions     Breast cancer means abnormal cells grow out of control in one or both breasts. These cancer cells can spread from the breast to nearby lymph nodes and other tissues. They can also spread to other parts of the body. The type and stage of cancer you have is based on:  · Where in the breast the cancer started. · The genetics of those cancer cells. · How far cancer has spread within the breast, to nearby tissues, or to other organs. · What the cancer cells look like under a microscope. · Whether there is cancer in the nearby lymph nodes. Tests that help find the stage of your cancer can help choose the right treatment for you. These tests can include a breast biopsy, lymph node biopsy, blood tests, and X-rays. You may need other tests as well, such as a bone, CT, or MRI scan. Whether you have more tests depends on your symptoms and the stage of the cancer. When you find out that you have cancer, you may feel many emotions and may need some help coping. Seek out family, friends, and counselors for support. You also can do things at home to make yourself feel better while you go through treatment. Call the [employer] ([phone]) or visit its website at [employer] for more information. How is breast cancer treated? Your doctor may combine treatments. This is a common way to treat breast cancer. Treatment depends on what type and stage of cancer you have. You may have:  · Surgery to remove the cancer. · Radiation. This uses high-dose X-rays to kill cancer cells and shrink tumors. · Chemotherapy. This uses medicine to kill cancer cells. · Hormone therapy. This uses medicines such as tamoxifen. It limits the effect of the hormone estrogen. This hormone can help some types of breast cancer cells to grow. · Targeted therapy.  This uses medicines such as trastuzumab (Herceptin) to help keep cancer from growing or spreading. · Immunotherapy. This uses medicines like pembrolizumab to help your immune system fight cancer. What surgeries are done for breast cancer? Surgery is a key part of treatment for breast cancer. The main types of surgeries are:  · Breast-conserving surgery, such as lumpectomy. It removes the cancer in the breast and just enough tissue to make sure that all of the cancer was removed. · Surgery to remove the breast. This includes:  ? Total mastectomy. This removes the whole breast.  ? Nipple-sparing mastectomy. This removes the whole breast but leaves the nipple. ? Modified radical mastectomy. This removes the whole breast and the lymph nodes under the arm (axillary lymph nodes). ? Radical mastectomy. This removes the whole breast, the chest muscles, and all the lymph nodes under the arm. If lymph nodes under the arm are removed, they are looked at under the microscope to check for cancer. There are two types of lymph node removal:  · [place] lymph node biopsy removes the lymph node that is the first to receive lymph fluid from the tumor. If cancer has spread from the breast to the lymph nodes, cancer cells most often show up in the sentinel node first.  · Axillary lymph node dissection removes most of the lymph nodes in the armpit. The type of surgery you have depends on the size, location, and type of the cancer. It also depends on your overall health and personal preferences. Even if your doctor removes all the cancer that can be seen at the time of your surgery, you may still need more treatment. You may get radiation, chemotherapy, or hormone therapy after surgery to try to kill any cancer cells that may be left. No matter what kind of surgery you have, you will get information about why you are having it, what its risks are, how to prepare, and what to do after surgery. Follow-up care is a key part of your treatment and safety.  Be sure to make and go to all appointments, and call your doctor if you are having problems. It's also a good idea to know your test results and keep a list of the medicines you take. Where can you learn more? Go to http://www.gray.com/  Enter X810 in the search box to learn more about \"Learning About Breast Cancer Treatments. \"  Current as of: September 8, 2021               Content Version: 13.2  © 2006-2022 [employer]. Care instructions adapted under license by [employer] (which disclaims liability or warranty for this information). If you have questions about a medical condition or this instruction, always ask your healthcare professional. [address] any warranty or liability for your use of this information.

## 2022-03-09 NOTE — PROGRESS NOTES
HISTORY OF PRESENT ILLNESS  Sheilah Pallas is a 61 y.o. female. HPI  ESTABLISHED patient here for surgery discussion of LEFT breast cancer. Patient states she is still shedding skin. Finished chemo Feb [id number]. Breast history -  Referring - Amanda Kuhn MD  8/24/21 - LEFT breast abscess - Aspiration and course of Bactrim   9/3/21 - LEFT breast abscess - I&D  10/27/21: LEFT breast bx. PATH: IDC, 5mm, favor grade 3, ER-/MN-/HER2+(3)/Ki-67 70%. DCIS with high-grade nuclear features and comedonecrosis. Microcalcifications not present. neoadjuvent chemotherapy, Dr. Jose Green, 10/21/21 - 2/10/22  Plastic surgeon - Dr. Esteban Jo     MRI Results (most recent):  Results from Hospital Encounter encounter on 02/21/22    MRI BREAST BI W WO CONT    Narrative  INDICATION: Left breast invasive ductal carcinoma, ER/MN negative, HER-2/therese  positive. DCIS. Diagnosed 10/27/2021. Left axillary flori metastasis. Post  neoadjuvant chemotherapy. COMPARISON: 10/13/2021. TECHNIQUE:  Multisequence, multiplanar, bilateral breast MRI was performed in prone position  using a dedicated breast coil. Images were obtained without contrast and dynamic  postcontrast images were obtained in multiple phases. 12 mL IV gadoteridol  (ProHance) was administered. Subtraction images were reconstructed. Postcontrast  images were reviewed with dedicated kinetic analysis software. FINDINGS:  There is mild background parenchymal enhancement and heterogeneous  fibroglandular tissue. Right breast:  No suspicious enhancing foci. No axillary or internal mammary chain  lymphadenopathy. A subclavian ported catheter, implanted in the posterior third  of the upper inner quadrant, terminates in appropriate position in the SVC. Left breast:  No residual enhancement in the mass in the posterior third of the outer slightly  upper left breast. The mass is no longer measurable. There is a biopsy clip  within scarring.   No residual enhancement in the previously seen, extensive, non-masslike  enhancement in the middle third of the central, lower, left breast. A biopsy  clip remains. Axillary flori metastases have significantly decreased in size and  are no longer pathologically enlarged. A biopsy clip is within an inferior  axillary lymph node. A summary portfolio with key images has been sent from kinetic analysis software  to PACS. Impression  1. No residual enhancement in multicentric, multifocal left breast carcinoma. 2. Significantly decreased left axillary flori metastases. Left axillary lymph  nodes are no longer pathologically enlarged. 3. No suspicious right breast enhancement or lymphadenopathy. 4. BI-RADS Assessment Category 6: Known biopsy proven malignancy. ROS  Past Medical History:   Diagnosis Date    [facility] 10/13/2021       History reviewed. No pertinent surgical history. Social History     Socioeconomic History    Marital status:      Spouse name: Not on file    Number of children: Not on file    Years of education: Not on file    Highest education level: Not on file   Occupational History    Not on file   Tobacco Use    Smoking status: Never Smoker    Smokeless tobacco: Never Used   Substance and Sexual Activity    Alcohol use: Never    Drug use: Never    Sexual activity: Not on file   Other Topics Concern    Not on file   Social History Narrative    Not on file     Social Determinants of Health     Financial Resource Strain:     Difficulty of Paying Living Expenses: Not on file   Food Insecurity:     Worried About Running Out of Food in the Last Year: Not on file    Eunice of Food in the Last Year: Not on file   Transportation Needs:     Lack of Transportation (Medical): Not on file    Lack of Transportation (Non-Medical):  Not on file   Physical Activity:     Days of Exercise per Week: Not on file    Minutes of Exercise per Session: Not on file   Stress:     Feeling of Stress : Not on file   Social Connections: Unknown    Frequency of Communication with Friends and Family: More than three times a week    Frequency of Social Gatherings with Friends and Family: More than three times a week    Attends Muslim Services: Not on file   [employer] [address] or Organizations: Not on file    Attends Club or Organization Meetings: Not on file    Marital Status:    Intimate Partner Violence:     Fear of Current or Ex-Partner: Not on file    Emotionally Abused: Not on file    Physically Abused: Not on file    Sexually Abused: Not on file   Housing Stability:     Unable to Pay for Housing in the Last Year: Not on file    Number of [place] in the Last Year: Not on file    Unstable Housing in the Last Year: Not on file       Current Outpatient Medications on File Prior to Visit   Medication Sig Dispense Refill    potassium chloride (K-DUR, KLOR-CON M20) 20 mEq tablet Take 1 Tablet by mouth daily. 30 Tablet 0    dexAMETHasone (DECADRON) 4 mg tablet Take 2 tabs (8mg) by mouth twice daily the day before chemotherapy and the day after chemotherapy (Patient not taking: Reported on 3/15/2022) 4 Tablet 0    LORazepam (ATIVAN) 0.5 mg tablet Take 1 tab by mouth 30-60 minutes prior to Breast MRI. May take additional dose if needed. (Patient not taking: Reported on 3/15/2022) 4 Tablet 0    ALPRAZolam (XANAX) 0.5 mg tablet Take 1 Tablet by mouth three (3) times daily as needed for Anxiety. Max Daily Amount: 1.5 mg. Indications: anxious 30 Tablet 0    lidocaine-prilocaine (EMLA) topical cream Apply  to affected area as needed for Pain. Apply to port-a-cath site 30-60 minutes prior to chemo (Patient not taking: Reported on 3/15/2022) 30 g 0    ondansetron (ZOFRAN ODT) 4 mg disintegrating tablet Take 1-2 Tablets by mouth every eight (8) hours as needed for Nausea or Vomiting.  (Patient not taking: Reported on 3/15/2022) 60 Tablet 1    prochlorperazine (Compazine) 5 mg tablet Take 1 tab by mouth every 6 hours as needed for nausea or vomiting (Patient not taking: Reported on 3/15/2022) 30 Tablet 1    CRANIAL PROSTHESIS misc Alopecia due to chemo/ wig 1 Each 1    amoxicillin-clavulanate (AUGMENTIN) 875-125 mg per tablet Take 1 Tablet by mouth two (2) times a day. (Patient not taking: Reported on 3/15/2022) 20 Tablet 0     No current facility-administered medications on file prior to visit. No Known Allergies    OB History    No obstetric history on file. Obstetric Comments   Menarche 14, LMP 2018, # of children 2, age of 4st delivery 33, Hysterectomy/oophorectomy no/no, Breast bx no, history of breast feeding yes, BCP no, Hormone therapy no             Physical Exam  Constitutional:       Appearance: She is well-developed. She is not diaphoretic. HENT:      Head: Normocephalic and atraumatic. Right Ear: External ear normal.      Left Ear: External ear normal.   Eyes:      General: No scleral icterus. Right eye: No discharge. Left eye: No discharge. Pupils: Pupils are equal, round, and reactive to light. Neck:      Thyroid: No thyromegaly. Vascular: No JVD. Trachea: No tracheal deviation. Cardiovascular:      Rate and Rhythm: Normal rate and regular rhythm. Heart sounds: Normal heart sounds. Pulmonary:      Effort: Pulmonary effort is normal. No tachypnea, accessory muscle usage or respiratory distress. Breath sounds: Normal breath sounds. No stridor. Chest:   Breasts: Breasts are symmetrical.      Right: No inverted nipple, mass, nipple discharge, skin change or tenderness. Left: No inverted nipple, mass, nipple discharge, skin change or tenderness. Comments: LEFT leg swollen, chronic non tender  Abdominal:      General: There is no distension. Palpations: Abdomen is soft. There is no mass. Tenderness: There is no abdominal tenderness.    Musculoskeletal: General: Normal range of motion. Cervical back: Normal range of motion and neck supple. Lymphadenopathy:      Cervical: No cervical adenopathy. Skin:     General: Skin is warm and dry. Neurological:      Mental Status: She is alert and oriented to person, place, and time. She is not disoriented. Psychiatric:         Speech: Speech normal.         Behavior: Behavior normal.         Thought Content: Thought content normal.         Judgment: Judgment normal.         Diagnostic Ultrasound  Indication : left upper outer quadrant  Technique : We scanned the area using a high-frequency, linear-array, near-field transducer . Findings - . Clips seen at bx site, LN visualized with clip  Impression : chemo completed  Disposition :plan for reduction lumpectomy with loc      ASSESSMENT and PLAN  Encounter Diagnoses   Name Primary?  Malignant neoplasm of left breast in female, estrogen receptor negative, unspecified site of breast ([facility] Yes        Talked about reduction with Dr. Mattie Sykes. Will have a little incision on the side as well as the reduction incisions. Would like to get at lease 4 lymph nodes. Will talk to Dr. Gene Hernandez to see if port can be removed.    Will need XRT post op    Total time 40 minutes

## 2022-03-09 NOTE — PROGRESS NOTES
HISTORY OF PRESENT ILLNESS  Jesus Manuel Correa is a 61 y.o. female. HPI   HPI ESTABLISHED patient here for surgery discussion of LEFT breast cancer. Patient states she is still shedding skin. Finished chemo Feb [id number]. [place]   Breast history -  Referring - Star Rodney MD  8/24/21 - LEFT breast abscess - Aspiration and course of Bactrim   9/3/21 - LEFT breast abscess - I&D    10/27/21: LEFT breast bx. PATH: IDC, 5mm, favor grade 3, ER-/AR-/HER2+(3)/Ki-67 70%. DCIS with high-grade nuclear features and comedonecrosis.  Microcalcifications not present.     ROS    Physical Exam    ASSESSMENT and PLAN  {ASSESSMENT/PLAN:[id number]}

## 2022-03-09 NOTE — PROGRESS NOTES
HISTORY OF PRESENT ILLNESS  Sofia Wayne is a 61 y.o. female. HPI ESTABLISHED patient here for surgery discussion of LEFT breast cancer.      Breast history -  Referring - Nery Avilez MD  8/24/21 - LEFT breast abscess - Aspiration and course of Bactrim   9/3/21 - LEFT breast abscess - I&D     10/27/21: LEFT breast bx. PATH: IDC, 5mm, favor grade 3, ER-/AK-/HER2+(3)/Ki-67 70%. DCIS with high-grade nuclear features and comedonecrosis. Microcalcifications not present.      ROS    Physical Exam    ASSESSMENT and PLAN  {ASSESSMENT/PLAN:[id number]}

## 2022-03-11 ENCOUNTER — ANCILLARY PROCEDURE (OUTPATIENT)
Dept: CARDIOLOGY CLINIC | Age: 57
End: 2022-03-11
Payer: COMMERCIAL

## 2022-03-11 VITALS — BODY MASS INDEX: 21.38 KG/M2 | WEIGHT: 133 LBS | HEIGHT: 66 IN

## 2022-03-11 DIAGNOSIS — Z79.899 ENCOUNTER FOR MONITORING CARDIOTOXIC DRUG THERAPY: ICD-10-CM

## 2022-03-11 DIAGNOSIS — Z51.81 ENCOUNTER FOR MONITORING CARDIOTOXIC DRUG THERAPY: ICD-10-CM

## 2022-03-11 PROCEDURE — 93308 TTE F-UP OR LMTD: CPT | Performed by: SPECIALIST

## 2022-03-15 ENCOUNTER — OFFICE VISIT (OUTPATIENT)
Dept: CARDIOLOGY CLINIC | Age: 57
End: 2022-03-15
Payer: COMMERCIAL

## 2022-03-15 VITALS
SYSTOLIC BLOOD PRESSURE: 136 MMHG | WEIGHT: 142.6 LBS | RESPIRATION RATE: 14 BRPM | DIASTOLIC BLOOD PRESSURE: 80 MMHG | HEART RATE: 98 BPM | HEIGHT: 66 IN | BODY MASS INDEX: 22.92 KG/M2 | OXYGEN SATURATION: 98 %

## 2022-03-15 DIAGNOSIS — Z17.1 MALIGNANT NEOPLASM OF LEFT BREAST IN FEMALE, ESTROGEN RECEPTOR NEGATIVE, UNSPECIFIED SITE OF BREAST (HCC): ICD-10-CM

## 2022-03-15 DIAGNOSIS — E87.6 HYPOKALEMIA: ICD-10-CM

## 2022-03-15 DIAGNOSIS — C50.912 MALIGNANT NEOPLASM OF LEFT BREAST IN FEMALE, ESTROGEN RECEPTOR NEGATIVE, UNSPECIFIED SITE OF BREAST (HCC): ICD-10-CM

## 2022-03-15 DIAGNOSIS — Z79.899 ENCOUNTER FOR MONITORING CARDIOTOXIC DRUG THERAPY: ICD-10-CM

## 2022-03-15 DIAGNOSIS — R93.1 ABNORMAL ECHOCARDIOGRAM: ICD-10-CM

## 2022-03-15 DIAGNOSIS — Z51.81 ENCOUNTER FOR MONITORING CARDIOTOXIC DRUG THERAPY: ICD-10-CM

## 2022-03-15 DIAGNOSIS — Z79.899 ENCOUNTER FOR MONITORING CARDIOTOXIC DRUG THERAPY: Primary | ICD-10-CM

## 2022-03-15 DIAGNOSIS — Z51.81 ENCOUNTER FOR MONITORING CARDIOTOXIC DRUG THERAPY: Primary | ICD-10-CM

## 2022-03-15 LAB
ECHO LV EDV A2C: 64 ML
ECHO LV EDV A4C: 54 ML
ECHO LV EDV BP: 60 ML (ref 56–104)
ECHO LV EDV INDEX A4C: 32 ML/M2
ECHO LV EDV INDEX BP: 36 ML/M2
ECHO LV EDV NDEX A2C: 38 ML/M2
ECHO LV EJECTION FRACTION A2C: 60 %
ECHO LV EJECTION FRACTION A4C: 64 %
ECHO LV EJECTION FRACTION BIPLANE: 60 % (ref 55–100)
ECHO LV ESV A2C: 26 ML
ECHO LV ESV A4C: 19 ML
ECHO LV ESV BP: 24 ML (ref 19–49)
ECHO LV ESV INDEX A2C: 15 ML/M2
ECHO LV ESV INDEX A4C: 11 ML/M2
ECHO LV ESV INDEX BP: 14 ML/M2
ECHO LV FRACTIONAL SHORTENING: 31 % (ref 28–44)
ECHO LV GLOBAL LONGITUDINAL STRAIN (GLS): -22.3 %
ECHO LV INTERNAL DIMENSION DIASTOLE INDEX: 2.14 CM/M2
ECHO LV INTERNAL DIMENSION DIASTOLIC: 3.6 CM (ref 3.9–5.3)
ECHO LV INTERNAL DIMENSION SYSTOLIC INDEX: 1.49 CM/M2
ECHO LV INTERNAL DIMENSION SYSTOLIC: 2.5 CM
ECHO LV IVSD: 1.2 CM (ref 0.6–0.9)
ECHO LV MASS 2D: 150.6 G (ref 67–162)
ECHO LV MASS INDEX 2D: 89.7 G/M2 (ref 43–95)
ECHO LV POSTERIOR WALL DIASTOLIC: 1.3 CM (ref 0.6–0.9)
ECHO LV RELATIVE WALL THICKNESS RATIO: 0.72

## 2022-03-15 PROCEDURE — 99214 OFFICE O/P EST MOD 30 MIN: CPT | Performed by: SPECIALIST

## 2022-03-15 NOTE — PROGRESS NOTES
Onel Landin     1965       Ludin Milian MD, [facility] [place]  Date of Visit-3/15/2022   PCP is None   [facility] [place]  [facility]  HPI:  Julio Waite is a 61 y.o. female   2 month f/u. Pt had pre-chemo echo with an EF of 55-60%. The GLS was -15. She was to start on [facility] which includes herceptin. She has been treated for breast cancer.      Fu echo in December with GLS of -20.3 and remains with normal EF    Today. .. Echo last Friday showed EF 60%, her GLS remains normal at -22. She has had an MRI of the breast In which results reviewed. She saw med oncology on 2/10/22 and will start cycle 7, she is also seeing Dr. Gui Daniel. Pt states that the last time she went to get her shot her K was low and has had some swelling issue sin her legs since. She reports that she is not nauseous anymore from chemo, and notes she is to have surgery soon and has finished her last cycle. Denies chest pain, syncope or shortness of breath at rest, has no tachycardia, palpitations or sense of arrhythmia. Assessment/Plan:     1. Encounter for monitoring cardiotoxic drug therapy  Pt had a GLS of -15.4 %. She is relatively asymptomatic with no evidence of heart failure and her EF is normal. Her fatigue seems mild. We have discussed continuing monitoring with chemo. F/u echo looks good, always caution with GLS number, dont necessarily stop chemo but drives monitoring  She is now finished her 6th and last cycle. There are no plans for another cycle. Her EF at end of last week was normal with normal GLS. Repeat echo in 4 months. Patient Instructions   Please reduce your sodium - no more pickles. Increase your protein. Have labs today. We will see you back in about a month with labs again 1-2 days prior. 2. Abnormal echocardiogram  She has LE edema, been puffy for several weeks. Gets tired after chemo cycles.  Her Albumin is low at 2.6, and K recently very low at 2.3, given infusions and feels she has bene retaining fluid since then. I am going to have her increase protein. One of the foods she could tolerate was salty pickles so will get rid of that and she knows to substitute with protein, want to avoid because of hypokalemia, and if necessary may need to use K-sparing diuretic like Aldactone. Will check troponin and BNP today since there is edema despite normal echo findings. 3. Malignant neoplasm of left breast in female, estrogen receptor negative, unspecified site of breast ([facility]  Completed chemo, and will now have lumpectomy and surgery. 4. Hypokalemia  K was 2.3. Will check labs today. F/u in 1 month     Impression:   1. Encounter for monitoring cardiotoxic drug therapy    2. Abnormal echocardiogram    3. Malignant neoplasm of left breast in female, estrogen receptor negative, unspecified site of breast ([facility]    4. Hypokalemia       Cardiac History:   No specialty comments available. Future Appointments   Date Time Provider Ryan Daly   4/14/2022  8:30 AM F1 RAFI LONG [facility]   4/14/2022  9:00 AM Rosa Saini  N Mariela Purdy BS AMB   4/19/2022  1:00 PM MD DEBBIE Gonzalez BS AMB   5/5/2022  8:30 AM F1 RAFI LONG [address] [facility]   5/26/2022  8:30 AM F1 RAFI LONG [facility]   6/16/2022  8:30 AM F1 RAFI LONG [facility]   7/7/2022  8:30 AM F1 RAFI LONG [facility]   7/18/2022 10:00 AM BONI PEOPLES BS AMB   66/6/5238  5:08 PM Matheus Cordero MD [facility] BS AMB        Patient Care Team:  None as PCP - General  Ana Casanova DO (Hematology and Oncology)  Bre Hankins MD (Cardio Vascular Surgery)    ROS-except as noted above. . A complete cardiac and respiratory are reviewed and negative except as above ; Resp-denies wheezing  or productive cough,.  Const- No unusual weight loss or fever; Neuro-no recent seizure or CVA ; GI- No BRBPR, abdom pain, bloating ; - no  hematuria   see supplement sheet, initialed and to be scanned by staff  Past Medical History:   Diagnosis Date    Malignant neoplasm of left breast in female, estrogen receptor negative ([facility] 10/13/2021      Social Hx= reports that she has never smoked. She has never used smokeless tobacco. She reports that she does not drink alcohol and does not use drugs. Exam and Labs:  /80 (BP 1 Location: Left arm, BP Patient Position: Sitting, BP Cuff Size: Small adult)   Pulse 98   Resp 14   Ht 5' 6\" (1.676 m)   Wt 142 lb 9.6 oz (64.7 kg)   SpO2 98%   BMI 23.02 kg/m² Constitutional:  NAD, comfortable  Head: NC,AT. Eyes: No scleral icterus. Neck:  Neck supple. No JVD present. Throat: moist mucous membranes. Chest: Effort normal & normal respiratory excursion . Neurological: alert, conversant and oriented . Skin: Skin is not cold. No obvious systemic rash noted. Not diaphoretic. No erythema. Psychiatric:  Grossly normal mood and affect. Behavior appears normal. Extremities:  no clubbing or cyanosis. Abdomen: non distended    Lungs:breath sounds normal. No stridor. distress, wheezes or  Rales. Heart: normal rate, regular rhythm, normal S1, S2, no murmurs, rubs, clicks or gallops , PMI non displaced. Edema: Edema is 1-2+ non pitting to mid-shin.   No results found for: CHOL, CHOLX, CHLST, CHOLV, HDL, HDLP, LDL, LDLC, DLDLP, TGLX, TRIGL, TRIGP, CHHD, CHHDX  Lab Results   Component Value Date/Time    Sodium 130 (L) 03/03/2022 08:14 AM    Potassium 2.3 (LL) 03/03/2022 08:14 AM    Chloride 90 (L) 03/03/2022 08:14 AM    CO2 31 03/03/2022 08:14 AM    Anion gap 9 03/03/2022 08:14 AM    Glucose 90 03/03/2022 08:14 AM    BUN 11 03/03/2022 08:14 AM    Creatinine 0.55 03/03/2022 08:14 AM    BUN/Creatinine ratio 20 03/03/2022 08:14 AM    GFR est AA >60 03/03/2022 08:14 AM    GFR est non-AA >60 03/03/2022 08:14 AM    Calcium 8.5 03/03/2022 08:14 AM      Wt Readings from Last 3 Encounters:   03/15/22 142 lb 9.6 oz (64.7 kg)   03/11/22 133 lb (60.3 kg)   03/09/22 133 lb (60.3 kg)      BP Readings from Last 3 Encounters:   03/15/22 136/80   03/09/22 112/68   03/03/22 97/62      Current Outpatient Medications   Medication Sig    potassium chloride (K-DUR, KLOR-CON M20) 20 mEq tablet Take 1 Tablet by mouth daily.  ALPRAZolam (XANAX) 0.5 mg tablet Take 1 Tablet by mouth three (3) times daily as needed for Anxiety. Max Daily Amount: 1.5 mg. Indications: anxious    CRANIAL PROSTHESIS misc Alopecia due to chemo/ wig    dexAMETHasone (DECADRON) 4 mg tablet Take 2 tabs (8mg) by mouth twice daily the day before chemotherapy and the day after chemotherapy (Patient not taking: Reported on 3/15/2022)    LORazepam (ATIVAN) 0.5 mg tablet Take 1 tab by mouth 30-60 minutes prior to Breast MRI. May take additional dose if needed. (Patient not taking: Reported on 3/15/2022)    lidocaine-prilocaine (EMLA) topical cream Apply  to affected area as needed for Pain. Apply to port-a-cath site 30-60 minutes prior to chemo (Patient not taking: Reported on 3/15/2022)    ondansetron (ZOFRAN ODT) 4 mg disintegrating tablet Take 1-2 Tablets by mouth every eight (8) hours as needed for Nausea or Vomiting. (Patient not taking: Reported on 3/15/2022)    prochlorperazine (Compazine) 5 mg tablet Take 1 tab by mouth every 6 hours as needed for nausea or vomiting (Patient not taking: Reported on 3/15/2022)    amoxicillin-clavulanate (AUGMENTIN) 875-125 mg per tablet Take 1 Tablet by mouth two (2) times a day. (Patient not taking: Reported on 3/15/2022)     No current facility-administered medications for this visit. Impression see above.       Written by Linnea Yeager, as dictated by Maddy Renee MD.

## 2022-03-15 NOTE — PATIENT INSTRUCTIONS
Please reduce your sodium - no more pickles. Increase your protein. Have labs today. We will see you back in about a month with labs again 1-2 days prior.      [facility] [place] Draw Site:  [facility], [address] [place] [address]  Phone: [phone]  Monday- Friday 7:00 am- 5:00 pm

## 2022-03-16 LAB
ANION GAP SERPL CALC-SCNC: 3 MMOL/L (ref 5–15)
BNP SERPL-MCNC: 317 PG/ML
BUN SERPL-MCNC: 11 MG/DL (ref 6–20)
BUN/CREAT SERPL: 32 (ref 12–20)
CALCIUM SERPL-MCNC: 8.4 MG/DL (ref 8.5–10.1)
CHLORIDE SERPL-SCNC: 100 MMOL/L (ref 97–108)
CO2 SERPL-SCNC: 30 MMOL/L (ref 21–32)
CREAT SERPL-MCNC: 0.34 MG/DL (ref 0.55–1.02)
GLUCOSE SERPL-MCNC: 99 MG/DL (ref 65–100)
POTASSIUM SERPL-SCNC: 3.4 MMOL/L (ref 3.5–5.1)
SODIUM SERPL-SCNC: 133 MMOL/L (ref 136–145)
TROPONIN-HIGH SENSITIVITY: 9 NG/L (ref 0–51)

## 2022-03-18 PROBLEM — T45.1X5A CHEMOTHERAPY-INDUCED NEUROPATHY (HCC): Status: ACTIVE | Noted: 2021-12-30

## 2022-03-18 PROBLEM — Z95.828 PORT-A-CATH IN PLACE: Status: ACTIVE | Noted: 2021-10-25

## 2022-03-18 PROBLEM — C50.912 MALIGNANT NEOPLASM OF LEFT BREAST IN FEMALE, ESTROGEN RECEPTOR NEGATIVE (HCC): Status: ACTIVE | Noted: 2021-10-13

## 2022-03-18 PROBLEM — Z17.1 MALIGNANT NEOPLASM OF LEFT BREAST IN FEMALE, ESTROGEN RECEPTOR NEGATIVE (HCC): Status: ACTIVE | Noted: 2021-10-13

## 2022-03-18 PROBLEM — G62.0 CHEMOTHERAPY-INDUCED NEUROPATHY (HCC): Status: ACTIVE | Noted: 2021-12-30

## 2022-03-19 PROBLEM — T45.1X5A CHEMOTHERAPY-INDUCED NAUSEA: Status: ACTIVE | Noted: 2021-12-09

## 2022-03-19 PROBLEM — R11.0 CHEMOTHERAPY-INDUCED NAUSEA: Status: ACTIVE | Noted: 2021-12-09

## 2022-03-19 PROBLEM — R53.83 CHEMOTHERAPY-INDUCED FATIGUE: Status: ACTIVE | Noted: 2021-12-09

## 2022-03-19 PROBLEM — N63.20 LEFT BREAST MASS: Status: ACTIVE | Noted: 2021-10-25

## 2022-03-19 PROBLEM — Z51.11 ENCOUNTER FOR ANTINEOPLASTIC CHEMOTHERAPY: Status: ACTIVE | Noted: 2021-10-25

## 2022-03-19 PROBLEM — R63.0 DECREASED APPETITE: Status: ACTIVE | Noted: 2022-02-10

## 2022-03-19 PROBLEM — R45.89 ANXIETY ABOUT HEALTH: Status: ACTIVE | Noted: 2021-12-30

## 2022-03-19 PROBLEM — Z78.0 POST-MENOPAUSAL: Status: ACTIVE | Noted: 2021-10-25

## 2022-03-19 PROBLEM — F41.8 ANXIETY ABOUT HEALTH: Status: ACTIVE | Noted: 2021-12-30

## 2022-03-19 PROBLEM — Z79.899 ENCOUNTER FOR MONITORING CARDIOTOXIC DRUG THERAPY: Status: ACTIVE | Noted: 2021-10-25

## 2022-03-19 PROBLEM — Z51.81 ENCOUNTER FOR MONITORING CARDIOTOXIC DRUG THERAPY: Status: ACTIVE | Noted: 2021-10-25

## 2022-03-19 PROBLEM — T45.1X5A CHEMOTHERAPY-INDUCED FATIGUE: Status: ACTIVE | Noted: 2021-12-09

## 2022-03-20 PROBLEM — Z09 CHEMOTHERAPY FOLLOW-UP EXAMINATION: Status: ACTIVE | Noted: 2021-11-18

## 2022-03-23 ENCOUNTER — APPOINTMENT (OUTPATIENT)
Dept: INFUSION THERAPY | Age: 57
End: 2022-03-23

## 2022-03-24 ENCOUNTER — HOSPITAL ENCOUNTER (OUTPATIENT)
Dept: INFUSION THERAPY | Age: 57
Discharge: HOME | End: 2022-03-24
Payer: COMMERCIAL

## 2022-03-24 ENCOUNTER — OFFICE VISIT (OUTPATIENT)
Dept: ONCOLOGY | Age: 57
End: 2022-03-24
Payer: COMMERCIAL

## 2022-03-24 VITALS
BODY MASS INDEX: 22.13 KG/M2 | OXYGEN SATURATION: 98 % | HEART RATE: 99 BPM | DIASTOLIC BLOOD PRESSURE: 80 MMHG | SYSTOLIC BLOOD PRESSURE: 122 MMHG | HEIGHT: 66 IN | WEIGHT: 137.7 LBS | TEMPERATURE: 97.1 F

## 2022-03-24 VITALS
TEMPERATURE: 97.1 F | HEART RATE: 87 BPM | SYSTOLIC BLOOD PRESSURE: 122 MMHG | OXYGEN SATURATION: 99 % | RESPIRATION RATE: 16 BRPM | DIASTOLIC BLOOD PRESSURE: 80 MMHG

## 2022-03-24 DIAGNOSIS — T45.1X5A CHEMOTHERAPY-INDUCED FATIGUE: ICD-10-CM

## 2022-03-24 DIAGNOSIS — Z95.828 PORT-A-CATH IN PLACE: ICD-10-CM

## 2022-03-24 DIAGNOSIS — Z17.1 MALIGNANT NEOPLASM OF LEFT BREAST IN FEMALE, ESTROGEN RECEPTOR NEGATIVE, UNSPECIFIED SITE OF BREAST (HCC): Primary | ICD-10-CM

## 2022-03-24 DIAGNOSIS — M79.89 LEFT LEG SWELLING: ICD-10-CM

## 2022-03-24 DIAGNOSIS — Z79.899 ENCOUNTER FOR MONITORING CARDIOTOXIC DRUG THERAPY: ICD-10-CM

## 2022-03-24 DIAGNOSIS — R11.0 CHEMOTHERAPY-INDUCED NAUSEA: ICD-10-CM

## 2022-03-24 DIAGNOSIS — Z51.81 ENCOUNTER FOR MONITORING CARDIOTOXIC DRUG THERAPY: ICD-10-CM

## 2022-03-24 DIAGNOSIS — C50.912 MALIGNANT NEOPLASM OF LEFT BREAST IN FEMALE, ESTROGEN RECEPTOR NEGATIVE, UNSPECIFIED SITE OF BREAST (HCC): Primary | ICD-10-CM

## 2022-03-24 DIAGNOSIS — G62.0 CHEMOTHERAPY-INDUCED NEUROPATHY (HCC): ICD-10-CM

## 2022-03-24 DIAGNOSIS — Z78.0 POST-MENOPAUSAL: ICD-10-CM

## 2022-03-24 DIAGNOSIS — T45.1X5A CHEMOTHERAPY-INDUCED NEUROPATHY (HCC): ICD-10-CM

## 2022-03-24 DIAGNOSIS — E87.6 HYPOKALEMIA: ICD-10-CM

## 2022-03-24 DIAGNOSIS — T45.1X5A CHEMOTHERAPY-INDUCED NAUSEA: ICD-10-CM

## 2022-03-24 DIAGNOSIS — R63.0 DECREASED APPETITE: ICD-10-CM

## 2022-03-24 DIAGNOSIS — R53.83 CHEMOTHERAPY-INDUCED FATIGUE: ICD-10-CM

## 2022-03-24 PROCEDURE — 96401 CHEMO ANTI-NEOPL SQ/IM: CPT

## 2022-03-24 PROCEDURE — 99215 OFFICE O/P EST HI 40 MIN: CPT | Performed by: INTERNAL MEDICINE

## 2022-03-24 PROCEDURE — 74011250636 HC RX REV CODE- 250/636: Performed by: NURSE PRACTITIONER

## 2022-03-24 RX ORDER — DIPHENHYDRAMINE HYDROCHLORIDE 50 MG/ML
50 INJECTION, SOLUTION INTRAMUSCULAR; INTRAVENOUS AS NEEDED
Status: ACTIVE | OUTPATIENT
Start: 2022-03-24 | End: 2022-03-24

## 2022-03-24 RX ORDER — ALBUTEROL SULFATE 0.83 MG/ML
2.5 SOLUTION RESPIRATORY (INHALATION) AS NEEDED
Status: ACTIVE | OUTPATIENT
Start: 2022-03-24 | End: 2022-03-24

## 2022-03-24 RX ORDER — ONDANSETRON 2 MG/ML
8 INJECTION INTRAMUSCULAR; INTRAVENOUS AS NEEDED
Status: ACTIVE | OUTPATIENT
Start: 2022-03-24 | End: 2022-03-24

## 2022-03-24 RX ORDER — POTASSIUM CHLORIDE 20 MEQ/1
20 TABLET, EXTENDED RELEASE ORAL DAILY
Qty: 30 TABLET | Refills: 0 | Status: SHIPPED | OUTPATIENT
Start: 2022-03-24 | End: 2022-03-30

## 2022-03-24 RX ORDER — ACETAMINOPHEN 325 MG/1
650 TABLET ORAL AS NEEDED
Status: ACTIVE | OUTPATIENT
Start: 2022-03-24 | End: 2022-03-24

## 2022-03-24 RX ORDER — EPINEPHRINE 1 MG/ML
0.3 INJECTION, SOLUTION, CONCENTRATE INTRAVENOUS AS NEEDED
Status: ACTIVE | OUTPATIENT
Start: 2022-03-24 | End: 2022-03-24

## 2022-03-24 RX ORDER — DIPHENHYDRAMINE HYDROCHLORIDE 50 MG/ML
25 INJECTION, SOLUTION INTRAMUSCULAR; INTRAVENOUS AS NEEDED
Status: ACTIVE | OUTPATIENT
Start: 2022-03-24 | End: 2022-03-24

## 2022-03-24 RX ORDER — HYDROCORTISONE SODIUM SUCCINATE 100 MG/2ML
100 INJECTION, POWDER, FOR SOLUTION INTRAMUSCULAR; INTRAVENOUS AS NEEDED
Status: ACTIVE | OUTPATIENT
Start: 2022-03-24 | End: 2022-03-24

## 2022-03-24 RX ADMIN — PERTUZUMAB, TRASTUZUMAB, AND HYALURONIDASE-ZZXF 10 ML: 600; 600; 2000 INJECTION, SOLUTION SUBCUTANEOUS at 11:08

## 2022-03-24 NOTE — PROGRESS NOTES
[facility] at [address] [place] [address] Omi Moulton, [place]: [phone]  F: [phone]    Reason for Visit:   Jessa Marcus is a 61 y.o. female seen today in office for follow up of Left Breast Cancer. Treatment History:   · LEFT Breast Biopsy 10/6/21: PATH - Invasive ductal carcinoma, favor grade 3  · ER/NC negative, HER2+  · Breast MRI 10/13/21: RIGHT BREAST: Background parenchymal enhancement: Mild. There is no suspicious masslike or nonmasslike enhancement within the right breast to indicate breast carcinoma. There are no suspicious internal mammary or axillary chain lymph nodes. LEFT BREAST: Background parenchymal enhancement: Mild There is a rounded mass with central fluid attenuation/necrosis in the lateral aspect of the left breast, deep 3rd, measuring approximately 3.3 cm in diameter with enhancement extending along the adjacent dermis suggesting skin invasion. There is nonmasslike enhancement extending both inferior, medial and anterior to the primary mass over approximately 6 x 5.5 x 4 cm. There are discontinuous areas of fluid attenuation associated with the nonmasslike enhancement which may represent cystic spaces and/or necrosis. There is an enlarged, left axillary lymph node with other adjacent, prominent lymph nodes. There is a lymph node node deep to the pectoralis minor muscle which measures 1.1 x 0.7 cm  · Left Axillary LN Biopsy 10/19/21: PATH - Metastatic breast cancer, measuring up to 17 mm in a single core  · ER/NC negative, HER2+  · Neoadjuvant TCHP x 6 cycles from 10/21/21 - 2/10/22  · DR Taxotere to 60 mg/m2 and Carbo to AUC 4 with Cycle 6 due to side effects  · Breast Biopsy 10/27/21: PATH - 5 mm IDC with DCIS  · Breast MRI 2/21/22: Right Breast: No suspicious enhancing foci. No axillary or internal mammary chain lymphadenopathy.  A subclavian ported catheter, implanted in the posterior third of the upper inner quadrant, terminates in appropriate position in the SVC. Left Breast: No residual enhancement in the mass in the posterior third of the outer slightly upper left breast. The mass is no longer measurable. There is a biopsy clip within scarring. No residual enhancement in the previously seen, extensive, non-masslike enhancement in the middle third of the central, lower, left breast. A biopsy clip remains. Axillary flori metastases have significantly decreased in size and are no longer pathologically enlarged. A biopsy clip is within an inferior axillary lymph node  · Maintenance HP ([facility]) 3/3/22 - Current      STAGE: Clinical 2 ER/IA negative Her2+    History of Present Illness:   Altagracia Salgado is a 61 y.o. female seen today in office for follow up of left breast cancer ER/IA negative Her2 + post biopsy on neoadjuvant chemo TCHP. She had a port placed on 10/19/21 without issues. She had left axillary LN biopsy on 10/19/21 and path showed metastatic breast cancer, same markers. She started neoadjuvant TCHP chemotherapy on 10/21/21. She had another breast biopsy on 10/27/21 that showed 5 mm IDC with DCIS. She saw Dr. Alma Saravia on [id number]/4561 and breast mass was responding well to chemo. She finished 6 cycles of TCHP on 2/10/22. She had a post chemo Breast MRI on 2/21/22 that showed no residual enhancement, axillary flori metastases have significantly decreased in size and are no longer pathologically enlarged. She started maintenance HP ([facility]) on 3/3/22. She saw Plastics, Dr. Edith Mcleod and plans is for lumpectomy/reduction. She had a follow up ECHO on 3/11/22 per Cardio and EF was 60% and GLS was normal.    She is here today for Cycle 8 (second dose of maintenance subQ HP). She reports that she feels well overall today. Chemo side effects are improving but she still does not have her taste buds back. She is tolerating HP well overall thus far. She does have some swelling in LLE.  She had labs on 3/15/22 in [facility] per Cardio and BNP was slightly elevated. Her appetite and energy levels are slowly improving. She still has some mild neuropathy in toes on bilateral feet and fingertips that might be slightly better overall. She denies fever, chills, mouth sores, cough, SOB, CP, nausea, vomiting, diarrhea, and constipation. She denies pain today. CBC and CMP from 3/3/22 reviewed today. She already received HP today. She is here alone today. Past Medical History:   Diagnosis Date    Malignant neoplasm of left breast in female, estrogen receptor negative ([facility] [address] 10/13/2021      History reviewed. No pertinent surgical history. Social History     Tobacco Use    Smoking status: Never Smoker    Smokeless tobacco: Never Used   Substance Use Topics    Alcohol use: Never      History reviewed. No pertinent family history. Current Outpatient Medications   Medication Sig    folic acid/multivit-min/lutein (CENTRUM SILVER PO) Take  by mouth.  cholecalciferol, vitamin D3, (VITAMIN D3 PO) Take  by mouth daily.  potassium chloride (K-DUR, KLOR-CON M20) 20 mEq tablet Take 1 Tablet by mouth daily.  ALPRAZolam (XANAX) 0.5 mg tablet Take 1 Tablet by mouth three (3) times daily as needed for Anxiety. Max Daily Amount: 1.5 mg. 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      Facility-Administered Medications Ordered in Other Visits   Medication Dose Route Frequency    sodium chloride 0.9 % bolus infusion 500 mL  500 mL IntraVENous PRN    diphenhydrAMINE (BENADRYL) injection 25 mg  25 mg IntraVENous PRN    famotidine (PF) (PEPCID) 20 mg in 0.9% sodium chloride 10 mL injection  20 mg IntraVENous PRN    acetaminophen (TYLENOL) tablet 650 mg  650 mg Oral PRN    meperidine (DEMEROL) injection 25 mg  25 mg IntraVENous PRN    ondansetron (ZOFRAN) injection 8 mg  8 mg IntraVENous PRN    sodium chloride 0.9 % bolus infusion 500 mL  500 mL IntraVENous PRN    EPINEPHrine HCl (PF) (ADRENALIN) 1 mg/mL (1 mL) injection 0.3 mg  0.3 mg SubCUTAneous PRN    hydrocortisone Sod Succ (PF) (SOLU-CORTEF) injection 100 mg  100 mg IntraVENous PRN    diphenhydrAMINE (BENADRYL) injection 50 mg  50 mg IntraVENous PRN    albuterol (PROVENTIL VENTOLIN) nebulizer solution 2.5 mg  2.5 mg Inhalation PRN      No Known Allergies     Review of Systems:  A complete review of systems was obtained, negative except as described above and as reported on ROS sheet scanned into system. Physical Exam:     Visit Vitals  /80 (BP 1 Location: Left upper arm, BP Patient Position: Sitting)   Pulse 99   Temp 97.1 °F (36.2 °C) (Temporal)   Ht 5' 6\" (1.676 m)   Wt 137 lb 11.2 oz (62.5 kg)   SpO2 98%   BMI 22.23 kg/m²     ECOG PS: 0  General: No distress  Eyes: Anicteric sclerae  HENT: Atraumatic, wearing a mask  Neck: Supple  Respiratory: CTAB, normal respiratory effort  CV: Normal rate, regular rhythm, no murmurs. LLE 2+ edema  GI: Soft, nontender, non-distended   MS: Normal gait and station. Skin: No rashes, ecchymoses, or petechiae. Normal temperature, turgor, and texture. Port site without redness/swelling  Psych: Alert, oriented, appropriate affect, normal judgment/insight  Neuro: Non focal    Results:     Lab Results   Component Value Date/Time    WBC 7.4 03/03/2022 08:14 AM    HGB 8.3 (L) 03/03/2022 08:14 AM    HCT 25.4 (L) 03/03/2022 08:14 AM    PLATELET 547 06/05/2101 08:14 AM    .6 (H) 03/03/2022 08:14 AM    ABS.  NEUTROPHILS 4.5 03/03/2022 08:14 AM     Lab Results   Component Value Date/Time    Sodium 133 (L) 03/15/2022 11:23 AM    Potassium 3.4 (L) 03/15/2022 11:23 AM    Chloride 100 03/15/2022 11:23 AM    CO2 30 03/15/2022 11:23 AM    Glucose 99 03/15/2022 11:23 AM    BUN 11 03/15/2022 11:23 AM    Creatinine 0.34 (L) 03/15/2022 11:23 AM    GFR est AA >60 03/15/2022 11:23 AM    GFR est non-AA >60 03/15/2022 11:23 AM    Calcium 8.4 (L) 03/15/2022 11:23 AM     Lab Results   Component Value Date/Time    Bilirubin, total 0.7 03/03/2022 08:14 AM    ALT (SGPT) 35 03/03/2022 08:14 AM    Alk. phosphatase 345 (H) 03/03/2022 08:14 AM    Protein, total 6.9 03/03/2022 08:14 AM    Albumin 2.6 (L) 03/03/2022 08:14 AM    Globulin 4.3 (H) 03/03/2022 08:14 AM     10/6/21  FINAL PATHOLOGIC DIAGNOSIS   Breast, left mass, core biopsy:   Invasive ductal carcinoma, favor grade 3 (see synoptic table)   INVASIVE CARCINOMA OF THE BREAST: Biopsy   TUMOR   Tumor Site: Clock position - 3 o'clock   Histologic Type: Invasive carcinoma of no special type (ductal)   Glandular (Acinar) / Tubular Differentiation: Score 3   Nuclear Pleomorphism: Score 3   Mitotic Rate: Score 3   Overall Grade: Grade 3 (scores of 8 or 9)   Tumor Size: 13 mm   Ductal Carcinoma In Situ (DCIS): Not identified   Lymphovascular Invasion: Not identified   Microcalcifications: Not identified     Breast MRI 10/13/21  IMPRESSION:  RIGHT BREAST:  1. BI-RADS Category 1 - Negative. Rhenda Judith LEFT BREAST:  1. Rounded mass with dermal extension measuring approximately 3.3 cm in diameter  in the lateral, deep 3rd of the left breast. There are areas of fluid/necrosis  within this mass. BI-RADS Category 6 - Known biopsy-proven malignancy. 2. Nonmasslike enhancement in the left breast which is inferior, medial and  anterior to the primary mass. This nonmasslike enhancement extends over  approximately 6 x 5.5 x 4 cm. There are areas of fluid attenuation which could  represent cysts and/or necrosis within this nonmasslike enhancement. BI-RADS Category 5 - Highly suggestive of malignancy. 3. There is axillary adenopathy, including a lymph node deep to pectoralis  minor.   BI-RADS Category 5 - Highly suggestive of malignancy    10/19/21  FINAL PATHOLOGIC DIAGNOSIS   Lymph node, left axillary, core biopsy:   Metastatic breast cancer, measuring up to 17 mm in a single core     10/21/21    ECHO ADULT COMPLETE 10/21/2021 10/21/2021    Interpretation Summary  · LV: Estimated LVEF is 55 - 60%. Normal cavity size, wall thickness and systolic function (ejection fraction normal). Wall motion: normal. Abnormal left ventricular strain. Global longitudinal strain is -15.4%. · TV: Pulmonary hypertension not suggested by Doppler findings. Signed by: Nery Dhillon MD on 10/21/2021  8:32 PM    10/27/21  FINAL PATHOLOGIC DIAGNOSIS   Breast, left, core biopsy:   Invasive ductal carcinoma, 5mm, favor grade 3 (see synoptic table)   Ductal carcinoma in situ (DCIS) with high-grade nuclear features and comedonecrosis   Microcalcifications present within DCIS     Breast MRI 2/21/22  IMPRESSION:  1. No residual enhancement in multicentric, multifocal left breast carcinoma. 2. Significantly decreased left axillary flori metastases. Left axillary lymph  nodes are no longer pathologically enlarged. 3. No suspicious right breast enhancement or lymphadenopathy. 4. BI-RADS Assessment Category 6: Known biopsy proven malignancy. 03/11/22    ECHO ADULT FOLLOW-UP OR LIMITED 03/15/2022 3/15/2022    Interpretation Summary    Left Ventricle: Left ventricle size is normal. Increased wall thickness. Normal wall motion. Normal left ventricular systolic function. EF by 2D Simpsons Biplane is 60%. Global longitudinal strain is normal with a value of -22.3%. Normal diastolic function. Signed by: Nery Dhillon MD on 3/15/2022 10:36 AM    Records reviewed and summarized above. Pathology report(s) reviewed above. Radiology report(s) reviewed above. Assessment:/PLAN     1) Clinical Stage 2B T3N0 LEFT Breast Cancer ER/[place] negative Her2+  post Breast Biopsy Only 10/21  Records and history reviewed. Reviewed path and data. Discussed dx, stage and treatment of breast cancer. Discussed neoadjuvant and adjuvant chemo. Discussed no hormonal therapy options. Surgery prefers neoadjuvant chemo. Not sure about type of surgery yet. Discussed chemo options. Discussed clinical trial options.  Patient does not want to do clinical trial.   Decided on neoadjuvant TCHP. Teaching and consent with pharmacy. MRI Breast 10/13/21 showed a rounded mass with central fluid attenuation/necrosis in the lateral aspect of the left breast, deep 3rd, measuring approximately 3.3 cm in diameter with enhancement extending along the adjacent dermis suggesting skin invasion. There is nonmasslike enhancement extending both inferior, medial and anterior to the primary mass over approximately 6 x 5.5 x 4 cm. There are discontinuous areas of fluid attenuation associated with the nonmasslike enhancement which may represent cystic spaces and/or necrosis. There is an enlarged, left axillary lymph node with other adjacent, prominent lymph nodes. There is a lymph node node deep to the pectoralis minor muscle which measures 1.1 x 0.7 cm  She had a left axillary LN biopsy on 10/19/21 and path showed metastatic beast cancer. She had a port placed on 10/19/21 with no issues. Pre chemo ECHO showed EF 55-60% with abnormal left ventricular strain. Referred to Cardiology for further evaluation - has follow up ECHO on 12/6/21 per Cardio. Patient started neoadjuvant TCHP chemotherapy on 10/25/21. She completed 6 cycles of TCHP on 2/10/22. DR Taxotere to 60 mg/m2 and Carbo to AUC 4 with Cycle 6 due to side effects. She had a post chemo Breast MRI on 2/21/22 that showed no residual enhancement in multicentric, multifocal left breast carcinoma, significantly decreased left axillary flori metastases, left axillary lymph nodes are no longer pathologically enlarged, and no suspicious right breast enhancement or lymphadenopathy. Personally reviewed MRI. Discussed MRI with patient today. She started maintenance HP on 3/3/22. She had a follow up ECHO on 3/11/22 per Cardio and EF was 60%. Patient is here today for Cycle 8 (second cycle of maintenance HP). Chemo side effects are improving. She is tolerating HP well overall.   She is clinically healthy overall and stable today. She has some left lower leg swelling ?etiology. Plan is for lumpectomy/reduction then XRT. She has seen Plastics, Dr. Shabbir Stoner (CBC and CMP) from 3/3/22 reviewed today. She had labs today in [facility] on 3/15/22 per Cardiology. Follow up in 3 weeks with Cycle 9 (HP only). Patient agrees with plan. 2)  Postmenopausal  Continue routine GYN follow up. 3) Management of High Risk Medications - Chemotherapy  Toxicities from chemo included Grade 1 Nausea,  Grade 1 Neuropathy, Grade 2 Fatigue, Grade 1-2 Decreased appetite. DR Taxotere to 60 mg/m2 and Carbo to AUC 4 with Cycle 6. Side effect management reviewed today. Chemo side effects improving. Pre chemo ECHO from 10/21/21 reviewed - EF 55-60% with abnormal left ventricular strain. Referred to Cardiology for further evaluation/management of strain. She had a follow up ECHO on 12/6/21 per Cardiology - EF 58% with no strain. She has a follow up ECHO on 3/11/22 per Cardiology - EF 60%, GLS normal.   Labs (CBC and CMP) from 3/3/22 reviewed today. She had labs in [facility] on 3/15/22 per Cardiology. Continue ECHOs every 3 months. Will monitor for side effects. 4) Psychosocial  Mood good, coping well. 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 Her  is very supportive. SW/NN support as needed. She is here alone today. Call if questions. Follow up in 3 weeks with Cycle 9 (HP only). I personally saw and evaluated the patient and performed the key components of medical decision making. The history, physical exam, and documentation were performed by Selina Flores NP. I reviewed and verified the above documentation and modified it as needed. Specifically pt doing well   On HP  Still waiting for surgery  Systemic therapy plan pending surgery path  LLE edema. Check ultrasound  Labs personally reviewed   Continue with HP     I appreciate the opportunity to participate in Ms. Negrita Edgar care.    Signed By: Abena Pardo DO

## 2022-03-24 NOTE — PROGRESS NOTES
[facility] Chemo Progress Note    Date: March 24, 2022      1000 Ms. Landin Arrived to [facility] for [facility] ambulatory in stable condition. Assessment was completed, edema in both feet, patient states she is seeing cardiology and all doctors are aware. Went to provider appointment with Medical Oncology after injection. Ms. Justen Hartley vitals were reviewed. Visit Vitals  /80 (BP 1 Location: Left upper arm, BP Patient Position: Sitting)   Pulse 87   Temp 97.1 °F (36.2 °C)   Resp 16   SpO2 99%      Medications Administered     pertuzumab 600 mg-trastuzumab 600 mg-hyaluronidase-zzxf 20,000 units/10 mL     Admin Date  03/24/2022 Action  Given Dose  10 mL Route  SubCUTAneous Administered By  Abigail Zhou RN                Given in the L Thigh SQ     1120 Patient tolerated treatment well. Patient was discharged in stable condition. Patient is aware of next scheduled [facility] appointment.     Future Appointments   Date Time Provider Ryan Daly   4/14/2022  8:30 AM F1 RAFI LONG [facility]   4/14/2022  9:00 AM Melina Vergara, MICHELLE [address] BS AMB   4/19/2022  1:00 PM MD DEBBIE Bell BS AMB   5/5/2022  8:30 AM F1 RAFI LONG [address] [facility]   5/26/2022  8:30 AM F1 RAFI LONG [facility]   6/16/2022  8:30 AM F1 RAFI LONG [facility]   7/7/2022  8:30 AM F1 RAFI LONG [facility]   7/18/2022 10:00 AM BONI PEOPLES BS AMB   97/9/8120  8:71 PM Janette Wheeler MD [facility] AMB         Doe Messina RN  March 24, 2022

## 2022-03-24 NOTE — PROGRESS NOTES
Eloy Li is a 61 y.o. female  Chief Complaint   Patient presents with    Chemotherapy    Breast Cancer     1. Have you been to the ER, urgent care clinic since your last visit? Hospitalized since your last visit? No.    2. Have you seen or consulted any other health care providers outside of the [address] [place] [address] since your last visit? Include any pap smears or colon screening.  No.

## 2022-03-30 DIAGNOSIS — C50.912 MALIGNANT NEOPLASM OF LEFT FEMALE BREAST, UNSPECIFIED ESTROGEN RECEPTOR STATUS, UNSPECIFIED SITE OF BREAST (HCC): Primary | ICD-10-CM

## 2022-03-30 DIAGNOSIS — E87.6 HYPOKALEMIA: ICD-10-CM

## 2022-03-30 DIAGNOSIS — C50.912 MALIGNANT NEOPLASM OF LEFT BREAST IN FEMALE, ESTROGEN RECEPTOR NEGATIVE, UNSPECIFIED SITE OF BREAST (HCC): ICD-10-CM

## 2022-03-30 DIAGNOSIS — Z17.1 MALIGNANT NEOPLASM OF LEFT BREAST IN FEMALE, ESTROGEN RECEPTOR NEGATIVE, UNSPECIFIED SITE OF BREAST (HCC): ICD-10-CM

## 2022-03-30 RX ORDER — POTASSIUM CHLORIDE 20 MEQ/1
20 TABLET, EXTENDED RELEASE ORAL DAILY
Qty: 30 TABLET | Refills: 0 | Status: SHIPPED | OUTPATIENT
Start: 2022-03-30 | End: 2022-04-26

## 2022-03-30 NOTE — PROGRESS NOTES
Troponin is low-good result for strength of heart  BNP is middling-fits with some lower extremity edema    The potassium is better at 3.4  Pt saw NP Jodie Lowery recently and reviewed labs and also has note indicating edema is now better so no venous   I see KCL started by Onc, agree with all these plans, keep same follow up  Thanks, Dr Clotilde Hay

## 2022-03-31 DIAGNOSIS — C50.912 MALIGNANT NEOPLASM OF LEFT FEMALE BREAST, UNSPECIFIED ESTROGEN RECEPTOR STATUS, UNSPECIFIED SITE OF BREAST (HCC): Primary | ICD-10-CM

## 2022-04-07 RX ORDER — SODIUM CHLORIDE 9 MG/ML
25 INJECTION, SOLUTION INTRAVENOUS CONTINUOUS
Status: CANCELLED | OUTPATIENT
Start: 2022-04-14

## 2022-04-07 RX ORDER — ACETAMINOPHEN 325 MG/1
650 TABLET ORAL
Status: CANCELLED | OUTPATIENT
Start: 2022-04-14

## 2022-04-07 RX ORDER — DIPHENHYDRAMINE HYDROCHLORIDE 50 MG/ML
50 INJECTION, SOLUTION INTRAMUSCULAR; INTRAVENOUS
Status: CANCELLED | OUTPATIENT
Start: 2022-04-14

## 2022-04-07 RX ORDER — SODIUM CHLORIDE 9 MG/ML
10 INJECTION INTRAMUSCULAR; INTRAVENOUS; SUBCUTANEOUS AS NEEDED
Status: CANCELLED | OUTPATIENT
Start: 2022-04-14

## 2022-04-13 ENCOUNTER — APPOINTMENT (OUTPATIENT)
Dept: INFUSION THERAPY | Age: 57
End: 2022-04-13

## 2022-04-14 ENCOUNTER — HOSPITAL ENCOUNTER (OUTPATIENT)
Dept: INFUSION THERAPY | Age: 57
Discharge: HOME | End: 2022-04-14
Payer: COMMERCIAL

## 2022-04-14 ENCOUNTER — OFFICE VISIT (OUTPATIENT)
Dept: ONCOLOGY | Age: 57
End: 2022-04-14
Payer: COMMERCIAL

## 2022-04-14 VITALS
WEIGHT: 134.8 LBS | SYSTOLIC BLOOD PRESSURE: 115 MMHG | DIASTOLIC BLOOD PRESSURE: 77 MMHG | HEIGHT: 66 IN | TEMPERATURE: 96.8 F | HEART RATE: 90 BPM | OXYGEN SATURATION: 98 % | BODY MASS INDEX: 21.66 KG/M2

## 2022-04-14 VITALS
HEART RATE: 71 BPM | RESPIRATION RATE: 16 BRPM | OXYGEN SATURATION: 99 % | DIASTOLIC BLOOD PRESSURE: 64 MMHG | BODY MASS INDEX: 22.23 KG/M2 | HEIGHT: 66 IN | SYSTOLIC BLOOD PRESSURE: 134 MMHG | TEMPERATURE: 96.8 F

## 2022-04-14 DIAGNOSIS — Z17.1 MALIGNANT NEOPLASM OF LEFT BREAST IN FEMALE, ESTROGEN RECEPTOR NEGATIVE, UNSPECIFIED SITE OF BREAST (HCC): Primary | ICD-10-CM

## 2022-04-14 DIAGNOSIS — Z79.899 ENCOUNTER FOR MONITORING CARDIOTOXIC DRUG THERAPY: ICD-10-CM

## 2022-04-14 DIAGNOSIS — C50.912 MALIGNANT NEOPLASM OF LEFT BREAST IN FEMALE, ESTROGEN RECEPTOR NEGATIVE, UNSPECIFIED SITE OF BREAST (HCC): Primary | ICD-10-CM

## 2022-04-14 DIAGNOSIS — Z51.81 ENCOUNTER FOR MONITORING CARDIOTOXIC DRUG THERAPY: ICD-10-CM

## 2022-04-14 DIAGNOSIS — Z78.0 POST-MENOPAUSAL: ICD-10-CM

## 2022-04-14 LAB
ALBUMIN SERPL-MCNC: 3 G/DL (ref 3.5–5)
ALBUMIN/GLOB SERPL: 0.6 {RATIO} (ref 1.1–2.2)
ALP SERPL-CCNC: 414 U/L (ref 45–117)
ALT SERPL-CCNC: 69 U/L (ref 12–78)
ANION GAP SERPL CALC-SCNC: 4 MMOL/L (ref 5–15)
AST SERPL-CCNC: 73 U/L (ref 15–37)
BASOPHILS # BLD: 0 K/UL (ref 0–0.1)
BASOPHILS NFR BLD: 1 % (ref 0–1)
BILIRUB SERPL-MCNC: 0.3 MG/DL (ref 0.2–1)
BUN SERPL-MCNC: 15 MG/DL (ref 6–20)
BUN/CREAT SERPL: 29 (ref 12–20)
CALCIUM SERPL-MCNC: 9.4 MG/DL (ref 8.5–10.1)
CHLORIDE SERPL-SCNC: 102 MMOL/L (ref 97–108)
CO2 SERPL-SCNC: 29 MMOL/L (ref 21–32)
CREAT SERPL-MCNC: 0.52 MG/DL (ref 0.55–1.02)
DIFFERENTIAL METHOD BLD: ABNORMAL
EOSINOPHIL # BLD: 0.1 K/UL (ref 0–0.4)
EOSINOPHIL NFR BLD: 2 % (ref 0–7)
ERYTHROCYTE [DISTWIDTH] IN BLOOD BY AUTOMATED COUNT: 12.9 % (ref 11.5–14.5)
GLOBULIN SER CALC-MCNC: 5.1 G/DL (ref 2–4)
GLUCOSE SERPL-MCNC: 103 MG/DL (ref 65–100)
HCT VFR BLD AUTO: 29.5 % (ref 35–47)
HGB BLD-MCNC: 9.8 G/DL (ref 11.5–16)
IMM GRANULOCYTES # BLD AUTO: 0 K/UL (ref 0–0.04)
IMM GRANULOCYTES NFR BLD AUTO: 0 % (ref 0–0.5)
LYMPHOCYTES # BLD: 2.4 K/UL (ref 0.8–3.5)
LYMPHOCYTES NFR BLD: 38 % (ref 12–49)
MCH RBC QN AUTO: 33.6 PG (ref 26–34)
MCHC RBC AUTO-ENTMCNC: 33.2 G/DL (ref 30–36.5)
MCV RBC AUTO: 101 FL (ref 80–99)
MONOCYTES # BLD: 0.5 K/UL (ref 0–1)
MONOCYTES NFR BLD: 7 % (ref 5–13)
NEUTS SEG # BLD: 3.3 K/UL (ref 1.8–8)
NEUTS SEG NFR BLD: 52 % (ref 32–75)
NRBC # BLD: 0 K/UL (ref 0–0.01)
NRBC BLD-RTO: 0 PER 100 WBC
PLATELET # BLD AUTO: 245 K/UL (ref 150–400)
PMV BLD AUTO: 8.8 FL (ref 8.9–12.9)
POTASSIUM SERPL-SCNC: 3.6 MMOL/L (ref 3.5–5.1)
PROT SERPL-MCNC: 8.1 G/DL (ref 6.4–8.2)
RBC # BLD AUTO: 2.92 M/UL (ref 3.8–5.2)
SODIUM SERPL-SCNC: 135 MMOL/L (ref 136–145)
WBC # BLD AUTO: 6.3 K/UL (ref 3.6–11)

## 2022-04-14 PROCEDURE — 80053 COMPREHEN METABOLIC PANEL: CPT

## 2022-04-14 PROCEDURE — 74011250636 HC RX REV CODE- 250/636: Performed by: INTERNAL MEDICINE

## 2022-04-14 PROCEDURE — 99214 OFFICE O/P EST MOD 30 MIN: CPT | Performed by: INTERNAL MEDICINE

## 2022-04-14 PROCEDURE — 36415 COLL VENOUS BLD VENIPUNCTURE: CPT

## 2022-04-14 PROCEDURE — 85025 COMPLETE CBC W/AUTO DIFF WBC: CPT

## 2022-04-14 PROCEDURE — 96401 CHEMO ANTI-NEOPL SQ/IM: CPT

## 2022-04-14 RX ORDER — ONDANSETRON 2 MG/ML
8 INJECTION INTRAMUSCULAR; INTRAVENOUS AS NEEDED
Status: ACTIVE | OUTPATIENT
Start: 2022-04-14 | End: 2022-04-14

## 2022-04-14 RX ORDER — SODIUM CHLORIDE 0.9 % (FLUSH) 0.9 %
10 SYRINGE (ML) INJECTION AS NEEDED
Status: DISPENSED | OUTPATIENT
Start: 2022-04-14 | End: 2022-04-14

## 2022-04-14 RX ORDER — HEPARIN 100 UNIT/ML
300-500 SYRINGE INTRAVENOUS AS NEEDED
Status: ACTIVE | OUTPATIENT
Start: 2022-04-14 | End: 2022-04-14

## 2022-04-14 RX ORDER — ALBUTEROL SULFATE 0.83 MG/ML
2.5 SOLUTION RESPIRATORY (INHALATION) AS NEEDED
Status: ACTIVE | OUTPATIENT
Start: 2022-04-14 | End: 2022-04-14

## 2022-04-14 RX ORDER — HYDROCORTISONE SODIUM SUCCINATE 100 MG/2ML
100 INJECTION, POWDER, FOR SOLUTION INTRAMUSCULAR; INTRAVENOUS AS NEEDED
Status: ACTIVE | OUTPATIENT
Start: 2022-04-14 | End: 2022-04-14

## 2022-04-14 RX ORDER — EPINEPHRINE 1 MG/ML
0.3 INJECTION, SOLUTION, CONCENTRATE INTRAVENOUS AS NEEDED
Status: ACTIVE | OUTPATIENT
Start: 2022-04-14 | End: 2022-04-14

## 2022-04-14 RX ORDER — ACETAMINOPHEN 325 MG/1
650 TABLET ORAL AS NEEDED
Status: ACTIVE | OUTPATIENT
Start: 2022-04-14 | End: 2022-04-14

## 2022-04-14 RX ORDER — DIPHENHYDRAMINE HYDROCHLORIDE 50 MG/ML
25 INJECTION, SOLUTION INTRAMUSCULAR; INTRAVENOUS AS NEEDED
Status: ACTIVE | OUTPATIENT
Start: 2022-04-14 | End: 2022-04-14

## 2022-04-14 RX ORDER — DIPHENHYDRAMINE HYDROCHLORIDE 50 MG/ML
50 INJECTION, SOLUTION INTRAMUSCULAR; INTRAVENOUS AS NEEDED
Status: ACTIVE | OUTPATIENT
Start: 2022-04-14 | End: 2022-04-14

## 2022-04-14 RX ADMIN — PERTUZUMAB, TRASTUZUMAB, AND HYALURONIDASE-ZZXF 10 ML: 600; 600; 2000 INJECTION, SOLUTION SUBCUTANEOUS at 09:49

## 2022-04-14 NOTE — PROGRESS NOTES
Denise Pierson is a 61 y.o. female  Chief Complaint   Patient presents with    Follow-up    Breast Cancer     1. Have you been to the ER, urgent care clinic since your last visit? Hospitalized since your last visit? No.  2. Have you seen or consulted any other health care providers outside of the [address] [place] [address] since your last visit? Include any pap smears or colon screening. No.

## 2022-04-14 NOTE — PROGRESS NOTES
[facility] [place] [facility] [place] [address] [address], [address], [place]: [phone]  F: [phone]    Reason for Visit:   Olman Hurtado is a 61 y.o. female seen today in office for follow up of Left Breast Cancer. Treatment History:   · LEFT Breast Biopsy 10/6/21: PATH - Invasive ductal carcinoma, favor grade 3  · ER/KS negative, HER2+  · Breast MRI 10/13/21: RIGHT BREAST: Background parenchymal enhancement: Mild. There is no suspicious masslike or nonmasslike enhancement within the right breast to indicate breast carcinoma. There are no suspicious internal mammary or axillary chain lymph nodes. LEFT BREAST: Background parenchymal enhancement: Mild There is a rounded mass with central fluid attenuation/necrosis in the lateral aspect of the left breast, deep 3rd, measuring approximately 3.3 cm in diameter with enhancement extending along the adjacent dermis suggesting skin invasion. There is nonmasslike enhancement extending both inferior, medial and anterior to the primary mass over approximately 6 x 5.5 x 4 cm. There are discontinuous areas of fluid attenuation associated with the nonmasslike enhancement which may represent cystic spaces and/or necrosis. There is an enlarged, left axillary lymph node with other adjacent, prominent lymph nodes. There is a lymph node node deep to the pectoralis minor muscle which measures 1.1 x 0.7 cm  · Left Axillary LN Biopsy 10/19/21: PATH - Metastatic breast cancer, measuring up to 17 mm in a single core  · ER/KS negative, HER2+  · Neoadjuvant TCHP x 6 cycles from 10/21/21 - 2/10/22  · DR Taxotere to 60 mg/m2 and Carbo to AUC 4 with Cycle 6 due to side effects  · Breast Biopsy 10/27/21: PATH - 5 mm IDC with DCIS  · Breast MRI 2/21/22: Right Breast: No suspicious enhancing foci. No axillary or internal mammary chain lymphadenopathy.  A subclavian ported catheter, implanted in the posterior third of the upper inner quadrant, terminates in appropriate position in the SVC. Left Breast: No residual enhancement in the mass in the posterior third of the outer slightly upper left breast. The mass is no longer measurable. There is a biopsy clip within scarring. No residual enhancement in the previously seen, extensive, non-masslike enhancement in the middle third of the central, lower, left breast. A biopsy clip remains. Axillary flori metastases have significantly decreased in size and are no longer pathologically enlarged. A biopsy clip is within an inferior axillary lymph node  · Maintenance HP (P[place]go) 3/3/22 - Current      STAGE: Clinical 2 ER/[place] negative Her2+    History of Present Illness:   Wang Randhawa is a 61 y.o. female seen today in office for follow up of left breast cancer ER/[place] negative Her2 + post biopsy on neoadjuvant chemo TCHP. She had a port placed on 10/19/21 without issues. She had left axillary LN biopsy on 10/19/21 and path showed metastatic breast cancer, same markers. She started neoadjuvant TCHP chemotherapy on 10/21/21. She had another breast biopsy on 10/27/21 that showed 5 mm IDC with DCIS. She saw Dr. Avani Del Castillo on [id number] and breast mass was responding well to chemo. She finished 6 cycles of TCHP on 2/10/22. She had a post chemo Breast MRI on 2/21/22 that showed no residual enhancement, axillary flori metastases have significantly decreased in size and are no longer pathologically enlarged. She started maintenance HP (Phesgo) on 3/3/22. She saw Plastics, Dr. Jailene Sexton and plans is for lumpectomy/reduction on 4/21/22. She had a follow up ECHO on 3/11/22 per Cardio and EF was 60% and GLS was normal.    She is here today for Cycle 9 (third dose of maintenance subQ HP). She reports that she feels better overall today. Chemo side effects are improving but taste is still a little impaired. She is tolerating HP well overall thus far. Her appetite and energy levels are slowly improving.  She still has some mild neuropathy in toes on bilateral feet (left worse than right) and fingertips that is stable/unchanfed. She denies fever, chills, mouth sores, cough, SOB, CP, nausea, vomiting, diarrhea, and constipation. She denies pain today. CBC and CMP reviewed today. She is having surgery on 4/21/22. She is here alone today. Past Medical History:   Diagnosis Date    Malignant neoplasm of left breast in female, estrogen receptor negative ([facility] [address] 10/13/2021      No past surgical history on file. Social History     Tobacco Use    Smoking status: Never Smoker    Smokeless tobacco: Never Used   Substance Use Topics    Alcohol use: Never      No family history on file. Current Outpatient Medications   Medication Sig    potassium chloride (K-DUR, KLOR-CON M20) 20 mEq tablet TAKE 1 TABLET BY MOUTH DAILY    folic acid/multivit-min/lutein (CENTRUM SILVER PO) Take  by mouth.  cholecalciferol, vitamin D3, (VITAMIN D3 PO) Take  by mouth daily.  ALPRAZolam (XANAX) 0.5 mg tablet Take 1 Tablet by mouth three (3) times daily as needed for Anxiety. Max Daily Amount: 1.5 mg. 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      Facility-Administered Medications Ordered in Other Visits   Medication Dose Route Frequency    pertuzumab 600 mg-trastuzumab 600 mg-hyaluronidase-zzxf 20,000 units/10 mL  10 mL SubCUTAneous ONCE    sodium chloride (NS) flush 10 mL  10 mL IntraVENous PRN    heparin (porcine) pf 300-500 Units  300-500 Units InterCATHeter PRN    sodium chloride 0.9 % bolus infusion 500 mL  500 mL IntraVENous PRN    diphenhydrAMINE (BENADRYL) injection 25 mg  25 mg IntraVENous PRN    famotidine (PF) (PEPCID) 20 mg in 0.9% sodium chloride 10 mL injection  20 mg IntraVENous PRN    acetaminophen (TYLENOL) tablet 650 mg  650 mg Oral PRN    meperidine (DEMEROL) injection 25 mg  25 mg IntraVENous PRN    ondansetron (ZOFRAN) injection 8 mg  8 mg IntraVENous PRN    sodium chloride 0.9 % bolus infusion 500 mL  500 mL IntraVENous PRN    EPINEPHrine HCl (PF) (ADRENALIN) 1 mg/mL (1 mL) injection 0.3 mg  0.3 mg SubCUTAneous PRN    hydrocortisone Sod Succ (PF) (SOLU-CORTEF) injection 100 mg  100 mg IntraVENous PRN    diphenhydrAMINE (BENADRYL) injection 50 mg  50 mg IntraVENous PRN    albuterol (PROVENTIL VENTOLIN) nebulizer solution 2.5 mg  2.5 mg Inhalation PRN      No Known Allergies     Review of Systems:  A complete review of systems was obtained, negative except as described above and as reported on ROS sheet scanned into system. Physical Exam:     Visit Vitals  /77 (BP 1 Location: Left upper arm, BP Patient Position: Sitting)   Pulse 90   Temp 96.8 °F (36 °C) (Temporal)   Ht 5' 6\" (1.676 m)   Wt 134 lb 12.8 oz (61.1 kg)   SpO2 98%   BMI 21.76 kg/m²     ECOG PS: 0  General: No distress  Eyes: Anicteric sclerae  HENT: Atraumatic, wearing a mask  Neck: Supple  Respiratory: CTAB, normal respiratory effort  CV: Normal rate, regular rhythm, no murmurs. LLE 2+ edema  GI: Soft, nontender, non-distended   MS: Normal gait and station. Skin: No rashes, ecchymoses, or petechiae. Normal temperature, turgor, and texture. Port site without redness/swelling  Psych: Alert, oriented, appropriate affect, normal judgment/insight  Neuro: Non focal    Results:     Lab Results   Component Value Date/Time    WBC 6.3 04/14/2022 08:30 AM    HGB 9.8 (L) 04/14/2022 08:30 AM    HCT 29.5 (L) 04/14/2022 08:30 AM    PLATELET 473 07/18/0846 08:30 AM    .0 (H) 04/14/2022 08:30 AM    ABS.  NEUTROPHILS 3.3 04/14/2022 08:30 AM     Lab Results   Component Value Date/Time    Sodium 135 (L) 04/14/2022 08:30 AM    Potassium 3.6 04/14/2022 08:30 AM    Chloride 102 04/14/2022 08:30 AM    CO2 29 04/14/2022 08:30 AM    Glucose 103 (H) 04/14/2022 08:30 AM    BUN 15 04/14/2022 08:30 AM    Creatinine 0.52 (L) 04/14/2022 08:30 AM    GFR est AA >60 04/14/2022 08:30 AM    GFR est non-AA >60 04/14/2022 08:30 AM    Calcium 9.4 04/14/2022 08:30 AM     Lab Results   Component Value Date/Time    Bilirubin, total 0.3 04/14/2022 08:30 AM    ALT (SGPT) 69 04/14/2022 08:30 AM    Alk. phosphatase 414 (H) 04/14/2022 08:30 AM    Protein, total 8.1 04/14/2022 08:30 AM    Albumin 3.0 (L) 04/14/2022 08:30 AM    Globulin 5.1 (H) 04/14/2022 08:30 AM     10/6/21  FINAL PATHOLOGIC DIAGNOSIS   Breast, left mass, core biopsy:   Invasive ductal carcinoma, favor grade 3 (see synoptic table)   INVASIVE CARCINOMA OF THE BREAST: Biopsy   TUMOR   Tumor Site: Clock position - 3 o'clock   Histologic Type: Invasive carcinoma of no special type (ductal)   Glandular (Acinar) / Tubular Differentiation: Score 3   Nuclear Pleomorphism: Score 3   Mitotic Rate: Score 3   Overall Grade: Grade 3 (scores of 8 or 9)   Tumor Size: 13 mm   Ductal Carcinoma In Situ (DCIS): Not identified   Lymphovascular Invasion: Not identified   Microcalcifications: Not identified     Breast MRI 10/13/21  IMPRESSION:  RIGHT BREAST:  1. BI-RADS Category 1 - Negative. Enrique Rich LEFT BREAST:  1. Rounded mass with dermal extension measuring approximately 3.3 cm in diameter  in the lateral, deep 3rd of the left breast. There are areas of fluid/necrosis  within this mass. BI-RADS Category 6 - Known biopsy-proven malignancy. 2. Nonmasslike enhancement in the left breast which is inferior, medial and  anterior to the primary mass. This nonmasslike enhancement extends over  approximately 6 x 5.5 x 4 cm. There are areas of fluid attenuation which could  represent cysts and/or necrosis within this nonmasslike enhancement. BI-RADS Category 5 - Highly suggestive of malignancy. 3. There is axillary adenopathy, including a lymph node deep to pectoralis  minor.   BI-RADS Category 5 - Highly suggestive of malignancy    10/19/21  FINAL PATHOLOGIC DIAGNOSIS   Lymph node, left axillary, core biopsy:   Metastatic breast cancer, measuring up to 17 mm in a single core 10/21/21    ECHO ADULT COMPLETE 10/21/2021 10/21/2021    Interpretation Summary  · LV: Estimated LVEF is 55 - 60%. Normal cavity size, wall thickness and systolic function (ejection fraction normal). Wall motion: normal. Abnormal left ventricular strain. Global longitudinal strain is -15.4%. · TV: Pulmonary hypertension not suggested by Doppler findings. 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 No residual enhancement in multicentric, multifocal left breast carcinoma. 2. Significantly decreased left axillary flori metastases. Left axillary lymph  nodes are no longer pathologically enlarged. 3. No suspicious right breast enhancement or lymphadenopathy. 4. BI-RADS Assessment Category 6: Known biopsy proven malignancy. 03/11/22    ECHO ADULT FOLLOW-UP OR LIMITED 03/15/2022 3/15/2022    Interpretation Summary    Left Ventricle: Left ventricle size is normal. Increased wall thickness. Normal wall motion. Normal left ventricular systolic function. EF by 2D Simpsons Biplane is 60%. Global longitudinal strain is normal with a value of -22.3%. Normal diastolic function. Signed by: Gretchen Banks MD on 3/15/2022 10:36 AM    Records reviewed and summarized above. Pathology report(s) reviewed above. Radiology report(s) reviewed above. Assessment:/PLAN     1) Clinical Stage 2B T3N0 LEFT Breast Cancer ER/MT negative Her2+  post Breast Biopsy Only 10/21  Records and history reviewed. Reviewed path and data. Discussed dx, stage and treatment of breast cancer. Discussed neoadjuvant and adjuvant chemo. Discussed no hormonal therapy options. Surgery prefers neoadjuvant chemo.  Not sure about type of surgery yet. Discussed chemo options. Discussed clinical trial options. Patient does not want to do clinical trial.   Decided on neoadjuvant TCHP. Teaching and consent with pharmacy. MRI Breast 10/13/21 showed a rounded mass with central fluid attenuation/necrosis in the lateral aspect of the left breast, deep 3rd, measuring approximately 3.3 cm in diameter with enhancement extending along the adjacent dermis suggesting skin invasion. There is nonmasslike enhancement extending both inferior, medial and anterior to the primary mass over approximately 6 x 5.5 x 4 cm. There are discontinuous areas of fluid attenuation associated with the nonmasslike enhancement which may represent cystic spaces and/or necrosis. There is an enlarged, left axillary lymph node with other adjacent, prominent lymph nodes. There is a lymph node node deep to the pectoralis minor muscle which measures 1.1 x 0.7 cm  She had a left axillary LN biopsy on 10/19/21 and path showed metastatic beast cancer. She had a port placed on 10/19/21 with no issues. Pre chemo ECHO showed EF 55-60% with abnormal left ventricular strain. Referred to Cardiology for further evaluation - has follow up ECHO on 12/6/21 per Cardio. Patient started neoadjuvant TCHP chemotherapy on 10/25/21. She completed 6 cycles of TCHP on 2/10/22. DR Taxotere to 60 mg/m2 and Carbo to AUC 4 with Cycle 6 due to side effects. She had a post chemo Breast MRI on 2/21/22 that showed no residual enhancement in multicentric, multifocal left breast carcinoma, significantly decreased left axillary flori metastases, left axillary lymph nodes are no longer pathologically enlarged, and no suspicious right breast enhancement or lymphadenopathy. Personally reviewed MRI. Discussed MRI with patient. She started maintenance HP on 3/3/22. She had a follow up ECHO on 3/11/22 per Cardio and EF was 60%.     Patient is here today for Cycle 9 (third cycle of maintenance HP).  Chemo side effects are improving. She is tolerating HP well overall. She is clinically healthy overall and stable today. Plan is for lumpectomy/reduction then XRT. She has seen Plastics, Dr. Daryl Price is scheduled for 4/21/22. Labs (CBC and CMP) reviewed today. Follow up in 3 weeks with Cycle 10 (HP only). Patient agrees with plan. 2)  Postmenopausal  Continue routine GYN follow up. 3) Management of High Risk Medications - Chemotherapy  Toxicities from chemo included Grade 1 Nausea,  Grade 1 Neuropathy, Grade 1 Fatigue, Grade 1 Decreased appetite. DR Taxotere to 60 mg/m2 and Carbo to AUC 4 with Cycle 6. Side effect management reviewed today. Chemo side effects improving. Pre chemo ECHO from 10/21/21 reviewed - EF 55-60% with abnormal left ventricular strain. Referred to Cardiology for further evaluation/management of strain. She had a follow up ECHO on 12/6/21 per Cardiology - EF 58% with no strain. She has a follow up ECHO on 3/11/22 per Cardiology - EF 60%, GLS normal.   Labs (CBC and CMP) from 3/3/22 reviewed today. She had labs in [facility] on 3/15/22 per Cardiology. Continue ECHOs every 3 months. Will monitor for side effects. 4) Psychosocial  Mood good, coping well. 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 Her  is very supportive. SW/NN support as needed. She is here alone today. Call if questions. Follow up in 3 weeks with Cycle 10 (HP only). I personally saw and evaluated the patient and performed the key components of medical decision making. The history, physical exam, and documentation were performed by Annette Phillip NP. I reviewed and verified the above documentation and modified it as needed. Specifically pt doing well   On HP and tolerating fine. For breast surgery next week.    Systemic therapy plan pending surgery path  Labs personally reviewed   Continue with HP    Fu post surgery    I appreciate the opportunity to participate in Ms. Harwood Ganser White's care.     Signed By: Tiff Lara DO

## 2022-04-14 NOTE — PROGRESS NOTES
[facility] Chemo Progress Note  0815 Ms. Landin Arrived to [facility] for [facility] (C9) ambulatory in stable condition. Assessment was completed, no acute issues at this time, no new complaints voiced. Labs drawn and sent for processing. Went to provider appointment with Medical Oncology. Chemotherapy Flowsheet 4/14/2022   Cycle C9   Date 4/14/2022   Drug / Regimen Phesgo   Pre Meds -   Notes given in R thigh       Ms. Landin's vitals were reviewed. Patient Vitals for the past 12 hrs:   Temp Pulse Resp BP SpO2   04/14/22 0958 -- 71 -- 134/64 --   04/14/22 0817 96.8 °F (36 °C) 86 16 115/77 99 %     Lab results were obtained and reviewed. Recent Results (from the past 12 hour(s))   CBC WITH AUTOMATED DIFF    Collection Time: 04/14/22  8:30 AM   Result Value Ref Range    WBC 6.3 3.6 - 11.0 K/uL    RBC 2.92 (L) 3.80 - 5.20 M/uL    HGB 9.8 (L) 11.5 - 16.0 g/dL    HCT 29.5 (L) 35.0 - 47.0 %    .0 (H) 80.0 - 99.0 FL    MCH 33.6 26.0 - 34.0 PG    MCHC 33.2 30.0 - 36.5 g/dL    RDW 12.9 11.5 - 14.5 %    PLATELET 941 405 - 480 K/uL    MPV 8.8 (L) 8.9 - 12.9 FL    NRBC 0.0 0  WBC    ABSOLUTE NRBC 0.00 0.00 - 0.01 K/uL    NEUTROPHILS 52 32 - 75 %    LYMPHOCYTES 38 12 - 49 %    MONOCYTES 7 5 - 13 %    EOSINOPHILS 2 0 - 7 %    BASOPHILS 1 0 - 1 %    IMMATURE GRANULOCYTES 0 0.0 - 0.5 %    ABS. NEUTROPHILS 3.3 1.8 - 8.0 K/UL    ABS. LYMPHOCYTES 2.4 0.8 - 3.5 K/UL    ABS. MONOCYTES 0.5 0.0 - 1.0 K/UL    ABS. EOSINOPHILS 0.1 0.0 - 0.4 K/UL    ABS. BASOPHILS 0.0 0.0 - 0.1 K/UL    ABS. IMM.  GRANS. 0.0 0.00 - 0.04 K/UL    DF AUTOMATED     METABOLIC PANEL, COMPREHENSIVE    Collection Time: 04/14/22  8:30 AM   Result Value Ref Range    Sodium 135 (L) 136 - 145 mmol/L    Potassium 3.6 3.5 - 5.1 mmol/L    Chloride 102 97 - 108 mmol/L    CO2 29 21 - 32 mmol/L    Anion gap 4 (L) 5 - 15 mmol/L    Glucose 103 (H) 65 - 100 mg/dL    BUN 15 6 - 20 MG/DL    Creatinine 0.52 (L) 0.55 - 1.02 MG/DL    BUN/Creatinine ratio 29 (H) 12 - 20      GFR est AA >60 >60 ml/min/1.73m2    GFR est non-AA >60 >60 ml/min/1.73m2    Calcium 9.4 8.5 - 10.1 MG/DL    Bilirubin, total 0.3 0.2 - 1.0 MG/DL    ALT (SGPT) 69 12 - 78 U/L    AST (SGOT) 73 (H) 15 - 37 U/L    Alk. phosphatase 414 (H) 45 - 117 U/L    Protein, total 8.1 6.4 - 8.2 g/dL    Albumin 3.0 (L) 3.5 - 5.0 g/dL    Globulin 5.1 (H) 2.0 - 4.0 g/dL    A-G Ratio 0.6 (L) 1.1 - 2.2         Pre-medications  were administered as ordered and chemotherapy was initiated. Medications Administered     pertuzumab 600 mg-trastuzumab 600 mg-hyaluronidase-zzxf 20,000 units/10 mL     Admin Date  04/14/2022 Action  Given Dose  10 mL Route  SubCUTAneous Administered By  Hannah Orozco RN              1000 Patient tolerated treatment well. Patient was discharged from [facility] in stable condition. Patient aware of next appointment. Future Appointments   Date Time Provider Ryan Daly   4/15/2022 10:30 AM [facility] PAT EXAM RM 1 [facility]   4/19/2022  1:00 PM MD DEBBIE Miner BS AMB   4/21/2022 10:30 AM [facility] NM RM 3 [facility]   5/24/2708 52:85 AM Alexus Murillo MD [facility] BS AMB   5/5/2022  8:30 AM F1 RAFI LONG [address] [facility]   5/5/2022  8:45 AM Davyyl Sox,  [address] BS AMB   5/26/2022  8:30 AM F1 RAFI LONG [place] [facility]   6/16/2022  8:30 AM F1 RAFI LONG [facility]   7/7/2022  8:30 AM F1 RAFI LONG [place] [facility]   7/18/2022 10:00 AM BONI PEOPLES BS AMB   92/3/9547  3:85 PM Milad Jonas., MD [facility] BS AMB         Yuliya Kruse, CHRISTOPHER  April 14, 2022

## 2022-04-15 ENCOUNTER — HOSPITAL ENCOUNTER (OUTPATIENT)
Dept: PREADMISSION TESTING | Age: 57
Discharge: HOME | End: 2022-04-15

## 2022-04-15 VITALS
WEIGHT: 136.47 LBS | DIASTOLIC BLOOD PRESSURE: 94 MMHG | HEIGHT: 66 IN | BODY MASS INDEX: 21.93 KG/M2 | SYSTOLIC BLOOD PRESSURE: 143 MMHG | HEART RATE: 89 BPM | TEMPERATURE: 98.2 F

## 2022-04-15 DIAGNOSIS — R93.1 ABNORMAL ECHOCARDIOGRAM: ICD-10-CM

## 2022-04-15 DIAGNOSIS — Z51.81 ENCOUNTER FOR MONITORING CARDIOTOXIC DRUG THERAPY: ICD-10-CM

## 2022-04-15 DIAGNOSIS — Z79.899 ENCOUNTER FOR MONITORING CARDIOTOXIC DRUG THERAPY: ICD-10-CM

## 2022-04-15 DIAGNOSIS — Z17.1 MALIGNANT NEOPLASM OF LEFT BREAST IN FEMALE, ESTROGEN RECEPTOR NEGATIVE, UNSPECIFIED SITE OF BREAST (HCC): ICD-10-CM

## 2022-04-15 DIAGNOSIS — C50.912 MALIGNANT NEOPLASM OF LEFT BREAST IN FEMALE, ESTROGEN RECEPTOR NEGATIVE, UNSPECIFIED SITE OF BREAST (HCC): ICD-10-CM

## 2022-04-15 LAB
ANION GAP SERPL CALC-SCNC: 8 MMOL/L (ref 5–15)
BUN SERPL-MCNC: 16 MG/DL (ref 6–20)
BUN/CREAT SERPL: 30 (ref 12–20)
CALCIUM SERPL-MCNC: 9.5 MG/DL (ref 8.5–10.1)
CHLORIDE SERPL-SCNC: 101 MMOL/L (ref 97–108)
CO2 SERPL-SCNC: 27 MMOL/L (ref 21–32)
CREAT SERPL-MCNC: 0.54 MG/DL (ref 0.55–1.02)
GLUCOSE SERPL-MCNC: 92 MG/DL (ref 65–100)
POTASSIUM SERPL-SCNC: 3.7 MMOL/L (ref 3.5–5.1)
SODIUM SERPL-SCNC: 136 MMOL/L (ref 136–145)

## 2022-04-15 NOTE — PERIOP NOTES
[address] INSTRUCTIONS    Surgery Date:   4/21/22    [facility] staff will call you between 4 PM- 8 PM the day before surgery with your arrival time. If your surgery is on a Monday, we will call you the preceding Friday. Please call [phone] after 8 PM if you did not receive your arrival time. 1. Please report to [facility] Patient Access/Admitting on the 1st floor. Bring your insurance card, photo identification, and any copayment ( if applicable). 2. If you are going home the same day of your surgery, you must have a responsible adult to drive you home. You need to have a responsible adult to stay with you the first 24 hours after surgery and you should not drive a car for 24 hours following your surgery. 3. Do NOT eat any solid foods after midnight the night before surgery including candy, mint or gum. You may drink clear liquids from midnight until 1 hour prior to your arrival. You may drink up to 12 ounces at one time every 4 hours. Please note special instructions, if applicable, below for medications. 4. Do NOT drink alcohol or smoke 24 hours before surgery. STOP smoking for 14 days prior as it helps with breathing and healing after surgery. 5. If you are being admitted to the hospital, please leave personal belongings/luggage in your car until you have an assigned hospital room number. 6. Please wear comfortable clothes. Wear your glasses instead of contacts. We ask that all money, jewelry and valuables be left at home. Wear no make up, particularly mascara, the day of surgery. 7.  All body piercings, rings, and jewelry need to be removed and left at home. Please remove any nail polish or artifical nails from your fingernails. Please wear your hair loose or down. Please no pony-tails, buns, or any metal hair accessories. If you shower the morning of surgery, please do not apply any lotions or powders afterwards. You may wear deodorant, unless having breast surgery.   Do not shave any body area within 24 hours of your surgery. 8. Please follow all instructions to avoid any potential surgical cancellation. 9. Should your physical condition change, (i.e. fever, cold, flu, etc.) please notify your surgeon as soon as possible. 10. It is important to be on time. If a situation occurs where you may be delayed, please call:  [phone] / [phone] on the day of surgery. 11. The Preadmission Testing staff can be reached at [phone]. 12. Special instructions: NONE      Current Outpatient Medications   Medication Sig    potassium chloride (K-DUR, KLOR-CON M20) 20 mEq tablet TAKE 1 TABLET BY MOUTH DAILY    folic acid/multivit-min/lutein (CENTRUM SILVER PO) Take 1 Tablet by mouth daily.  cholecalciferol, vitamin D3, (VITAMIN D3 PO) Take 1 Tablet by mouth daily.  ALPRAZolam (XANAX) 0.5 mg tablet Take 1 Tablet by mouth three (3) times daily as needed for Anxiety. Max Daily Amount: 1.5 mg. 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 1. YOU MUST ONLY TAKE THESE MEDICATIONS THE MORNING OF SURGERY WITH A SIP OF WATER: POTASSIUM  2. MEDICATIONS TO TAKE THE MORNING OF SURGERY ONLY IF NEEDED: XANAX  3. HOLD these prescription medications BEFORE Surgery: NONE  4. Ask your surgeon/prescribing physician about when/if to STOP taking these medications: NONE  5. Stop all vitamins, herbal medicines and Aspirin containing products 7 days prior to surgery. Stop any non-steroidal anti-inflammatory drugs (i.e. Ibuprofen, Naproxen, Advil, Aleve) 3 days before surgery. You may take Tylenol. 6. If you are currently taking Plavix, Coumadin,or any other blood-thinning/anticoagulant medication contact your prescribing physician for instructions. Eating and Drinking Before Surgery     You may eat a regular dinner at the usual time on the day before your surgery.    Do NOT eat any solid foods after midnight unless your arrival time at the hospital is 3pm or later.  You may drink clear liquids only from 12 midnight until 1 hours prior to your arrival time at the hospital on the day of your surgery. Do NOT drink alcohol.  Clear liquids include:  o Water  o Fruit juices without pulp( i.e. apple juice)  o Carbonated beverages  o Black coffee (no cream/milk)  o Tea (no cream/milk)  o Gatorade   You may drink up to 12-16 ounces at one time every 4 hours between the hours of midnight and 1 hour before your arrival time at the hospital. Example- if your arrival time at the hospital is 6am, you may drink 12-16 ounces of clear liquids no later than 5am.   If your arrival time at the hospital is 3pm or later, you may eat a light breakfast before 8am.   A light breakfast includes:  o Toast or bagel (no butter)  o Black coffee (no cream/milk)  o Tea (no cream/milk)  o Fruit juices without pulp ( i.e. apple juice)  o Do NOT eat meat, eggs, vegetables or fruit   If you have any questions, please contact your surgeon's office. Preventing Infections Before and After - Your Surgery    IMPORTANT INSTRUCTIONS    You play an important role in your health and preparation for surgery. To reduce the germs on your skin you will need to shower with CHG soap (Chorhexidine gluconate 4%) two times before surgery. CHG soap (Hibiclens, Hex-A-Clens or store brand)   CHG soap will be provided at your Preadmission Testing (PAT) appointment.  If you do not have a PAT appointment before surgery, you may arrange to  CHG soap from our office or purchase CHG soap at a pharmacy, grocery or department store.  You need to purchase TWO 4 ounce bottles to use for your 2 showers. Steps to follow:  1. Wash your hair with your normal shampoo and your body with regular soap and rinse well to remove shampoo and soap from your skin. 2. Wet a clean washcloth and turn off the shower.   3. Put CHG soap on washcloth and apply to your entire body from the neck down. Do not use on your head, face or private parts(genitals). Do not use CHG soap on open sores, wounds or areas of skin irritation. 4. Wash you body gently for 5 minutes. Do not wash your skin too hard. This soap does not create lather. Pay special attention to your underarms and from your belly button to your feet. 5. Turn the shower back on and rinse well to get CHG soap off your body. 6. Pat your skin dry with a clean, dry towel. Do not apply lotions or moisturizer. 7. Put on clean clothes and sleep on fresh bed sheets and do not allow pets to sleep with you. Shower with CHG soap 2 times before your surgery   The evening before your surgery   The morning of your surgery      Tips to help prevent infections after your surgery:  1. Protect your surgical wound from germs:  ? Hand washing is the most important thing you and your caregivers can do to prevent infections. ? Keep your bandage clean and dry! ? Do not touch your surgical wound. 2. Use clean, freshly washed towels and washcloths every time you shower; do not share bath linens with others. 3. Until your surgical wound is healed, wear clothing and sleep on bed linens each day that are clean and freshly washed. 4. Do not allow pets to sleep in your bed with you or touch your surgical wound. 5. Do not smoke - smoking delays wound healing. This may be a good time to stop smoking. 6. If you have diabetes, it is important for you to manage your blood sugar levels properly before your surgery as well as after your surgery. Poorly managed blood sugar levels slow down wound healing and prevent you from healing completely. Patient Information Regarding COVID Restrictions      Day of Procedure     Please park in the parking deck or any designated visitor parking lot.    Enter the facility through the Main Entrance of the hospital.   On the day of surgery, please provide the cell phone number of the person who will be waiting for you to the Patient Access representative at the time of registration.  Please wear a mask on the day of your procedure.  We are now allowing two designated visitors per stay. Pediatric patients may have 2 designated visitors. These two people may come in with you on the day of your procedure.  No visitors under the age of 13.  The designated visitor must also wear a mask.  Once your procedure and the immediate recovery period is completed, a nurse in the recovery area will contact your designated visitor to inform them of your room number or to otherwise review other pertinent information regarding your care.  Social distancing practices are to be adhered to in waiting areas and the cafeteria. The patient was contacted  in person. She verbalized understanding of all instructions does not  need reinforcement.

## 2022-04-18 NOTE — PERIOP NOTES
CC MSG SENT TO NURSES AT [facility] RE: ABNORMAL LABS:      JUST WANTED TO LET YOU KNOW ABOUT THE FOLLOWING ABNORMAL LAB VALUES:    HGB  9.8 (PER OUR PROTOCOL, TYPE & SCREEN ORDERED FOR DOS)    HCT  29.5    AST   73    ALK.  PHOSPHATASE    414

## 2022-04-19 ENCOUNTER — OFFICE VISIT (OUTPATIENT)
Dept: CARDIOLOGY CLINIC | Age: 57
End: 2022-04-19
Payer: COMMERCIAL

## 2022-04-19 VITALS
SYSTOLIC BLOOD PRESSURE: 100 MMHG | OXYGEN SATURATION: 97 % | DIASTOLIC BLOOD PRESSURE: 70 MMHG | HEART RATE: 80 BPM | HEIGHT: 66 IN | RESPIRATION RATE: 16 BRPM | WEIGHT: 136 LBS | BODY MASS INDEX: 21.86 KG/M2

## 2022-04-19 DIAGNOSIS — Z51.81 ENCOUNTER FOR MONITORING CARDIOTOXIC DRUG THERAPY: Primary | ICD-10-CM

## 2022-04-19 DIAGNOSIS — E87.6 HYPOKALEMIA: ICD-10-CM

## 2022-04-19 DIAGNOSIS — C50.912 MALIGNANT NEOPLASM OF LEFT BREAST IN FEMALE, ESTROGEN RECEPTOR NEGATIVE, UNSPECIFIED SITE OF BREAST (HCC): ICD-10-CM

## 2022-04-19 DIAGNOSIS — R93.1 ABNORMAL ECHOCARDIOGRAM: ICD-10-CM

## 2022-04-19 DIAGNOSIS — Z79.899 ENCOUNTER FOR MONITORING CARDIOTOXIC DRUG THERAPY: Primary | ICD-10-CM

## 2022-04-19 DIAGNOSIS — Z17.1 MALIGNANT NEOPLASM OF LEFT BREAST IN FEMALE, ESTROGEN RECEPTOR NEGATIVE, UNSPECIFIED SITE OF BREAST (HCC): ICD-10-CM

## 2022-04-19 PROCEDURE — 99213 OFFICE O/P EST LOW 20 MIN: CPT | Performed by: SPECIALIST

## 2022-04-19 NOTE — PROGRESS NOTES
Dottie Landin     1965       Ludin Bautista Preshector MOREIRA, [facility] [place] [facility]  Date of Visit-4/19/2022   PCP is None   [facility] [place]  [facility]  HPI:  Chad Chamorro is a 61 y.o. female   1 month f/u. Pt had pre-chemo echo with an EF of 55-60%. The GLS was -15. She was to start on [facility] which includes herceptin. She has been treated for breast cancer.      Fu echo in December with GLS of -20.3 and remains with normal EF     Echo in March showed EF 60%, her GLS remains normal at -22. She has had an MRI of the breast In which results reviewed. She saw med oncology on 2/10/22 and will start cycle 7, she is also seeing Dr. Trey Lundy. Today. .. She had had some LE edema, low albumin and K. Blood work now shows improvement to 3.7 and the sodium has normalized to 136. Had pro-BNP and troponin. BNP was 317, and troponin ws low. Overall the pt states she is doing well. Pt states that she has felt much better and believes it is from coming out of chemo. She reports that she has stopped eating significant amounts of meat and has felt better. Pt is done with chemo and is having surgery on Thursday. Denies chest pain, edema, syncope or shortness of breath at rest, has no tachycardia, palpitations or sense of arrhythmia. Assessment/Plan:     Patient Instructions   We will see you back in 4 months with a limited echocardiogram.       1. Encounter for monitoring cardiotoxic drug therapy  Troponin normal, BNP slightly elevated. Other labs now normal. Out from chemo and increased protein intake. Stable. her GLS had been slightly down at -15, has come up to -22.     2. Abnormal echocardiogram  LE edema resolved as has lab work improved. Not had to use diuretic.   03/11/22    ECHO ADULT FOLLOW-UP OR LIMITED 03/15/2022 3/15/2022    Interpretation Summary    Left Ventricle: Left ventricle size is normal. Increased wall thickness. Normal wall motion.  Normal left ventricular systolic function. EF by 2D Simpsons Biplane is 60%. Global longitudinal strain is normal with a value of -22.3%. Normal diastolic function. Signed by: Lili Ramírez MD on 3/15/2022 10:36 AM      3. Malignant neoplasm of left breast in female, estrogen receptor negative, unspecified site of breast ([facility] [address]    Completed chemo, and will now have lumpectomy and surgery. 4. Hypokalemia  K was 2.3. F/u 3-4 months with echo. Lab Results   Component Value Date/Time    Potassium 3.7 04/15/2022 11:38 AM        F/u in 4 months     Impression:   1. Encounter for monitoring cardiotoxic drug therapy    2. Abnormal echocardiogram    3. Malignant neoplasm of left breast in female, estrogen receptor negative, unspecified site of breast ([facility] [address]    4. Hypokalemia       Cardiac History:   No specialty comments available. Future Appointments   Date Time Provider Ryan Daly   4/21/2022 10:30 AM [facility] NM RM 3 [facility]   1/30/9667  3:13 PM Jessica Harp MD [facility] BS AMB   5/5/2022  8:30 AM F1 RAFI LONG [address] [facility] H   5/5/2022  8:45 AM Shine Glez  [address] BS AMB   8/94/0089 43:60 AM Jessica Harp MD [facility] BS AMB   5/26/2022  8:30 AM F1 RAFI LONG [facility]   6/16/2022  8:30 AM F1 RAFI LONG [facility]   7/7/2022  8:30 AM F1 RAFI LONG [facility]   7/18/2022 10:00 AM ECHO, BONI LEWIS BS AMB   06/0/9287  6:43 PM Jessica Harp MD [facility] BS AMB        Patient Care Team:  None as PCP - General  Lisa Sever, DO (Hematology and Oncology)  Lili Ramírez MD (Cardio Vascular Surgery)    ROS-except as noted above. . A complete cardiac and respiratory are reviewed and negative except as above ; Resp-denies wheezing  or productive cough,.  Const- No unusual weight loss or fever; Neuro-no recent seizure or CVA ; GI- No BRBPR, abdom pain, bloating ; - no  hematuria   see supplement sheet, initialed and to be scanned by staff  Past Medical History:   Diagnosis Date    Malignant neoplasm of left breast in female, estrogen receptor negative ([facility] [address] 10/13/2021      Social Hx= reports that she has never smoked. She has never used smokeless tobacco. She reports previous alcohol use. She reports that she does not use drugs. Exam and Labs:  /70   Pulse 80   Resp 16   Ht 5' 6\" (1.676 m)   Wt 136 lb (61.7 kg)   SpO2 97%   BMI 21.95 kg/m² Constitutional:  NAD, comfortable  Head: NC,AT. Eyes: No scleral icterus. Neck:  Neck supple. No JVD present. Throat: moist mucous membranes. Chest: Effort normal & normal respiratory excursion . Neurological: alert, conversant and oriented . Skin: Skin is not cold. No obvious systemic rash noted. Not diaphoretic. No erythema. Psychiatric:  Grossly normal mood and affect. Behavior appears normal. Extremities:  no clubbing or cyanosis. Abdomen: non distended    Lungs:breath sounds normal. No stridor. distress, wheezes or  Rales. Heart: normal rate, regular rhythm, normal S1, S2, no murmurs, rubs, clicks or gallops , PMI non displaced. Edema: Edema is none.   No results found for: CHOL, CHOLX, CHLST, CHOLV, HDL, HDLP, LDL, LDLC, DLDLP, TGLX, TRIGL, TRIGP, CHHD, CHHDX  Lab Results   Component Value Date/Time    Sodium 136 04/15/2022 11:38 AM    Potassium 3.7 04/15/2022 11:38 AM    Chloride 101 04/15/2022 11:38 AM    CO2 27 04/15/2022 11:38 AM    Anion gap 8 04/15/2022 11:38 AM    Glucose 92 04/15/2022 11:38 AM    BUN 16 04/15/2022 11:38 AM    Creatinine 0.54 (L) 04/15/2022 11:38 AM    BUN/Creatinine ratio 30 (H) 04/15/2022 11:38 AM    GFR est AA >60 04/15/2022 11:38 AM    GFR est non-AA >60 04/15/2022 11:38 AM    Calcium 9.5 04/15/2022 11:38 AM      Wt Readings from Last 3 Encounters:   04/19/22 136 lb (61.7 kg)   04/15/22 136 lb 7.4 oz (61.9 kg)   04/14/22 134 lb 12.8 oz (61.1 kg)      BP Readings from Last 3 Encounters:   04/19/22 100/70   04/15/22 (!) 143/94   04/14/22 134/64      Current Outpatient Medications   Medication Sig    potassium chloride (K-DUR, KLOR-CON M20) 20 mEq tablet TAKE 1 TABLET BY MOUTH DAILY    folic acid/multivit-min/lutein (CENTRUM SILVER PO) Take 1 Tablet by mouth daily.  cholecalciferol, vitamin D3, (VITAMIN D3 PO) Take 1 Tablet by mouth daily.  ALPRAZolam (XANAX) 0.5 mg tablet Take 1 Tablet by mouth three (3) times daily as needed for Anxiety. Max Daily Amount: 1.5 mg. 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 Impression see above.       Written by Linda Bell, as dictated by Daniela Franklin MD.

## 2022-04-20 ENCOUNTER — ANESTHESIA EVENT (OUTPATIENT)
Dept: MEDSURG UNIT | Age: 57
End: 2022-04-20
Payer: COMMERCIAL

## 2022-04-21 ENCOUNTER — APPOINTMENT (OUTPATIENT)
Dept: MAMMOGRAPHY | Age: 57
End: 2022-04-21
Attending: SURGERY
Payer: COMMERCIAL

## 2022-04-21 ENCOUNTER — HOSPITAL ENCOUNTER (OUTPATIENT)
Age: 57
Setting detail: OUTPATIENT SURGERY
Discharge: HOME | End: 2022-04-21
Attending: SURGERY | Admitting: SURGERY
Payer: COMMERCIAL

## 2022-04-21 ENCOUNTER — ANESTHESIA (OUTPATIENT)
Dept: MEDSURG UNIT | Age: 57
End: 2022-04-21
Payer: COMMERCIAL

## 2022-04-21 ENCOUNTER — APPOINTMENT (OUTPATIENT)
Dept: NUCLEAR MEDICINE | Age: 57
End: 2022-04-21
Attending: SURGERY
Payer: COMMERCIAL

## 2022-04-21 VITALS
HEART RATE: 91 BPM | TEMPERATURE: 97.6 F | RESPIRATION RATE: 21 BRPM | WEIGHT: 136 LBS | BODY MASS INDEX: 21.95 KG/M2 | SYSTOLIC BLOOD PRESSURE: 130 MMHG | DIASTOLIC BLOOD PRESSURE: 93 MMHG | OXYGEN SATURATION: 100 %

## 2022-04-21 DIAGNOSIS — C50.412 MALIGNANT NEOPLASM OF UPPER-OUTER QUADRANT OF LEFT BREAST IN FEMALE, ESTROGEN RECEPTOR NEGATIVE (HCC): ICD-10-CM

## 2022-04-21 DIAGNOSIS — Z17.1 MALIGNANT NEOPLASM OF UPPER-OUTER QUADRANT OF LEFT BREAST IN FEMALE, ESTROGEN RECEPTOR NEGATIVE (HCC): ICD-10-CM

## 2022-04-21 DIAGNOSIS — C50.912 MALIGNANT NEOPLASM OF LEFT FEMALE BREAST, UNSPECIFIED ESTROGEN RECEPTOR STATUS, UNSPECIFIED SITE OF BREAST (HCC): ICD-10-CM

## 2022-04-21 PROCEDURE — 38525 BIOPSY/REMOVAL LYMPH NODES: CPT | Performed by: SURGERY

## 2022-04-21 PROCEDURE — 77030033138 HC SUT PGA STRATFX J&J -B: Performed by: SURGERY

## 2022-04-21 PROCEDURE — 77030008684 HC TU ET CUF COVD -B: Performed by: ANESTHESIOLOGY

## 2022-04-21 PROCEDURE — 36590 REMOVAL TUNNELED CV CATH: CPT | Performed by: SURGERY

## 2022-04-21 PROCEDURE — C1819 TISSUE LOCALIZATION-EXCISION: HCPCS

## 2022-04-21 PROCEDURE — 74011250637 HC RX REV CODE- 250/637: Performed by: ANESTHESIOLOGY

## 2022-04-21 PROCEDURE — 88305 TISSUE EXAM BY PATHOLOGIST: CPT

## 2022-04-21 PROCEDURE — C9290 INJ, BUPIVACAINE LIPOSOME: HCPCS | Performed by: SURGERY

## 2022-04-21 PROCEDURE — 88331 PATH CONSLTJ SURG 1 BLK 1SPC: CPT

## 2022-04-21 PROCEDURE — A9520 TC99 TILMANOCEPT DIAG 0.5MCI: HCPCS

## 2022-04-21 PROCEDURE — 74011000272 HC RX REV CODE- 272: Performed by: SURGERY

## 2022-04-21 PROCEDURE — 74011250636 HC RX REV CODE- 250/636: Performed by: ANESTHESIOLOGY

## 2022-04-21 PROCEDURE — 77030041680 HC PNCL ELECSURG SMK EVAC CNMD -B: Performed by: SURGERY

## 2022-04-21 PROCEDURE — 74011000250 HC RX REV CODE- 250: Performed by: NURSE ANESTHETIST, CERTIFIED REGISTERED

## 2022-04-21 PROCEDURE — 77030008462 HC STPLR SKN PROX J&J -A: Performed by: SURGERY

## 2022-04-21 PROCEDURE — 74011250636 HC RX REV CODE- 250/636: Performed by: SURGERY

## 2022-04-21 PROCEDURE — 77030026438 HC STYL ET INTUB CARD -A: Performed by: ANESTHESIOLOGY

## 2022-04-21 PROCEDURE — 76030000005 HC AMB SURG OR TIME 2.5 TO 3: Performed by: SURGERY

## 2022-04-21 PROCEDURE — 76210000036 HC AMBSU PH I REC 1.5 TO 2 HR: Performed by: SURGERY

## 2022-04-21 PROCEDURE — 77030008534 HC TBNG LIPOSUC BYRO -B: Performed by: SURGERY

## 2022-04-21 PROCEDURE — 74011250636 HC RX REV CODE- 250/636: Performed by: NURSE ANESTHETIST, CERTIFIED REGISTERED

## 2022-04-21 PROCEDURE — 77030034479 HC ADH SKN CLSR PRINEO J&J -B: Performed by: SURGERY

## 2022-04-21 PROCEDURE — 77030002996 HC SUT SLK J&J -A: Performed by: SURGERY

## 2022-04-21 PROCEDURE — 77030002933 HC SUT MCRYL J&J -A: Performed by: SURGERY

## 2022-04-21 PROCEDURE — 77030002916 HC SUT ETHLN J&J -A: Performed by: SURGERY

## 2022-04-21 PROCEDURE — 77030031139 HC SUT VCRL2 J&J -A: Performed by: SURGERY

## 2022-04-21 PROCEDURE — 88332 PATH CONSLTJ SURG EA ADD BLK: CPT

## 2022-04-21 PROCEDURE — 77030002966 HC SUT PDS J&J -A: Performed by: SURGERY

## 2022-04-21 PROCEDURE — 77030040504 HC DRN WND MDII -B: Performed by: SURGERY

## 2022-04-21 PROCEDURE — 77030011825 HC SUPP SURG PSTOP S2SG -B: Performed by: SURGERY

## 2022-04-21 PROCEDURE — 77030008552 HC TBNG SMK EVAC BFLF -A: Performed by: SURGERY

## 2022-04-21 PROCEDURE — 76060000065 HC AMB SURG ANES 2.5 TO 3 HR: Performed by: SURGERY

## 2022-04-21 PROCEDURE — 19301 PARTIAL MASTECTOMY: CPT | Performed by: SURGERY

## 2022-04-21 PROCEDURE — 74011000250 HC RX REV CODE- 250: Performed by: RADIOLOGY

## 2022-04-21 PROCEDURE — 77030040506 HC DRN WND MDII -A: Performed by: SURGERY

## 2022-04-21 PROCEDURE — 74011000250 HC RX REV CODE- 250: Performed by: SURGERY

## 2022-04-21 PROCEDURE — 77030040361 HC SLV COMPR DVT MDII -B: Performed by: SURGERY

## 2022-04-21 PROCEDURE — 77030018390 HC SPNG HEMSTAT2 J&J -B: Performed by: SURGERY

## 2022-04-21 PROCEDURE — 77030011267 HC ELECTRD BLD COVD -A: Performed by: SURGERY

## 2022-04-21 PROCEDURE — 88307 TISSUE EXAM BY PATHOLOGIST: CPT

## 2022-04-21 PROCEDURE — 2709999900 HC NON-CHARGEABLE SUPPLY: Performed by: SURGERY

## 2022-04-21 RX ORDER — MIDAZOLAM HYDROCHLORIDE 1 MG/ML
0.5 INJECTION, SOLUTION INTRAMUSCULAR; INTRAVENOUS
Status: DISCONTINUED | OUTPATIENT
Start: 2022-04-21 | End: 2022-04-21 | Stop reason: HOSPADM

## 2022-04-21 RX ORDER — MIDAZOLAM HYDROCHLORIDE 1 MG/ML
INJECTION, SOLUTION INTRAMUSCULAR; INTRAVENOUS AS NEEDED
Status: DISCONTINUED | OUTPATIENT
Start: 2022-04-21 | End: 2022-04-21

## 2022-04-21 RX ORDER — SODIUM CHLORIDE, SODIUM LACTATE, POTASSIUM CHLORIDE, CALCIUM CHLORIDE 600; 310; 30; 20 MG/100ML; MG/100ML; MG/100ML; MG/100ML
INJECTION, SOLUTION INTRAVENOUS
Status: DISCONTINUED | OUTPATIENT
Start: 2022-04-21 | End: 2022-04-21 | Stop reason: HOSPADM

## 2022-04-21 RX ORDER — SODIUM CHLORIDE 0.9 % (FLUSH) 0.9 %
5-40 SYRINGE (ML) INJECTION EVERY 8 HOURS
Status: DISCONTINUED | OUTPATIENT
Start: 2022-04-21 | End: 2022-04-21 | Stop reason: HOSPADM

## 2022-04-21 RX ORDER — SODIUM CHLORIDE, SODIUM LACTATE, POTASSIUM CHLORIDE, CALCIUM CHLORIDE 600; 310; 30; 20 MG/100ML; MG/100ML; MG/100ML; MG/100ML
100 INJECTION, SOLUTION INTRAVENOUS CONTINUOUS
Status: DISCONTINUED | OUTPATIENT
Start: 2022-04-21 | End: 2022-04-21 | Stop reason: HOSPADM

## 2022-04-21 RX ORDER — OXYCODONE AND ACETAMINOPHEN 5; 325 MG/1; MG/1
1 TABLET ORAL
Qty: 20 TABLET | Refills: 0 | Status: SHIPPED | OUTPATIENT
Start: 2022-04-21 | End: 2022-04-28

## 2022-04-21 RX ORDER — PROPOFOL 10 MG/ML
INJECTION, EMULSION INTRAVENOUS AS NEEDED
Status: DISCONTINUED | OUTPATIENT
Start: 2022-04-21 | End: 2022-04-21 | Stop reason: HOSPADM

## 2022-04-21 RX ORDER — DEXAMETHASONE SODIUM PHOSPHATE 4 MG/ML
INJECTION, SOLUTION INTRA-ARTICULAR; INTRALESIONAL; INTRAMUSCULAR; INTRAVENOUS; SOFT TISSUE AS NEEDED
Status: DISCONTINUED | OUTPATIENT
Start: 2022-04-21 | End: 2022-04-21 | Stop reason: HOSPADM

## 2022-04-21 RX ORDER — SCOLOPAMINE TRANSDERMAL SYSTEM 1 MG/1
1 PATCH, EXTENDED RELEASE TRANSDERMAL
Status: DISCONTINUED | OUTPATIENT
Start: 2022-04-21 | End: 2022-04-21 | Stop reason: HOSPADM

## 2022-04-21 RX ORDER — ROCURONIUM BROMIDE 10 MG/ML
INJECTION, SOLUTION INTRAVENOUS AS NEEDED
Status: DISCONTINUED | OUTPATIENT
Start: 2022-04-21 | End: 2022-04-21 | Stop reason: HOSPADM

## 2022-04-21 RX ORDER — SODIUM CHLORIDE 9 MG/ML
25 INJECTION, SOLUTION INTRAVENOUS CONTINUOUS
Status: DISCONTINUED | OUTPATIENT
Start: 2022-04-21 | End: 2022-04-21 | Stop reason: HOSPADM

## 2022-04-21 RX ORDER — ROPIVACAINE HYDROCHLORIDE 5 MG/ML
30 INJECTION, SOLUTION EPIDURAL; INFILTRATION; PERINEURAL AS NEEDED
Status: DISCONTINUED | OUTPATIENT
Start: 2022-04-21 | End: 2022-04-21 | Stop reason: HOSPADM

## 2022-04-21 RX ORDER — FENTANYL CITRATE 50 UG/ML
50 INJECTION, SOLUTION INTRAMUSCULAR; INTRAVENOUS AS NEEDED
Status: DISCONTINUED | OUTPATIENT
Start: 2022-04-21 | End: 2022-04-21 | Stop reason: HOSPADM

## 2022-04-21 RX ORDER — MIDAZOLAM HYDROCHLORIDE 1 MG/ML
1 INJECTION, SOLUTION INTRAMUSCULAR; INTRAVENOUS AS NEEDED
Status: DISCONTINUED | OUTPATIENT
Start: 2022-04-21 | End: 2022-04-21 | Stop reason: HOSPADM

## 2022-04-21 RX ORDER — LIDOCAINE HYDROCHLORIDE 10 MG/ML
10 INJECTION INFILTRATION; PERINEURAL
Status: COMPLETED | OUTPATIENT
Start: 2022-04-21 | End: 2022-04-21

## 2022-04-21 RX ORDER — ONDANSETRON 2 MG/ML
INJECTION INTRAMUSCULAR; INTRAVENOUS AS NEEDED
Status: DISCONTINUED | OUTPATIENT
Start: 2022-04-21 | End: 2022-04-21 | Stop reason: HOSPADM

## 2022-04-21 RX ORDER — FENTANYL CITRATE 50 UG/ML
INJECTION, SOLUTION INTRAMUSCULAR; INTRAVENOUS AS NEEDED
Status: DISCONTINUED | OUTPATIENT
Start: 2022-04-21 | End: 2022-04-21 | Stop reason: HOSPADM

## 2022-04-21 RX ORDER — DIPHENHYDRAMINE HYDROCHLORIDE 50 MG/ML
12.5 INJECTION, SOLUTION INTRAMUSCULAR; INTRAVENOUS AS NEEDED
Status: DISCONTINUED | OUTPATIENT
Start: 2022-04-21 | End: 2022-04-21 | Stop reason: HOSPADM

## 2022-04-21 RX ORDER — MIDAZOLAM HYDROCHLORIDE 1 MG/ML
INJECTION, SOLUTION INTRAMUSCULAR; INTRAVENOUS AS NEEDED
Status: DISCONTINUED | OUTPATIENT
Start: 2022-04-21 | End: 2022-04-21 | Stop reason: HOSPADM

## 2022-04-21 RX ORDER — ONDANSETRON 2 MG/ML
4 INJECTION INTRAMUSCULAR; INTRAVENOUS AS NEEDED
Status: DISCONTINUED | OUTPATIENT
Start: 2022-04-21 | End: 2022-04-21 | Stop reason: HOSPADM

## 2022-04-21 RX ORDER — LIDOCAINE HYDROCHLORIDE 10 MG/ML
0.1 INJECTION, SOLUTION EPIDURAL; INFILTRATION; INTRACAUDAL; PERINEURAL AS NEEDED
Status: DISCONTINUED | OUTPATIENT
Start: 2022-04-21 | End: 2022-04-21 | Stop reason: HOSPADM

## 2022-04-21 RX ORDER — MORPHINE SULFATE 2 MG/ML
2 INJECTION, SOLUTION INTRAMUSCULAR; INTRAVENOUS
Status: DISCONTINUED | OUTPATIENT
Start: 2022-04-21 | End: 2022-04-21 | Stop reason: HOSPADM

## 2022-04-21 RX ORDER — HYDROMORPHONE HYDROCHLORIDE 1 MG/ML
0.2 INJECTION, SOLUTION INTRAMUSCULAR; INTRAVENOUS; SUBCUTANEOUS
Status: DISCONTINUED | OUTPATIENT
Start: 2022-04-21 | End: 2022-04-21 | Stop reason: HOSPADM

## 2022-04-21 RX ORDER — LIDOCAINE HYDROCHLORIDE 20 MG/ML
INJECTION, SOLUTION EPIDURAL; INFILTRATION; INTRACAUDAL; PERINEURAL AS NEEDED
Status: DISCONTINUED | OUTPATIENT
Start: 2022-04-21 | End: 2022-04-21 | Stop reason: HOSPADM

## 2022-04-21 RX ORDER — SODIUM CHLORIDE 0.9 % (FLUSH) 0.9 %
5-40 SYRINGE (ML) INJECTION AS NEEDED
Status: DISCONTINUED | OUTPATIENT
Start: 2022-04-21 | End: 2022-04-21 | Stop reason: HOSPADM

## 2022-04-21 RX ORDER — SUCCINYLCHOLINE CHLORIDE 20 MG/ML
INJECTION INTRAMUSCULAR; INTRAVENOUS AS NEEDED
Status: DISCONTINUED | OUTPATIENT
Start: 2022-04-21 | End: 2022-04-21 | Stop reason: HOSPADM

## 2022-04-21 RX ORDER — ACETAMINOPHEN 325 MG/1
650 TABLET ORAL ONCE
Status: COMPLETED | OUTPATIENT
Start: 2022-04-21 | End: 2022-04-21

## 2022-04-21 RX ORDER — EPHEDRINE SULFATE/0.9% NACL/PF 50 MG/5 ML
SYRINGE (ML) INTRAVENOUS AS NEEDED
Status: DISCONTINUED | OUTPATIENT
Start: 2022-04-21 | End: 2022-04-21 | Stop reason: HOSPADM

## 2022-04-21 RX ORDER — PHENYLEPHRINE HCL IN 0.9% NACL 0.4MG/10ML
SYRINGE (ML) INTRAVENOUS AS NEEDED
Status: DISCONTINUED | OUTPATIENT
Start: 2022-04-21 | End: 2022-04-21 | Stop reason: HOSPADM

## 2022-04-21 RX ORDER — OXYCODONE HYDROCHLORIDE 5 MG/1
5 TABLET ORAL AS NEEDED
Status: DISCONTINUED | OUTPATIENT
Start: 2022-04-21 | End: 2022-04-21 | Stop reason: HOSPADM

## 2022-04-21 RX ORDER — FENTANYL CITRATE 50 UG/ML
25 INJECTION, SOLUTION INTRAMUSCULAR; INTRAVENOUS
Status: DISCONTINUED | OUTPATIENT
Start: 2022-04-21 | End: 2022-04-21 | Stop reason: HOSPADM

## 2022-04-21 RX ORDER — SODIUM CHLORIDE, SODIUM LACTATE, POTASSIUM CHLORIDE, CALCIUM CHLORIDE 600; 310; 30; 20 MG/100ML; MG/100ML; MG/100ML; MG/100ML
25 INJECTION, SOLUTION INTRAVENOUS CONTINUOUS
Status: DISCONTINUED | OUTPATIENT
Start: 2022-04-21 | End: 2022-04-21 | Stop reason: HOSPADM

## 2022-04-21 RX ADMIN — FENTANYL CITRATE 25 MCG: 50 INJECTION, SOLUTION INTRAMUSCULAR; INTRAVENOUS at 17:29

## 2022-04-21 RX ADMIN — FENTANYL CITRATE 25 MCG: 50 INJECTION, SOLUTION INTRAMUSCULAR; INTRAVENOUS at 17:23

## 2022-04-21 RX ADMIN — ACETAMINOPHEN 650 MG: 325 TABLET ORAL at 13:41

## 2022-04-21 RX ADMIN — SODIUM CHLORIDE, POTASSIUM CHLORIDE, SODIUM LACTATE AND CALCIUM CHLORIDE 25 ML/HR: 600; 310; 30; 20 INJECTION, SOLUTION INTRAVENOUS at 13:41

## 2022-04-21 RX ADMIN — FENTANYL CITRATE 50 MCG: 50 INJECTION, SOLUTION INTRAMUSCULAR; INTRAVENOUS at 14:27

## 2022-04-21 RX ADMIN — FENTANYL CITRATE 50 MCG: 50 INJECTION, SOLUTION INTRAMUSCULAR; INTRAVENOUS at 14:54

## 2022-04-21 RX ADMIN — ROCURONIUM BROMIDE 5 MG: 10 SOLUTION INTRAVENOUS at 14:21

## 2022-04-21 RX ADMIN — LIDOCAINE HYDROCHLORIDE 50 MG: 20 INJECTION, SOLUTION EPIDURAL; INFILTRATION; INTRACAUDAL; PERINEURAL at 14:21

## 2022-04-21 RX ADMIN — WATER 2 G: 1 INJECTION INTRAMUSCULAR; INTRAVENOUS; SUBCUTANEOUS at 14:27

## 2022-04-21 RX ADMIN — FENTANYL CITRATE 25 MCG: 50 INJECTION, SOLUTION INTRAMUSCULAR; INTRAVENOUS at 17:42

## 2022-04-21 RX ADMIN — SODIUM CHLORIDE 30 MCG/MIN: 9 INJECTION, SOLUTION INTRAVENOUS at 15:00

## 2022-04-21 RX ADMIN — MIDAZOLAM 2 MG: 1 INJECTION INTRAMUSCULAR; INTRAVENOUS at 14:09

## 2022-04-21 RX ADMIN — OXYCODONE 5 MG: 5 TABLET ORAL at 18:37

## 2022-04-21 RX ADMIN — PROPOFOL 150 MG: 10 INJECTION, EMULSION INTRAVENOUS at 14:21

## 2022-04-21 RX ADMIN — FENTANYL CITRATE 50 MCG: 50 INJECTION, SOLUTION INTRAMUSCULAR; INTRAVENOUS at 15:52

## 2022-04-21 RX ADMIN — MIDAZOLAM 1 MG: 1 INJECTION INTRAMUSCULAR; INTRAVENOUS at 14:10

## 2022-04-21 RX ADMIN — SUCCINYLCHOLINE CHLORIDE 120 MG: 20 INJECTION, SOLUTION INTRAMUSCULAR; INTRAVENOUS at 14:22

## 2022-04-21 RX ADMIN — SODIUM CHLORIDE, POTASSIUM CHLORIDE, SODIUM LACTATE AND CALCIUM CHLORIDE: 600; 310; 30; 20 INJECTION, SOLUTION INTRAVENOUS at 14:14

## 2022-04-21 RX ADMIN — Medication 10 MG: at 15:00

## 2022-04-21 RX ADMIN — ONDANSETRON HYDROCHLORIDE 4 MG: 2 INJECTION, SOLUTION INTRAMUSCULAR; INTRAVENOUS at 16:40

## 2022-04-21 RX ADMIN — MIDAZOLAM 1 MG: 1 INJECTION INTRAMUSCULAR; INTRAVENOUS at 14:20

## 2022-04-21 RX ADMIN — SODIUM CHLORIDE, POTASSIUM CHLORIDE, SODIUM LACTATE AND CALCIUM CHLORIDE: 600; 310; 30; 20 INJECTION, SOLUTION INTRAVENOUS at 16:21

## 2022-04-21 RX ADMIN — DEXAMETHASONE SODIUM PHOSPHATE 4 MG: 4 INJECTION, SOLUTION INTRAMUSCULAR; INTRAVENOUS at 14:32

## 2022-04-21 RX ADMIN — LIDOCAINE HYDROCHLORIDE 10 ML: 10 INJECTION, SOLUTION INFILTRATION; PERINEURAL at 12:30

## 2022-04-21 RX ADMIN — Medication 80 MCG: at 14:33

## 2022-04-21 NOTE — OP NOTES
Date of Surgery: 4/21/2022     Preoperative Diagnosis:   1. Left breast cancer     Postoperative Diagnosis:   1. Left breast cancer     Procedure Performed. 1. Oncoplastic reduction of left breast  2. Right breast reduction for symmetry     Surgeon: Samara Rizvi MD     Surgical Assistant: Jayson LEOS     Anesthesia: general      EBL: 80 cc     ZEYAD drains: ZEYAD x1 L breast     Complications: none    Specimens:   1. Left breast skin and tissue  2. Right breast skin and tissue     Indications: This is a 61 year-old female with history of left breast cancer. She has undergone neoadjuvant chemotherapy and presents for left lumpectomy with oncoplastic reduction, right reduction for symmetry. She understands the risks and alternate treatments. Risks include infection, wound healing issues, seroma, hematoma, asymmetry, and need for additional procedures. She understands that there are no guarantees or warrantees. Description of Procedure: The patient was greeted in the preoperative holding area. Informed consent was reviewed and all questions were answered. She was marked in the upright position. She was given preoperative antibiotics. She was brought back to the operating theater and laid supine on the operating table. She was sedated and intubated. Her surgical sites were prepped and draped in the usual sterile fashion. A formal timeout was performed confirming the patient, operative site, operation to be performed, allergies, antibiotic status, medications on the field and all members of the team.      Dr. Leticia Epstein then performed his portion of the surgery. For more details, please refer to his dictated operative report.      The patient had been marked for a vertical reduction. I started on the left (cancer) side. The nipple areola complex was marked out to be 42 mm and a new NAC was created. The pedicle was designed (superomedial) and de-epithelialized.  Due to the large defect from the lumpectomy, I elected not to remove additional breast tissue from the left breast. I essentially removed the skin only from the vertical markings, and mobilized the pedicle by dissecting into the breast tissue. I freed up the tissue from the inferior breast and mobilized it to fill the pocket. I then tailor tacked the incisions closed. I turned my attention to the right breast. Again, I marked the nipple areolar complex with a 42 mm cookie cutter and de-epithelialized the superomedial pedicle. I then incised the vertical markings. I removed tissue from the inferior pole and sent it for permanent specimen. I then tailor tacked the incisions closed. I then had the patient sat upright. She was noted to be symmetric and I laid her back down. I then addressed the right breast: I irrigated the breast and achieved hemostasis. I placed surgicel in the pocket. I then closed it with deep dermal 3-0 monocryl sutures followed by a running subcuticular 3-0 monocryl suture. I then turned my attention to the left breast. I opened the incisions and irrigated the pockets, and then achieved hemostasis. Due to the large defect, I placed a drain within the cavity. I placed surgicel within the cavity. I then closed the skin with deep dermal 3-0 monocryl followed by a running subcuticular 3-0 monocryl suture. All sharp, sponge and instrument counts were correct prior to the closure of the wounds. I then placed sterile dressings on the incisions and fashioned a Topifoam surgical bra to re-inforce the inferior poles of the breasts. Patient was extubated without event and transferred to the PACU in stable condition.

## 2022-04-21 NOTE — H&P
HISTORY OF PRESENT ILLNESS  Hailey Bell is a 61 y.o. female. HPI  ESTABLISHED patient here for surgery discussion of LEFT breast cancer. Patient states she is still shedding skin. Finished chemo Feb [id number]. Breast history -  Referring - Sarahy Lua MD  8/24/21 - LEFT breast abscess - Aspiration and course of Bactrim   9/3/21 - LEFT breast abscess - I&D  10/27/21: LEFT breast bx. PATH: IDC, 5mm, favor grade 3, ER-/RI-/HER2+(3)/Ki-67 70%. DCIS with high-grade nuclear features and comedonecrosis. Microcalcifications not present. neoadjuvent chemotherapy, Dr. Tim Carson, 10/21/21 - 2/10/22  Plastic surgeon - Dr. Marylee Meres Results (most recent):  Results from [place] encounter on 02/21/22     MRI BREAST BI W WO CONT     Narrative  INDICATION: Left breast invasive ductal carcinoma, ER/RI negative, HER-2/therese  positive. DCIS. Diagnosed 10/27/2021. Left axillary flori metastasis. Post  neoadjuvant chemotherapy.     COMPARISON: 10/13/2021.     TECHNIQUE:  Multisequence, multiplanar, bilateral breast MRI was performed in prone position  using a dedicated breast coil. Images were obtained without contrast and dynamic  postcontrast images were obtained in multiple phases. 12 mL IV gadoteridol  (ProHance) was administered. Subtraction images were reconstructed. Postcontrast  images were reviewed with dedicated kinetic analysis software.     FINDINGS:  There is mild background parenchymal enhancement and heterogeneous  fibroglandular tissue.     Right breast:  No suspicious enhancing foci. No axillary or internal mammary chain  lymphadenopathy. A subclavian ported catheter, implanted in the posterior third  of the upper inner quadrant, terminates in appropriate position in the SVC.    Left breast:  No residual enhancement in the mass in the posterior third of the outer slightly  upper left breast. The mass is no longer measurable. There is a biopsy clip  within scarring.   No residual enhancement in the previously seen, extensive, non-masslike  enhancement in the middle third of the central, lower, left breast. A biopsy  clip remains. Axillary flori metastases have significantly decreased in size and  are no longer pathologically enlarged. A biopsy clip is within an inferior  axillary lymph node.     A summary portfolio with key images has been sent from kinetic analysis software  to PACS.    Impression  1. No residual enhancement in multicentric, multifocal left breast carcinoma. 2. Significantly decreased left axillary flori metastases. Left axillary lymph  nodes are no longer pathologically enlarged. 3. No suspicious right breast enhancement or lymphadenopathy. 4. BI-RADS Assessment Category 6: Known biopsy proven malignancy.     ROS       Past Medical History:   Diagnosis Date    [facility] left [facility] in [facility] 10/13/2021         History reviewed. No pertinent surgical history.     Social History            Socioeconomic History    Marital status:        Spouse name: Not on file    Number of children: Not on file    Years of education: Not on file    Highest education level: Not on file   Occupational History    Not on file   Tobacco Use    Smoking status: Never Smoker    Smokeless tobacco: Never Used   Substance and Sexual Activity    Alcohol use: Never    Drug use: Never    Sexual activity: Not on file   Other Topics Concern    Not on file   Social History Narrative    Not on file      Social Determinants of Health          Financial Resource Strain:     Difficulty of Paying Living Expenses: Not on file   Food Insecurity:     Worried About Running Out of Food in the Last Year: Not on file    Eunice of Food in the Last Year: Not on file   Transportation Needs:     Lack of Transportation (Medical): Not on file    Lack of Transportation (Non-Medical):  Not on file   Physical Activity:     Days of Exercise per Week: Not on file    Minutes of Exercise per Session: Not on file   Stress:     Feeling of Stress : Not on file   Social Connections: Unknown    Frequency of Communication with Friends and Family: More than three times a week    Frequency of Social Gatherings with Friends and Family: More than three times a week    Attends Gnosticist Services: Not on file   [employer] [facility] [employer] or Organizations: Not on file    Attends Club or Organization Meetings: Not on file    Marital Status:    Intimate Partner Violence:     Fear of Current or Ex-Partner: Not on file    Emotionally Abused: Not on file    Physically Abused: Not on file    Sexually Abused: Not on file   Housing Stability:     Unable to Pay for Housing in the Last Year: Not on file    Number of [place] in the Last Year: Not on file    Unstable Housing in the Last Year: Not on file                Current Outpatient Medications on File Prior to Visit   Medication Sig Dispense Refill    potassium chloride (K-DUR, KLOR-CON M20) 20 mEq tablet Take 1 Tablet by mouth daily. 30 Tablet 0    dexAMETHasone (DECADRON) 4 mg tablet Take 2 tabs (8mg) by mouth twice daily the day before chemotherapy and the day after chemotherapy (Patient not taking: Reported on 3/15/2022) 4 Tablet 0    LORazepam (ATIVAN) 0.5 mg tablet Take 1 tab by mouth 30-60 minutes prior to Breast MRI. May take additional dose if needed. (Patient not taking: Reported on 3/15/2022) 4 Tablet 0    ALPRAZolam (XANAX) 0.5 mg tablet Take 1 Tablet by mouth three (3) times daily as needed for Anxiety. Max Daily Amount: 1.5 mg. Indications: anxious 30 Tablet 0    lidocaine-prilocaine (EMLA) topical cream Apply  to affected area as needed for Pain. Apply to port-a-cath site 30-60 minutes prior to chemo (Patient not taking: Reported on 3/15/2022) 30 g 0    ondansetron (ZOFRAN ODT) 4 mg disintegrating tablet Take 1-2 Tablets by mouth every eight (8) hours as needed for Nausea or Vomiting.  (Patient not taking: Reported on 3/15/2022) 60 Tablet 1    prochlorperazine (Compazine) 5 mg tablet Take 1 tab by mouth every 6 hours as needed for nausea or vomiting (Patient not taking: Reported on 3/15/2022) 30 Tablet 1    CRANIAL PROSTHESIS misc Alopecia due to chemo/ wig 1 Each 1    amoxicillin-clavulanate (AUGMENTIN) 875-125 mg per tablet Take 1 Tablet by mouth two (2) times a day. (Patient not taking: Reported on 3/15/2022) 20 Tablet 0      No current facility-administered medications on file prior to visit.         No Known Allergies     OB History    No obstetric history on file.       Obstetric Comments   Menarche 14, LMP 2018, # of children 2, age of 4st delivery 33, Hysterectomy/oophorectomy no/no, Breast bx no, history of breast feeding yes, BCP no, Hormone therapy no                Physical Exam  Constitutional:       Appearance: She is well-developed. She is not diaphoretic. HENT:      Head: Normocephalic and atraumatic. Right Ear: External ear normal.      Left Ear: External ear normal.   Eyes:      General: No scleral icterus. Right eye: No discharge. Left eye: No discharge. Pupils: Pupils are equal, round, and reactive to light. Neck:      Thyroid: No thyromegaly. Vascular: No JVD. Trachea: No tracheal deviation. Cardiovascular:      Rate and Rhythm: Normal rate and regular rhythm. Heart sounds: Normal heart sounds. Pulmonary:      Effort: Pulmonary effort is normal. No tachypnea, accessory muscle usage or respiratory distress. Breath sounds: Normal breath sounds. No stridor. Chest:   Breasts: Breasts are symmetrical.      Right: No inverted nipple, mass, nipple discharge, skin change or tenderness. Left: No inverted nipple, mass, nipple discharge, skin change or tenderness.             Comments: LEFT leg swollen, chronic non tender  Abdominal:      General: There is no distension. Palpations: Abdomen is soft. There is no mass. Tenderness:  There is no abdominal tenderness. Musculoskeletal:         General: Normal range of motion. Cervical back: Normal range of motion and neck supple. Lymphadenopathy:      Cervical: No cervical adenopathy. Skin:     General: Skin is warm and dry. Neurological:      Mental Status: She is alert and oriented to person, place, and time. She is not disoriented. Psychiatric:         Speech: Speech normal.         Behavior: Behavior normal.         Thought Content: Thought content normal.         Judgment: Judgment normal.            Diagnostic Ultrasound  Indication : left upper outer quadrant  Technique : We scanned the area using a high-frequency, linear-array, near-field transducer . Findings - . Clips seen at bx site, LN visualized with clip  Impression : chemo completed  Disposition :plan for reduction lumpectomy with loc        ASSESSMENT and PLAN       Encounter Diagnoses   Name Primary?  Malignant neoplasm of left breast in female, estrogen receptor negative, unspecified site of breast ([facility] Yes         Talked about reduction with Dr. Carmen Garcia. Will have a little incision on the side as well as the reduction incisions. Would like to get at lease 4 lymph nodes. Will talk to Dr. Janis Gómez to see if port can be removed.    Will need XRT post op

## 2022-04-21 NOTE — DISCHARGE INSTRUCTIONS
Patient Education      Wound care:   - keep your foam dressings on at all times. You may start showering on post-operative day 2 (4/24/22)  - keep your ZEYAD drain bulb closed, but do not \"charge\" it.   - no heavy lifting >10 lbs for four weeks following surgery  - you do not need to wear a bra at this time while the foam is in place    You may follow up with Dr. Maylin Tubbs in 1 week (4/27/22). Please call [phone] to schedule    Should you have any questions or concerns prior to that, please do not hesitate to call Dr. Maylin Tubbs at [phone]      Lumpectomy: What to Expect at Home  Your Recovery     Breast-conserving surgery (lumpectomy) removes the cancer and just enough tissue to get all the cancer. For 1 or 2 days after the surgery, you will probably feel tired and have some pain. The skin around the cut (incision) may feel firm, swollen, and tender, and be bruised. Tenderness should go away in about 2 or 3 days, and the bruising within 2 weeks. Firmness and swelling may last for 3 to 6 months. You may feel a soft lump in your breast that gradually turns hard. This is the incision healing. It is not cancer. Women should wear a well-fitted and supportive bra, even during the night, for 1 week. You will probably be able to go back to work or your normal routine in 1 to 3 weeks after the surgery. This may depend on whether you have more treatment. Your doctor may have removed some lymph nodes in your armpit to see if the cancer has spread. If so, you may feel either numbness or tingling (\"pins and needles\") in your armpit or on the inside of your upper arm. This should improve over the next several weeks. Some people have numbness for a longer time. When you find out that you have cancer, you may feel many emotions and may need some help coping. Seek out family, friends, and counselors for support. You also can do things at home to make yourself feel better while you go through treatment.  Call the [facility] ([phone]) or visit its website at [address]. org for more information. This care sheet gives you a general idea about how long it will take for you to recover. But each person recovers at a different pace. Follow the steps below to get better as quickly as possible. How can you care for yourself at home? Activity    · Rest when you feel tired. Getting enough sleep will help you recover. You may want to sleep on the side that has not been operated on. A woman may want to use a pillow to support the affected breast while lying on her side.     · Avoid strenuous activities, such as biking, jogging, weightlifting, or aerobic exercise, for 1 month or until your doctor says it is okay. This may include housework, such as washing windows, especially if you have to use the arm next to the affected breast.     · Most people can return to their normal activities within 2 weeks.     · Try to walk each day. Start out by walking a little more than you did the day before. Bit by bit, increase the amount you walk. Walking boosts blood flow and helps prevent pneumonia and constipation.     · For 1 to 2 weeks, avoid lifting anything over 10 to 15 pounds or that would make you strain. This may include heavy grocery bags and milk containers, a heavy briefcase or backpack, cat litter or dog food bags, a vacuum , or a child.     · You may drive when you are no longer taking pain medicine and can use your arm without pain. Talk to your doctor about when to start driving, especially if you are having radiation treatments.     · You will probably be able to go back to work or your normal routine in 1 to 3 weeks. It may be longer, depending on the type of work you do and whether you are having radiation or chemotherapy.     · You may shower 24 to 48 hours after surgery, if your doctor okays it. Pat the incision dry. Do not take a bath for the first 2 weeks, or until your doctor tells you it is okay.    Diet    · You can eat your normal diet. If your stomach is upset, try bland, low-fat foods like plain rice, broiled chicken, toast, and yogurt.     · You may notice that your bowel movements are not regular right after your surgery. This is common. Try to avoid constipation and straining with bowel movements. You may want to take a fiber supplement every day. If you have not had a bowel movement after a couple of days, ask your doctor about taking a mild laxative. Medicines    · Your doctor will tell you if and when you can restart your medicines. He or she will also give you instructions about taking any new medicines.     · If you take aspirin or some other blood thinner, ask your doctor if and when to start taking it again. Make sure that you understand exactly what your doctor wants you to do.     · Take pain medicines exactly as directed. ? Your doctor may have given you a medicine to numb the area inside and around your cut (incision). The numbness will last from 6 to 12 hours. If you went home right after the surgery, you may want to take pain medicine before this wears off.  ? If the doctor gave you a prescription medicine for pain, take it as prescribed. ? If you are not taking a prescription pain medicine, ask your doctor if you can take an over-the-counter medicine.     · If your doctor prescribed antibiotics, take them as directed. Do not stop taking them just because you feel better. You need to take the full course of antibiotics.     · If you think your pain medicine is making you sick to your stomach:  ? Take your medicine after meals (unless your doctor has told you not to). ? Ask your doctor for a different pain medicine. Incision care    · If you have strips of tape on the cut the doctor made (incision), leave the tape on for a week or until it falls off.     · When you can shower, wash the area daily with warm, soapy water and pat it dry. Follow-up care is a key part of your treatment and safety.  Be sure to make and go to all appointments, and call your doctor if you are having problems. It's also a good idea to know your test results and keep a list of the medicines you take. When should you call for help? Call 911 anytime you think you may need emergency care. For example, call if:    · You passed out (lost consciousness).     · You have chest pain, are short of breath, or cough up blood. Call your doctor now or seek immediate medical care if:    · You are sick to your stomach or cannot drink fluids.     · You cannot pass stools or gas.     · You have pain that does not get better after you take your pain medicine.     · You have loose stitches, or your incision comes open.     · Bright red blood has soaked through the bandage over your incision.     · You have signs of a blood clot in your leg (called a deep vein thrombosis), such as:  ? Pain in your calf, back of the knee, thigh, or groin. ? Redness or swelling in your leg.     · You have signs of infection, such as:  ? Increased pain, swelling, warmth, or redness. ? Red streaks leading from the incision. ? Pus draining from the incision. ? A fever. Watch closely for changes in your health, and be sure to contact your doctor if:    · You have any problems.     · You have new or worse swelling or pain in your arm. Where can you learn more? Go to http://www.gray.com/  Enter D222 in the search box to learn more about \"Lumpectomy: What to Expect at Home. \"  Current as of: September 8, 2021               Content Version: 13.2  © 2006-2022 [employer] Care instructions adapted under license by [facility] [employer] (which disclaims liability or warranty for this information). If you have questions about a medical condition or this instruction, always ask your healthcare professional. [place] [address] any warranty or liability for your use of this information.       Patient Education Scopolamine (Transderm Scop) - (Absorbed through the skin)   Why this medicine is used:   Treat nausea and vomiting. Contact a nurse or doctor right away if you have:  · Seeing, hearing, or feeling things that are not there  · Confusion or memory loss  · Lightheadedness, dizziness, drowsiness, or fainting  · Blurred vision  · Trouble urinating     Common side effects:  · Skin rash or redness  · Restlessness or tiredness  · Dry mouth  © 2017 [address] is for End User's use only and may not be sold, redistributed or otherwise used for commercial purposes. Patient Education   Bupivacaine Liposome (By injection)   Bupivacaine Liposome (uvq-RTG-i-rose LYE-poh-some)  Relieves pain after surgery. This medicine is a local anesthetic. Brand Name(s): Exparel   There may be other brand names for this medicine. When This Medicine Should Not Be Used: This medicine is not right for everyone. Do not use it if you had an allergic reaction to bupivacaine. How to Use This Medicine:   Injectable  · A nurse or other trained health professional will give you this medicine in a hospital. This medicine is given through a needle injected into the surgical site. Drugs and Foods to Avoid:   Ask your doctor or pharmacist before using any other medicine, including over-the-counter medicines, vitamins, and herbal products. · Tell your doctor if you are also using other numbing medicines, including other kinds of bupivacaine, such as lidocaine. You should not be given any other kind of bupivacaine for at least 4 days. Warnings While Using This Medicine:   · Tell your doctor if you are pregnant or breastfeeding, or if you have kidney disease or liver disease. · This medicine may make you dizzy or drowsy. Do not drive or do anything that could be dangerous until you know how this medicine affects you. · This medicine should cause numbness only to the area where it is injected.  It is not meant to cause you to fall asleep or become unconscious. · It may be easier to hurt yourself while your treated body area is still numb. Be careful to avoid injury until you have regained all the feeling and are no longer numb. Possible Side Effects While Using This Medicine:   Call your doctor right away if you notice any of these side effects:  · Allergic reaction: Itching or hives, swelling in your face or hands, swelling or tingling in your mouth or throat, chest tightness, trouble breathing  · Anxiety, depression, restlessness, drowsiness, ringing in your ears, blurred vision  · Chest pain, fast, pounding, slow, or uneven heartbeat, trouble breathing  · Lightheadedness, dizziness, fainting  · Nausea, vomiting, chills, metallic taste in your mouth  · Seizures, shivering, shaking, or tremors  If you notice these less serious side effects, talk with your doctor:   · Headache, back pain  · Trouble sleeping  If you notice other side effects that you think are caused by this medicine, tell your doctor. Call your doctor for medical advice about side effects. You may report side effects to FDA at 6-885-FDA-3020  © 2017 [facility] Information is for End User's use only and may not be sold, redistributed or otherwise used for commercial purposes. The above information is an  only. It is not intended as medical advice for individual conditions or treatments. Talk to your doctor, nurse or pharmacist before following any medical regimen to see if it is safe and effective for you.

## 2022-04-21 NOTE — PROGRESS NOTES
Patient tolerated left breast wire localization x2 well with scant bleeding. Wires were taped in place and loosely covered with a gauze. Patient was then transported via wheelchair to the ambulatory surgery area.

## 2022-04-21 NOTE — ANESTHESIA PREPROCEDURE EVALUATION
Relevant Problems   No relevant active problems       Anesthetic History   No history of anesthetic complications            Review of Systems / Medical History  Patient summary reviewed, nursing notes reviewed and pertinent labs reviewed    Pulmonary  Within defined limits                 Neuro/Psych   Within defined limits           Cardiovascular  Within defined limits                     GI/Hepatic/Renal  Within defined limits              Endo/Other  Within defined limits           Other Findings              Physical Exam    Airway  Mallampati: II  TM Distance: > 6 cm  Neck ROM: normal range of motion   Mouth opening: Normal     Cardiovascular  Regular rate and rhythm,  S1 and S2 normal,  no murmur, click, rub, or gallop             Dental  No notable dental hx       Pulmonary  Breath sounds clear to auscultation               Abdominal  GI exam deferred       Other Findings            Anesthetic Plan    ASA: 2  Anesthesia type: general            Anesthetic plan and risks discussed with: Patient

## 2022-04-21 NOTE — ANESTHESIA POSTPROCEDURE EVALUATION
Procedure(s):  LEFT BREAST REDUCTION LUMPECTOMY WITH BRACKETED MAG SEED LOCALIZATION, LEFT BREAST SENTINEL NODE BIOPSY LEFT BREAST ONCOPLASTIC REDUCTION WITH RIGHT BREAST REDUCTION FOR SYMMETRY AND PORT REMOVAL  . .    general    Anesthesia Post Evaluation      Multimodal analgesia: multimodal analgesia used between 6 hours prior to anesthesia start to PACU discharge  Patient location during evaluation: bedside  Patient participation: complete - patient participated  Level of consciousness: awake  Pain score: 0  Pain management: satisfactory to patient  Airway patency: patent  Anesthetic complications: no  Cardiovascular status: acceptable and blood pressure returned to baseline  Respiratory status: acceptable  Hydration status: acceptable  Comments: I have evaluated the patient and meets criteria for discharge from PACU. Omari Barnes DO. Post anesthesia nausea and vomiting:  none  Final Post Anesthesia Temperature Assessment:  Normothermia (36.0-37.5 degrees C)      INITIAL Post-op Vital signs:   Vitals Value Taken Time   /85 04/21/22 1715   Temp 36.4 °C (97.6 °F) 04/21/22 1707   Pulse 93 04/21/22 1720   Resp 16 04/21/22 1720   SpO2 98 % 04/21/22 1720   Vitals shown include unvalidated device data.

## 2022-04-21 NOTE — BRIEF OP NOTE
Brief Postoperative Note    Patient: Anatoly Barrientos  YOB: 1965  MRN: [id number]    Date of Procedure: 4/21/2022     Pre-Op Diagnosis: LEFT BREAST CANCER    Post-Op Diagnosis: Same as preoperative diagnosis. Procedure(s):  LEFT BREAST REDUCTION LUMPECTOMY WITH BRACKETED MAG SEED LOCALIZATION, LEFT BREAST SENTINEL NODE BIOPSY LEFT BREAST ONCOPLASTIC REDUCTION WITH RIGHT BREAST REDUCTION FOR SYMMETRY AND PORT REMOVAL  .     Surgeon(s):  MD Yoana Nieves MD    Surgical Assistant: Surg Asst-1: Layla ROSE    Anesthesia: General     Estimated Blood Loss (mL): Minimal    Complications: None    Specimens:   ID Type Source Tests Collected by Time Destination   1 : LEFT BREAST SKIN Fresh Breast  Yoana Montoya MD 4/21/2022 1450 Pathology   2 : LEFT AXILLARY SENTINEL NODE #1 Frozen Section Lymph Node  Katelyn Young MD 4/21/2022 1503 Pathology   3 : LEFT AXILLARY SENTINEL NODE #2 Frozen Section Lymph Node  Katelyn Young MD 4/21/2022 1506 Pathology   4 : LEFT AXILLARY SENTINEL NODE #3 Frozen Section Lymph Node  Katelyn Young MD 4/21/2022 1510 Pathology   5 : LEFT BREAST LUMPECTOMY Fresh Breast  Katelyn Young MD 4/21/2022 1527 Pathology   6 : LEFT BREAST SKIN #2 Fresh Breast  Yoana Montoya MD 4/21/2022 1534 Pathology   7 : RIGHT BREAST SKIN Fresh Breast  Yoana Montoya MD 4/21/2022 1553 Pathology        Implants: * No implants in log *    Drains: * No LDAs found *    Findings: lumpectomy with bracketed loc, spec radiograph 2 clips and calcs, sent nodes x 4 all neg by frozen section including clipped node, intact portacath    Electronically Signed by Reece Lombardo MD on 7/73/1752 at 3:55 PM

## 2022-04-21 NOTE — ROUTINE PROCESS
Patient: Wendie Carrasco MRN: [id number]  SSN: [id number]   YOB: 1965  Age: 61 y.o. Sex: female     Patient is status post Procedure(s):  LEFT BREAST REDUCTION LUMPECTOMY WITH BRACKETED MAG SEED LOCALIZATION, LEFT BREAST SENTINEL NODE BIOPSY LEFT BREAST ONCOPLASTIC REDUCTION WITH RIGHT BREAST REDUCTION FOR SYMMETRY AND PORT REMOVAL  . Meño Rogers     Surgeon(s) and Role:  Panel 1:     * Reji Grimes MD - Primary  Panel 2:     * Coco Serna MD - Primary    Local/Dose/Irrigation:  SEE MAR                Venous Access Device power port 8fr implantable 10/19/21 Upper chest (subclavicular area, right (Active)      Peripheral IV 04/21/22 Right;Posterior Wrist (Active)          Tal-Dunaway Drain 04/21/22 Left Breast (Active)      Airway - Endotracheal Tube 04/21/22 Oral (Active)                   Dressing/Packing:  Incision 04/21/22 Breast-Dressing/Treatment: ABD pad;Gauze dressing/dressing sponge;Skin glue (TOPIFOAM, BRA) (04/21/22 1500)    Splint/Cast:  ]    Other:

## 2022-04-22 NOTE — OP NOTES
[facility]  OPERATIVE REPORT    Name:  Ramos Guevara  MR#:  [id number]  :  1965  ACCOUNT #:  [de-identified]  DATE OF SERVICE:  2022      PREOPERATIVE DIAGNOSIS:  Carcinoma of left breast status post neoadjuvant chemotherapy. POSTOPERATIVE DIAGNOSIS:  Carcinoma of left breast status post neoadjuvant chemotherapy. PROCEDURES PERFORMED:  1. Port-A-Cath removal.  2.  Left lumpectomy with bracketed needle localization. 3.  Left sentinel node biopsy. SURGEON:  Angie Burgess MD    ASSISTANT:  Erick Anguiano    ANESTHESIA:  General.    COMPLICATIONS:  None. SPECIMENS REMOVED:  Left breast tissue and left axillary sentinel lymph nodes x4. IMPLANTS:  None. ESTIMATED BLOOD LOSS:  Minimal.    INDICATIONS:  The patient is a 25-year-old female who had a high-risk carcinoma of the left breast who has completed neoadjuvant chemotherapy. She had a positive lymph node as well and is admitted for reduction lumpectomy with Dr. Jannie Pitts and sentinel node biopsy and Port-A-Cath removal.    PROCEDURE:  After lymphoscintigraphy in Radiology, the patient was brought to the operating room. After satisfactory induction of general LMA anesthesia, the patient was prepped and draped in the sterile fashion. The Port-A-Cath incision was identified and opened. Port-A-Cath was identified and removed and noted to be intact. The wound was hemostatic, closed with interrupted 3-0 Vicryl and a running subcuticular 4-0 Monocryl on the skin. Attention was then turned to the left side where an axillary incision was made and deepened through subcutaneous tissue with Bovie cautery. The axilla was entered and utilizing a Neoprobe, four sentinel nodes were found in the deep axilla. The first sentinel node was imaged with a specimen radiography and was found to have a clip within it. All four sentinel nodes were sent for frozen section.   All four were benign including some treatment effect on the first node around the clip. Attention was then turned to the breast, where the lateral portion of reduction incision was opened and dissection continued laterally. The first and lower more inferior wire was brought into the wound and dissection continued superolaterally. An elliptical incision was made around the upper wire which was in the area of the skin involvement of the breast cancer. Although there was no evidence of tumor, this area was excised. This was brought into the wound as the anterior margin of this area. Lumpectomy was then performed down to chest wall including both wires, the skin, and up to the medial portion of the nipple-areolar complex. The specimens were oriented and specimen radiograph was obtained which revealed the presence of both clips, both wires and parenchymal calcifications in the breast.  Specimen was sent to Pathology. All dissection planes were hemostatic. At this point in time, Dr. Maylin Tubbs began the reconstructive lumpectomy.         Naveen Ariza MD      [id number]  D:  04/21/2022 16:08  T:  04/21/2022 23:05  JOB #:  [id number]  CC:  MD Sahil Buck MD

## 2022-04-25 ENCOUNTER — TELEPHONE (OUTPATIENT)
Dept: SURGERY | Age: 57
End: 2022-04-25

## 2022-04-26 DIAGNOSIS — E87.6 HYPOKALEMIA: ICD-10-CM

## 2022-04-26 DIAGNOSIS — C50.912 MALIGNANT NEOPLASM OF LEFT BREAST IN FEMALE, ESTROGEN RECEPTOR NEGATIVE, UNSPECIFIED SITE OF BREAST (HCC): ICD-10-CM

## 2022-04-26 DIAGNOSIS — Z17.1 MALIGNANT NEOPLASM OF LEFT BREAST IN FEMALE, ESTROGEN RECEPTOR NEGATIVE, UNSPECIFIED SITE OF BREAST (HCC): ICD-10-CM

## 2022-04-26 RX ORDER — POTASSIUM CHLORIDE 20 MEQ/1
20 TABLET, EXTENDED RELEASE ORAL DAILY
Qty: 30 TABLET | Refills: 0 | Status: SHIPPED | OUTPATIENT
Start: 2022-04-26 | End: 2022-07-28

## 2022-04-28 RX ORDER — SODIUM CHLORIDE 9 MG/ML
25 INJECTION, SOLUTION INTRAVENOUS CONTINUOUS
Status: CANCELLED | OUTPATIENT
Start: 2022-05-05

## 2022-04-28 RX ORDER — ACETAMINOPHEN 325 MG/1
650 TABLET ORAL AS NEEDED
Status: CANCELLED
Start: 2022-05-05

## 2022-04-28 RX ORDER — HEPARIN 100 UNIT/ML
300-500 SYRINGE INTRAVENOUS AS NEEDED
Status: CANCELLED
Start: 2022-05-05

## 2022-04-28 RX ORDER — HYDROCORTISONE SODIUM SUCCINATE 100 MG/2ML
100 INJECTION, POWDER, FOR SOLUTION INTRAMUSCULAR; INTRAVENOUS AS NEEDED
Status: CANCELLED | OUTPATIENT
Start: 2022-05-05

## 2022-04-28 RX ORDER — DIPHENHYDRAMINE HYDROCHLORIDE 50 MG/ML
50 INJECTION, SOLUTION INTRAMUSCULAR; INTRAVENOUS
Status: CANCELLED | OUTPATIENT
Start: 2022-05-05

## 2022-04-28 RX ORDER — DIPHENHYDRAMINE HYDROCHLORIDE 50 MG/ML
50 INJECTION, SOLUTION INTRAMUSCULAR; INTRAVENOUS AS NEEDED
Status: CANCELLED
Start: 2022-05-05

## 2022-04-28 RX ORDER — SODIUM CHLORIDE 9 MG/ML
10 INJECTION INTRAMUSCULAR; INTRAVENOUS; SUBCUTANEOUS AS NEEDED
Status: CANCELLED | OUTPATIENT
Start: 2022-05-05

## 2022-04-28 RX ORDER — EPINEPHRINE 1 MG/ML
0.3 INJECTION, SOLUTION, CONCENTRATE INTRAVENOUS AS NEEDED
Status: CANCELLED | OUTPATIENT
Start: 2022-05-05

## 2022-04-28 RX ORDER — ALBUTEROL SULFATE 0.83 MG/ML
2.5 SOLUTION RESPIRATORY (INHALATION) AS NEEDED
Status: CANCELLED
Start: 2022-05-05

## 2022-04-28 RX ORDER — ACETAMINOPHEN 325 MG/1
650 TABLET ORAL
Status: CANCELLED | OUTPATIENT
Start: 2022-05-05

## 2022-04-28 RX ORDER — SODIUM CHLORIDE 0.9 % (FLUSH) 0.9 %
10 SYRINGE (ML) INJECTION AS NEEDED
Status: CANCELLED | OUTPATIENT
Start: 2022-05-05

## 2022-04-28 RX ORDER — ONDANSETRON 2 MG/ML
8 INJECTION INTRAMUSCULAR; INTRAVENOUS AS NEEDED
Status: CANCELLED | OUTPATIENT
Start: 2022-05-05

## 2022-04-28 RX ORDER — DIPHENHYDRAMINE HYDROCHLORIDE 50 MG/ML
25 INJECTION, SOLUTION INTRAMUSCULAR; INTRAVENOUS AS NEEDED
Status: CANCELLED
Start: 2022-05-05

## 2022-05-04 ENCOUNTER — APPOINTMENT (OUTPATIENT)
Dept: INFUSION THERAPY | Age: 57
End: 2022-05-04

## 2022-05-05 ENCOUNTER — DOCUMENTATION ONLY (OUTPATIENT)
Dept: ONCOLOGY | Age: 57
End: 2022-05-05

## 2022-05-05 ENCOUNTER — HOSPITAL ENCOUNTER (OUTPATIENT)
Dept: INFUSION THERAPY | Age: 57
Discharge: HOME | End: 2022-05-05
Payer: COMMERCIAL

## 2022-05-05 ENCOUNTER — OFFICE VISIT (OUTPATIENT)
Dept: ONCOLOGY | Age: 57
End: 2022-05-05

## 2022-05-05 VITALS
OXYGEN SATURATION: 98 % | DIASTOLIC BLOOD PRESSURE: 74 MMHG | SYSTOLIC BLOOD PRESSURE: 119 MMHG | RESPIRATION RATE: 18 BRPM | HEART RATE: 75 BPM | TEMPERATURE: 97.3 F

## 2022-05-05 VITALS
OXYGEN SATURATION: 99 % | SYSTOLIC BLOOD PRESSURE: 113 MMHG | BODY MASS INDEX: 21.21 KG/M2 | HEART RATE: 83 BPM | DIASTOLIC BLOOD PRESSURE: 75 MMHG | HEIGHT: 66 IN | TEMPERATURE: 97.7 F | WEIGHT: 132 LBS

## 2022-05-05 DIAGNOSIS — Z17.1 MALIGNANT NEOPLASM OF LEFT BREAST IN FEMALE, ESTROGEN RECEPTOR NEGATIVE, UNSPECIFIED SITE OF BREAST (HCC): Primary | ICD-10-CM

## 2022-05-05 DIAGNOSIS — Z51.81 ENCOUNTER FOR MONITORING CARDIOTOXIC DRUG THERAPY: ICD-10-CM

## 2022-05-05 DIAGNOSIS — Z79.899 ENCOUNTER FOR MONITORING CARDIOTOXIC DRUG THERAPY: ICD-10-CM

## 2022-05-05 DIAGNOSIS — C50.912 MALIGNANT NEOPLASM OF LEFT BREAST IN FEMALE, ESTROGEN RECEPTOR NEGATIVE, UNSPECIFIED SITE OF BREAST (HCC): Primary | ICD-10-CM

## 2022-05-05 DIAGNOSIS — Z98.890 S/P LUMPECTOMY, LEFT BREAST: ICD-10-CM

## 2022-05-05 DIAGNOSIS — Z78.0 POST-MENOPAUSAL: ICD-10-CM

## 2022-05-05 PROCEDURE — 99215 OFFICE O/P EST HI 40 MIN: CPT | Performed by: INTERNAL MEDICINE

## 2022-05-05 PROCEDURE — 96401 CHEMO ANTI-NEOPL SQ/IM: CPT

## 2022-05-05 PROCEDURE — 74011250636 HC RX REV CODE- 250/636: Performed by: INTERNAL MEDICINE

## 2022-05-05 RX ADMIN — PERTUZUMAB, TRASTUZUMAB, AND HYALURONIDASE-ZZXF 10 ML: 600; 600; 2000 INJECTION, SOLUTION SUBCUTANEOUS at 09:19

## 2022-05-05 NOTE — PROGRESS NOTES
[facility] [place] [facility] [place] [address] [address], [address], [place]: [phone]  F: [phone]    Reason for Visit:   Pham Billings is a 61 y.o. female seen today in office for follow up of Left Breast Cancer. Treatment History:   · LEFT Breast Biopsy 10/6/21: PATH - Invasive ductal carcinoma, favor grade 3  · ER/DE negative, HER2+  · Breast MRI 10/13/21: RIGHT BREAST: Background parenchymal enhancement: Mild. There is no suspicious masslike or nonmasslike enhancement within the right breast to indicate breast carcinoma. There are no suspicious internal mammary or axillary chain lymph nodes. LEFT BREAST: Background parenchymal enhancement: Mild There is a rounded mass with central fluid attenuation/necrosis in the lateral aspect of the left breast, deep 3rd, measuring approximately 3.3 cm in diameter with enhancement extending along the adjacent dermis suggesting skin invasion. There is nonmasslike enhancement extending both inferior, medial and anterior to the primary mass over approximately 6 x 5.5 x 4 cm. There are discontinuous areas of fluid attenuation associated with the nonmasslike enhancement which may represent cystic spaces and/or necrosis. There is an enlarged, left axillary lymph node with other adjacent, prominent lymph nodes. There is a lymph node node deep to the pectoralis minor muscle which measures 1.1 x 0.7 cm  · Left Axillary LN Biopsy 10/19/21: PATH - Metastatic breast cancer, measuring up to 17 mm in a single core  · ER/DE negative, HER2+  · Neoadjuvant TCHP x 6 cycles from 10/21/21 - 2/10/22  · DR Taxotere to 60 mg/m2 and Carbo to AUC 4 with Cycle 6 due to side effects  · Breast Biopsy 10/27/21: PATH - 5 mm IDC with DCIS  · Breast MRI 2/21/22: Right Breast: No suspicious enhancing foci. No axillary or internal mammary chain lymphadenopathy.  A subclavian ported catheter, implanted in the posterior third of the upper inner quadrant, terminates in appropriate position in the SVC. Left Breast: No residual enhancement in the mass in the posterior third of the outer slightly upper left breast. The mass is no longer measurable. There is a biopsy clip within scarring. No residual enhancement in the previously seen, extensive, non-masslike enhancement in the middle third of the central, lower, left breast. A biopsy clip remains. Axillary flori metastases have significantly decreased in size and are no longer pathologically enlarged. A biopsy clip is within an inferior axillary lymph node  · Maintenance HP (Phesgo) 3/3/22 - Current   · Left Lumpectomy 4/21/22: pCR, 0/4 LNs     STAGE: Clinical 2 ER/ND negative Her2+    History of Present Illness:   Daniele Wood is a 61 y.o. female seen today in office for follow up of left breast cancer ER/ND negative Her2 +. She started neoadjuvant TCHP chemotherapy on 10/21/21 and finished 6 cycles on 2/10/22. She started maintenance HP (Phesgo) on 3/3/22. She had a follow up ECHO on 3/11/22 per Cardio and EF was 60% and GLS was normal. She had a left lumpectomy on 4/21/22 and had path CR. She is here today for Cycle 10 (fourth dose of maintenance subQ HP). She reports that she feels well overall today. She states that taste buds are still impaired. She is tolerating HP well overall thus far without significant side effects. Her appetite and energy levels are \"increasing. \". She still has some mild neuropathy in toes on bilateral feet (left worse than right) and fingertips that is stable/unchanfed. She denies fever, chills, mouth sores, cough, SOB, CP, nausea, vomiting, diarrhea, and constipation. She denies pain today. CBC and CMP from 4/14/22 reviewed today. She is here alone today.      Past Medical History:   Diagnosis Date    Malignant neoplasm of left breast in female, estrogen receptor negative ([facility] [address] 10/13/2021      Past Surgical History:   Procedure Laterality Date    HX BREAST LUMPECTOMY Left 4/21/2022    LEFT BREAST REDUCTION LUMPECTOMY WITH BRACKETED MAG SEED LOCALIZATION, LEFT BREAST SENTINEL NODE BIOPSY LEFT BREAST ONCOPLASTIC REDUCTION WITH RIGHT BREAST REDUCTION FOR SYMMETRY AND PORT REMOVAL performed by Mena Camilo MD at [facility] AMBUL[facility]    HX BREAST REDUCTION Bilateral 4/21/2022    . performed by Danae Chua MD at [place] [address]    HX VASCULAR ACCESS Right 10/2021    PORT INSERTION    IL BREAST SURGERY PROCEDURE UNLISTED Left 10/06/21    Biopsy      Social History     Tobacco Use    Smoking status: Never Smoker    Smokeless tobacco: Never Used   Substance Use Topics    Alcohol use: Not Currently     Alcohol/week: 0.0 standard drinks      Family History   Problem Relation Age of Onset    Cancer Mother         BREAST    Dementia Mother     Heart Disease Father     Anesth Problems Neg Hx      Current Outpatient Medications   Medication Sig    potassium chloride (K-DUR, KLOR-CON M20) 20 mEq tablet TAKE 1 TABLET BY MOUTH DAILY    folic acid/multivit-min/lutein (CENTRUM SILVER PO) Take 1 Tablet by mouth daily.  cholecalciferol, vitamin D3, (VITAMIN D3 PO) Take 1 Tablet by mouth daily.  ALPRAZolam (XANAX) 0.5 mg tablet Take 1 Tablet by mouth three (3) times daily as needed for Anxiety. Max Daily Amount: 1.5 mg. 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 Facility-Administered Medications Ordered in Other Visits   Medication Dose Route Frequency    pertuzumab 600 mg-trastuzumab 600 mg-hyaluronidase-zzxf 20,000 units/10 mL  10 mL SubCUTAneous ONCE      No Known Allergies     Review of Systems:  A complete review of systems was obtained, negative except as described above and as reported on ROS sheet scanned into system.      Physical Exam:     Visit Vitals  /75 (BP 1 Location: Right arm, BP Patient Position: Sitting)   Pulse 83   Temp 97.7 °F (36.5 °C) (Temporal)   Ht 5' 6\" (1.676 m) Wt 132 lb (59.9 kg)   SpO2 99%   BMI 21.31 kg/m²     ECOG PS: 0  General: No distress  Eyes: Anicteric sclerae  HENT: Atraumatic, wearing a mask  Neck: Supple  Respiratory: CTAB, normal respiratory effort  CV: Normal rate, regular rhythm, no murmurs. LLE 2+ edema  GI: Soft, nontender, non-distended   MS: Normal gait and station. Skin: No rashes, ecchymoses, or petechiae. Normal temperature, turgor, and texture. Port site without redness/swelling  Psych: Alert, oriented, appropriate affect, normal judgment/insight  Neuro: Non focal    Results:     Lab Results   Component Value Date/Time    WBC 6.3 04/14/2022 08:30 AM    HGB 9.8 (L) 04/14/2022 08:30 AM    HCT 29.5 (L) 04/14/2022 08:30 AM    PLATELET 740 58/80/9882 08:30 AM    .0 (H) 04/14/2022 08:30 AM    ABS. NEUTROPHILS 3.3 04/14/2022 08:30 AM     Lab Results   Component Value Date/Time    Sodium 136 04/15/2022 11:38 AM    Potassium 3.7 04/15/2022 11:38 AM    Chloride 101 04/15/2022 11:38 AM    CO2 27 04/15/2022 11:38 AM    Glucose 92 04/15/2022 11:38 AM    BUN 16 04/15/2022 11:38 AM    Creatinine 0.54 (L) 04/15/2022 11:38 AM    GFR est AA >60 04/15/2022 11:38 AM    GFR est non-AA >60 04/15/2022 11:38 AM    Calcium 9.5 04/15/2022 11:38 AM     Lab Results   Component Value Date/Time    Bilirubin, total 0.3 04/14/2022 08:30 AM    ALT (SGPT) 69 04/14/2022 08:30 AM    Alk. phosphatase 414 (H) 04/14/2022 08:30 AM    Protein, total 8.1 04/14/2022 08:30 AM    Albumin 3.0 (L) 04/14/2022 08:30 AM    Globulin 5.1 (H) 04/14/2022 08:30 AM     10/6/21  FINAL PATHOLOGIC DIAGNOSIS   Breast, left mass, core biopsy:   Invasive ductal carcinoma, favor grade 3 (see synoptic table)   INVASIVE CARCINOMA OF THE BREAST: Biopsy   TUMOR   Tumor Site: Clock position - 3 o'clock   Histologic Type:  Invasive carcinoma of no special type (ductal)   Glandular (Acinar) / Tubular Differentiation: Score 3   Nuclear Pleomorphism: Score 3   Mitotic Rate: Score 3   Overall Grade: Grade 3 (scores of 8 or 9)   Tumor Size: 13 mm   Ductal Carcinoma In Situ (DCIS): Not identified   Lymphovascular Invasion: Not identified   Microcalcifications: Not identified     Breast MRI 10/13/21  IMPRESSION:  RIGHT BREAST:  1. BI-RADS Category 1 - Negative. [place] LEFT BREAST:  1. Rounded mass with dermal extension measuring approximately 3.3 cm in diameter  in the lateral, deep 3rd of the left breast. There are areas of fluid/necrosis  within this mass. BI-RADS Category 6 - Known biopsy-proven malignancy. 2. Nonmasslike enhancement in the left breast which is inferior, medial and  anterior to the primary mass. This nonmasslike enhancement extends over  approximately 6 x 5.5 x 4 cm. There are areas of fluid attenuation which could  represent cysts and/or necrosis within this nonmasslike enhancement. BI-RADS Category 5 - Highly suggestive of malignancy. 3. There is axillary adenopathy, including a lymph node deep to pectoralis  minor. BI-RADS Category 5 - Highly suggestive of malignancy    10/19/21  FINAL PATHOLOGIC DIAGNOSIS   Lymph node, left axillary, core biopsy:   Metastatic breast cancer, measuring up to 17 mm in a single core     10/21/21    ECHO ADULT COMPLETE 10/21/2021 10/21/2021    Interpretation Summary  · LV: Estimated LVEF is 55 - 60%. Normal cavity size, wall thickness and systolic function (ejection fraction normal). Wall motion: normal. Abnormal left ventricular strain. Global longitudinal strain is -15.4%. · TV: Pulmonary hypertension not suggested by Doppler findings. 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  No residual enhancement in multicentric, multifocal left breast carcinoma. 2. Significantly decreased left axillary flori metastases. Left axillary lymph  nodes are no longer pathologically enlarged. 3. No suspicious right breast enhancement or lymphadenopathy. 4. BI-RADS Assessment Category 6: Known biopsy proven malignancy. 03/11/22    ECHO ADULT FOLLOW-UP OR LIMITED 03/15/2022 3/15/2022    Interpretation Summary    Left Ventricle: Left ventricle size is normal. Increased wall thickness. Normal wall motion. Normal left ventricular systolic function. EF by 2D Simpsons Biplane is 60%. Global longitudinal strain is normal with a value of -22.3%. Normal diastolic function. Signed by: Michelle Garcia MD on 3/15/2022 10:36 AM    4/21/22  FINAL PATHOLOGIC DIAGNOSIS   1. Left breast skin, excision:   Benign skin   2. Left axillary sentinel node #1, excision:   One benign lymph node with scar, biopsy site and clip (0/1)   3. Left axillary sentinel node #2, excision:   One benign lymph node without scar (0/1)   4. Left axillary sentinel node #3, excision:   Two benign lymph nodes without scar (0/2)   5. Left breast, lumpectomy:   No residual invasive or in situ carcinoma status post neoadjuvant therapy (ypT0)   Two fibrocalcific tumor beds with biopsy sites   6. Left breast skin #2, excision:   Benign skin and subcutis   7. Right breast skin and tissue, 21 g, excision:   Benign mammary tissue and skin     Records reviewed and summarized above. Pathology report(s) reviewed above. Radiology report(s) reviewed above. Assessment:/PLAN     1) Clinical Stage 2B T3N0 LEFT Breast Cancer ER/MA negative Her2+  post Breast Biopsy Only 10/21  MRI Breast 10/13/21 showed a rounded mass with central fluid attenuation/necrosis in the lateral aspect of the left breast, deep 3rd, measuring approximately 3.3 cm in diameter with enhancement extending along the adjacent dermis suggesting skin invasion.  There is nonmasslike enhancement extending both inferior, medial and anterior to the primary mass over approximately 6 x 5.5 x 4 cm. There are discontinuous areas of fluid attenuation associated with the nonmasslike enhancement which may represent cystic spaces and/or necrosis. There is an enlarged, left axillary lymph node with other adjacent, prominent lymph nodes. There is a lymph node node deep to the pectoralis minor muscle which measures 1.1 x 0.7 cm  She had a left axillary LN biopsy on 10/19/21 and path showed metastatic beast cancer. Pre chemo ECHO showed EF 55-60% with abnormal left ventricular strain. Referred to Cardiology for further evaluation - has follow up ECHO on 12/6/21 per Cardio. Patient started neoadjuvant TCHP chemotherapy on 10/25/21. She completed 6 cycles of TCHP on 2/10/22. DR Taxotere to 60 mg/m2 and Carbo to AUC 4 with Cycle 6 due to side effects. She had a post chemo Breast MRI on 2/21/22 that showed no residual enhancement in multicentric, multifocal left breast carcinoma, significantly decreased left axillary flori metastases, left axillary lymph nodes are no longer pathologically enlarged, and no suspicious right breast enhancement or lymphadenopathy. Personally reviewed MRI. Discussed MRI with patient. She started maintenance HP on 3/3/22. She had a follow up ECHO on 3/11/22 per Cardio and EF was 60%. Patient is here today for Cycle 10 (fourth cycle of maintenance HP). She had a left lumpectomy on 4/21/22 and had pCR. Discussed path with patient today. She is tolerating HP well overall without significant side effects. She is clinically healthy overall and stable today. Labs (CBC and CMP from 4/14/22) reviewed today. Continue maintenance Herceptin+Perjeta. She has follow up with Surgery on 5/20/22. Referred to Rad/Onc, Dr Quita Thompson, today. Follow [facility] in 3 weeks in [facility]. Follow up in 6 weeks in [facility]/office. Patient agrees with plan. 2)  Postmenopausal  Continue routine GYN follow up.     3) Management of High Risk Medications - Chemotherapy  Toxicities from chemo included Grade 1 Nausea,  Grade 1 Neuropathy, Grade 1 Fatigue, Grade 1 Decreased appetite. DR Taxotere to 60 mg/m2 and Carbo to AUC 4 with Cycle 6. Chemo side effects improving. Pre chemo ECHO from 10/21/21 reviewed - EF 55-60% with abnormal left ventricular strain. Referred to Cardiology for further evaluation/management of strain. She had a follow up ECHO on 12/6/21 per Cardiology - EF 58% with no strain. She has a follow up ECHO on 3/11/22 per Cardiology - EF 60%, GLS normal.   Continue ECHOs every 3 months, due prior to 6/16/22 HP. Labs (CBC and CMP) from 4/14/22 reviewed today. Will monitor for side effects. 4) Psychosocial  Mood good, coping well. 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 Her  is very supportive. SW/NN support as needed. She is here alone today. Call if questions. Follow up in 3 weeks in [facility] and 6 weeks in [facility]office. I personally saw and evaluated the patient and performed the key components of medical decision making. The history, physical exam, and documentation were performed by Dayna Maxwell NP. I reviewed and verified the above documentation and modified it as needed. Specifically pt doing well   On HP and tolerating fine. Had path CR. Reviewed path today. Labs personally reviewed   Continue with HP    Refer to Rad/onc and has surgery fu. I appreciate the opportunity to participate in Ms. Livia Landin's care.     Signed By: Agueda Logan DO

## 2022-05-05 NOTE — PROGRESS NOTES
[facility] - Chemotherapy Progress Note    2960 Pt admit to [facility] for Phesgo ambulatory in stable condition. Assessment completed. No new concerns voiced. Pt went to office appt first and [facility] was called to order medication. Chemotherapy Flowsheet 5/5/2022   Cycle C10   Date 5/5/2022   Drug / Regimen Phesgo   Pre Meds -   Notes given in left thigh       Visit Vitals  /74 (BP 1 Location: Right arm, BP Patient Position: At rest)   Pulse 75   Temp 97.3 °F (36.3 °C)   Resp 18   SpO2 98%       Medications:  Medications Administered     pertuzumab 600 mg-trastuzumab 600 mg-hyaluronidase-zzxf 20,000 units/10 mL     Admin Date  05/05/2022 Action  Given Dose  10 mL Route  SubCUTAneous Administered By  Jerson Chauhan RN              Injection given in the left thigh    0920 Pt tolerated treatment well. D/c home ambulatory in no distress. Pt aware of next appointment scheduled for 5/26/22.

## 2022-05-05 NOTE — PROGRESS NOTES
UPDATE:    MA has faxed Radiation Oncology Referral to their office fax number: [phone]! Fax confirmation was received back. Referral in progress. ..   Sirisha Fields

## 2022-05-05 NOTE — PROGRESS NOTES
Madi Coreas is a 61 y.o. female  Chief Complaint   Patient presents with    Chemotherapy    Breast Cancer     1. Have you been to the ER, urgent care clinic since your last visit? Hospitalized since your last visit? No.    2. Have you seen or consulted any other health care providers outside of the [address] [place] [address] since your last visit? Include any pap smears or colon screening.  No.

## 2022-05-06 ENCOUNTER — DOCUMENTATION ONLY (OUTPATIENT)
Dept: ONCOLOGY | Age: 57
End: 2022-05-06

## 2022-05-06 NOTE — PROGRESS NOTES
UPDATE:    Patient is now scheduled to see Radiation Oncology on 5/31/22 at 8:00AM!  Radiation Oncology Referral is now CLOSED.   .Lydia Naylor

## 2022-05-19 NOTE — PROGRESS NOTES
HISTORY OF PRESENT ILLNESS  Ban Muhammad is a 61 y.o. female. HPI  ESTABLISHED patient here for POST OP visit. POD#29. No concerns patient is doing well. Sx 4/21/22- left breast reduction, left lumpectomy w SNBx     Finished chemo Feb 35,6010. Breast history -  Referring - Meghna Belcher MD  8/24/21 - LEFT breast abscess - Aspiration and course of Bactrim   9/3/21 - LEFT breast abscess - I&D       Review of Systems   All other systems reviewed and are negative. 10/27/21: LEFT breast bx. PATH: IDC, 5mm, favor grade 3, ER-/RI-/HER2+(3)/Ki-67 70%. DCIS with high-grade nuclear features and comedonecrosis. Microcalcifications not present. 04/21/2022: LEFT breast lumpectomy and LEFT excisional SLN. PATH:   - LEFT BREAST: Benign skin. No residual invasive or in situ carcinoma status post neoadjuvant. LEFT breast skin #2: benign skin and subcutis. - RIGHT BREAST: skin and tissue, 21g, benign mammary tissue and skin  LEFT axillary: 4/4 benign LNs with scar. 1 LN biopsy site and clip. Past Medical History:   Diagnosis Date    Malignant neoplasm of left breast in female, estrogen receptor negative ([facility] [address] 10/13/2021       Past Surgical History:   Procedure Laterality Date    HX BREAST LUMPECTOMY Left 4/21/2022    LEFT BREAST REDUCTION LUMPECTOMY WITH BRACKETED MAG SEED LOCALIZATION, LEFT BREAST SENTINEL NODE BIOPSY LEFT BREAST ONCOPLASTIC REDUCTION WITH RIGHT BREAST REDUCTION FOR SYMMETRY AND PORT REMOVAL performed by Alysia Helms MD at [address] [place] [address] HX BREAST REDUCTION Bilateral 4/21/2022    .  performed by Hebert Colbert MD at [place] [address]    HX VASCULAR ACCESS Right 10/2021    PORT INSERTION    RI BREAST SURGERY PROCEDURE UNLISTED Left 10/06/21    Biopsy       Social History     Socioeconomic History    Marital status:      Spouse name: Not on file    Number of children: Not on file    Years of education: Not on file    Highest education level: Not on file Occupational History    Not on file   Tobacco Use    Smoking status: Never Smoker    Smokeless tobacco: Never Used   Vaping Use    Vaping Use: Never used   Substance and Sexual Activity    Alcohol use: Not Currently     Alcohol/week: 0.0 standard drinks    Drug use: Never    Sexual activity: Yes     Partners: Male     Birth control/protection: None   Other Topics Concern    Not on file   Social History Narrative    Not on file     Social Determinants of Health     Financial Resource Strain:     Difficulty of Paying Living Expenses: Not on file   Food Insecurity:     Worried About Running Out of Food in the Last Year: Not on file    Eunice of Food in the Last Year: Not on file   Transportation Needs:     Lack of Transportation (Medical): Not on file    Lack of Transportation (Non-Medical):  Not on file   Physical Activity:     Days of Exercise per Week: Not on file    Minutes of Exercise per Session: Not on file   Stress:     Feeling of Stress : Not on file   Social Connections: Unknown    Frequency of Communication with Friends and Family: More than three times a week    Frequency of Social Gatherings with Friends and Family: More than three times a week    Attends Baptism Services: Not on file   [employer] [facility] or Organizations: Not on file    Attends Club or Organization Meetings: Not on file    Marital Status:    Intimate Partner Violence:     Fear of Current or Ex-Partner: Not on file    Emotionally Abused: Not on file    Physically Abused: Not on file    Sexually Abused: Not on file   Housing Stability:     Unable to Pay for Housing in the Last Year: Not on file    Number of [place] in the Last Year: Not on file    Unstable Housing in the Last Year: Not on file       Current Outpatient Medications on File Prior to Visit   Medication Sig Dispense Refill    potassium chloride (K-DUR, KLOR-CON M20) 20 mEq tablet TAKE 1 TABLET BY MOUTH DAILY 30 Tablet 0    folic acid/multivit-min/lutein (CENTRUM SILVER PO) Take 1 Tablet by mouth daily.  cholecalciferol, vitamin D3, (VITAMIN D3 PO) Take 1 Tablet by mouth daily.  ALPRAZolam (XANAX) 0.5 mg tablet Take 1 Tablet by mouth three (3) times daily as needed for Anxiety. Max Daily Amount: 1.5 mg. 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 No Known Allergies    OB History    No obstetric history on file. Obstetric Comments   Menarche 14, LMP 2018, # of children 2, age of 4st delivery 33, Hysterectomy/oophorectomy no/no, Breast bx no, history of breast feeding yes, BCP no, Hormone therapy no             ROS      Physical Exam  Constitutional:       Appearance: She is well-developed. She is not diaphoretic. HENT:      Head: Normocephalic and atraumatic. Right Ear: External ear normal.      Left Ear: External ear normal.   Eyes:      General: No scleral icterus. Right eye: No discharge. Left eye: No discharge. Pupils: Pupils are equal, round, and reactive to light. Neck:      Thyroid: No thyromegaly. Vascular: No JVD. Trachea: No tracheal deviation. Cardiovascular:      Rate and Rhythm: Normal rate and regular rhythm. Heart sounds: Normal heart sounds. Pulmonary:      Effort: Pulmonary effort is normal. No tachypnea, accessory muscle usage or respiratory distress. Breath sounds: Normal breath sounds. No stridor. Chest:   Breasts: Breasts are symmetrical.      Right: No inverted nipple, mass, nipple discharge, skin change or tenderness. Left: No inverted nipple, mass, nipple discharge, skin change or tenderness. Abdominal:      General: There is no distension. Palpations: Abdomen is soft. There is no mass. Tenderness: There is no abdominal tenderness. Musculoskeletal:         General: Normal range of motion. Cervical back: Normal range of motion and neck supple. Lymphadenopathy:      Cervical: No cervical adenopathy. Skin:     General: Skin is warm and dry. Neurological:      Mental Status: She is alert and oriented to person, place, and time. She is not disoriented. Psychiatric:         Speech: Speech normal.         Behavior: Behavior normal.         Thought Content: Thought content normal.         Judgment: Judgment normal.           ASSESSMENT and PLAN    ICD-10-CM ICD-9-CM    1. Malignant neoplasm of left breast in female, estrogen receptor negative, unspecified site of breast ([facility]  C50.912 174.9     Z17.1 V86.1      Patient presents for f/u s/p LEFT breast reduction lumpectomy on 04/21/22, and is doing well overall. Upon physical examination noted well healed incision. Patient stated feeling minor numbness, otherwise feels well overall. Follow up in 6 months. This plan was reviewed with the patient and patient agrees. All questions were answered.       Written by Elroy Grimes, as dictated by Dr. Philippe Bundy MD.

## 2022-05-20 ENCOUNTER — OFFICE VISIT (OUTPATIENT)
Dept: SURGERY | Age: 57
End: 2022-05-20
Payer: COMMERCIAL

## 2022-05-20 VITALS — BODY MASS INDEX: 21.31 KG/M2 | WEIGHT: 132 LBS

## 2022-05-20 DIAGNOSIS — Z17.1 MALIGNANT NEOPLASM OF LEFT BREAST IN FEMALE, ESTROGEN RECEPTOR NEGATIVE, UNSPECIFIED SITE OF BREAST (HCC): Primary | ICD-10-CM

## 2022-05-20 DIAGNOSIS — C50.912 MALIGNANT NEOPLASM OF LEFT BREAST IN FEMALE, ESTROGEN RECEPTOR NEGATIVE, UNSPECIFIED SITE OF BREAST (HCC): Primary | ICD-10-CM

## 2022-05-20 PROCEDURE — 99024 POSTOP FOLLOW-UP VISIT: CPT | Performed by: SURGERY

## 2022-05-20 NOTE — PROGRESS NOTES
HISTORY OF PRESENT ILLNESS  Hailey Bell is a 61 y.o. female. HPI ESTABLISHED patient here for POST OP visit. POD#29. No concerns patient is doing well. Sx 4/21/22- left breast reduction, left lumpectomy w SNBx    Finished chemo Feb 04,9246. Breast history -  Referring - Sarahy Lua MD  8/24/21 - LEFT breast abscess - Aspiration and course of Bactrim   9/3/21 - LEFT breast abscess - I&D  10/27/21: LEFT breast bx. PATH: IDC, 5mm, favor grade 3, ER-/VT-/HER2+(3)/Ki-67 70%. DCIS with high-grade nuclear features and comedonecrosis. Microcalcifications not present. neoadjuvent chemotherapy, Dr. Tim Carson, 10/21/21 - 2/10/22  Plastic surgeon - Dr. Eric Vaughn      Review of Systems   All other systems reviewed and are negative.       Physical Exam    ASSESSMENT and PLAN  {ASSESSMENT/PLAN:16971}

## 2022-05-20 NOTE — PATIENT INSTRUCTIONS
Breast Cancer: Care Instructions  Your Care Instructions     Breast cancer occurs when abnormal cells grow out of control in the breast. These cancer cells can spread within the breast, to nearby lymph nodes and other tissues, and to other parts of the body. Being treated for cancer can weaken your body, and you may feel very tired. Get the rest your body needs so you can feel better. Finding out that you have cancer is scary. You may feel many emotions and may need some help coping. Seek out family, friends, and counselors for support. You also can do things at home to make yourself feel better while you go through treatment. Call the [employer] (6-752.439.2659) or visit its website at [employer] for more information. Follow-up care is a key part of your treatment and safety. Be sure to make and go to all appointments, and call your doctor if you are having problems. It's also a good idea to know your test results and keep a list of the medicines you take. How can you care for yourself at home? · Take your medicines exactly as prescribed. Call your doctor if you think you are having a problem with your medicine. You may get medicine for nausea and vomiting if you have these side effects. · Follow your doctor's instructions to relieve pain. Pain from cancer and surgery can almost always be controlled. Use pain medicine when you first notice pain, before it becomes severe. · Eat healthy food. If you do not feel like eating, try to eat food that has protein and extra calories to keep up your strength and prevent weight loss. Drink liquid meal replacements for extra calories and protein. Try to eat your main meal early. · Get some physical activity every day, but do not get too tired. Keep doing the hobbies you enjoy as your energy allows. · Do not smoke. Smoking can make your cancer worse. If you need help quitting, talk to your doctor about stop-smoking programs and medicines.  These can increase your chances of quitting for good. · Take steps to control your stress and workload. Learn relaxation techniques. ? Share your feelings. Stress and tension affect our emotions. By expressing your feelings to others, you may be able to understand and cope with them. ? Consider joining a support group. Talking about a problem with your spouse, a good friend, or other people with similar problems is a good way to reduce tension and stress. ? Express yourself through art. Try writing, crafts, dance, or art to relieve stress. Some dance, writing, or art groups may be available just for people who have cancer. ? Be kind to your body and mind. Getting enough sleep, eating a healthy diet, and taking time to do things you enjoy can contribute to an overall feeling of balance in your life and can help reduce stress. ? Get help if you need it. Discuss your concerns with your doctor or counselor. · If you are vomiting or have diarrhea:  ? Drink plenty of fluids to prevent dehydration. Choose water and other clear liquids. If you have kidney, heart, or liver disease and have to limit fluids, talk with your doctor before you increase the amount of fluids you drink. ? When you are able to eat, try clear soups, mild foods, and liquids until all symptoms are gone for 12 to 48 hours. Other good choices include dry toast, crackers, cooked cereal, and gelatin dessert, such as Jell-O.  · If you have not already done so, prepare a list of advance directives. Advance directives are instructions to your doctor and family members about what kind of care you want if you become unable to speak or express yourself. When should you call for help? Call 911 anytime you think you may need emergency care. For example, call if:    · You passed out (lost consciousness).    Call your doctor now or seek immediate medical care if:    · You have a fever.     · You have abnormal bleeding.     · You think you have an infection.     · You have new or worse pain.     · You have new symptoms, such as a cough, belly pain, vomiting, diarrhea, or a rash. Watch closely for changes in your health, and be sure to contact your doctor if:    · You are much more tired than usual.     · You have swollen glands in your armpits, groin, or neck.     · You do not get better as expected. Where can you learn more? Go to http://www.bazan.com/  Enter V321 in the search box to learn more about \"Breast Cancer: Care Instructions. \"  Current as of: September 8, 2021               Content Version: 13.2  © 2946-6051 [employer]. Care instructions adapted under license by [employer] (which disclaims liability or warranty for this information). If you have questions about a medical condition or this instruction, always ask your healthcare professional. [address] any warranty or liability for your use of this information.

## 2022-05-26 ENCOUNTER — HOSPITAL ENCOUNTER (OUTPATIENT)
Dept: INFUSION THERAPY | Age: 57
Discharge: HOME | End: 2022-05-26
Payer: COMMERCIAL

## 2022-05-26 VITALS
DIASTOLIC BLOOD PRESSURE: 91 MMHG | HEART RATE: 60 BPM | OXYGEN SATURATION: 100 % | TEMPERATURE: 97.8 F | SYSTOLIC BLOOD PRESSURE: 123 MMHG | RESPIRATION RATE: 16 BRPM | WEIGHT: 133.4 LBS | BODY MASS INDEX: 21.53 KG/M2

## 2022-05-26 DIAGNOSIS — Z17.1 MALIGNANT NEOPLASM OF LEFT BREAST IN FEMALE, ESTROGEN RECEPTOR NEGATIVE, UNSPECIFIED SITE OF BREAST (HCC): Primary | ICD-10-CM

## 2022-05-26 DIAGNOSIS — C50.912 MALIGNANT NEOPLASM OF LEFT BREAST IN FEMALE, ESTROGEN RECEPTOR NEGATIVE, UNSPECIFIED SITE OF BREAST (HCC): Primary | ICD-10-CM

## 2022-05-26 LAB
ALBUMIN SERPL-MCNC: 3.3 G/DL (ref 3.5–5)
ALBUMIN/GLOB SERPL: 0.8 {RATIO} (ref 1.1–2.2)
ALP SERPL-CCNC: 395 U/L (ref 45–117)
ALT SERPL-CCNC: 68 U/L (ref 12–78)
ANION GAP SERPL CALC-SCNC: 2 MMOL/L (ref 5–15)
AST SERPL-CCNC: 70 U/L (ref 15–37)
BASOPHILS # BLD: 0 K/UL (ref 0–0.1)
BASOPHILS NFR BLD: 0 % (ref 0–1)
BILIRUB SERPL-MCNC: 0.3 MG/DL (ref 0.2–1)
BUN SERPL-MCNC: 13 MG/DL (ref 6–20)
BUN/CREAT SERPL: 22 (ref 12–20)
CALCIUM SERPL-MCNC: 9.8 MG/DL (ref 8.5–10.1)
CHLORIDE SERPL-SCNC: 103 MMOL/L (ref 97–108)
CO2 SERPL-SCNC: 31 MMOL/L (ref 21–32)
CREAT SERPL-MCNC: 0.6 MG/DL (ref 0.55–1.02)
DIFFERENTIAL METHOD BLD: ABNORMAL
EOSINOPHIL # BLD: 0.2 K/UL (ref 0–0.4)
EOSINOPHIL NFR BLD: 3 % (ref 0–7)
ERYTHROCYTE [DISTWIDTH] IN BLOOD BY AUTOMATED COUNT: 12.4 % (ref 11.5–14.5)
GLOBULIN SER CALC-MCNC: 4.4 G/DL (ref 2–4)
GLUCOSE SERPL-MCNC: 92 MG/DL (ref 65–100)
HCT VFR BLD AUTO: 30.9 % (ref 35–47)
HGB BLD-MCNC: 10.3 G/DL (ref 11.5–16)
IMM GRANULOCYTES # BLD AUTO: 0 K/UL (ref 0–0.04)
IMM GRANULOCYTES NFR BLD AUTO: 0 % (ref 0–0.5)
LYMPHOCYTES # BLD: 2.4 K/UL (ref 0.8–3.5)
LYMPHOCYTES NFR BLD: 41 % (ref 12–49)
MCH RBC QN AUTO: 32.1 PG (ref 26–34)
MCHC RBC AUTO-ENTMCNC: 33.3 G/DL (ref 30–36.5)
MCV RBC AUTO: 96.3 FL (ref 80–99)
MONOCYTES # BLD: 0.6 K/UL (ref 0–1)
MONOCYTES NFR BLD: 10 % (ref 5–13)
NEUTS SEG # BLD: 2.7 K/UL (ref 1.8–8)
NEUTS SEG NFR BLD: 46 % (ref 32–75)
NRBC # BLD: 0 K/UL (ref 0–0.01)
NRBC BLD-RTO: 0 PER 100 WBC
PLATELET # BLD AUTO: 210 K/UL (ref 150–400)
PMV BLD AUTO: 9.3 FL (ref 8.9–12.9)
POTASSIUM SERPL-SCNC: 4.3 MMOL/L (ref 3.5–5.1)
PROT SERPL-MCNC: 7.7 G/DL (ref 6.4–8.2)
RBC # BLD AUTO: 3.21 M/UL (ref 3.8–5.2)
SODIUM SERPL-SCNC: 136 MMOL/L (ref 136–145)
WBC # BLD AUTO: 5.8 K/UL (ref 3.6–11)

## 2022-05-26 PROCEDURE — 36415 COLL VENOUS BLD VENIPUNCTURE: CPT

## 2022-05-26 PROCEDURE — 80053 COMPREHEN METABOLIC PANEL: CPT

## 2022-05-26 PROCEDURE — 85025 COMPLETE CBC W/AUTO DIFF WBC: CPT

## 2022-05-26 PROCEDURE — 74011250636 HC RX REV CODE- 250/636: Performed by: INTERNAL MEDICINE

## 2022-05-26 PROCEDURE — 96402 CHEMO HORMON ANTINEOPL SQ/IM: CPT

## 2022-05-26 RX ORDER — ACETAMINOPHEN 325 MG/1
650 TABLET ORAL
Status: DISCONTINUED | OUTPATIENT
Start: 2022-05-26 | End: 2022-05-27 | Stop reason: HOSPADM

## 2022-05-26 RX ORDER — HEPARIN 100 UNIT/ML
300-500 SYRINGE INTRAVENOUS AS NEEDED
Status: ACTIVE | OUTPATIENT
Start: 2022-05-26 | End: 2022-05-26

## 2022-05-26 RX ORDER — DIPHENHYDRAMINE HYDROCHLORIDE 50 MG/ML
50 INJECTION, SOLUTION INTRAMUSCULAR; INTRAVENOUS
Status: DISCONTINUED | OUTPATIENT
Start: 2022-05-26 | End: 2022-05-27 | Stop reason: HOSPADM

## 2022-05-26 RX ORDER — ALBUTEROL SULFATE 0.83 MG/ML
2.5 SOLUTION RESPIRATORY (INHALATION) AS NEEDED
Status: ACTIVE | OUTPATIENT
Start: 2022-05-26 | End: 2022-05-26

## 2022-05-26 RX ORDER — ACETAMINOPHEN 325 MG/1
650 TABLET ORAL AS NEEDED
Status: ACTIVE | OUTPATIENT
Start: 2022-05-26 | End: 2022-05-26

## 2022-05-26 RX ORDER — EPINEPHRINE 1 MG/ML
0.3 INJECTION, SOLUTION, CONCENTRATE INTRAVENOUS AS NEEDED
Status: ACTIVE | OUTPATIENT
Start: 2022-05-26 | End: 2022-05-26

## 2022-05-26 RX ORDER — DIPHENHYDRAMINE HYDROCHLORIDE 50 MG/ML
50 INJECTION, SOLUTION INTRAMUSCULAR; INTRAVENOUS AS NEEDED
Status: ACTIVE | OUTPATIENT
Start: 2022-05-26 | End: 2022-05-26

## 2022-05-26 RX ORDER — PERPHENAZINE 16 MG
TABLET ORAL
COMMUNITY
End: 2022-08-29

## 2022-05-26 RX ORDER — SODIUM CHLORIDE 9 MG/ML
25 INJECTION, SOLUTION INTRAVENOUS CONTINUOUS
Status: DISPENSED | OUTPATIENT
Start: 2022-05-26 | End: 2022-05-26

## 2022-05-26 RX ORDER — SODIUM CHLORIDE 9 MG/ML
10 INJECTION INTRAMUSCULAR; INTRAVENOUS; SUBCUTANEOUS AS NEEDED
Status: ACTIVE | OUTPATIENT
Start: 2022-05-26 | End: 2022-05-26

## 2022-05-26 RX ORDER — SODIUM CHLORIDE 0.9 % (FLUSH) 0.9 %
10 SYRINGE (ML) INJECTION AS NEEDED
Status: DISPENSED | OUTPATIENT
Start: 2022-05-26 | End: 2022-05-26

## 2022-05-26 RX ORDER — ONDANSETRON 2 MG/ML
8 INJECTION INTRAMUSCULAR; INTRAVENOUS AS NEEDED
Status: ACTIVE | OUTPATIENT
Start: 2022-05-26 | End: 2022-05-26

## 2022-05-26 RX ORDER — DIPHENHYDRAMINE HYDROCHLORIDE 50 MG/ML
25 INJECTION, SOLUTION INTRAMUSCULAR; INTRAVENOUS AS NEEDED
Status: ACTIVE | OUTPATIENT
Start: 2022-05-26 | End: 2022-05-26

## 2022-05-26 RX ORDER — HYDROCORTISONE SODIUM SUCCINATE 100 MG/2ML
100 INJECTION, POWDER, FOR SOLUTION INTRAMUSCULAR; INTRAVENOUS AS NEEDED
Status: ACTIVE | OUTPATIENT
Start: 2022-05-26 | End: 2022-05-26

## 2022-05-26 RX ADMIN — PERTUZUMAB, TRASTUZUMAB, AND HYALURONIDASE-ZZXF 10 ML: 600; 600; 2000 INJECTION, SOLUTION SUBCUTANEOUS at 09:41

## 2022-05-26 NOTE — PROGRESS NOTES
[facility] - Chemotherapy Progress Note    [age] Pt admit to [facility] for [address] ambulatory in stable condition. Assessment completed. No new concerns voiced. Labs drawn peripherally per order and sent. Chemotherapy Flowsheet 5/26/2022   Cycle C11   Date 5/26/2022   Drug / Regimen [place]   Pre Meds -   Notes right thigh       Visit Vitals  BP (!) 123/91   Pulse 60   Temp 97.8 °F (36.6 °C)   Resp 16   Wt 60.5 kg (133 lb 6.4 oz)   SpO2 100%   Breastfeeding No   BMI 21.53 kg/m²       Medications:  Medications Administered     pertuzumab 600 mg-trastuzumab 600 mg-hyaluronidase-zzxf 20,000 units/10 mL     Admin Date  05/26/2022 Action  Given Dose  10 mL Route  SubCUTAneous Administered By  Celestina Holstein, RN            Injection given in the RIGHT thigh    0950 Pt tolerated treatment well. D/c home ambulatory in no distress.  Pt aware of next appointment scheduled for   Future Appointments   Date Time Provider Ryan Daly   5/31/2022  8:00 AM RAD ONC THERAPY [facility]   6/8/2022  3:45 PM BONI PEOPLES BS AMB   6/16/2022  8:30 AM F1 RAFI LONG [place] [facility]   6/16/2022  8:45 AM Arnaud Rinaldi  [address] BS AMB   7/7/2022  8:30 AM F1 RAFI LONG [place] [facility]   8/16/2022 11:20 AM MD DEBBIE Escobedo BS AMB   53/13/8160  4:65 PM Caitlyn Aguero MD [facility] BS AMB   58/0/5265  8:60 PM Caitlyn Aguero MD [facility] BS AMB     Recent Results (from the past 12 hour(s))   CBC WITH AUTOMATED DIFF    Collection Time: 05/26/22  8:45 AM   Result Value Ref Range    WBC 5.8 3.6 - 11.0 K/uL    RBC 3.21 (L) 3.80 - 5.20 M/uL    HGB 10.3 (L) 11.5 - 16.0 g/dL    HCT 30.9 (L) 35.0 - 47.0 %    MCV 96.3 80.0 - 99.0 FL    MCH 32.1 26.0 - 34.0 PG    MCHC 33.3 30.0 - 36.5 g/dL    RDW 12.4 11.5 - 14.5 %    PLATELET 909 713 - 894 K/uL    MPV 9.3 8.9 - 12.9 FL    NRBC 0.0 0  WBC    ABSOLUTE NRBC 0.00 0.00 - 0.01 K/uL    NEUTROPHILS 46 32 - 75 % LYMPHOCYTES 41 12 - 49 %    MONOCYTES 10 5 - 13 %    EOSINOPHILS 3 0 - 7 %    BASOPHILS 0 0 - 1 %    IMMATURE GRANULOCYTES 0 0.0 - 0.5 %    ABS. NEUTROPHILS 2.7 1.8 - 8.0 K/UL    ABS. LYMPHOCYTES 2.4 0.8 - 3.5 K/UL    ABS. MONOCYTES 0.6 0.0 - 1.0 K/UL    ABS. EOSINOPHILS 0.2 0.0 - 0.4 K/UL    ABS. BASOPHILS 0.0 0.0 - 0.1 K/UL    ABS. IMM.  GRANS. 0.0 0.00 - 0.04 K/UL    DF AUTOMATED

## 2022-05-31 ENCOUNTER — HOSPITAL ENCOUNTER (OUTPATIENT)
Dept: RADIATION THERAPY | Age: 57
Discharge: HOME | End: 2022-05-31

## 2022-06-01 ENCOUNTER — HOSPITAL ENCOUNTER (OUTPATIENT)
Dept: RADIATION THERAPY | Age: 57
Discharge: HOME | End: 2022-06-01
Payer: COMMERCIAL

## 2022-06-01 PROCEDURE — 77470 SPECIAL RADIATION TREATMENT: CPT

## 2022-06-08 ENCOUNTER — ANCILLARY PROCEDURE (OUTPATIENT)
Dept: CARDIOLOGY CLINIC | Age: 57
End: 2022-06-08
Payer: COMMERCIAL

## 2022-06-08 VITALS
HEIGHT: 66 IN | BODY MASS INDEX: 21.38 KG/M2 | SYSTOLIC BLOOD PRESSURE: 123 MMHG | DIASTOLIC BLOOD PRESSURE: 91 MMHG | WEIGHT: 133 LBS

## 2022-06-08 DIAGNOSIS — Z51.81 ENCOUNTER FOR MONITORING CARDIOTOXIC DRUG THERAPY: ICD-10-CM

## 2022-06-08 DIAGNOSIS — Z79.899 ENCOUNTER FOR MONITORING CARDIOTOXIC DRUG THERAPY: ICD-10-CM

## 2022-06-08 PROCEDURE — 93308 TTE F-UP OR LMTD: CPT | Performed by: SPECIALIST

## 2022-06-08 PROCEDURE — 93356 MYOCRD STRAIN IMG SPCKL TRCK: CPT | Performed by: SPECIALIST

## 2022-06-09 RX ORDER — DIPHENHYDRAMINE HYDROCHLORIDE 50 MG/ML
50 INJECTION, SOLUTION INTRAMUSCULAR; INTRAVENOUS
Status: CANCELLED | OUTPATIENT
Start: 2022-06-16

## 2022-06-09 RX ORDER — SODIUM CHLORIDE 9 MG/ML
10 INJECTION INTRAMUSCULAR; INTRAVENOUS; SUBCUTANEOUS AS NEEDED
Status: CANCELLED | OUTPATIENT
Start: 2022-06-16

## 2022-06-09 RX ORDER — DIPHENHYDRAMINE HYDROCHLORIDE 50 MG/ML
25 INJECTION, SOLUTION INTRAMUSCULAR; INTRAVENOUS AS NEEDED
Status: CANCELLED
Start: 2022-06-16

## 2022-06-09 RX ORDER — HEPARIN 100 UNIT/ML
300-500 SYRINGE INTRAVENOUS AS NEEDED
Status: CANCELLED
Start: 2022-06-16

## 2022-06-09 RX ORDER — SODIUM CHLORIDE 0.9 % (FLUSH) 0.9 %
10 SYRINGE (ML) INJECTION AS NEEDED
Status: CANCELLED | OUTPATIENT
Start: 2022-06-16

## 2022-06-09 RX ORDER — ACETAMINOPHEN 325 MG/1
650 TABLET ORAL
Status: CANCELLED | OUTPATIENT
Start: 2022-06-16

## 2022-06-09 RX ORDER — SODIUM CHLORIDE 9 MG/ML
25 INJECTION, SOLUTION INTRAVENOUS CONTINUOUS
Status: CANCELLED | OUTPATIENT
Start: 2022-06-16

## 2022-06-09 RX ORDER — HYDROCORTISONE SODIUM SUCCINATE 100 MG/2ML
100 INJECTION, POWDER, FOR SOLUTION INTRAMUSCULAR; INTRAVENOUS AS NEEDED
Status: CANCELLED | OUTPATIENT
Start: 2022-06-16

## 2022-06-09 RX ORDER — ONDANSETRON 2 MG/ML
8 INJECTION INTRAMUSCULAR; INTRAVENOUS AS NEEDED
Status: CANCELLED | OUTPATIENT
Start: 2022-06-16

## 2022-06-09 RX ORDER — ALBUTEROL SULFATE 0.83 MG/ML
2.5 SOLUTION RESPIRATORY (INHALATION) AS NEEDED
Status: CANCELLED
Start: 2022-06-16

## 2022-06-09 RX ORDER — ACETAMINOPHEN 325 MG/1
650 TABLET ORAL AS NEEDED
Status: CANCELLED
Start: 2022-06-16

## 2022-06-09 RX ORDER — EPINEPHRINE 1 MG/ML
0.3 INJECTION, SOLUTION, CONCENTRATE INTRAVENOUS AS NEEDED
Status: CANCELLED | OUTPATIENT
Start: 2022-06-16

## 2022-06-09 RX ORDER — DIPHENHYDRAMINE HYDROCHLORIDE 50 MG/ML
50 INJECTION, SOLUTION INTRAMUSCULAR; INTRAVENOUS AS NEEDED
Status: CANCELLED
Start: 2022-06-16

## 2022-06-10 LAB
ECHO AO ROOT DIAM: 3.2 CM
ECHO AO ROOT INDEX: 1.9 CM/M2
ECHO LA DIAMETER INDEX: 1.73 CM/M2
ECHO LA DIAMETER: 2.9 CM
ECHO LA TO AORTIC ROOT RATIO: 0.91
ECHO LA VOL 2C: 32 ML (ref 22–52)
ECHO LA VOL 4C: 35 ML (ref 22–52)
ECHO LA VOL BP: 36 ML (ref 22–52)
ECHO LA VOL/BSA BIPLANE: 21 ML/M2 (ref 16–34)
ECHO LA VOLUME AREA LENGTH: 40 ML
ECHO LA VOLUME INDEX A2C: 19 ML/M2 (ref 16–34)
ECHO LA VOLUME INDEX A4C: 21 ML/M2 (ref 16–34)
ECHO LA VOLUME INDEX AREA LENGTH: 24 ML/M2 (ref 16–34)
ECHO LV EDV A2C: 46 ML
ECHO LV EDV A4C: 62 ML
ECHO LV EDV BP: 53 ML (ref 56–104)
ECHO LV EDV INDEX A4C: 37 ML/M2
ECHO LV EDV INDEX BP: 32 ML/M2
ECHO LV EDV NDEX A2C: 27 ML/M2
ECHO LV EJECTION FRACTION A2C: 58 %
ECHO LV EJECTION FRACTION A4C: 56 %
ECHO LV EJECTION FRACTION BIPLANE: 56 % (ref 55–100)
ECHO LV ESV A2C: 19 ML
ECHO LV ESV A4C: 27 ML
ECHO LV ESV BP: 23 ML (ref 19–49)
ECHO LV ESV INDEX A2C: 11 ML/M2
ECHO LV ESV INDEX A4C: 16 ML/M2
ECHO LV ESV INDEX BP: 14 ML/M2
ECHO LV FRACTIONAL SHORTENING: 24 % (ref 28–44)
ECHO LV GLOBAL LONGITUDINAL STRAIN (GLS): -17.1 %
ECHO LV INTERNAL DIMENSION DIASTOLE INDEX: 2.5 CM/M2
ECHO LV INTERNAL DIMENSION DIASTOLIC: 4.2 CM (ref 3.9–5.3)
ECHO LV INTERNAL DIMENSION SYSTOLIC INDEX: 1.9 CM/M2
ECHO LV INTERNAL DIMENSION SYSTOLIC: 3.2 CM
ECHO LV IVSD: 0.9 CM (ref 0.6–0.9)
ECHO LV MASS 2D: 127.8 G (ref 67–162)
ECHO LV MASS INDEX 2D: 76.1 G/M2 (ref 43–95)
ECHO LV POSTERIOR WALL DIASTOLIC: 1 CM (ref 0.6–0.9)
ECHO LV RELATIVE WALL THICKNESS RATIO: 0.48

## 2022-06-13 ENCOUNTER — HOSPITAL ENCOUNTER (OUTPATIENT)
Dept: RADIATION THERAPY | Age: 57
Discharge: HOME | End: 2022-06-13
Payer: COMMERCIAL

## 2022-06-13 PROCEDURE — 77295 3-D RADIOTHERAPY PLAN: CPT

## 2022-06-13 PROCEDURE — 77334 RADIATION TREATMENT AID(S): CPT

## 2022-06-13 PROCEDURE — 77300 RADIATION THERAPY DOSE PLAN: CPT

## 2022-06-14 ENCOUNTER — HOSPITAL ENCOUNTER (OUTPATIENT)
Dept: RADIATION THERAPY | Age: 57
Discharge: HOME | End: 2022-06-14
Payer: COMMERCIAL

## 2022-06-15 ENCOUNTER — HOSPITAL ENCOUNTER (OUTPATIENT)
Dept: RADIATION THERAPY | Age: 57
Discharge: HOME | End: 2022-06-15
Payer: COMMERCIAL

## 2022-06-15 PROCEDURE — 77280 THER RAD SIMULAJ FIELD SMPL: CPT

## 2022-06-16 ENCOUNTER — OFFICE VISIT (OUTPATIENT)
Dept: ONCOLOGY | Age: 57
End: 2022-06-16
Payer: COMMERCIAL

## 2022-06-16 ENCOUNTER — HOSPITAL ENCOUNTER (OUTPATIENT)
Dept: RADIATION THERAPY | Age: 57
Discharge: HOME | End: 2022-06-16
Payer: COMMERCIAL

## 2022-06-16 ENCOUNTER — HOSPITAL ENCOUNTER (OUTPATIENT)
Dept: INFUSION THERAPY | Age: 57
Discharge: HOME | End: 2022-06-16
Payer: COMMERCIAL

## 2022-06-16 VITALS
DIASTOLIC BLOOD PRESSURE: 83 MMHG | TEMPERATURE: 97.7 F | BODY MASS INDEX: 21.1 KG/M2 | WEIGHT: 131.3 LBS | HEIGHT: 66 IN | OXYGEN SATURATION: 98 % | HEART RATE: 80 BPM | SYSTOLIC BLOOD PRESSURE: 130 MMHG

## 2022-06-16 VITALS
TEMPERATURE: 97.7 F | HEART RATE: 80 BPM | DIASTOLIC BLOOD PRESSURE: 83 MMHG | SYSTOLIC BLOOD PRESSURE: 130 MMHG | RESPIRATION RATE: 18 BRPM

## 2022-06-16 DIAGNOSIS — Z98.890 S/P LUMPECTOMY, LEFT BREAST: ICD-10-CM

## 2022-06-16 DIAGNOSIS — C50.912 MALIGNANT NEOPLASM OF LEFT BREAST IN FEMALE, ESTROGEN RECEPTOR NEGATIVE, UNSPECIFIED SITE OF BREAST (HCC): Primary | ICD-10-CM

## 2022-06-16 DIAGNOSIS — G62.0 CHEMOTHERAPY-INDUCED NEUROPATHY (HCC): ICD-10-CM

## 2022-06-16 DIAGNOSIS — Z79.899 ENCOUNTER FOR MONITORING CARDIOTOXIC DRUG THERAPY: ICD-10-CM

## 2022-06-16 DIAGNOSIS — Z51.81 ENCOUNTER FOR MONITORING CARDIOTOXIC DRUG THERAPY: ICD-10-CM

## 2022-06-16 DIAGNOSIS — T45.1X5A CHEMOTHERAPY-INDUCED NEUROPATHY (HCC): ICD-10-CM

## 2022-06-16 DIAGNOSIS — Z17.1 MALIGNANT NEOPLASM OF LEFT BREAST IN FEMALE, ESTROGEN RECEPTOR NEGATIVE, UNSPECIFIED SITE OF BREAST (HCC): Primary | ICD-10-CM

## 2022-06-16 DIAGNOSIS — Z78.0 POST-MENOPAUSAL: ICD-10-CM

## 2022-06-16 PROCEDURE — 99215 OFFICE O/P EST HI 40 MIN: CPT | Performed by: INTERNAL MEDICINE

## 2022-06-16 PROCEDURE — 77412 RADIATION TX DELIVERY LVL 3: CPT

## 2022-06-16 PROCEDURE — 74011250636 HC RX REV CODE- 250/636: Performed by: INTERNAL MEDICINE

## 2022-06-16 PROCEDURE — 96402 CHEMO HORMON ANTINEOPL SQ/IM: CPT

## 2022-06-16 PROCEDURE — 77417 THER RADIOLOGY PORT IMAGE(S): CPT

## 2022-06-16 RX ADMIN — PERTUZUMAB, TRASTUZUMAB, AND HYALURONIDASE-ZZXF 10 ML: 600; 600; 2000 INJECTION, SOLUTION SUBCUTANEOUS at 09:31

## 2022-06-16 NOTE — PROGRESS NOTES
[facility] [place] [address] [address], [address], [place]: [phone]  F: [phone]    Reason for Visit:   Ace Black is a 61 y.o. female seen today in office for follow up of Left Breast Cancer. Treatment History:   · LEFT Breast Biopsy 10/6/21: PATH - Invasive ductal carcinoma, favor grade 3  · ER/MI negative, HER2+  · Breast MRI 10/13/21: RIGHT BREAST: Background parenchymal enhancement: Mild. There is no suspicious masslike or nonmasslike enhancement within the right breast to indicate breast carcinoma. There are no suspicious internal mammary or axillary chain lymph nodes. LEFT BREAST: Background parenchymal enhancement: Mild There is a rounded mass with central fluid attenuation/necrosis in the lateral aspect of the left breast, deep 3rd, measuring approximately 3.3 cm in diameter with enhancement extending along the adjacent dermis suggesting skin invasion. There is nonmasslike enhancement extending both inferior, medial and anterior to the primary mass over approximately 6 x 5.5 x 4 cm. There are discontinuous areas of fluid attenuation associated with the nonmasslike enhancement which may represent cystic spaces and/or necrosis. There is an enlarged, left axillary lymph node with other adjacent, prominent lymph nodes. There is a lymph node node deep to the pectoralis minor muscle which measures 1.1 x 0.7 cm  · Left Axillary LN Biopsy 10/19/21: PATH - Metastatic breast cancer, measuring up to 17 mm in a single core  · ER/MI negative, HER2+  · Neoadjuvant TCHP x 6 cycles from 10/21/21 - 2/10/22  · DR Taxotere to 60 mg/m2 and Carbo to AUC 4 with Cycle 6 due to side effects  · Breast Biopsy 10/27/21: PATH - 5 mm IDC with DCIS  · Breast MRI 2/21/22: Right Breast: No suspicious enhancing foci. No axillary or internal mammary chain lymphadenopathy.  A subclavian ported catheter, implanted in the posterior third of the upper inner quadrant, terminates in appropriate position in the SVC. Left Breast: No residual enhancement in the mass in the posterior third of the outer slightly upper left breast. The mass is no longer measurable. There is a biopsy clip within scarring. No residual enhancement in the previously seen, extensive, non-masslike enhancement in the middle third of the central, lower, left breast. A biopsy clip remains. Axillary flori metastases have significantly decreased in size and are no longer pathologically enlarged. A biopsy clip is within an inferior axillary lymph node  · Maintenance HP (Phesgo) 3/3/22 - Current   · Left Lumpectomy 4/21/22: pCR, 0/4 LNs  · XRT to start on 6/16/22      STAGE: Clinical 2 ER/NH negative Her2+    History of Present Illness:   Ric De Jesus is a 61 y.o. female seen today in office for follow up of left breast cancer ER/NH negative Her2 +. She started neoadjuvant TCHP chemotherapy on 10/21/21 and finished 6 cycles on 2/10/22. She started maintenance HP (Phesgo) on 3/3/22. She had a follow up ECHO on 3/11/22 per Cardio and EF was 60% and GLS was normal. She had a left lumpectomy on 4/21/22 and had path CR. She is here today for Cycle 12 (sixth dose of maintenance subQ HP). She will start XRT with Dr. Jada Tejeda today. She reports that she feels well overall today. She states that taste buds are still impaired. She is tolerating HP well overall thus far without significant side effects. Her appetite and energy levels are stable/good. She still has some mild neuropathy in toes on bilateral feet (left worse than right) and fingertips that is stable/unchanfed. She states that neuropathy is worse at night. She denies fever, chills, mouth sores, cough, SOB, CP, nausea, vomiting, diarrhea, and constipation. She denies pain today. CBC and CMP from 5/26/22 reviewed today. She is here alone today.      Past Medical History:   Diagnosis Date    Malignant neoplasm of left breast in female, estrogen receptor negative ([facility] [address] 10/13/2021 Past Surgical History:   Procedure Laterality Date    HX BREAST LUMPECTOMY Left 4/21/2022    LEFT BREAST REDUCTION LUMPECTOMY WITH BRACKETED MAG SEED LOCALIZATION, LEFT BREAST SENTINEL NODE BIOPSY LEFT BREAST ONCOPLASTIC REDUCTION WITH RIGHT BREAST REDUCTION FOR SYMMETRY AND PORT REMOVAL performed by Rupali Falk MD at [facility] AMBULATORY OR    HX BREAST REDUCTION Bilateral 4/21/2022    . performed by Nate Sheridan MD at [place] [address]    HX VASCULAR ACCESS Right 10/2021    PORT INSERTION    AK BREAST SURGERY PROCEDURE UNLISTED Left 10/06/21    Biopsy      Social History     Tobacco Use    Smoking status: Never Smoker    Smokeless tobacco: Never Used   Substance Use Topics    Alcohol use: Not Currently     Alcohol/week: 0.0 standard drinks      Family History   Problem Relation Age of Onset    Cancer Mother         BREAST    Dementia Mother     Heart Disease Father     Anesth Problems Neg Hx      Current Outpatient Medications   Medication Sig    Alpha Lipoic Acid 600 mg cap Take  by mouth.  potassium chloride (K-DUR, KLOR-CON M20) 20 mEq tablet TAKE 1 TABLET BY MOUTH DAILY    folic acid/multivit-min/lutein (CENTRUM SILVER PO) Take 1 Tablet by mouth daily.  cholecalciferol, vitamin D3, (VITAMIN D3 PO) Take 1 Tablet by mouth daily.  ALPRAZolam (XANAX) 0.5 mg tablet Take 1 Tablet by mouth three (3) times daily as needed for Anxiety. Max Daily Amount: 1.5 mg. 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 Facility-Administered Medications Ordered in Other Visits   Medication Dose Route Frequency    pertuzumab 600 mg-trastuzumab 600 mg-hyaluronidase-zzxf 20,000 units/10 mL  10 mL SubCUTAneous ONCE      No Known Allergies     Review of Systems:  A complete review of systems was obtained, negative except as described above and as reported on ROS sheet scanned into system.      Physical Exam:     Visit Vitals  /83 (BP 1 Location: Left upper arm, BP Patient Position: Sitting)   Pulse 80   Temp 97.7 °F (36.5 °C) (Temporal)   Ht 5' 6\" (1.676 m)   Wt 131 lb 4.8 oz (59.6 kg)   SpO2 98%   BMI 21.19 kg/m²     ECOG PS: 0  General: No distress  Eyes: Anicteric sclerae  HENT: Atraumatic, wearing a mask  Neck: Supple  Respiratory: CTAB, normal respiratory effort  CV: Normal rate, regular rhythm, no murmurs. GI: Soft, nontender, non-distended   MS: Normal gait and station. Skin: No rashes, ecchymoses, or petechiae. Normal temperature, turgor, and texture. Port site without redness/swelling  Psych: Alert, oriented, appropriate affect, normal judgment/insight  Neuro: Non focal    Results:     Lab Results   Component Value Date/Time    WBC 5.8 05/26/2022 08:45 AM    HGB 10.3 (L) 05/26/2022 08:45 AM    HCT 30.9 (L) 05/26/2022 08:45 AM    PLATELET 945 79/14/6670 08:45 AM    MCV 96.3 05/26/2022 08:45 AM    ABS. NEUTROPHILS 2.7 05/26/2022 08:45 AM     Lab Results   Component Value Date/Time    Sodium 136 05/26/2022 08:45 AM    Potassium 4.3 05/26/2022 08:45 AM    Chloride 103 05/26/2022 08:45 AM    CO2 31 05/26/2022 08:45 AM    Glucose 92 05/26/2022 08:45 AM    BUN 13 05/26/2022 08:45 AM    Creatinine 0.60 05/26/2022 08:45 AM    GFR est AA >60 05/26/2022 08:45 AM    GFR est non-AA >60 05/26/2022 08:45 AM    Calcium 9.8 05/26/2022 08:45 AM     Lab Results   Component Value Date/Time    Bilirubin, total 0.3 05/26/2022 08:45 AM    ALT (SGPT) 68 05/26/2022 08:45 AM    Alk. phosphatase 395 (H) 05/26/2022 08:45 AM    Protein, total 7.7 05/26/2022 08:45 AM    Albumin 3.3 (L) 05/26/2022 08:45 AM    Globulin 4.4 (H) 05/26/2022 08:45 AM     10/6/21  FINAL PATHOLOGIC DIAGNOSIS   Breast, left mass, core biopsy:   Invasive ductal carcinoma, favor grade 3 (see synoptic table)   INVASIVE CARCINOMA OF THE BREAST: Biopsy   TUMOR   Tumor Site: Clock position - 3 o'clock   Histologic Type:  Invasive carcinoma of no special type (ductal)   Glandular (Acinar) / Tubular Differentiation: Score 3   Nuclear Pleomorphism: Score 3   Mitotic Rate: Score 3   Overall Grade: Grade 3 (scores of 8 or 9)   Tumor Size: 13 mm   Ductal Carcinoma In Situ (DCIS): Not identified   Lymphovascular Invasion: Not identified   Microcalcifications: Not identified     Breast MRI 10/13/21  IMPRESSION:  RIGHT BREAST:  1. BI-RADS Category 1 - Negative. Mark Lever LEFT BREAST:  1. Rounded mass with dermal extension measuring approximately 3.3 cm in diameter  in the lateral, deep 3rd of the left breast. There are areas of fluid/necrosis  within this mass. BI-RADS Category 6 - Known biopsy-proven malignancy. 2. Nonmasslike enhancement in the left breast which is inferior, medial and  anterior to the primary mass. This nonmasslike enhancement extends over  approximately 6 x 5.5 x 4 cm. There are areas of fluid attenuation which could  represent cysts and/or necrosis within this nonmasslike enhancement. BI-RADS Category 5 - Highly suggestive of malignancy. 3. There is axillary adenopathy, including a lymph node deep to pectoralis  minor. BI-RADS Category 5 - Highly suggestive of malignancy    10/19/21  FINAL PATHOLOGIC DIAGNOSIS   Lymph node, left axillary, core biopsy:   Metastatic breast cancer, measuring up to 17 mm in a single core     10/21/21    ECHO ADULT COMPLETE 10/21/2021 10/21/2021    Interpretation Summary  · LV: Estimated LVEF is 55 - 60%. Normal cavity size, wall thickness and systolic function (ejection fraction normal). Wall motion: normal. Abnormal left ventricular strain. Global longitudinal strain is -15.4%. · TV: Pulmonary hypertension not suggested by Doppler findings.     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 No residual enhancement in multicentric, multifocal left breast carcinoma. 2. Significantly decreased left axillary flori metastases. Left axillary lymph  nodes are no longer pathologically enlarged. 3. No suspicious right breast enhancement or lymphadenopathy. 4. BI-RADS Assessment Category 6: Known biopsy proven malignancy. 03/11/22    ECHO ADULT FOLLOW-UP OR LIMITED 03/15/2022 3/15/2022    Interpretation Summary    Left Ventricle: Left ventricle size is normal. Increased wall thickness. Normal wall motion. Normal left ventricular systolic function. EF by 2D Simpsons Biplane is 60%. Global longitudinal strain is normal with a value of -22.3%. Normal diastolic function. Signed by: Bayron Esparza MD on 3/15/2022 10:36 AM    4/21/22  FINAL PATHOLOGIC DIAGNOSIS   1. Left breast skin, excision:   Benign skin   2. Left axillary sentinel node #1, excision:   One benign lymph node with scar, biopsy site and clip (0/1)   3. Left axillary sentinel node #2, excision:   One benign lymph node without scar (0/1)   4. Left axillary sentinel node #3, excision:   Two benign lymph nodes without scar (0/2)   5. Left breast, lumpectomy:   No residual invasive or in situ carcinoma status post neoadjuvant therapy (ypT0)   Two fibrocalcific tumor beds with biopsy sites   6. Left breast skin #2, excision:   Benign skin and subcutis   7. Right breast skin and tissue, 21 g, excision:   Benign mammary tissue and skin     06/08/22    ECHO ADULT FOLLOW-UP OR LIMITED 06/10/2022 6/10/2022    Interpretation Summary    Left Ventricle: Normal left ventricular systolic function. EF by 2D Simpsons Biplane is 56%. Left ventricle size is normal. Normal wall thickness. Normal wall motion. Global longitudinal strain is normal. GLS today: -17.1%, 3/11/2022 -22.3%, 12/6/2021 -20.3%, 10/21/2021 -15.4%.     Signed by: Bayron Esparza MD on 6/10/2022 12:51 PM    Records reviewed and summarized above. Pathology report(s) reviewed above. Radiology report(s) reviewed above. Assessment:/PLAN     1) Clinical Stage 2B T3N0 LEFT Breast Cancer ER/[place] negative Her2+  post Breast Biopsy Only 10/21  MRI Breast 10/13/21 showed a rounded mass with central fluid attenuation/necrosis in the lateral aspect of the left breast, deep 3rd, measuring approximately 3.3 cm in diameter with enhancement extending along the adjacent dermis suggesting skin invasion. There is nonmasslike enhancement extending both inferior, medial and anterior to the primary mass over approximately 6 x 5.5 x 4 cm. There are discontinuous areas of fluid attenuation associated with the nonmasslike enhancement which may represent cystic spaces and/or necrosis. There is an enlarged, left axillary lymph node with other adjacent, prominent lymph nodes. There is a lymph node node deep to the pectoralis minor muscle which measures 1.1 x 0.7 cm  She had a left axillary LN biopsy on 10/19/21 and path showed metastatic beast cancer. Pre chemo ECHO showed EF 55-60% with abnormal left ventricular strain. Referred to Cardiology for further evaluation - has follow up ECHO on 12/6/21 per Cardio. Patient started neoadjuvant TCHP chemotherapy on 10/25/21. She completed 6 cycles of TCHP on 2/10/22. DR Taxotere to 60 mg/m2 and Carbo to AUC 4 with Cycle 6 due to side effects. She had a post chemo Breast MRI on 2/21/22 that showed no residual enhancement in multicentric, multifocal left breast carcinoma, significantly decreased left axillary flori metastases, left axillary lymph nodes are no longer pathologically enlarged, and no suspicious right breast enhancement or lymphadenopathy. Personally reviewed MRI. Discussed MRI with patient. She started maintenance HP on 3/3/22. She had a follow up ECHO on 3/11/22 per Cardio and EF was 60%. She had a left lumpectomy on 4/21/22 and had pCR.  Discussed path with patient. She had follow up with Surgery on 5/20/22. Patient is here today for Cycle 12  (sixth cycle of maintenance HP). She had a follow up ECHO on 6/8/22 that was good with EF 56%. She has seen Rad/Onc and will start XRT today with Dr. Tova Bhatti. She is tolerating HP well overall without significant side effects. She is clinically healthy overall and stable today. 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 Labs (CBC and CMP from 5/26/22) reviewed today. Continue maintenance Herceptin+Perjeta. Follow [facility] 3 weeks in [facility]. Follow up in 6 weeks in [facility]/office. Patient agrees with plan. 2)  Postmenopausal  Continue routine GYN follow up. 3) Management of High Risk Medications - Chemotherapy  Toxicities from chemo included Grade 1 Nausea,  Grade 1 Neuropathy, Grade 1 Fatigue, Grade 1 Decreased Appetite. DR Taxotere to 60 mg/m2 and Carbo to AUC 4 with Cycle 6. Chemo side effects improving. Pre chemo ECHO from 10/21/21 reviewed - EF 55-60% with abnormal left ventricular strain. Referred to Cardiology for further evaluation/management of strain. She had a follow up ECHO on 12/6/21 per Cardiology - EF 58% with no strain. She has a follow up ECHO on 3/11/22 per Cardiology - EF 60%, GLS normal.   She had a follow up ECHO on 6/8/22 per Cardiology - EF 56%, GLS normal.   Labs (CBC and CMP) from 5/26/22 reviewed today. Continue ECHOs every 3 months. Will monitor for side effects. 4) Psychosocial  Mood good, coping well. 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 Her  is very supportive. SW/NN support as needed. She is here alone today. Call if questions. Follow up in 3 weeks in [facility] and 6 weeks in [facility]/office. I personally saw and evaluated the patient and performed the key components of medical decision making. The history, physical exam, and documentation were performed by Lesvia Casas NP.  I reviewed and verified the above documentation and modified it as needed. Specifically pt doing well   On HP and tolerating fine. Had path CR. For radiation. Reviewed original dx path/ scans again today  Labs personally reviewed   Continue with HP    Refer to Rad/onc and has surgery fu. I appreciate the opportunity to participate in Ms. Isac Landin's care.     Signed By: Eugene Holter,

## 2022-06-16 NOTE — PROGRESS NOTES
Shanika Olmos is a 61 y.o. female  Chief Complaint   Patient presents with    Chemotherapy    Breast Cancer     1. Have you been to the ER, urgent care clinic since your last visit? Hospitalized since your last visit? No.    2. Have you seen or consulted any other health care providers outside of the [address] [place] [address] since your last visit? Include any pap smears or colon screening.  No.

## 2022-06-17 ENCOUNTER — HOSPITAL ENCOUNTER (OUTPATIENT)
Dept: RADIATION THERAPY | Age: 57
Discharge: HOME | End: 2022-06-17
Payer: COMMERCIAL

## 2022-06-17 PROCEDURE — 77412 RADIATION TX DELIVERY LVL 3: CPT

## 2022-06-17 PROCEDURE — 77417 THER RADIOLOGY PORT IMAGE(S): CPT

## 2022-06-20 ENCOUNTER — HOSPITAL ENCOUNTER (OUTPATIENT)
Dept: RADIATION THERAPY | Age: 57
Discharge: HOME | End: 2022-06-20
Payer: COMMERCIAL

## 2022-06-20 PROCEDURE — 77412 RADIATION TX DELIVERY LVL 3: CPT

## 2022-06-21 ENCOUNTER — HOSPITAL ENCOUNTER (OUTPATIENT)
Dept: RADIATION THERAPY | Age: 57
Discharge: HOME | End: 2022-06-21
Payer: COMMERCIAL

## 2022-06-21 PROCEDURE — 77417 THER RADIOLOGY PORT IMAGE(S): CPT

## 2022-06-21 PROCEDURE — 77336 RADIATION PHYSICS CONSULT: CPT

## 2022-06-21 PROCEDURE — 77412 RADIATION TX DELIVERY LVL 3: CPT

## 2022-06-22 ENCOUNTER — HOSPITAL ENCOUNTER (OUTPATIENT)
Dept: RADIATION THERAPY | Age: 57
Discharge: HOME | End: 2022-06-22
Payer: COMMERCIAL

## 2022-06-22 PROCEDURE — 77417 THER RADIOLOGY PORT IMAGE(S): CPT

## 2022-06-22 PROCEDURE — 77412 RADIATION TX DELIVERY LVL 3: CPT

## 2022-06-23 ENCOUNTER — HOSPITAL ENCOUNTER (OUTPATIENT)
Dept: RADIATION THERAPY | Age: 57
Discharge: HOME | End: 2022-06-23
Payer: COMMERCIAL

## 2022-06-23 PROCEDURE — 77417 THER RADIOLOGY PORT IMAGE(S): CPT

## 2022-06-23 PROCEDURE — 77412 RADIATION TX DELIVERY LVL 3: CPT

## 2022-06-24 ENCOUNTER — HOSPITAL ENCOUNTER (OUTPATIENT)
Dept: RADIATION THERAPY | Age: 57
Discharge: HOME | End: 2022-06-24
Payer: COMMERCIAL

## 2022-06-24 PROCEDURE — 77412 RADIATION TX DELIVERY LVL 3: CPT

## 2022-06-24 PROCEDURE — 77417 THER RADIOLOGY PORT IMAGE(S): CPT

## 2022-06-27 ENCOUNTER — HOSPITAL ENCOUNTER (OUTPATIENT)
Dept: RADIATION THERAPY | Age: 57
Discharge: HOME | End: 2022-06-27
Payer: COMMERCIAL

## 2022-06-27 PROCEDURE — 77417 THER RADIOLOGY PORT IMAGE(S): CPT

## 2022-06-27 PROCEDURE — 77412 RADIATION TX DELIVERY LVL 3: CPT

## 2022-06-28 ENCOUNTER — HOSPITAL ENCOUNTER (OUTPATIENT)
Dept: RADIATION THERAPY | Age: 57
Discharge: HOME | End: 2022-06-28
Payer: COMMERCIAL

## 2022-06-28 PROCEDURE — 77336 RADIATION PHYSICS CONSULT: CPT

## 2022-06-28 PROCEDURE — 77412 RADIATION TX DELIVERY LVL 3: CPT

## 2022-06-28 PROCEDURE — 77417 THER RADIOLOGY PORT IMAGE(S): CPT

## 2022-06-29 ENCOUNTER — HOSPITAL ENCOUNTER (OUTPATIENT)
Dept: RADIATION THERAPY | Age: 57
Discharge: HOME | End: 2022-06-29
Payer: COMMERCIAL

## 2022-06-29 PROCEDURE — 77417 THER RADIOLOGY PORT IMAGE(S): CPT

## 2022-06-29 PROCEDURE — 77412 RADIATION TX DELIVERY LVL 3: CPT

## 2022-06-29 RX ORDER — EPINEPHRINE 1 MG/ML
0.3 INJECTION, SOLUTION, CONCENTRATE INTRAVENOUS AS NEEDED
Status: CANCELLED | OUTPATIENT
Start: 2022-07-07

## 2022-06-29 RX ORDER — HYDROCORTISONE SODIUM SUCCINATE 100 MG/2ML
100 INJECTION, POWDER, FOR SOLUTION INTRAMUSCULAR; INTRAVENOUS AS NEEDED
Status: CANCELLED | OUTPATIENT
Start: 2022-07-07

## 2022-06-29 RX ORDER — ACETAMINOPHEN 325 MG/1
650 TABLET ORAL
Status: CANCELLED | OUTPATIENT
Start: 2022-07-07

## 2022-06-29 RX ORDER — HEPARIN 100 UNIT/ML
300-500 SYRINGE INTRAVENOUS AS NEEDED
Status: CANCELLED
Start: 2022-07-07

## 2022-06-29 RX ORDER — ALBUTEROL SULFATE 0.83 MG/ML
2.5 SOLUTION RESPIRATORY (INHALATION) AS NEEDED
Status: CANCELLED
Start: 2022-07-07

## 2022-06-29 RX ORDER — SODIUM CHLORIDE 9 MG/ML
10 INJECTION INTRAMUSCULAR; INTRAVENOUS; SUBCUTANEOUS AS NEEDED
Status: CANCELLED | OUTPATIENT
Start: 2022-07-07

## 2022-06-29 RX ORDER — DIPHENHYDRAMINE HYDROCHLORIDE 50 MG/ML
50 INJECTION, SOLUTION INTRAMUSCULAR; INTRAVENOUS AS NEEDED
Status: CANCELLED
Start: 2022-07-07

## 2022-06-29 RX ORDER — DIPHENHYDRAMINE HYDROCHLORIDE 50 MG/ML
50 INJECTION, SOLUTION INTRAMUSCULAR; INTRAVENOUS
Status: CANCELLED | OUTPATIENT
Start: 2022-07-07

## 2022-06-29 RX ORDER — ONDANSETRON 2 MG/ML
8 INJECTION INTRAMUSCULAR; INTRAVENOUS AS NEEDED
Status: CANCELLED | OUTPATIENT
Start: 2022-07-07

## 2022-06-29 RX ORDER — SODIUM CHLORIDE 0.9 % (FLUSH) 0.9 %
10 SYRINGE (ML) INJECTION AS NEEDED
Status: CANCELLED | OUTPATIENT
Start: 2022-07-07

## 2022-06-29 RX ORDER — ACETAMINOPHEN 325 MG/1
650 TABLET ORAL AS NEEDED
Status: CANCELLED
Start: 2022-07-07

## 2022-06-29 RX ORDER — SODIUM CHLORIDE 9 MG/ML
25 INJECTION, SOLUTION INTRAVENOUS CONTINUOUS
Status: CANCELLED | OUTPATIENT
Start: 2022-07-07

## 2022-06-29 RX ORDER — DIPHENHYDRAMINE HYDROCHLORIDE 50 MG/ML
25 INJECTION, SOLUTION INTRAMUSCULAR; INTRAVENOUS AS NEEDED
Status: CANCELLED
Start: 2022-07-07

## 2022-06-30 ENCOUNTER — HOSPITAL ENCOUNTER (OUTPATIENT)
Dept: RADIATION THERAPY | Age: 57
Discharge: HOME | End: 2022-06-30
Payer: COMMERCIAL

## 2022-06-30 PROCEDURE — 77417 THER RADIOLOGY PORT IMAGE(S): CPT

## 2022-06-30 PROCEDURE — 77412 RADIATION TX DELIVERY LVL 3: CPT

## 2022-07-01 ENCOUNTER — HOSPITAL ENCOUNTER (OUTPATIENT)
Dept: RADIATION THERAPY | Age: 57
Discharge: HOME | End: 2022-07-01
Payer: COMMERCIAL

## 2022-07-01 PROCEDURE — 77412 RADIATION TX DELIVERY LVL 3: CPT

## 2022-07-01 PROCEDURE — 77417 THER RADIOLOGY PORT IMAGE(S): CPT

## 2022-07-05 ENCOUNTER — HOSPITAL ENCOUNTER (OUTPATIENT)
Dept: RADIATION THERAPY | Age: 57
Discharge: HOME | End: 2022-07-05
Payer: COMMERCIAL

## 2022-07-05 PROCEDURE — 77412 RADIATION TX DELIVERY LVL 3: CPT

## 2022-07-05 PROCEDURE — 77417 THER RADIOLOGY PORT IMAGE(S): CPT

## 2022-07-06 ENCOUNTER — HOSPITAL ENCOUNTER (OUTPATIENT)
Dept: RADIATION THERAPY | Age: 57
Discharge: HOME | End: 2022-07-06
Payer: COMMERCIAL

## 2022-07-06 PROCEDURE — 77336 RADIATION PHYSICS CONSULT: CPT

## 2022-07-06 PROCEDURE — 77412 RADIATION TX DELIVERY LVL 3: CPT

## 2022-07-06 PROCEDURE — 77417 THER RADIOLOGY PORT IMAGE(S): CPT

## 2022-07-07 ENCOUNTER — HOSPITAL ENCOUNTER (OUTPATIENT)
Dept: INFUSION THERAPY | Age: 57
Discharge: HOME | End: 2022-07-07
Payer: COMMERCIAL

## 2022-07-07 ENCOUNTER — HOSPITAL ENCOUNTER (OUTPATIENT)
Dept: RADIATION THERAPY | Age: 57
Discharge: HOME | End: 2022-07-07
Payer: COMMERCIAL

## 2022-07-07 VITALS
HEIGHT: 66 IN | TEMPERATURE: 97.8 F | HEART RATE: 77 BPM | SYSTOLIC BLOOD PRESSURE: 126 MMHG | BODY MASS INDEX: 20.96 KG/M2 | RESPIRATION RATE: 18 BRPM | DIASTOLIC BLOOD PRESSURE: 89 MMHG | WEIGHT: 130.4 LBS

## 2022-07-07 DIAGNOSIS — Z17.1 MALIGNANT NEOPLASM OF LEFT BREAST IN FEMALE, ESTROGEN RECEPTOR NEGATIVE, UNSPECIFIED SITE OF BREAST (HCC): Primary | ICD-10-CM

## 2022-07-07 DIAGNOSIS — C50.912 MALIGNANT NEOPLASM OF LEFT BREAST IN FEMALE, ESTROGEN RECEPTOR NEGATIVE, UNSPECIFIED SITE OF BREAST (HCC): Primary | ICD-10-CM

## 2022-07-07 LAB
ALBUMIN SERPL-MCNC: 3.5 G/DL (ref 3.5–5)
ALBUMIN/GLOB SERPL: 0.8 {RATIO} (ref 1.1–2.2)
ALP SERPL-CCNC: 336 U/L (ref 45–117)
ALT SERPL-CCNC: 122 U/L (ref 12–78)
ANION GAP SERPL CALC-SCNC: 3 MMOL/L (ref 5–15)
AST SERPL-CCNC: 85 U/L (ref 15–37)
BASOPHILS # BLD: 0 K/UL (ref 0–0.1)
BASOPHILS NFR BLD: 0 % (ref 0–1)
BILIRUB SERPL-MCNC: 0.5 MG/DL (ref 0.2–1)
BUN SERPL-MCNC: 14 MG/DL (ref 6–20)
BUN/CREAT SERPL: 20 (ref 12–20)
CALCIUM SERPL-MCNC: 9.9 MG/DL (ref 8.5–10.1)
CHLORIDE SERPL-SCNC: 102 MMOL/L (ref 97–108)
CO2 SERPL-SCNC: 31 MMOL/L (ref 21–32)
CREAT SERPL-MCNC: 0.69 MG/DL (ref 0.55–1.02)
DIFFERENTIAL METHOD BLD: ABNORMAL
EOSINOPHIL # BLD: 0.1 K/UL (ref 0–0.4)
EOSINOPHIL NFR BLD: 3 % (ref 0–7)
ERYTHROCYTE [DISTWIDTH] IN BLOOD BY AUTOMATED COUNT: 13.1 % (ref 11.5–14.5)
GLOBULIN SER CALC-MCNC: 4.5 G/DL (ref 2–4)
GLUCOSE SERPL-MCNC: 99 MG/DL (ref 65–100)
HCT VFR BLD AUTO: 33 % (ref 35–47)
HGB BLD-MCNC: 10.8 G/DL (ref 11.5–16)
IMM GRANULOCYTES # BLD AUTO: 0 K/UL (ref 0–0.04)
IMM GRANULOCYTES NFR BLD AUTO: 0 % (ref 0–0.5)
LYMPHOCYTES # BLD: 1.5 K/UL (ref 0.8–3.5)
LYMPHOCYTES NFR BLD: 35 % (ref 12–49)
MCH RBC QN AUTO: 30.1 PG (ref 26–34)
MCHC RBC AUTO-ENTMCNC: 32.7 G/DL (ref 30–36.5)
MCV RBC AUTO: 91.9 FL (ref 80–99)
MONOCYTES # BLD: 0.5 K/UL (ref 0–1)
MONOCYTES NFR BLD: 12 % (ref 5–13)
NEUTS SEG # BLD: 2 K/UL (ref 1.8–8)
NEUTS SEG NFR BLD: 50 % (ref 32–75)
NRBC # BLD: 0 K/UL (ref 0–0.01)
NRBC BLD-RTO: 0 PER 100 WBC
PLATELET # BLD AUTO: 184 K/UL (ref 150–400)
PMV BLD AUTO: 9 FL (ref 8.9–12.9)
POTASSIUM SERPL-SCNC: 3.7 MMOL/L (ref 3.5–5.1)
PROT SERPL-MCNC: 8 G/DL (ref 6.4–8.2)
RBC # BLD AUTO: 3.59 M/UL (ref 3.8–5.2)
SODIUM SERPL-SCNC: 136 MMOL/L (ref 136–145)
WBC # BLD AUTO: 4.1 K/UL (ref 3.6–11)

## 2022-07-07 PROCEDURE — 36415 COLL VENOUS BLD VENIPUNCTURE: CPT

## 2022-07-07 PROCEDURE — 85025 COMPLETE CBC W/AUTO DIFF WBC: CPT

## 2022-07-07 PROCEDURE — 80053 COMPREHEN METABOLIC PANEL: CPT

## 2022-07-07 PROCEDURE — 77412 RADIATION TX DELIVERY LVL 3: CPT

## 2022-07-07 PROCEDURE — 77417 THER RADIOLOGY PORT IMAGE(S): CPT

## 2022-07-07 PROCEDURE — 74011250636 HC RX REV CODE- 250/636: Performed by: INTERNAL MEDICINE

## 2022-07-07 PROCEDURE — 96402 CHEMO HORMON ANTINEOPL SQ/IM: CPT

## 2022-07-07 RX ADMIN — PERTUZUMAB, TRASTUZUMAB, AND HYALURONIDASE-ZZXF 10 ML: 600; 600; 2000 INJECTION, SOLUTION SUBCUTANEOUS at 09:12

## 2022-07-07 NOTE — PROGRESS NOTES
[facility] Chemo Progress Note    Date: July 7, 2022      0820: Ms. Jessica Hand Arrived to [facility] for  C13 Phesgo ambulatory in stable condition. Assessment was completed. Labs drawn peripherally from right [facility] and sent for processing. Criteria for treatment was met. Chemotherapy Flowsheet 7/7/2022   Cycle C13   Date 7/7/2022   Drug / Regimen Phesgo   Pre Meds -   Notes Given right thigh       Ms. Landin's vitals were reviewed. Visit Vitals  /89   Pulse 77   Temp 97.8 °F (36.6 °C)   Resp 18   Ht 5' 6\" (1.676 m)   Wt 59.1 kg (130 lb 6.4 oz)   BMI 21.05 kg/m²          Pre-medications  were administered as ordered and chemotherapy was initiated. Medications Administered     pertuzumab 600 mg-trastuzumab 600 mg-hyaluronidase-zzxf 20,000 units/10 mL     Admin Date  07/07/2022 Action  Given Dose  10 mL Route  SubCUTAneous Administered By  Fareed Pour                Given SQ to right thigh     Two nurses verified prior to administering: Drug name, Drug dose, Infusion volume or drug volume when prepared in a syringe, Rate of administration, Route of administration, Expiration dates and/or times, Appearance and physical integrity of the drugs, Rate set on infusion pump, when used, and Sequencing of drug administration. 0930: Patient tolerated treatment well. Patient was discharged in stable condition. Patient is aware of next scheduled [facility] appointment on 7/28/22.     Future Appointments   Date Time Provider Ryan Daly   7/8/2022  9:15 AM RAD ONC THERAPY [address]   7/11/2022  9:15 AM RAD ONC THERAPY [address] [facility]   7/12/2022  9:15 AM RAD ONC THERAPY [facility]   7/13/2022  9:15 AM RAD ONC THERAPY [address] [facility]. [facility]   7/20/2022  7:45 AM Terry Mcclelland PT [facility]   7/28/2022  8:30 AM F1 RAFI LONG [address] [place] [address]   7/28/2022  8:45 AM Daily [place]  [address] BS AMB   8/16/2022 11:20 AM Ludin Euceda MD [facility] BS AMB 8/18/2022  8:00 AM F1 RAFI LONG [facility] H   9/8/2022  8:00 AM F1 RAFI LONG [facility]   9/29/2022  8:00 AM F1 RAFI LONG [facility]   10/20/2022  8:00 AM F1 RAFI LONG TX [facility] H   02/77/7723  6:34 PM Dorys Wan MD [facility] BS AMB   07/6/0733  0:24 PM Dorys Wan MD [facility] BS AMB         Susy Butt RN  July 7, 2022

## 2022-07-08 ENCOUNTER — HOSPITAL ENCOUNTER (OUTPATIENT)
Dept: RADIATION THERAPY | Age: 57
Discharge: HOME | End: 2022-07-08
Payer: COMMERCIAL

## 2022-07-08 PROCEDURE — 77417 THER RADIOLOGY PORT IMAGE(S): CPT

## 2022-07-08 PROCEDURE — 77412 RADIATION TX DELIVERY LVL 3: CPT

## 2022-07-11 ENCOUNTER — HOSPITAL ENCOUNTER (OUTPATIENT)
Dept: RADIATION THERAPY | Age: 57
Discharge: HOME | End: 2022-07-11
Payer: COMMERCIAL

## 2022-07-11 PROCEDURE — 77412 RADIATION TX DELIVERY LVL 3: CPT

## 2022-07-11 PROCEDURE — 77417 THER RADIOLOGY PORT IMAGE(S): CPT

## 2022-07-11 RX ORDER — DESONIDE 0.5 MG/G
CREAM TOPICAL 3 TIMES DAILY
Qty: 15 G | Refills: 0 | Status: SHIPPED | OUTPATIENT
Start: 2022-07-11 | End: 2022-08-29

## 2022-07-12 ENCOUNTER — HOSPITAL ENCOUNTER (OUTPATIENT)
Dept: RADIATION THERAPY | Age: 57
Discharge: HOME | End: 2022-07-12
Payer: COMMERCIAL

## 2022-07-12 PROCEDURE — 77412 RADIATION TX DELIVERY LVL 3: CPT

## 2022-07-12 PROCEDURE — 77417 THER RADIOLOGY PORT IMAGE(S): CPT

## 2022-07-13 ENCOUNTER — HOSPITAL ENCOUNTER (OUTPATIENT)
Dept: RADIATION THERAPY | Age: 57
Discharge: HOME | End: 2022-07-13
Payer: COMMERCIAL

## 2022-07-13 PROCEDURE — 77412 RADIATION TX DELIVERY LVL 3: CPT

## 2022-07-13 PROCEDURE — 77417 THER RADIOLOGY PORT IMAGE(S): CPT

## 2022-07-14 ENCOUNTER — HOSPITAL ENCOUNTER (OUTPATIENT)
Dept: RADIATION THERAPY | Age: 57
Discharge: HOME | End: 2022-07-14
Payer: COMMERCIAL

## 2022-07-14 PROCEDURE — 77412 RADIATION TX DELIVERY LVL 3: CPT

## 2022-07-14 PROCEDURE — 77417 THER RADIOLOGY PORT IMAGE(S): CPT

## 2022-07-14 PROCEDURE — 77336 RADIATION PHYSICS CONSULT: CPT

## 2022-07-15 ENCOUNTER — HOSPITAL ENCOUNTER (OUTPATIENT)
Dept: RADIATION THERAPY | Age: 57
Discharge: HOME | End: 2022-07-15
Payer: COMMERCIAL

## 2022-07-15 PROCEDURE — 77417 THER RADIOLOGY PORT IMAGE(S): CPT

## 2022-07-15 PROCEDURE — 77412 RADIATION TX DELIVERY LVL 3: CPT

## 2022-07-18 ENCOUNTER — HOSPITAL ENCOUNTER (OUTPATIENT)
Dept: RADIATION THERAPY | Age: 57
Discharge: HOME | End: 2022-07-18
Payer: COMMERCIAL

## 2022-07-18 PROCEDURE — 77412 RADIATION TX DELIVERY LVL 3: CPT

## 2022-07-18 PROCEDURE — 77417 THER RADIOLOGY PORT IMAGE(S): CPT

## 2022-07-19 ENCOUNTER — HOSPITAL ENCOUNTER (OUTPATIENT)
Dept: RADIATION THERAPY | Age: 57
Discharge: HOME | End: 2022-07-19
Payer: COMMERCIAL

## 2022-07-19 PROCEDURE — 77412 RADIATION TX DELIVERY LVL 3: CPT

## 2022-07-19 PROCEDURE — 77417 THER RADIOLOGY PORT IMAGE(S): CPT

## 2022-07-20 ENCOUNTER — HOSPITAL ENCOUNTER (OUTPATIENT)
Dept: PHYSICAL THERAPY | Age: 57
Discharge: HOME | End: 2022-07-20
Payer: COMMERCIAL

## 2022-07-20 ENCOUNTER — HOSPITAL ENCOUNTER (OUTPATIENT)
Dept: RADIATION THERAPY | Age: 57
Discharge: HOME | End: 2022-07-20
Payer: COMMERCIAL

## 2022-07-20 VITALS — BODY MASS INDEX: 21.02 KG/M2 | WEIGHT: 130.8 LBS | HEIGHT: 66 IN

## 2022-07-20 PROCEDURE — 97161 PT EVAL LOW COMPLEX 20 MIN: CPT

## 2022-07-20 PROCEDURE — 77412 RADIATION TX DELIVERY LVL 3: CPT

## 2022-07-20 PROCEDURE — 97110 THERAPEUTIC EXERCISES: CPT

## 2022-07-20 PROCEDURE — 97140 MANUAL THERAPY 1/> REGIONS: CPT

## 2022-07-20 PROCEDURE — 77417 THER RADIOLOGY PORT IMAGE(S): CPT

## 2022-07-20 NOTE — PROGRESS NOTES
Statement of Medical Necessity  Page 1 of 2      Tennis Clau 1965 Today's Date: 7/20/2022 PRERNA: [employer]   Payor: Keron Duggan / Plan: [address] HMO/CHOICE PLUS/POS / Product Type: HMO /  ME: TBD  Refills: 2                   Diagnosis  []   I97.2 Post-Mastectomy Lymphedema []   I87.2 Venous Insufficiency   [x]   I89.0 Lymphedema, other secondary L UE []   I83.019 Venous Stasis Ulcer LE, Right   []   I89.9 Unspecified Lymphatic Disorder []   I83.029 Venous Stasis Ulcer LE, Left   [x]   R60.9 Swelling not relieved by elevation []   Q82.0 Hereditary/ Congenital Lymphedema   []   C50.211 Malignant neoplasm of breast, Right []   G89.3  Cancer associated pain   []   C50.212 Malignant neoplasm of breast, Left []   L03.115 LE Cellulitis, Right   [x]  Z48.3 Aftercare following surgery for neoplasm []   L03.116 LE Cellulitis, Left                                   Iron Clau    1965  Page 2 of 2    Physician Order for DME for Diagnosis of secondary L UE lymphedema and Axillary Web Syndrome as Listed on Statement of Medical Necessity, Page 1         Recommended Product:  Units Upper Extremity Rt Lt Units Lower Extremity Rt Lt    Circ-Aid, Ready Wrap, Sigvaris Arm    Inelastic binders (Circ-Aid, Farrow)  []Foot   []Below Knee   []Knee   []Thigh      Circ-Aid Ready Wrap, Sigvaris hand    Scott Edward, night use []Full Leg  []Lower Leg      Tribute Arm, night use    Tribute, night use  []Full Leg  []Lower Leg      Scott Edward Arm, night use    Major Sleeve Leg/ Foot, night use     4 Gradiant Compression Sleeves & Gloves  []Custom [x] RM Arm:  [x]CCL1 [x]CCL2 []CCL3  []Custom [x] RM Glove: [x]CCL1 [x]CCL2 []CCL3    X  Gradient Compression Stockings   []Custom  []RM Lower Extremity:   []CCL1       []CCL2         []CCL3   []Knee       []Thigh        []Waist Length      Major sleeve arm w/ hand, night use    Tribute Wrap, night use     2 Compression Bra          Compression Vest         The above patient was referred for treatment of Lymphedema due to the diagnosis indicated above. Lymphedema is a progressive and chronic condition. It may cause acute infections, muscle atrophy, discomfort, and connective tissue fibrosis with irreversible tissue damage, decreased ROM in the affected limb and diminished functional mobility possibly interfering with independence and ability to work. Recurrent infections and wound complications that commonly occur with Lymphedema often require hospitalization and extensive wound care, thus increasing medical costs. The patient has received complete decongestive therapy which includes manual lymphatic drainage, lymphedema specific exercises, compression bandaging, and hygiene/skin care. Goals of therapy are to reduce the edema and prevent re-accumulation of fluid with its complications. It is medically necessary for this patient to have daytime garments to control this condition. They will need 2 sets (one set to wear and one set to wash for adequate skin care and wearing time). Garments must be replaced every 4-6 months for effectiveness. There are no substitutes available that offer acceptable compression treatment for this Lymphedema patient. If further information is requested, please contact our certified lymphedema therapists at [phone].     [] Rosa Riley PT, MSPT, CLT-DEXTER [] Thiago Wiggins, MS, OTR/L, CLT [x]  Moon Watts PT, CLT [] Timothy Cartwright PTA, CLT, CSIS    Printed  Provider Name  Provider Signature  Date    Provider NPI

## 2022-07-20 NOTE — PROGRESS NOTES
[facility]patient [facility]  a part of [facility]  [address], [address]  Phone: [phone]  Fax: [phone]    Plan of Care/Statement of Necessity for Physical Therapy/Occupational Therapy Services  2-15    Patient name: Jackie Pierce  : 1965  Provider#: [id number]  Referral source: Chuy Paris      Medical/Treatment Diagnosis: Aftercare following surgery for neoplasm (Z48.3)    Prior Hospitalization: see medical history     Comorbidities: L breast cancer treated with surgery, chemotherapy, and radiation therapy  Prior Level of Function: Independent  Medications: Verified on Patient Summary List  Start of Care: 22      Onset Date: 22   The Plan of Care and following information is based on the information from the initial evaluation. Assessment/ key information: Patient is a 61year old female with L UE swelling/lymphedema stage 1 to 2 following diagnosis and treatment of L breast cancer. Patient's condition is complicated by ongoing radiation therapy, radiation related skin changes. , and cording in the L arm. Full UE volumes taken today reveal a percentage difference of >8% L UE > R UE, however, patient reports using the L arm for lifting and most functional tasks even though the R arm is the dominant arm. Girth measurements of the arms taken today reveal that all girth measurements of the L arm above the elbow were larger than girth measurements of the R arm above the elbow. Patient is motivated to improve her condition and reduce swelling and cording in the L upper arm. Patient was educated in exercises and techniques to perform at home to improve these symptoms. Patient will work on her home program and return to the clinic in 3 weeks once radiation is completed.     Evaluation Complexity History LOW Complexity : Zero comorbidities / personal factors that will impact the outcome / POC; Examination MEDIUM Complexity : 3 Standardized tests and measures addressing body structure, function, activity limitation and / or participation in recreation  ;Presentation MEDIUM Complexity : Evolving with changing characteristics  ; Clinical Decision Making MEDIUM Complexity : FOTO score of 26-74  Overall Complexity Rating: LOW     Problem List: axillary web syndrome, swelling. Treatment Plan may include any combination of the following: Other: therapeutic exercise, manual lymphatic drainage, measuring and fitting of compression products, skin care, patient education  Patient / Family readiness to learn indicated by: asking questions, trying to perform skills, and interest  Persons(s) to be included in education: patient (P)  Patient Goal (s): reduce the risk of lymphedema and reduce cording symptoms  Rehabilitation Potential: good    Short Term Goals: To be met by 8/24/22  1. Patient will demonstrate knowledge of signs/symptoms of infections/cellulitis and be independent in skin care to prevent cellulitis. 2.  Patient will demonstrate independence in lymphedema home program of therapeutic exercises/stretches to improve axillary web symptoms and decongest limb to improve ADLs. 3.  Patient will participate in the selection process to allow ordering of home compression system (daytime, nighttime garments and pump as needed). Long Term Goals: To be met by 10/15/22  1. Patient will be independent with don/doff of compression system and use in order to prevent reaccumulation of fluid at discharge. 2.  Pt will be independent in self-MLD and show stable limb volumes showing decongestion and pt. ready for transition to independent restorative phase of lymphedema therapy.   Frequency / Duration: Patient to be seen for 3 to 7 visits over the next 12 weeks     Patient/ Caregiver education and instruction: exercises, skin care, home program    [x]  Plan of care has been reviewed with PTA      Certification Period: 7/20/2022-10/15/2022    Adarsh Jamison PT, CLT 7/20/2022     ______________________________________________________5/18/05__________________    I certify that the above Therapy Services are being furnished while the patient is under my care. I agree with the treatment plan and certify that this therapy is necessary.     [de-identified] Signature:____________________  Date:____________Time: _________         Rodger Meckel

## 2022-07-20 NOTE — PROGRESS NOTES
PT/OT LYMPHEDEMA INITIAL EVALUATION NOTE - [facility] 2-15    Patient Name: Kathy Sherman  Date:2022  : 1965  [x]  Patient  Verified  Payor: Stacie Mead / Plan: [address] HMO/CHOICE PLUS/POS / Product Type: HMO /    In time: 7:30  Out time: 8:45  Total Treatment Time (min): 75  Total Timed Codes (min): 50  1:1 Treatment Time ( [address] only): 50   Visit #: 1     Treatment Area: Aftercare following surgery for neoplasm (Z48.3)    SUBJECTIVE  Pain Level (0-10 scale): 0/10  Any medication changes, allergies to medications, adverse drug reactions, diagnosis change, or new procedure performed?: [] No    [x] Yes (see summary sheet for update)  Subjective:    Patient reports that she first noticed tightness in the L upper arm soon after having surgery for breast cancer. She has not noticed any swelling in the arm but is interested in learning how to avoid lymphedema. Function: Patient is independent with mobility and self care. Mechanism of Injury: Patient reports the onset of tightness and possible cording in the L lateral chest and upper arm soon after having surgery for L breast cancer. Patient had a L breast lumpectomy, reduction, SNBx and a R breast reduction 22 as well as chemotherapy. Patient reports having two more weeks of radiation therapy and that she has radiation related skin changes on the L anterior chest.   Previous Treatment/Compliance: None  PMHx/Surgical Hx:   Past Surgical History:   Procedure Laterality Date    HX BREAST LUMPECTOMY Left 2022    LEFT BREAST REDUCTION LUMPECTOMY WITH BRACKETED MAG SEED LOCALIZATION, LEFT BREAST SENTINEL NODE BIOPSY LEFT BREAST ONCOPLASTIC REDUCTION WITH RIGHT BREAST REDUCTION FOR SYMMETRY AND PORT REMOVAL performed by Vani Marquez MD at [place] [address]    HX BREAST REDUCTION Bilateral 2022    .  performed by Elio Goldmann, MD at [place] [address]    HX VASCULAR ACCESS Right 10/2021    PORT INSERTION    SC BREAST SURGERY PROCEDURE UNLISTED Left 10/06/21    Biopsy     Past Medical History:   Diagnosis Date    Malignant neoplasm of left breast in female, estrogen receptor negative ([facility] [address] 10/13/2021     Work Hx: Working full time - spends several hours each day typing when at work. Living Situation: Lives with  in a multi-level house. Bedroom on the first floor. Pt Goals: \"avoid lymphedema\"  Barriers: Ongoing radiation therapy  Motivation: Good  Cognition: A & O x 3      Sleeping Arrangement:  in bed at night  Compression/Lymphedema Equipment: None    LLIS Score: scores a 5 with 8% impairment    OBJECTIVE/EXAMINATION    Skin and Tissue Assessment:  Dermal Status: Tenuous L anterior chest and Scars - B breast reduction and L breast lumpectomy    Texture/Consistency: Boggy L upper arm     Pigmentation/Color Change:  radiation related skin changes L anterolateral chest    Anomalies: N/A    Circulatory:  no history of DVT noted     Nails: Normal    Stemmers Sign: Negative    Height:  Height: 5' 6\" (167.6 cm)  Weight:  Weight: 59.3 kg (130 lb 12.8 oz)   BMI:  BMI (calculated): 21.1  (36 or greater: adversely affecting lymphedema)  Volumetric Measurements:   Right:  1,633.39 mL Left:  1,773. 31 mL   % Difference: 8.57% L UE > R UE Dominance: R hand dominant       15 min Therapeutic Exercise:  [x] See flow sheet :  Discussed the role and benefit of compression products with exercise. Demonstrated the butterfly stretch, T stretch, and neural tension stretch on wall with patient able to demonstrate with good technique in the clinic. Patient to perform the stretches two times each day as tolerated and discussed modifying the stretches as needed due to radiation related skin changes.     Rationale:  facilitate the muscle pump function and encourage range of motion  to improve the patients ability to reduce the risk of progression of swelling and cording L UE.     35 min Manual Therapy: Patient instructed in skin care principles and anatomy of the lymphatic system. Patient presents with radiation related skin changes L anterolateral trunk and is performing skin care with Remedy and Aquaphor lotion two times each day as instructed by physician. Discussed the increased risk of of swelling and cording with radiation therapy and other high risk activities, such as heat, airline travel, and repetitive activity. Positioned patient in supine with L UE abducted to 90 degrees and elbow extended. Palpable cording is present in the   L upper arm medially from axilla to mid bicep. Performed skin traction and stretching techniques and provided instruction in technique for patient to continue at home. Discussed the role and benefit of compression products for management of swelling and cording and patient voiced interest in obtaining one set of day garments. She prefers to pay a cash price for quick delivery. Measured patient and ordered one Juzo Soft arm sleeve in the size 2 long length and glove in the size small 20-30 mmHg and the order will be submitted to [employer] for processing. Discussed the return/exchange policy of garments with patient. Will also attempt to secure a vendor and check insurance coverage of additional compression products prior to patient's next visit. Rationale: increase tissue extensibility and decrease edema  to improve the patients ability to reduce signs and symptoms of cording and progression of swelling during the restorative phase of care. With   [x] TE   [] TA   [x] MT   [] SC   [] other: Patient Education: [x] Review HEP    [x] MLD Patient Education Continued education in self MLD technique with bathing and skin care. [] Progressed/Changed HEP based on:   [x] positioning   [] Kinesiotape   [x] Skin care   [] wound care   [] other:   [x]  Compression product  options  Patient / caregiver re-demonstrated bandaging.  [] Yes  [] No  Compression bandaging/garment precautions reviewed: [x] Yes  [] No Other Objective/Functional Measures:   Range of motion: within functional limits all extremities  Strength: not formally assessed but patient able to complete all functional activities in the clinic. Pain Level (0-10 scale) post treatment: 0/10    ASSESSMENT/Changes in Function: Patient is a 61year old female with L UE swelling/lymphedema stage 1 to 2 following diagnosis and treatment of L breast cancer. Patient's condition is complicated by ongoing radiation therapy, radiation related skin changes. , and cording in the L arm. Full UE volumes taken today reveal a percentage difference of >8% L UE > R UE, however, patient reports using the L arm for lifting and most functional tasks even though the R arm is the dominant arm. Girth measurements of the arms taken today reveal that all girth measurements of the L arm above the elbow were larger than girth measurements of the R arm above the elbow. Patient is motivated to improve her condition and reduce swelling and cording in the L upper arm. Patient was educated in exercises and techniques to perform at home to improve these symptoms. Patient will work on her home program and return to the clinic in 3 weeks once radiation is completed.      [x]  See Plan of [address] [place] [address] Duncan, PT, CLT 7/20/2022

## 2022-07-21 ENCOUNTER — HOSPITAL ENCOUNTER (OUTPATIENT)
Dept: RADIATION THERAPY | Age: 57
Discharge: HOME | End: 2022-07-21
Payer: COMMERCIAL

## 2022-07-21 PROCEDURE — 77417 THER RADIOLOGY PORT IMAGE(S): CPT

## 2022-07-21 PROCEDURE — 77412 RADIATION TX DELIVERY LVL 3: CPT

## 2022-07-22 ENCOUNTER — HOSPITAL ENCOUNTER (OUTPATIENT)
Dept: RADIATION THERAPY | Age: 57
Discharge: HOME | End: 2022-07-22
Payer: COMMERCIAL

## 2022-07-22 PROCEDURE — 77417 THER RADIOLOGY PORT IMAGE(S): CPT

## 2022-07-22 PROCEDURE — 77412 RADIATION TX DELIVERY LVL 3: CPT

## 2022-07-26 ENCOUNTER — HOSPITAL ENCOUNTER (OUTPATIENT)
Dept: RADIATION THERAPY | Age: 57
Discharge: HOME | End: 2022-07-26
Payer: COMMERCIAL

## 2022-07-26 PROCEDURE — 77412 RADIATION TX DELIVERY LVL 3: CPT

## 2022-07-26 PROCEDURE — 77417 THER RADIOLOGY PORT IMAGE(S): CPT

## 2022-07-27 ENCOUNTER — HOSPITAL ENCOUNTER (OUTPATIENT)
Dept: RADIATION THERAPY | Age: 57
Discharge: HOME | End: 2022-07-27
Payer: COMMERCIAL

## 2022-07-27 PROCEDURE — 77412 RADIATION TX DELIVERY LVL 3: CPT

## 2022-07-27 PROCEDURE — 77417 THER RADIOLOGY PORT IMAGE(S): CPT

## 2022-07-28 ENCOUNTER — HOSPITAL ENCOUNTER (OUTPATIENT)
Dept: RADIATION THERAPY | Age: 57
Discharge: HOME | End: 2022-07-28
Payer: COMMERCIAL

## 2022-07-28 ENCOUNTER — OFFICE VISIT (OUTPATIENT)
Dept: ONCOLOGY | Age: 57
End: 2022-07-28
Payer: COMMERCIAL

## 2022-07-28 ENCOUNTER — HOSPITAL ENCOUNTER (OUTPATIENT)
Dept: INFUSION THERAPY | Age: 57
Discharge: HOME | End: 2022-07-28
Payer: COMMERCIAL

## 2022-07-28 VITALS
SYSTOLIC BLOOD PRESSURE: 136 MMHG | HEIGHT: 66 IN | HEART RATE: 82 BPM | WEIGHT: 129.6 LBS | OXYGEN SATURATION: 98 % | TEMPERATURE: 97.8 F | BODY MASS INDEX: 20.83 KG/M2 | DIASTOLIC BLOOD PRESSURE: 85 MMHG

## 2022-07-28 VITALS
TEMPERATURE: 97.8 F | RESPIRATION RATE: 18 BRPM | DIASTOLIC BLOOD PRESSURE: 85 MMHG | HEART RATE: 96 BPM | HEIGHT: 66 IN | WEIGHT: 129.2 LBS | BODY MASS INDEX: 20.76 KG/M2 | SYSTOLIC BLOOD PRESSURE: 136 MMHG

## 2022-07-28 DIAGNOSIS — R06.09 DOE (DYSPNEA ON EXERTION): ICD-10-CM

## 2022-07-28 DIAGNOSIS — G62.0 CHEMOTHERAPY-INDUCED NEUROPATHY (HCC): ICD-10-CM

## 2022-07-28 DIAGNOSIS — R79.89 ELEVATED LFTS: ICD-10-CM

## 2022-07-28 DIAGNOSIS — T45.1X5A CHEMOTHERAPY-INDUCED NEUROPATHY (HCC): ICD-10-CM

## 2022-07-28 DIAGNOSIS — Z98.890 S/P LUMPECTOMY, LEFT BREAST: ICD-10-CM

## 2022-07-28 DIAGNOSIS — Z17.1 MALIGNANT NEOPLASM OF LEFT BREAST IN FEMALE, ESTROGEN RECEPTOR NEGATIVE, UNSPECIFIED SITE OF BREAST (HCC): Primary | ICD-10-CM

## 2022-07-28 DIAGNOSIS — Z78.0 POST-MENOPAUSAL: ICD-10-CM

## 2022-07-28 DIAGNOSIS — Z79.899 ENCOUNTER FOR MONITORING CARDIOTOXIC DRUG THERAPY: ICD-10-CM

## 2022-07-28 DIAGNOSIS — C50.912 MALIGNANT NEOPLASM OF LEFT BREAST IN FEMALE, ESTROGEN RECEPTOR NEGATIVE, UNSPECIFIED SITE OF BREAST (HCC): Primary | ICD-10-CM

## 2022-07-28 DIAGNOSIS — Z51.81 ENCOUNTER FOR MONITORING CARDIOTOXIC DRUG THERAPY: ICD-10-CM

## 2022-07-28 DIAGNOSIS — R51.9 FREQUENT HEADACHES: ICD-10-CM

## 2022-07-28 DIAGNOSIS — R43.2 IMPAIRED TASTE: ICD-10-CM

## 2022-07-28 PROBLEM — Z09 CHEMOTHERAPY FOLLOW-UP EXAMINATION: Status: RESOLVED | Noted: 2021-11-18 | Resolved: 2022-07-28

## 2022-07-28 PROBLEM — Z51.11 ENCOUNTER FOR ANTINEOPLASTIC CHEMOTHERAPY: Status: RESOLVED | Noted: 2021-10-25 | Resolved: 2022-07-28

## 2022-07-28 PROBLEM — R63.0 DECREASED APPETITE: Status: RESOLVED | Noted: 2022-02-10 | Resolved: 2022-07-28

## 2022-07-28 PROBLEM — N63.20 LEFT BREAST MASS: Status: RESOLVED | Noted: 2021-10-25 | Resolved: 2022-07-28

## 2022-07-28 LAB
ALBUMIN SERPL-MCNC: 3.5 G/DL (ref 3.5–5)
ALBUMIN/GLOB SERPL: 0.8 {RATIO} (ref 1.1–2.2)
ALP SERPL-CCNC: 294 U/L (ref 45–117)
ALT SERPL-CCNC: 177 U/L (ref 12–78)
ANION GAP SERPL CALC-SCNC: 5 MMOL/L (ref 5–15)
AST SERPL-CCNC: 116 U/L (ref 15–37)
BASOPHILS # BLD: 0 K/UL (ref 0–0.1)
BASOPHILS NFR BLD: 0 % (ref 0–1)
BILIRUB SERPL-MCNC: 0.5 MG/DL (ref 0.2–1)
BUN SERPL-MCNC: 14 MG/DL (ref 6–20)
BUN/CREAT SERPL: 22 (ref 12–20)
CALCIUM SERPL-MCNC: 10.2 MG/DL (ref 8.5–10.1)
CHLORIDE SERPL-SCNC: 101 MMOL/L (ref 97–108)
CO2 SERPL-SCNC: 29 MMOL/L (ref 21–32)
CREAT SERPL-MCNC: 0.65 MG/DL (ref 0.55–1.02)
DIFFERENTIAL METHOD BLD: ABNORMAL
EOSINOPHIL # BLD: 0.2 K/UL (ref 0–0.4)
EOSINOPHIL NFR BLD: 4 % (ref 0–7)
ERYTHROCYTE [DISTWIDTH] IN BLOOD BY AUTOMATED COUNT: 13.2 % (ref 11.5–14.5)
GLOBULIN SER CALC-MCNC: 4.5 G/DL (ref 2–4)
GLUCOSE SERPL-MCNC: 92 MG/DL (ref 65–100)
HCT VFR BLD AUTO: 31.9 % (ref 35–47)
HGB BLD-MCNC: 10.7 G/DL (ref 11.5–16)
IMM GRANULOCYTES # BLD AUTO: 0 K/UL (ref 0–0.04)
IMM GRANULOCYTES NFR BLD AUTO: 0 % (ref 0–0.5)
LYMPHOCYTES # BLD: 1.3 K/UL (ref 0.8–3.5)
LYMPHOCYTES NFR BLD: 32 % (ref 12–49)
MCH RBC QN AUTO: 30.7 PG (ref 26–34)
MCHC RBC AUTO-ENTMCNC: 33.5 G/DL (ref 30–36.5)
MCV RBC AUTO: 91.7 FL (ref 80–99)
MONOCYTES # BLD: 0.6 K/UL (ref 0–1)
MONOCYTES NFR BLD: 14 % (ref 5–13)
NEUTS SEG # BLD: 2.1 K/UL (ref 1.8–8)
NEUTS SEG NFR BLD: 50 % (ref 32–75)
NRBC # BLD: 0 K/UL (ref 0–0.01)
NRBC BLD-RTO: 0 PER 100 WBC
PLATELET # BLD AUTO: 179 K/UL (ref 150–400)
PMV BLD AUTO: 9.4 FL (ref 8.9–12.9)
POTASSIUM SERPL-SCNC: 3.7 MMOL/L (ref 3.5–5.1)
PROT SERPL-MCNC: 8 G/DL (ref 6.4–8.2)
RBC # BLD AUTO: 3.48 M/UL (ref 3.8–5.2)
SODIUM SERPL-SCNC: 135 MMOL/L (ref 136–145)
WBC # BLD AUTO: 4.2 K/UL (ref 3.6–11)

## 2022-07-28 PROCEDURE — 80053 COMPREHEN METABOLIC PANEL: CPT

## 2022-07-28 PROCEDURE — 77280 THER RAD SIMULAJ FIELD SMPL: CPT

## 2022-07-28 PROCEDURE — 77334 RADIATION TREATMENT AID(S): CPT

## 2022-07-28 PROCEDURE — 85025 COMPLETE CBC W/AUTO DIFF WBC: CPT

## 2022-07-28 PROCEDURE — 74011250636 HC RX REV CODE- 250/636: Performed by: INTERNAL MEDICINE

## 2022-07-28 PROCEDURE — 99215 OFFICE O/P EST HI 40 MIN: CPT | Performed by: INTERNAL MEDICINE

## 2022-07-28 PROCEDURE — 36415 COLL VENOUS BLD VENIPUNCTURE: CPT

## 2022-07-28 PROCEDURE — 77307 TELETHX ISODOSE PLAN CPLX: CPT

## 2022-07-28 PROCEDURE — 96401 CHEMO ANTI-NEOPL SQ/IM: CPT

## 2022-07-28 RX ORDER — ACETAMINOPHEN 325 MG/1
650 TABLET ORAL AS NEEDED
Status: ACTIVE | OUTPATIENT
Start: 2022-07-28 | End: 2022-07-28

## 2022-07-28 RX ORDER — DIPHENHYDRAMINE HYDROCHLORIDE 50 MG/ML
25 INJECTION, SOLUTION INTRAMUSCULAR; INTRAVENOUS AS NEEDED
Status: ACTIVE | OUTPATIENT
Start: 2022-07-28 | End: 2022-07-28

## 2022-07-28 RX ORDER — ONDANSETRON 2 MG/ML
8 INJECTION INTRAMUSCULAR; INTRAVENOUS AS NEEDED
Status: ACTIVE | OUTPATIENT
Start: 2022-07-28 | End: 2022-07-28

## 2022-07-28 RX ORDER — HEPARIN 100 UNIT/ML
300-500 SYRINGE INTRAVENOUS AS NEEDED
Status: ACTIVE | OUTPATIENT
Start: 2022-07-28 | End: 2022-07-28

## 2022-07-28 RX ORDER — ALBUTEROL SULFATE 0.83 MG/ML
2.5 SOLUTION RESPIRATORY (INHALATION) AS NEEDED
Status: ACTIVE | OUTPATIENT
Start: 2022-07-28 | End: 2022-07-28

## 2022-07-28 RX ORDER — SODIUM CHLORIDE 9 MG/ML
25 INJECTION, SOLUTION INTRAVENOUS CONTINUOUS
Status: DISPENSED | OUTPATIENT
Start: 2022-07-28 | End: 2022-07-28

## 2022-07-28 RX ORDER — HYDROCORTISONE SODIUM SUCCINATE 100 MG/2ML
100 INJECTION, POWDER, FOR SOLUTION INTRAMUSCULAR; INTRAVENOUS AS NEEDED
Status: ACTIVE | OUTPATIENT
Start: 2022-07-28 | End: 2022-07-28

## 2022-07-28 RX ORDER — EPINEPHRINE 1 MG/ML
0.3 INJECTION, SOLUTION, CONCENTRATE INTRAVENOUS AS NEEDED
Status: ACTIVE | OUTPATIENT
Start: 2022-07-28 | End: 2022-07-28

## 2022-07-28 RX ORDER — SODIUM CHLORIDE 9 MG/ML
10 INJECTION INTRAMUSCULAR; INTRAVENOUS; SUBCUTANEOUS AS NEEDED
Status: ACTIVE | OUTPATIENT
Start: 2022-07-28 | End: 2022-07-28

## 2022-07-28 RX ORDER — DIPHENHYDRAMINE HYDROCHLORIDE 50 MG/ML
50 INJECTION, SOLUTION INTRAMUSCULAR; INTRAVENOUS AS NEEDED
Status: ACTIVE | OUTPATIENT
Start: 2022-07-28 | End: 2022-07-28

## 2022-07-28 RX ORDER — ACETAMINOPHEN 325 MG/1
650 TABLET ORAL
Status: DISCONTINUED | OUTPATIENT
Start: 2022-07-28 | End: 2022-07-29 | Stop reason: HOSPADM

## 2022-07-28 RX ORDER — DIPHENHYDRAMINE HYDROCHLORIDE 50 MG/ML
50 INJECTION, SOLUTION INTRAMUSCULAR; INTRAVENOUS
Status: DISCONTINUED | OUTPATIENT
Start: 2022-07-28 | End: 2022-07-29 | Stop reason: HOSPADM

## 2022-07-28 RX ORDER — SODIUM CHLORIDE 0.9 % (FLUSH) 0.9 %
10 SYRINGE (ML) INJECTION AS NEEDED
Status: DISPENSED | OUTPATIENT
Start: 2022-07-28 | End: 2022-07-28

## 2022-07-28 RX ADMIN — PERTUZUMAB, TRASTUZUMAB, AND HYALURONIDASE-ZZXF 10 ML: 600; 600; 2000 INJECTION, SOLUTION SUBCUTANEOUS at 09:41

## 2022-07-28 NOTE — PROGRESS NOTES
[facility] [place] at [address], [id number] [place] [address] Road, [facility]: [phone]  F: [phone]    Reason for Visit:   Markell Amos is a 61 y.o. female seen today in office for follow up of Left Breast Cancer. Treatment History:   LEFT Breast Biopsy 10/6/21: PATH - Invasive ductal carcinoma, favor grade 3  ER/NM negative, HER2+  Breast MRI 10/13/21: RIGHT BREAST: Background parenchymal enhancement: Mild. There is no suspicious masslike or nonmasslike enhancement within the right breast to indicate breast carcinoma. There are no suspicious internal mammary or axillary chain lymph nodes. LEFT BREAST: Background parenchymal enhancement: Mild There is a rounded mass with central fluid attenuation/necrosis in the lateral aspect of the left breast, deep 3rd, measuring approximately 3.3 cm in diameter with enhancement extending along the adjacent dermis suggesting skin invasion. There is nonmasslike enhancement extending both inferior, medial and anterior to the primary mass over approximately 6 x 5.5 x 4 cm. There are discontinuous areas of fluid attenuation associated with the nonmasslike enhancement which may represent cystic spaces and/or necrosis. There is an enlarged, left axillary lymph node with other adjacent, prominent lymph nodes. There is a lymph node node deep to the pectoralis minor muscle which measures 1.1 x 0.7 cm  Left Axillary LN Biopsy 10/19/21: PATH - Metastatic breast cancer, measuring up to 17 mm in a single core  ER/NM negative, HER2+  Neoadjuvant TCHP x 6 cycles from 10/21/21 - 2/10/22  DR Taxotere to 60 mg/m2 and Carbo to AUC 4 with Cycle 6 due to side effects  Breast Biopsy 10/27/21: PATH - 5 mm IDC with DCIS  Breast MRI 2/21/22: Right Breast: No suspicious enhancing foci. No axillary or internal mammary chain lymphadenopathy.  A subclavian ported catheter, implanted in the posterior third of the upper inner quadrant, terminates in appropriate position in the SVC. Left Breast: No residual enhancement in the mass in the posterior third of the outer slightly upper left breast. The mass is no longer measurable. There is a biopsy clip within scarring. No residual enhancement in the previously seen, extensive, non-masslike enhancement in the middle third of the central, lower, left breast. A biopsy clip remains. Axillary flori metastases have significantly decreased in size and are no longer pathologically enlarged. A biopsy clip is within an inferior axillary lymph node  Maintenance HP (Phesgo) 3/3/22 - Current   Left Lumpectomy 4/21/22: pCR, 0/4 LNs  XRT 6/16/22 - Current with Dr. [facility]: Clinical 2 ER/UT negative Her2+    History of Present Illness:   Jackie Pierce is a 61 y.o. female seen today in office for follow up of left breast cancer ER/UT negative Her2 +. She finished 6 cycles of neoadjuvant TCHP on 2/10/22. She started maintenance HP (Phesgo) on 3/3/22. She had a left lumpectomy on 4/21/22 and had path CR. She started XRT on 6/16/22 and will finish next week. She is here today for Cycle 14 (eighth dose of maintenance subQ HP). She reports that she feels well overall today. She states that taste buds are still impaired and hair is taking a long time to grow back. She is tolerating HP well overall thus far without significant side effects. Her appetite and energy levels are stable/good. She still has some mild neuropathy in toes on bilateral feet that is stable/unchanged. Neuropathy in fingertips is better. She states that neuropathy is worse at night. She denies fever, chills, mouth sores, cough, SOB, CP, nausea, vomiting, diarrhea, and constipation. She denies pain today. CBC and CMP reviewed today. She is here alone today.      Past Medical History:   Diagnosis Date    Malignant neoplasm of left breast in female, estrogen receptor negative ([facility] [address] 10/13/2021      Past Surgical History:   Procedure Laterality Date    HX BREAST LUMPECTOMY Left 4/21/2022    LEFT BREAST REDUCTION LUMPECTOMY WITH BRACKETED MAG SEED LOCALIZATION, LEFT BREAST SENTINEL NODE BIOPSY LEFT BREAST ONCOPLASTIC REDUCTION WITH RIGHT BREAST REDUCTION FOR SYMMETRY AND PORT REMOVAL performed by Brendan Pollard MD at [place] [address]    HX BREAST REDUCTION Bilateral 4/21/2022    . performed by Lan Jimenez MD at [facility]    HX VASCULAR ACCESS Right 10/2021    PORT INSERTION    VT BREAST SURGERY PROCEDURE UNLISTED Left 10/06/21    Biopsy      Social History     Tobacco Use    Smoking status: Never    Smokeless tobacco: Never   Substance Use Topics    Alcohol use: Not Currently     Alcohol/week: 0.0 standard drinks      Family History   Problem Relation Age of Onset    Cancer Mother         BREAST    Dementia Mother     Heart Disease Father     Anesth Problems Neg Hx      Current Outpatient Medications   Medication Sig    desonide (TRIDESILON) 0.05 % cream Apply  to affected area three (3) times daily. Alpha Lipoic Acid 600 mg cap Take  by mouth. folic acid/multivit-min/lutein (CENTRUM SILVER PO) Take 1 Tablet by mouth daily. cholecalciferol, vitamin D3, (VITAMIN D3 PO) Take 1 Tablet by mouth daily. ALPRAZolam (XANAX) 0.5 mg tablet Take 1 Tablet by mouth three (3) times daily as needed for Anxiety. Max Daily Amount: 1.5 mg. Indications: anxious    CRANIAL PROSTHESIS misc Alopecia due to chemo/ wig     No current facility-administered medications for this visit.      Facility-Administered Medications Ordered in Other Visits   Medication Dose Route Frequency    0.9% sodium chloride infusion  25 mL/hr IntraVENous CONTINUOUS    diphenhydrAMINE (BENADRYL) injection 50 mg  50 mg IntraVENous ONCE PRN    acetaminophen (TYLENOL) tablet 650 mg  650 mg Oral ONCE PRN    sodium chloride (NS) flush 10 mL  10 mL IntraVENous PRN    0.9% sodium chloride injection 10 mL  10 mL IntraVENous PRN    heparin (porcine) pf 300-500 Units  300-500 Units InterCATHeter PRN    sodium chloride 0.9 % bolus infusion 500 mL  500 mL IntraVENous PRN    diphenhydrAMINE (BENADRYL) injection 25 mg  25 mg IntraVENous PRN    famotidine (PF) (PEPCID) 20 mg in 0.9% sodium chloride 10 mL injection  20 mg IntraVENous PRN    acetaminophen (TYLENOL) tablet 650 mg  650 mg Oral PRN    meperidine (DEMEROL) injection 25 mg  25 mg IntraVENous PRN    ondansetron (ZOFRAN) injection 8 mg  8 mg IntraVENous PRN    sodium chloride 0.9 % bolus infusion 500 mL  500 mL IntraVENous PRN    EPINEPHrine HCl (PF) (ADRENALIN) 1 mg/mL (1 mL) injection 0.3 mg  0.3 mg SubCUTAneous PRN    hydrocortisone Sod Succ (PF) (SOLU-CORTEF) injection 100 mg  100 mg IntraVENous PRN    diphenhydrAMINE (BENADRYL) injection 50 mg  50 mg IntraVENous PRN    albuterol (PROVENTIL VENTOLIN) nebulizer solution 2.5 mg  2.5 mg Inhalation PRN      No Known Allergies     Review of Systems:  A complete review of systems was obtained, negative except as described above and as reported on ROS sheet scanned into system. Physical Exam:     Visit Vitals  /85 (BP 1 Location: Left upper arm, BP Patient Position: Sitting)   Pulse 82   Temp 97.8 °F (36.6 °C) (Temporal)   Ht 5' 6\" (1.676 m)   Wt 129 lb 9.6 oz (58.8 kg)   SpO2 98%   BMI 20.92 kg/m²     ECOG PS: 0  General: No distress  Eyes: Anicteric sclerae  HENT: Atraumatic, wearing a mask  Neck: Supple  Respiratory: CTAB, normal respiratory effort  CV: Normal rate, regular rhythm, no murmurs. GI: Soft, nontender, non-distended   MS: Normal gait and station. Skin: No rashes, ecchymoses, or petechiae. Normal temperature, turgor, and texture. Psych: Alert, oriented, appropriate affect, normal judgment/insight  Neuro: Non focal    Results:     Lab Results   Component Value Date/Time    WBC 4.2 07/28/2022 08:26 AM    HGB 10.7 (L) 07/28/2022 08:26 AM    HCT 31.9 (L) 07/28/2022 08:26 AM    PLATELET 271 89/98/1742 08:26 AM    MCV 91.7 07/28/2022 08:26 AM    ABS.  NEUTROPHILS 2.1 07/28/2022 08:26 AM     Lab Results   Component Value Date/Time    Sodium 135 (L) 07/28/2022 08:26 AM    Potassium 3.7 07/28/2022 08:26 AM    Chloride 101 07/28/2022 08:26 AM    CO2 29 07/28/2022 08:26 AM    Glucose 92 07/28/2022 08:26 AM    BUN 14 07/28/2022 08:26 AM    Creatinine 0.65 07/28/2022 08:26 AM    GFR est AA >60 07/28/2022 08:26 AM    GFR est non-AA >60 07/28/2022 08:26 AM    Calcium 10.2 (H) 07/28/2022 08:26 AM     Lab Results   Component Value Date/Time    Bilirubin, total 0.5 07/28/2022 08:26 AM    ALT (SGPT) 177 (H) 07/28/2022 08:26 AM    Alk. phosphatase 294 (H) 07/28/2022 08:26 AM    Protein, total 8.0 07/28/2022 08:26 AM    Albumin 3.5 07/28/2022 08:26 AM    Globulin 4.5 (H) 07/28/2022 08:26 AM     10/6/21  FINAL PATHOLOGIC DIAGNOSIS   Breast, left mass, core biopsy:   Invasive ductal carcinoma, favor grade 3 (see synoptic table)   INVASIVE CARCINOMA OF THE BREAST: Biopsy   TUMOR   Tumor Site: Clock position - 3 o'clock   Histologic Type: Invasive carcinoma of no special type (ductal)   Glandular (Acinar) / Tubular Differentiation: Score 3   Nuclear Pleomorphism: Score 3   Mitotic Rate: Score 3   Overall Grade: Grade 3 (scores of 8 or 9)   Tumor Size: 13 mm   Ductal Carcinoma In Situ (DCIS): Not identified   Lymphovascular Invasion: Not identified   Microcalcifications: Not identified     Breast MRI 10/13/21  IMPRESSION:  RIGHT BREAST:  1. BI-RADS Category 1 - Negative. Trish Money LEFT BREAST:  1. Rounded mass with dermal extension measuring approximately 3.3 cm in diameter  in the lateral, deep 3rd of the left breast. There are areas of fluid/necrosis  within this mass. BI-RADS Category 6 - Known biopsy-proven malignancy. 2. Nonmasslike enhancement in the left breast which is inferior, medial and  anterior to the primary mass. This nonmasslike enhancement extends over  approximately 6 x 5.5 x 4 cm. There are areas of fluid attenuation which could  represent cysts and/or necrosis within this nonmasslike enhancement.   BI-RADS Category 5 - Highly suggestive of malignancy. 3. There is axillary adenopathy, including a lymph node deep to pectoralis  minor. BI-RADS Category 5 - Highly suggestive of malignancy    10/19/21  FINAL PATHOLOGIC DIAGNOSIS   Lymph node, left axillary, core biopsy:   Metastatic breast cancer, measuring up to 17 mm in a single core     10/21/21    ECHO ADULT COMPLETE 10/21/2021 10/21/2021    Interpretation Summary  · LV: Estimated LVEF is 55 - 60%. Normal cavity size, wall thickness and systolic function (ejection fraction normal). Wall motion: normal. Abnormal left ventricular strain. Global longitudinal strain is -15.4%. · TV: Pulmonary hypertension not suggested by Doppler findings. 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 No residual enhancement in multicentric, multifocal left breast carcinoma. 2. Significantly decreased left axillary flori metastases. Left axillary lymph  nodes are no longer pathologically enlarged. 3. No suspicious right breast enhancement or lymphadenopathy. 4. BI-RADS Assessment Category 6: Known biopsy proven malignancy. 03/11/22    ECHO ADULT FOLLOW-UP OR LIMITED 03/15/2022 3/15/2022    Interpretation Summary    Left Ventricle: Left ventricle size is normal. Increased wall thickness. Normal wall motion. Normal left ventricular systolic function. EF by 2D Simpsons Biplane is 60%. Global longitudinal strain is normal with a value of -22.3%. Normal diastolic function. Signed by: Mo Aburto MD on 3/15/2022 10:36 AM    4/21/22  FINAL PATHOLOGIC DIAGNOSIS   1. Left breast skin, excision:   Benign skin   2.  Left axillary sentinel node #1, excision:   One benign lymph node with scar, biopsy site and clip (0/1)   3. Left axillary sentinel node #2, excision:   One benign lymph node without scar (0/1)   4. Left axillary sentinel node #3, excision:   Two benign lymph nodes without scar (0/2)   5. Left breast, lumpectomy:   No residual invasive or in situ carcinoma status post neoadjuvant therapy (ypT0)   Two fibrocalcific tumor beds with biopsy sites   6. Left breast skin #2, excision:   Benign skin and subcutis   7. Right breast skin and tissue, 21 g, excision:   Benign mammary tissue and skin     06/08/22    ECHO ADULT FOLLOW-UP OR LIMITED 06/10/2022 6/10/2022    Interpretation Summary    Left Ventricle: Normal left ventricular systolic function. EF by 2D Simpsons Biplane is 56%. Left ventricle size is normal. Normal wall thickness. Normal wall motion. Global longitudinal strain is normal. GLS today: -17.1%, 3/11/2022 -22.3%, 12/6/2021 -20.3%, 10/21/2021 -15.4%. Signed by: Renetta Albright MD on 6/10/2022 12:51 PM    Records reviewed and summarized above. Pathology report(s) reviewed above. Radiology report(s) reviewed above. Assessment:/PLAN     1) Clinical Stage 2B T3N0 LEFT Breast Cancer ER/ND negative Her2+  post Breast Biopsy Only 10/21  MRI Breast 10/13/21 showed a rounded mass with central fluid attenuation/necrosis in the lateral aspect of the left breast, deep 3rd, measuring approximately 3.3 cm in diameter with enhancement extending along the adjacent dermis suggesting skin invasion. There is nonmasslike enhancement extending both inferior, medial and anterior to the primary mass over approximately 6 x 5.5 x 4 cm. There are discontinuous areas of fluid attenuation associated with the nonmasslike enhancement which may represent cystic spaces and/or necrosis. There is an enlarged, left axillary lymph node with other adjacent, prominent lymph nodes.  There is a lymph node node deep to the pectoralis minor muscle which measures 1.1 x 0.7 cm  She had a left axillary LN biopsy on 10/19/21 and path showed metastatic beast cancer. Pre chemo ECHO showed EF 55-60% with abnormal left ventricular strain. Referred to Cardiology for further evaluation - has follow up ECHO on 12/6/21 per Cardio. Patient started neoadjuvant TCHP chemotherapy on 10/25/21. She completed 6 cycles of TCHP on 2/10/22. DR Taxotere to 60 mg/m2 and Carbo to AUC 4 with Cycle 6 due to side effects. She had a post chemo Breast MRI on 2/21/22 that showed no residual enhancement in multicentric, multifocal left breast carcinoma, significantly decreased left axillary flori metastases, left axillary lymph nodes are no longer pathologically enlarged, and no suspicious right breast enhancement or lymphadenopathy. Personally reviewed MRI. Discussed MRI with patient. She started maintenance HP on 3/3/22. She had a follow up ECHO on 3/11/22 per Cardio and EF was 60%. She had a left lumpectomy on 4/21/22 and had pCR. Discussed path with patient. She had a follow up ECHO on 6/8/22 that was good with EF 56%. Patient is here today for Cycle 14  (eighth cycle of maintenance HP). She started XRT on 6/16/22 and will finish next week with Dr Jonathan Perdue. She is tolerating HP well overall without significant side effects. No adjuvant hormonal therapy as ER/DC negative. She is clinically healthy overall and stable today. 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 Labs (CBC and CMP) reviewed today. Continue maintenance Herceptin+Perjeta. [facility] 3 [facility] in [facility]. Follow up in 6 weeks in [facility]/office. Patient agrees with plan. 2)  Postmenopausal  Continue routine GYN follow up. 3) Elevated LFTs  Unclear etiology - no recent ETOH or Tylenol use. Follow up CT A/P ordered today for eval.     4) Management of High Risk Medications - Chemotherapy  Toxicities from chemo included Grade 1 Nausea,  Grade 1 Neuropathy, Grade 1 Fatigue, Grade 1 Decreased Appetite.    Taxotere to 60 mg/m2 and Carbo to AUC 4 with Cycle 6. Chemo side effects improving. Pre chemo ECHO from 10/21/21 reviewed - EF 55-60% with abnormal left ventricular strain. Referred to Cardiology for further evaluation/management of strain. She had a follow up ECHO on 12/6/21 per Cardiology - EF 58% with no strain. She has a follow up ECHO on 3/11/22 per Cardiology - EF 60%, GLS normal.   She had a follow up ECHO on 6/8/22 per Cardiology - EF 56%, GLS normal.   Continue ECHOs every 3 months - ordered today. Labs (CBC and CMP) reviewed today. Will monitor for side effects. 5) Psychosocial  Mood good, coping well. 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 Her  is very supportive. SW/NN support as needed. She is here alone today. Call if questions. Follow up in 3 weeks in [facility] and 6 weeks in [facility]/office. I personally saw and evaluated the patient and performed the key components of medical decision making. The history, physical exam, and documentation were performed by Christelle Brock NP. I reviewed and verified the above documentation and modified it as needed. Specifically pt doing well   On HP and tolerating fine. Had path CR. LFTs have been up and increasing. Will do CT eval.   ? Related to supplements and pt will stop ALA. Pt also having PAK and HAs and will add CT eval.   Labs personally reviewed   Continue with HP        I appreciate the opportunity to participate in Ms. Roya Landin's care.     Signed By: Gisel Cristina DO

## 2022-07-28 NOTE — PROGRESS NOTES
Kathy Sherman is a 61 y.o. female  Chief Complaint   Patient presents with    Chemotherapy    Breast Cancer     1. Have you been to the ER, urgent care clinic since your last visit? Hospitalized since your last visit? No    2. Have you seen or consulted any other health care providers outside of the [address] [place] [address] since your last visit? Include any pap smears or colon screening.  No

## 2022-07-28 NOTE — PROGRESS NOTES
[facility] - Chemotherapy Progress Note    0815 Pt admit to [facility] for [facility] ambulatory in stable condition. Assessment completed. No new concerns voiced. Labs drawn peripherally and sent for processing. Chemotherapy Flowsheet 7/28/2022   Cycle C14   Date 7/28/2022   Drug / Regimen Phesgo   Pre Meds -   Notes left thigh       Visit Vitals  /85 (BP 1 Location: Right arm, BP Patient Position: At rest)   Pulse 96   Temp 97.8 °F (36.6 °C)   Resp 18   Ht 5' 6\" (1.676 m)   Wt 58.6 kg (129 lb 3.2 oz)   BMI 20.85 kg/m²     Medications Administered       pertuzumab 600 mg-trastuzumab 600 mg-hyaluronidase-zzxf 20,000 units/10 mL       Admin Date  07/28/2022 Action  Given Dose  10 mL Route  SubCUTAneous Administered By  Queta Altman, RN                     Injection given in the left thigh    0945 Pt tolerated treatment well. D/c home ambulatory in no distress. Pt aware of next appointment scheduled for 8/18/22    Recent Results (from the past 24 hour(s))   CBC WITH AUTOMATED DIFF    Collection Time: 07/28/22  8:26 AM   Result Value Ref Range    WBC 4.2 3.6 - 11.0 K/uL    RBC 3.48 (L) 3.80 - 5.20 M/uL    HGB 10.7 (L) 11.5 - 16.0 g/dL    HCT 31.9 (L) 35.0 - 47.0 %    MCV 91.7 80.0 - 99.0 FL    MCH 30.7 26.0 - 34.0 PG    MCHC 33.5 30.0 - 36.5 g/dL    RDW 13.2 11.5 - 14.5 %    PLATELET 003 652 - 700 K/uL    MPV 9.4 8.9 - 12.9 FL    NRBC 0.0 0  WBC    ABSOLUTE NRBC 0.00 0.00 - 0.01 K/uL    NEUTROPHILS 50 32 - 75 %    LYMPHOCYTES 32 12 - 49 %    MONOCYTES 14 (H) 5 - 13 %    EOSINOPHILS 4 0 - 7 %    BASOPHILS 0 0 - 1 %    IMMATURE GRANULOCYTES 0 0.0 - 0.5 %    ABS. NEUTROPHILS 2.1 1.8 - 8.0 K/UL    ABS. LYMPHOCYTES 1.3 0.8 - 3.5 K/UL    ABS. MONOCYTES 0.6 0.0 - 1.0 K/UL    ABS. EOSINOPHILS 0.2 0.0 - 0.4 K/UL    ABS. BASOPHILS 0.0 0.0 - 0.1 K/UL    ABS. IMM.  GRANS. 0.0 0.00 - 0.04 K/UL    DF AUTOMATED     METABOLIC PANEL, COMPREHENSIVE    Collection Time: 07/28/22  8:26 AM   Result Value Ref Range Sodium 135 (L) 136 - 145 mmol/L    Potassium 3.7 3.5 - 5.1 mmol/L    Chloride 101 97 - 108 mmol/L    CO2 29 21 - 32 mmol/L    Anion gap 5 5 - 15 mmol/L    Glucose 92 65 - 100 mg/dL    BUN 14 6 - 20 MG/DL    Creatinine 0.65 0.55 - 1.02 MG/DL    BUN/Creatinine ratio 22 (H) 12 - 20      GFR est AA >60 >60 ml/min/1.73m2    GFR est non-AA >60 >60 ml/min/1.73m2    Calcium 10.2 (H) 8.5 - 10.1 MG/DL    Bilirubin, total 0.5 0.2 - 1.0 MG/DL    ALT (SGPT) 177 (H) 12 - 78 U/L    AST (SGOT) 116 (H) 15 - 37 U/L    Alk.  phosphatase 294 (H) 45 - 117 U/L    Protein, total 8.0 6.4 - 8.2 g/dL    Albumin 3.5 3.5 - 5.0 g/dL    Globulin 4.5 (H) 2.0 - 4.0 g/dL    A-G Ratio 0.8 (L) 1.1 - 2.2

## 2022-07-29 ENCOUNTER — HOSPITAL ENCOUNTER (OUTPATIENT)
Dept: RADIATION THERAPY | Age: 57
Discharge: HOME | End: 2022-07-29
Payer: COMMERCIAL

## 2022-07-29 ENCOUNTER — DOCUMENTATION ONLY (OUTPATIENT)
Dept: ONCOLOGY | Age: 57
End: 2022-07-29

## 2022-07-29 NOTE — PROGRESS NOTES
UPDATE:    HIPPA Verified. Called patient off mobile phone number listed to ask a few questions to help MA find the best appt to fit her schedule to be seen by a PCP per Internal Medicine Referral placed. Patient gave MA her suggestions/preferences. MA then contacted the [facility] [employer]. 2. to requested earliest appt available to establish care with Provider. MA was then given appt for August 29th, 2022 at 9:00AM with Dr.Emmaline Chan. MA then once again called the patient back to give her appt date and time. Patient accepted this appt and is aware. She was also given the scheduling dept number so she may go ahead and have her CT scans scheduled. She was so appreciative for our help! Internal Medicine Referral is now CLOSED.   Alton Vallejo

## 2022-08-01 ENCOUNTER — HOSPITAL ENCOUNTER (OUTPATIENT)
Dept: RADIATION THERAPY | Age: 57
Discharge: HOME | End: 2022-08-01
Payer: COMMERCIAL

## 2022-08-01 PROCEDURE — 77412 RADIATION TX DELIVERY LVL 3: CPT

## 2022-08-02 ENCOUNTER — HOSPITAL ENCOUNTER (OUTPATIENT)
Dept: RADIATION THERAPY | Age: 57
Discharge: HOME | End: 2022-08-02
Payer: COMMERCIAL

## 2022-08-02 PROCEDURE — 77412 RADIATION TX DELIVERY LVL 3: CPT

## 2022-08-03 ENCOUNTER — HOSPITAL ENCOUNTER (OUTPATIENT)
Dept: RADIATION THERAPY | Age: 57
Discharge: HOME | End: 2022-08-03
Payer: COMMERCIAL

## 2022-08-03 PROCEDURE — 77412 RADIATION TX DELIVERY LVL 3: CPT

## 2022-08-08 ENCOUNTER — ANCILLARY PROCEDURE (OUTPATIENT)
Dept: CARDIOLOGY CLINIC | Age: 57
End: 2022-08-08

## 2022-08-08 VITALS
DIASTOLIC BLOOD PRESSURE: 85 MMHG | HEIGHT: 66 IN | WEIGHT: 129 LBS | BODY MASS INDEX: 20.73 KG/M2 | SYSTOLIC BLOOD PRESSURE: 136 MMHG

## 2022-08-08 DIAGNOSIS — Z17.1 MALIGNANT NEOPLASM OF LEFT BREAST IN FEMALE, ESTROGEN RECEPTOR NEGATIVE, UNSPECIFIED SITE OF BREAST (HCC): ICD-10-CM

## 2022-08-08 DIAGNOSIS — Z79.899 ENCOUNTER FOR MONITORING CARDIOTOXIC DRUG THERAPY: ICD-10-CM

## 2022-08-08 DIAGNOSIS — C50.912 MALIGNANT NEOPLASM OF LEFT BREAST IN FEMALE, ESTROGEN RECEPTOR NEGATIVE, UNSPECIFIED SITE OF BREAST (HCC): ICD-10-CM

## 2022-08-08 DIAGNOSIS — Z51.81 ENCOUNTER FOR MONITORING CARDIOTOXIC DRUG THERAPY: ICD-10-CM

## 2022-08-09 ENCOUNTER — HOSPITAL ENCOUNTER (OUTPATIENT)
Dept: CT IMAGING | Age: 57
Discharge: HOME | End: 2022-08-09
Attending: INTERNAL MEDICINE
Payer: COMMERCIAL

## 2022-08-09 ENCOUNTER — HOSPITAL ENCOUNTER (OUTPATIENT)
Dept: CT IMAGING | Age: 57
Discharge: HOME | End: 2022-08-09
Attending: NURSE PRACTITIONER
Payer: COMMERCIAL

## 2022-08-09 DIAGNOSIS — Z51.81 ENCOUNTER FOR MONITORING CARDIOTOXIC DRUG THERAPY: ICD-10-CM

## 2022-08-09 DIAGNOSIS — R43.2 IMPAIRED TASTE: ICD-10-CM

## 2022-08-09 DIAGNOSIS — R79.89 ELEVATED LFTS: ICD-10-CM

## 2022-08-09 DIAGNOSIS — Z79.899 ENCOUNTER FOR MONITORING CARDIOTOXIC DRUG THERAPY: ICD-10-CM

## 2022-08-09 DIAGNOSIS — C50.912 MALIGNANT NEOPLASM OF LEFT BREAST IN FEMALE, ESTROGEN RECEPTOR NEGATIVE, UNSPECIFIED SITE OF BREAST (HCC): ICD-10-CM

## 2022-08-09 DIAGNOSIS — T45.1X5A CHEMOTHERAPY-INDUCED NEUROPATHY (HCC): ICD-10-CM

## 2022-08-09 DIAGNOSIS — G62.0 CHEMOTHERAPY-INDUCED NEUROPATHY (HCC): ICD-10-CM

## 2022-08-09 DIAGNOSIS — Z98.890 S/P LUMPECTOMY, LEFT BREAST: ICD-10-CM

## 2022-08-09 DIAGNOSIS — Z17.1 MALIGNANT NEOPLASM OF LEFT BREAST IN FEMALE, ESTROGEN RECEPTOR NEGATIVE, UNSPECIFIED SITE OF BREAST (HCC): ICD-10-CM

## 2022-08-09 DIAGNOSIS — R06.09 DOE (DYSPNEA ON EXERTION): ICD-10-CM

## 2022-08-09 DIAGNOSIS — R51.9 FREQUENT HEADACHES: ICD-10-CM

## 2022-08-09 DIAGNOSIS — Z78.0 POST-MENOPAUSAL: ICD-10-CM

## 2022-08-09 PROCEDURE — 71260 CT THORAX DX C+: CPT

## 2022-08-09 PROCEDURE — 74177 CT ABD & PELVIS W/CONTRAST: CPT

## 2022-08-09 PROCEDURE — 74011000636 HC RX REV CODE- 636: Performed by: NURSE PRACTITIONER

## 2022-08-09 PROCEDURE — 70470 CT HEAD/BRAIN W/O & W/DYE: CPT

## 2022-08-09 RX ADMIN — IOPAMIDOL 100 ML: 755 INJECTION, SOLUTION INTRAVENOUS at 15:42

## 2022-08-10 NOTE — PROGRESS NOTES
Not sure what mass is  Finished XRT 6/22  Can have breast surgery see pt? Maybe ultrasound area?   Will copy to Dr Mattie Delong pt scans good and will look into this

## 2022-08-11 NOTE — PROGRESS NOTES
ILDEFONSO Verified. Called pt on mobile phone number listed. Patient was made aware per Provider that her recent CT scan report was good but we are not sure what the mass is that is now showing on the report and that we will be looking into it/keep patient updated in regards to this. Patient is requesting a call from Provider or Nurse Practitioner to go over this report and answer any questions she may have. Patient is asking if this is cancer, and wants to know more \"than what I can read in the report and more than what I already know. \" Patient sounded very worried/stressed. She is aware that she may need to fu with Louretta Prom for further review and that he is attached to this message. Please advise.   Flora Simmonds

## 2022-08-15 NOTE — TELEPHONE ENCOUNTER
Called and spoke with patient. Abscess has not healed and continues to drain. Will see one of surgeons for evaluation. Class II - visualization of the soft palate, fauces, and uvula

## 2022-08-16 ENCOUNTER — OFFICE VISIT (OUTPATIENT)
Dept: CARDIOLOGY CLINIC | Age: 57
End: 2022-08-16
Payer: COMMERCIAL

## 2022-08-16 ENCOUNTER — HOSPITAL ENCOUNTER (OUTPATIENT)
Dept: PHYSICAL THERAPY | Age: 57
Discharge: HOME | End: 2022-08-16
Payer: COMMERCIAL

## 2022-08-16 VITALS
OXYGEN SATURATION: 99 % | BODY MASS INDEX: 20.89 KG/M2 | DIASTOLIC BLOOD PRESSURE: 60 MMHG | HEIGHT: 66 IN | WEIGHT: 130 LBS | SYSTOLIC BLOOD PRESSURE: 120 MMHG | HEART RATE: 76 BPM | RESPIRATION RATE: 16 BRPM

## 2022-08-16 DIAGNOSIS — Z79.899 ENCOUNTER FOR MONITORING CARDIOTOXIC DRUG THERAPY: Primary | ICD-10-CM

## 2022-08-16 DIAGNOSIS — C50.912 MALIGNANT NEOPLASM OF LEFT BREAST IN FEMALE, ESTROGEN RECEPTOR NEGATIVE, UNSPECIFIED SITE OF BREAST (HCC): ICD-10-CM

## 2022-08-16 DIAGNOSIS — Z51.81 ENCOUNTER FOR MONITORING CARDIOTOXIC DRUG THERAPY: Primary | ICD-10-CM

## 2022-08-16 DIAGNOSIS — E87.6 HYPOKALEMIA: ICD-10-CM

## 2022-08-16 DIAGNOSIS — R93.1 ABNORMAL ECHOCARDIOGRAM: ICD-10-CM

## 2022-08-16 DIAGNOSIS — Z17.1 MALIGNANT NEOPLASM OF LEFT BREAST IN FEMALE, ESTROGEN RECEPTOR NEGATIVE, UNSPECIFIED SITE OF BREAST (HCC): ICD-10-CM

## 2022-08-16 PROCEDURE — 97110 THERAPEUTIC EXERCISES: CPT

## 2022-08-16 PROCEDURE — 99213 OFFICE O/P EST LOW 20 MIN: CPT | Performed by: SPECIALIST

## 2022-08-16 PROCEDURE — 97140 MANUAL THERAPY 1/> REGIONS: CPT

## 2022-08-16 NOTE — PATIENT INSTRUCTIONS
We will see you back in 3 months with a limited echo and then 6 months with an echo and office visit.

## 2022-08-16 NOTE — PROGRESS NOTES
[facility]  a part of [address] [place] [address], 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     Assessment / Recommendations:   Patient is returning today for the first visit since evaluation. She has completed radiation therapy and skin is intact but fragile. She had a CT scan which found a mass in the L chest wall and is scheduled to follow up with physician. She paid a cash price for quick delivery of one set of L UE compression products and a garment order for additional products will be submitted to the vendor, [employer], today. Patient reports wearing a compression sleeve at least 6 days each week and the hand piece less frequently. Full UE volumes taken today reveal an improvement in percentage difference from 8.57% to 6.42% since evaluation. Patient was educated in the self MLD packet and remedial lymphedema exercises today to have as additional tools for management of L UE swelling. Patient learned stretches to improve axillary web symptoms on evaluation and signs and symptoms of cording have since improved. Patient prefers to return to the clinic on an as needed basis due to working full time and multiple medical appointments. She is aware that the plan of care will remain open until 10/15/22. She will continue with her home program to the best of her ability and return or call the clinic with any issues.        Patient will continue to benefit from skilled PT services to  address swelling, analyze compression product fit and use, and instruct in home lymphedema management program to attain remaining goals. Short Term Goals: To be met by 8/24/22  1. Patient will demonstrate knowledge of signs/symptoms of infections/cellulitis and be independent in skin care to prevent cellulitis. Goal met 8/16/22   2. Patient will demonstrate independence in lymphedema home program of therapeutic exercises/stretches to improve axillary web symptoms and decongest limb to improve ADLs. Goal met 8/16/22  3. Patient will participate in the selection process to allow ordering of home compression system (daytime, nighttime garments and pump as needed). In progress. Will continue to assess for additional needs. Long Term Goals: To be met by 10/15/22  1. Patient will be independent with don/doff of compression system and use in order to prevent reaccumulation of fluid at discharge. Goal met 8/16/22. 2.  Pt will be independent in self-MLD and show stable limb volumes showing decongestion and pt. Ready. In progress.      Other: continue per plan of care      Ana M Mtz, PT, CLT 8/16/2022

## 2022-08-16 NOTE — PROGRESS NOTES
Summerliton Merlinda Capri     1965       Ludin Lacy Ma, MD, [facility]  Date of Visit-8/16/2022   PCP is None   [facility] [place]  [facility]  HPI:  Amita Cabrera is a 61 y.o. female   4  month f/u. Pt had pre-chemo echo with an EF of 55-60%. The GLS was -15. She was to start on [facility] which includes herceptin. She has been treated for breast cancer. Fu echo in December with GLS of -20.3 and remains with normal EF  Echo in March showed EF 60%, her GLS remains normal at -22. She has had an MRI of the breast In which results reviewed. She saw med oncology on 2/10/22 and will start cycle 7, she is also seeing Dr. Jessika Hernandez. ..previously some mild edema low albumin and potassium  She had had some LE edema, low albumin and K. Blood work now shows improvement to 3.7 and the sodium has normalized to 136. Had pro-BNP and troponin. BNP was 317, and troponin ws low. Recent echo reviewed today, see result below    Today she is asymptomatic she is having increasing job responsibility and she likes it she has no complaints. Overall the pt states she is doing well. Denies chest pain, edema, syncope or shortness of breath at rest, has no tachycardia, palpitations or sense of arrhythmia. Assessment/Plan:     Patient Instructions   We will see you back in 3 months with a limited echo and then 6 months with an echo and office visit. 1. Encounter for monitoring cardiotoxic drug therapy  Troponin normal, BNP slightly elevated. Other labs now normal. Out from chemo and increased protein intake. Stable. her GLS had been slightly down at -15, has come up to -22. With a mild drop in GLS there is no need to change therapy but we do need a bit more monitoring so we will get an echo at 3 and 6 months    2. Abnormal echocardiogram  LE edema resolved as has lab work improved.  Not had to use diuretic.   08/08/22    ECHO ADULT COMPLETE 08/17/2022 8/17/2022    Interpretation Summary  Formatting of this result is different from the original.      Left Ventricle: Normal left ventricular systolic function. EF by 2D Simpsons Biplane is 59%. Left ventricle size is normal. Normal wall thickness. Normal wall motion. Global longitudinal strain is -18.7%, 06/08/2022 -17.1%, 03/11/2022 -22.3%, 12/16/2021 -20.3%, 10/21/2021 -15.4%. .    Mitral Valve: Mild regurgitation. Tricuspid Valve: Mild regurgitation. Pulmonic Valve: Mild regurgitation. Signed by: Chloé Young MD on 8/17/2022 11:09 PM          3. Malignant neoplasm of left breast in female, estrogen receptor negative, unspecified site of breast ([facility] [address]  Completed chemo, and will now have lumpectomy and surgery. 4. Hypokalemia  K was 2.3. F/u 3-4 months with echo. Lab Results   Component Value Date/Time    Potassium 3.7 07/28/2022 08:26 AM         Impression:   1. Encounter for monitoring cardiotoxic drug therapy    2. Abnormal echocardiogram    3. Malignant neoplasm of left breast in female, estrogen receptor negative, unspecified site of breast ([facility] [address]    4. Hypokalemia         Cardiac History:   No specialty comments available. Future Appointments   Date Time Provider Ryan Daly   4/60/5883 44:13 AM Michelle Arora MD [facility] [place] BS AMB   8/18/2022  8:00 AM F1 RAFI LONG [address] [facility]   8/29/2022  9:00 AM Chelo Chan NP [facility] AMB   9/8/2022  8:00 AM F1 RAFI LONG [place] [facility]   9/8/2022  8:15 AM Daily Snow  [address] BS AMB   9/29/2022  8:00 AM F1 RAFI LONG [address] [facility]   37/48/6677  3:56 PM Michelle Arora MD [facility] [place] BS AMB   15/3/1139  5:82 PM Michelle Arora MD [facility] [place] BS AMB        Patient Care Team:  None as PCP - General  Mireya Herron DO (Hematology and Oncology)  Garima Ignacio MD (Cardio Vascular Surgery)    ROS-except as noted above. . A complete cardiac and respiratory are reviewed and negative except as above ; Resp-denies wheezing  or productive cough,. Const- No unusual weight loss or fever; Neuro-no recent seizure or CVA ; GI- No BRBPR, abdom pain, bloating ; - no  hematuria   see supplement sheet, initialed and to be scanned by staff  Past Medical History:   Diagnosis Date    Malignant neoplasm of left breast in female, estrogen receptor negative ([facility] [address] 10/13/2021      Social Hx= reports that she has never smoked. She has never used smokeless tobacco. She reports that she does not currently use alcohol. She reports that she does not use drugs. Exam and Labs:  /60   Pulse 76   Resp 16   Ht 5' 6\" (1.676 m)   Wt 130 lb (59 kg)   SpO2 99%   BMI 20.98 kg/m² Constitutional:  NAD, comfortable  Head: NC,AT. Eyes: No scleral icterus. Neck:  Neck supple. No JVD present. Throat: moist mucous membranes. Chest: Effort normal & normal respiratory excursion . Neurological: alert, conversant and oriented . Skin: Skin is not cold. No obvious systemic rash noted. Not diaphoretic. No erythema. Psychiatric:  Grossly normal mood and affect. Behavior appears normal. Extremities:  no clubbing or cyanosis. Abdomen: non distended    Lungs:breath sounds normal. No stridor. distress, wheezes or  Rales. Heart: normal rate, regular rhythm, normal S1, S2, no murmurs, rubs, clicks or gallops , PMI non displaced. Edema: Edema is none.   No results found for: CHOL, CHOLX, CHLST, CHOLV, HDL, HDLP, LDL, LDLC, DLDLP, TGLX, TRIGL, TRIGP, CHHD, CHHDX  Lab Results   Component Value Date/Time    Sodium 135 (L) 07/28/2022 08:26 AM    Potassium 3.7 07/28/2022 08:26 AM    Chloride 101 07/28/2022 08:26 AM    CO2 29 07/28/2022 08:26 AM    Anion gap 5 07/28/2022 08:26 AM    Glucose 92 07/28/2022 08:26 AM    BUN 14 07/28/2022 08:26 AM    Creatinine 0.65 07/28/2022 08:26 AM    BUN/Creatinine ratio 22 (H) 07/28/2022 08:26 AM    GFR est AA >60 07/28/2022 08:26 AM    GFR est non-AA >60 07/28/2022 08:26 AM    Calcium 10.2 (H) 07/28/2022 08:26 AM Wt Readings from Last 3 Encounters:   08/16/22 130 lb (59 kg)   08/08/22 129 lb (58.5 kg)   07/28/22 129 lb 3.2 oz (58.6 kg)      BP Readings from Last 3 Encounters:   08/16/22 120/60   08/08/22 136/85   07/28/22 136/85      Current Outpatient Medications   Medication Sig    desonide (TRIDESILON) 0.05 % cream Apply  to affected area three (3) times daily. (Patient not taking: Reported on 8/16/2022)    Alpha Lipoic Acid 600 mg cap Take  by mouth. (Patient not taking: Reported on 0/97/5226)    folic acid/multivit-min/lutein (CENTRUM SILVER PO) Take 1 Tablet by mouth daily. (Patient not taking: Reported on 8/16/2022)    cholecalciferol, vitamin D3, (VITAMIN D3 PO) Take 1 Tablet by mouth daily. (Patient not taking: Reported on 8/16/2022)    ALPRAZolam (XANAX) 0.5 mg tablet Take 1 Tablet by mouth three (3) times daily as needed for Anxiety. Max Daily Amount: 1.5 mg. Indications: anxious (Patient not taking: Reported on 8/16/2022)    CRANIAL PROSTHESIS misc Alopecia due to chemo/ wig (Patient not taking: Reported on 8/16/2022)     No current facility-administered medications for this visit. Impression see above.

## 2022-08-16 NOTE — PROGRESS NOTES
LYMPHEDEMA PT DAILY TREATMENT NOTE - 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 Any medication changes, allergies to medications, adverse drug reactions, diagnosis change, or new procedure performed?: [] No    [x] Yes (see summary sheet for update) Patient reports having a CT scan of the abdomen/chest and that a soft tissue mass was found in the L chest. She has a follow up scheduled with the physician. Subjective functional status/changes:   [] No changes reported  Patient reports that radiation therapy is complete and that her skin is somewhat fragile but intact. She is feeling well. She reports having multiple upcoming medical appointments     OBJECTIVE    15 min Therapeutic Exercise:  [] Hildred Cozier Exercises [x] Remedial Lymphedema Exercise Program [x] Axillary Web Exercise Program      [] Shoulder ROM Exercises   Rationale: Activate muscle pump to improve lymphatic fluid movement and decrease swelling to improve the patients ability to perform ADL and IADL skills and prevent worsening of swelling over time. 75 min Manual Therapy    Manual Lymphatic Drainage (MLD):  Area to decongest: LUE and trunk   Sequence used and effectiveness: Secondary sequence for upper extremity with trunk involvement. MLD modified due to skin integrity issues from radiation. AAA and AI pathway and cervical techniques deferred. Performed soft tissue mobilization to upper extremity with no palpable cording noted today.     Skin/wound care/debridement: Reviewed skin care principles:   Performed skin care with low pH lotion following manual lymph principles. Upper/Lower Extremity Compression: Fitted for the following compression products:    L UE       Style: Circular knit    Brand: Juzo Soft arm sleeve and glove    Type: Non-Custom: Size: sleeve in size 2 long and glove in size small will be exchanged for size medium for better fit and comfort. Patient interested in ordering additional garments for the L UE  to have for wash and wear and those will be ordered today: Two Juzo Soft arm sleeves size 2 long 20-30 mmHg and gauntlets size medium ( 20-30 mmHg and 15-20 mmHg) in the color beige and floral gray. Vendor: [employer] - patient paid a cash price for quick delivery for one set of garments. Will submit a garment order to [employer] today. Education: Educated patient in compression garment donning and doffing. Glove use with donning. Donated patient one pair of Sigvaris donning gloves today. Daily wear schedule. Daily laundering. Garment lifespan. Return and reordering process (by bringing prior garments into the clinic). Educated patient to monitor for redness or pressure points on the skin. If new pain occurs they should contact their therapist.    Patient/family demonstrated donning and doffing with independence    Discussed purchasing a full coverage supportive bra vs compression shirt if any truncal swelling is noted in the future. Not interested in ordering a night time product at this time. Rationale: decrease edema  to improve the patients ability to reduce the risk of infection and progression of swelling during the restorative phase of care. 0   Vasopneumatic Device:   Discussed the role and benefit of the vaso-pneumatic device and patient may be interested in obtaining a pump in the future with any worsening of swelling. Rationale:  To improve lymphatic fluid movement and decrease swelling to improve the patients ability to perform ADL and IADL skills and prevent worsening of swelling over time. With   [x] TE   [] TA   [x] MT   [] SC   [] other: Patient Education: [x] Review HEP    [] MLD Patient Education Reviewed with patient, as well as demonstration and instruction during MLD portion of the session. Continued education in self MLD technique with bathing and skin care. Provided patient with the written instructions to follow. Educated patient in soft tissue mobilization for axillary web. [] Progressed/Changed HEP based on:   [x] positioning   [] Kinesiotape   [x] Skin care   [] wound care   [] other:   Patient / caregiver re-demonstrated bandaging. [] Yes  [] No  Compression bandaging/garment precautions reviewed: [x] Yes  [] No     Other Objective/Functional Measures: Full UE volumes taken today:   right upper 1,662.91 ml today compared to 1,633.39 ml on 7/20/22. left upper 1,769.66 ml today compared to 1,773.31 ml on 7/20/22. Percent difference today is 6.42% as compared to 8.57% on evaluation. Pain Level (0-10 scale) post treatment: No pain reported    ASSESSMENT/Changes in Function:   Patient is returning today for the first visit since evaluation. She has completed radiation therapy and skin is intact but fragile. She had a CT scan which found a mass in the L chest wall and is scheduled to follow up with physician. She paid a cash price for quick delivery of one set of L UE compression products and a garment order for additional products will be submitted to the vendor, [employer], today. Patient reports wearing a compression sleeve at least 6 days each week and the hand piece less frequently. Full UE volumes taken today reveal an improvement in percentage difference from 8.57% to 6.42% since evaluation. Patient was educated in the self MLD packet and remedial lymphedema exercises today to have as additional tools for management of L UE swelling.  Patient learned stretches to improve axillary web symptoms on evaluation and signs and symptoms of cording have since improved. Patient prefers to return to the clinic on an as needed basis due to working full time and multiple medical appointments. She is aware that the plan of care will remain open until 10/15/22. She will continue with her home program to the best of her ability and return or call the clinic with any issues. Patient will continue to benefit from skilled PT services to  address swelling, analyze compression product fit and use, and instruct in home lymphedema management program to attain remaining goals. [x]  See Plan of Care  []  See progress note/recertification  []  See Discharge Summary         Progress towards goals / Updated goals:  Short Term Goals: To be met by 8/24/22  1. Patient will demonstrate knowledge of signs/symptoms of infections/cellulitis and be independent in skin care to prevent cellulitis. Goal met 8/16/22   2. Patient will demonstrate independence in lymphedema home program of therapeutic exercises/stretches to improve axillary web symptoms and decongest limb to improve ADLs. Goal met 8/16/22  3. Patient will participate in the selection process to allow ordering of home compression system (daytime, nighttime garments and pump as needed). In progress. Will continue to assess for additional needs. Long Term Goals: To be met by 10/15/22  1. Patient will be independent with don/doff of compression system and use in order to prevent reaccumulation of fluid at discharge. Goal met 8/16/22. 2.  Pt will be independent in self-MLD and show stable limb volumes showing decongestion and pt. Ready. In progress.      PLAN  []  Upgrade activities as tolerated     [x]  Continue plan of care  []  Update interventions per flow sheet       []  Discharge due to:_  []  Other:_      Glorine Double, PT, CLT 8/16/2022

## 2022-08-17 ENCOUNTER — TELEPHONE (OUTPATIENT)
Dept: ONCOLOGY | Age: 57
End: 2022-08-17

## 2022-08-17 ENCOUNTER — OFFICE VISIT (OUTPATIENT)
Dept: SURGERY | Age: 57
End: 2022-08-17
Payer: COMMERCIAL

## 2022-08-17 VITALS
DIASTOLIC BLOOD PRESSURE: 68 MMHG | HEIGHT: 66 IN | HEART RATE: 78 BPM | WEIGHT: 130 LBS | BODY MASS INDEX: 20.89 KG/M2 | SYSTOLIC BLOOD PRESSURE: 108 MMHG

## 2022-08-17 DIAGNOSIS — R93.89 ABNORMAL CHEST CT: ICD-10-CM

## 2022-08-17 DIAGNOSIS — C50.912 MALIGNANT NEOPLASM OF LEFT BREAST IN FEMALE, ESTROGEN RECEPTOR NEGATIVE, UNSPECIFIED SITE OF BREAST (HCC): Primary | ICD-10-CM

## 2022-08-17 DIAGNOSIS — Z17.1 MALIGNANT NEOPLASM OF LEFT BREAST IN FEMALE, ESTROGEN RECEPTOR NEGATIVE, UNSPECIFIED SITE OF BREAST (HCC): Primary | ICD-10-CM

## 2022-08-17 LAB
ECHO AO ASC DIAM: 3.5 CM
ECHO AO ASCENDING AORTA INDEX: 2.11 CM/M2
ECHO AO ROOT DIAM: 3.3 CM
ECHO AO ROOT INDEX: 1.99 CM/M2
ECHO AV PEAK GRADIENT: 7 MMHG
ECHO AV PEAK VELOCITY: 1.3 M/S
ECHO AV VELOCITY RATIO: 1
ECHO EST RA PRESSURE: 3 MMHG
ECHO LA DIAMETER INDEX: 1.87 CM/M2
ECHO LA DIAMETER: 3.1 CM
ECHO LA TO AORTIC ROOT RATIO: 0.94
ECHO LA VOL 2C: 29 ML (ref 22–52)
ECHO LA VOL 4C: 39 ML (ref 22–52)
ECHO LA VOL BP: 34 ML (ref 22–52)
ECHO LA VOL/BSA BIPLANE: 20 ML/M2 (ref 16–34)
ECHO LA VOLUME AREA LENGTH: 36 ML
ECHO LA VOLUME INDEX A2C: 17 ML/M2 (ref 16–34)
ECHO LA VOLUME INDEX A4C: 23 ML/M2 (ref 16–34)
ECHO LA VOLUME INDEX AREA LENGTH: 22 ML/M2 (ref 16–34)
ECHO LV E' LATERAL VELOCITY: 10 CM/S
ECHO LV E' SEPTAL VELOCITY: 8 CM/S
ECHO LV EJECTION FRACTION A2C: 57 %
ECHO LV EJECTION FRACTION A4C: 61 %
ECHO LV EJECTION FRACTION BIPLANE: 59 % (ref 55–100)
ECHO LV FRACTIONAL SHORTENING: 46 % (ref 28–44)
ECHO LV GLOBAL LONGITUDINAL STRAIN (GLS): -18.7 %
ECHO LV INTERNAL DIMENSION DIASTOLE INDEX: 2.89 CM/M2
ECHO LV INTERNAL DIMENSION DIASTOLIC: 4.8 CM (ref 3.9–5.3)
ECHO LV INTERNAL DIMENSION SYSTOLIC INDEX: 1.57 CM/M2
ECHO LV INTERNAL DIMENSION SYSTOLIC: 2.6 CM
ECHO LV ISOVOLUMETRIC RELAXATION TIME (IVRT): 85.6 MS
ECHO LV IVSD: 0.8 CM (ref 0.6–0.9)
ECHO LV MASS 2D: 137.1 G (ref 67–162)
ECHO LV MASS INDEX 2D: 82.6 G/M2 (ref 43–95)
ECHO LV POSTERIOR WALL DIASTOLIC: 0.9 CM (ref 0.6–0.9)
ECHO LV RELATIVE WALL THICKNESS RATIO: 0.38
ECHO LVOT CARDIAC OUTPUT: 4.3 LITER/MINUTE
ECHO LVOT CARDIAC OUTPUT: 4.3 LITER/MINUTE
ECHO LVOT CARDIAC OUTPUT: 4.6 LITER/MINUTE
ECHO LVOT CARDIAC OUTPUT: 4.6 LITER/MINUTE
ECHO LVOT CARDIAC OUTPUT: 5 LITER/MINUTE
ECHO LVOT CARDIAC OUTPUT: 5 LITER/MINUTE
ECHO LVOT PEAK GRADIENT: 7 MMHG
ECHO LVOT PEAK VELOCITY: 1.3 M/S
ECHO MV "A" WAVE DURATION: 138.9 MSEC
ECHO MV A VELOCITY: 0.72 M/S
ECHO MV E DECELERATION TIME (DT): 186.5 MS
ECHO MV E VELOCITY: 0.63 M/S
ECHO MV E/A RATIO: 0.88
ECHO MV E/E' LATERAL: 6.3
ECHO MV E/E' RATIO (AVERAGED): 7.09
ECHO MV E/E' SEPTAL: 7.88
ECHO RIGHT VENTRICULAR SYSTOLIC PRESSURE (RVSP): 19 MMHG
ECHO RV FREE WALL PEAK S': 17 CM/S
ECHO RV TAPSE: 2.2 CM (ref 1.7–?)
ECHO TV REGURGITANT MAX VELOCITY: 2.01 M/S
ECHO TV REGURGITANT PEAK GRADIENT: 16 MMHG

## 2022-08-17 PROCEDURE — 99213 OFFICE O/P EST LOW 20 MIN: CPT | Performed by: SURGERY

## 2022-08-17 PROCEDURE — 93306 TTE W/DOPPLER COMPLETE: CPT | Performed by: SPECIALIST

## 2022-08-17 PROCEDURE — 76642 ULTRASOUND BREAST LIMITED: CPT | Performed by: SURGERY

## 2022-08-17 NOTE — TELEPHONE ENCOUNTER
Call to [facility], [phone], patient ID verified X 2. Spoke with Katherine Flores. Patient had ECHO on 8/8/22 that is not completed at this time. Explained patient has chemo on 8/18/22 that requires ECHO results be available. Per Katherine Flores she will route message to Provider.

## 2022-08-17 NOTE — PROGRESS NOTES
HISTORY OF PRESENT ILLNESS  Jenny Acosat is a 61 y.o. female. HPI ESTABLISHED patient here LEFT breast mass found on CT scan of body. 10/27/21: LEFT breast bx. PATH: IDC, 5mm, favor grade 3, ER-/WV-/HER2+(3)/Ki-67 70%. DCIS with high-grade nuclear features and comedonecrosis. Microcalcifications not present. 04/21/2022: LEFT breast lumpectomy and LEFT excisional SLN. PATH:   LEFT BREAST: Benign skin. No residual invasive or in situ carcinoma status post neoadjuvant. LEFT breast skin #2: benign skin and subcutis. RIGHT BREAST: skin and tissue, 21g, benign mammary tissue and skin  LEFT axillary: 4/4 benign LNs with scar. 1 LN biopsy site and clip.         ROS    Physical Exam    ASSESSMENT and PLAN  {ASSESSMENT/PLAN:44315}

## 2022-08-17 NOTE — PROGRESS NOTES
HISTORY OF PRESENT ILLNESS  Vinayak Parada is a 61 y.o. female. HPI  ESTABLISHED patient here LEFT breast mass found on CT scan of body. 10/27/21: LEFT breast bx. PATH: IDC, 5mm, favor grade 3, ER-/MA-/HER2+(3)/Ki-67 70%. DCIS with high-grade nuclear features and comedonecrosis. Microcalcifications not present. 04/21/2022: LEFT breast lumpectomy and LEFT excisional SLN. PATH:   LEFT BREAST: Benign skin. No residual invasive or in situ carcinoma status post neoadjuvant. LEFT breast skin #2: benign skin and subcutis. RIGHT BREAST: skin and tissue, 21g, benign mammary tissue and skin  LEFT axillary: 4/4 benign LNs with scar. 1 LN biopsy site and clip. Past Medical History:   Diagnosis Date    Malignant neoplasm of left breast in female, estrogen receptor [facility] 10/13/2021       Past Surgical History:   Procedure Laterality Date    HX BREAST LUMPECTOMY Left 4/21/2022    LEFT BREAST REDUCTION LUMPECTOMY WITH BRACKETED MAG SEED LOCALIZATION, LEFT BREAST SENTINEL NODE BIOPSY LEFT BREAST ONCOPLASTIC REDUCTION WITH RIGHT BREAST REDUCTION FOR SYMMETRY AND PORT REMOVAL performed by Jonatan Robin MD at [place] [address]    HX BREAST REDUCTION Bilateral 4/21/2022    .  performed by Guy Chinchilla MD at [place] [address]    HX VASCULAR ACCESS Right 10/2021    PORT INSERTION    MA BREAST SURGERY PROCEDURE UNLISTED Left 10/06/21    Biopsy       Social History     Socioeconomic History    Marital status:      Spouse name: Not on file    Number of children: Not on file    Years of education: Not on file    Highest education level: Not on file   Occupational History    Not on file   Tobacco Use    Smoking status: Never    Smokeless tobacco: Never   Vaping Use    Vaping Use: Never used   Substance and Sexual Activity    Alcohol use: Not Currently     Alcohol/week: 0.0 standard drinks    Drug use: Never    Sexual activity: Yes     Partners: Male     Birth control/protection: None Other Topics Concern    Not on file   Social History Narrative    Not on file     Social Determinants of Health     Financial Resource Strain: Not on file   Food Insecurity: Not on file   Transportation Needs: Not on file   Physical Activity: Not on file   Stress: Not on file   Social Connections: Unknown    Frequency of Communication with Friends and Family: More than three times a week    Frequency of Social Gatherings with Friends and Family: More than three times a week    Attends Anabaptism Services: Not on file    Active Member of Clubs or Organizations: Not on file    Attends Club or Organization Meetings: Not on file    Marital Status:    Intimate Partner Violence: Not on file   Housing Stability: Not on file       Current Outpatient Medications on File Prior to Visit   Medication Sig Dispense Refill    desonide (TRIDESILON) 0.05 % cream Apply  to affected area three (3) times daily. (Patient not taking: Reported on 8/16/2022) 15 g 0    Alpha Lipoic Acid 600 mg cap Take  by mouth. (Patient not taking: Reported on 2/77/8889)      folic acid/multivit-min/lutein (CENTRUM SILVER PO) Take 1 Tablet by mouth daily. (Patient not taking: Reported on 8/16/2022)      cholecalciferol, vitamin D3, (VITAMIN D3 PO) Take 1 Tablet by mouth daily. (Patient not taking: Reported on 8/16/2022)      ALPRAZolam (XANAX) 0.5 mg tablet Take 1 Tablet by mouth three (3) times daily as needed for Anxiety. Max Daily Amount: 1.5 mg. Indications: anxious (Patient not taking: Reported on 8/16/2022) 30 Tablet 0    CRANIAL PROSTHESIS misc Alopecia due to chemo/ wig (Patient not taking: Reported on 8/16/2022) 1 Each 1     No current facility-administered medications on file prior to visit. No Known Allergies    OB History    No obstetric history on file.       Obstetric Comments   Menarche 14, LMP 2018, # of children 2, age of 4st delivery 33, Hysterectomy/oophorectomy no/no, Breast bx no, history of breast feeding yes, BCP no, Hormone therapy no               ROS      Physical Exam  Exam conducted with a chaperone present. Constitutional:       Appearance: She is well-developed. She is not diaphoretic. HENT:      Head: Normocephalic and atraumatic. Right Ear: External ear normal.      Left Ear: External ear normal.   Eyes:      General: No scleral icterus. Right eye: No discharge. Left eye: No discharge. Pupils: Pupils are equal, round, and reactive to light. Neck:      Thyroid: No thyromegaly. Vascular: No JVD. Trachea: No tracheal deviation. Cardiovascular:      Rate and Rhythm: Normal rate and regular rhythm. Heart sounds: Normal heart sounds. Pulmonary:      Effort: Pulmonary effort is normal. No tachypnea, accessory muscle usage or respiratory distress. Breath sounds: Normal breath sounds. No stridor. Chest:   Breasts:     Breasts are symmetrical.      Right: No inverted nipple, mass, nipple discharge, skin change or tenderness. Left: No inverted nipple, mass, nipple discharge, skin change or tenderness. Abdominal:      General: There is no distension. Palpations: Abdomen is soft. There is no mass. Tenderness: There is no abdominal tenderness. Musculoskeletal:         General: Normal range of motion. Cervical back: Normal range of motion and neck supple. Lymphadenopathy:      Cervical: No cervical adenopathy. Skin:     General: Skin is warm and dry. Neurological:      Mental Status: She is alert and oriented to person, place, and time. She is not disoriented. Psychiatric:         Speech: Speech normal.         Behavior: Behavior normal.         Thought Content: Thought content normal.         Judgment: Judgment normal.         BREAST ULTRASOUND  Indication: LEFT breast mass. Technique: The area was scanned using a high-frequency linear-array near-field transducer  Findings: Post op change with scar tissue.   Impression: No worrisome findings. Disposition: Breast MRI and mammogram in 6 months. ASSESSMENT and PLAN    ICD-10-CM ICD-9-CM    1. Malignant neoplasm of left breast in female, estrogen receptor negative, unspecified site of breast ([facility].)  C50.912 174.9     Z17.1 V86.1         Established patient presents for evaluation of LEFT breast mass and is doing well overall. Upon physical examination noted normal. Ultrasound visualizes post op change with scar tissue, no worrisome findings. Patient stated finished radiation therapy 2 weeks ago. I advised breast MRI and mammogram with follow up in 6 months. This plan was reviewed with the patient and patient agrees. All questions were answered. Total time spent was 20 minutes.     Written by Brittaney Santamaria, as dictated by Dr. Mame Ulloa MD.

## 2022-08-18 ENCOUNTER — HOSPITAL ENCOUNTER (OUTPATIENT)
Dept: INFUSION THERAPY | Age: 57
Discharge: HOME | End: 2022-08-18
Payer: COMMERCIAL

## 2022-08-18 VITALS
RESPIRATION RATE: 18 BRPM | SYSTOLIC BLOOD PRESSURE: 116 MMHG | DIASTOLIC BLOOD PRESSURE: 80 MMHG | TEMPERATURE: 97.8 F | BODY MASS INDEX: 20.82 KG/M2 | HEART RATE: 62 BPM | WEIGHT: 129 LBS

## 2022-08-18 DIAGNOSIS — Z17.1 MALIGNANT NEOPLASM OF LEFT BREAST IN FEMALE, ESTROGEN RECEPTOR NEGATIVE, UNSPECIFIED SITE OF BREAST (HCC): Primary | ICD-10-CM

## 2022-08-18 DIAGNOSIS — C50.912 MALIGNANT NEOPLASM OF LEFT BREAST IN FEMALE, ESTROGEN RECEPTOR NEGATIVE, UNSPECIFIED SITE OF BREAST (HCC): Primary | ICD-10-CM

## 2022-08-18 LAB
ALBUMIN SERPL-MCNC: 3.5 G/DL (ref 3.5–5)
ALBUMIN/GLOB SERPL: 0.8 {RATIO} (ref 1.1–2.2)
ALP SERPL-CCNC: 260 U/L (ref 45–117)
ALT SERPL-CCNC: 96 U/L (ref 12–78)
ANION GAP SERPL CALC-SCNC: 4 MMOL/L (ref 5–15)
AST SERPL-CCNC: 60 U/L (ref 15–37)
BASOPHILS # BLD: 0 K/UL (ref 0–0.1)
BASOPHILS NFR BLD: 0 % (ref 0–1)
BILIRUB SERPL-MCNC: 0.4 MG/DL (ref 0.2–1)
BUN SERPL-MCNC: 12 MG/DL (ref 6–20)
BUN/CREAT SERPL: 17 (ref 12–20)
CALCIUM SERPL-MCNC: 10.2 MG/DL (ref 8.5–10.1)
CHLORIDE SERPL-SCNC: 103 MMOL/L (ref 97–108)
CO2 SERPL-SCNC: 30 MMOL/L (ref 21–32)
CREAT SERPL-MCNC: 0.69 MG/DL (ref 0.55–1.02)
DIFFERENTIAL METHOD BLD: ABNORMAL
EOSINOPHIL # BLD: 0.2 K/UL (ref 0–0.4)
EOSINOPHIL NFR BLD: 3 % (ref 0–7)
ERYTHROCYTE [DISTWIDTH] IN BLOOD BY AUTOMATED COUNT: 13.3 % (ref 11.5–14.5)
GLOBULIN SER CALC-MCNC: 4.6 G/DL (ref 2–4)
GLUCOSE SERPL-MCNC: 85 MG/DL (ref 65–100)
HCT VFR BLD AUTO: 33.4 % (ref 35–47)
HGB BLD-MCNC: 11.5 G/DL (ref 11.5–16)
IMM GRANULOCYTES # BLD AUTO: 0 K/UL (ref 0–0.04)
IMM GRANULOCYTES NFR BLD AUTO: 0 % (ref 0–0.5)
LYMPHOCYTES # BLD: 1.3 K/UL (ref 0.8–3.5)
LYMPHOCYTES NFR BLD: 27 % (ref 12–49)
MCH RBC QN AUTO: 31.3 PG (ref 26–34)
MCHC RBC AUTO-ENTMCNC: 34.4 G/DL (ref 30–36.5)
MCV RBC AUTO: 91 FL (ref 80–99)
MONOCYTES # BLD: 0.6 K/UL (ref 0–1)
MONOCYTES NFR BLD: 12 % (ref 5–13)
NEUTS SEG # BLD: 2.7 K/UL (ref 1.8–8)
NEUTS SEG NFR BLD: 58 % (ref 32–75)
NRBC # BLD: 0 K/UL (ref 0–0.01)
NRBC BLD-RTO: 0 PER 100 WBC
PLATELET # BLD AUTO: 175 K/UL (ref 150–400)
PMV BLD AUTO: 9.5 FL (ref 8.9–12.9)
POTASSIUM SERPL-SCNC: 4 MMOL/L (ref 3.5–5.1)
PROT SERPL-MCNC: 8.1 G/DL (ref 6.4–8.2)
RBC # BLD AUTO: 3.67 M/UL (ref 3.8–5.2)
SODIUM SERPL-SCNC: 137 MMOL/L (ref 136–145)
WBC # BLD AUTO: 4.7 K/UL (ref 3.6–11)

## 2022-08-18 PROCEDURE — 85025 COMPLETE CBC W/AUTO DIFF WBC: CPT

## 2022-08-18 PROCEDURE — 80053 COMPREHEN METABOLIC PANEL: CPT

## 2022-08-18 PROCEDURE — 74011250636 HC RX REV CODE- 250/636: Performed by: INTERNAL MEDICINE

## 2022-08-18 PROCEDURE — 96401 CHEMO ANTI-NEOPL SQ/IM: CPT

## 2022-08-18 PROCEDURE — 36415 COLL VENOUS BLD VENIPUNCTURE: CPT

## 2022-08-18 RX ORDER — ACETAMINOPHEN 325 MG/1
650 TABLET ORAL
Status: DISCONTINUED | OUTPATIENT
Start: 2022-08-18 | End: 2022-08-19 | Stop reason: HOSPADM

## 2022-08-18 RX ORDER — HEPARIN 100 UNIT/ML
300-500 SYRINGE INTRAVENOUS AS NEEDED
Status: ACTIVE | OUTPATIENT
Start: 2022-08-18 | End: 2022-08-18

## 2022-08-18 RX ORDER — SODIUM CHLORIDE 0.9 % (FLUSH) 0.9 %
10 SYRINGE (ML) INJECTION AS NEEDED
Status: DISPENSED | OUTPATIENT
Start: 2022-08-18 | End: 2022-08-18

## 2022-08-18 RX ORDER — SODIUM CHLORIDE 9 MG/ML
10 INJECTION INTRAMUSCULAR; INTRAVENOUS; SUBCUTANEOUS AS NEEDED
Status: ACTIVE | OUTPATIENT
Start: 2022-08-18 | End: 2022-08-18

## 2022-08-18 RX ORDER — ALBUTEROL SULFATE 90 UG/1
2 AEROSOL, METERED RESPIRATORY (INHALATION) AS NEEDED
Status: ACTIVE | OUTPATIENT
Start: 2022-08-18 | End: 2022-08-18

## 2022-08-18 RX ORDER — DIPHENHYDRAMINE HYDROCHLORIDE 50 MG/ML
25 INJECTION, SOLUTION INTRAMUSCULAR; INTRAVENOUS AS NEEDED
Status: ACTIVE | OUTPATIENT
Start: 2022-08-18 | End: 2022-08-18

## 2022-08-18 RX ORDER — EPINEPHRINE 1 MG/ML
0.3 INJECTION, SOLUTION, CONCENTRATE INTRAVENOUS AS NEEDED
Status: ACTIVE | OUTPATIENT
Start: 2022-08-18 | End: 2022-08-18

## 2022-08-18 RX ORDER — ACETAMINOPHEN 325 MG/1
650 TABLET ORAL AS NEEDED
Status: ACTIVE | OUTPATIENT
Start: 2022-08-18 | End: 2022-08-18

## 2022-08-18 RX ORDER — SODIUM CHLORIDE 9 MG/ML
25 INJECTION, SOLUTION INTRAVENOUS CONTINUOUS
Status: DISPENSED | OUTPATIENT
Start: 2022-08-18 | End: 2022-08-18

## 2022-08-18 RX ORDER — HYDROCORTISONE SODIUM SUCCINATE 100 MG/2ML
100 INJECTION, POWDER, FOR SOLUTION INTRAMUSCULAR; INTRAVENOUS AS NEEDED
Status: ACTIVE | OUTPATIENT
Start: 2022-08-18 | End: 2022-08-18

## 2022-08-18 RX ORDER — DIPHENHYDRAMINE HYDROCHLORIDE 50 MG/ML
50 INJECTION, SOLUTION INTRAMUSCULAR; INTRAVENOUS AS NEEDED
Status: ACTIVE | OUTPATIENT
Start: 2022-08-18 | End: 2022-08-18

## 2022-08-18 RX ORDER — ONDANSETRON 2 MG/ML
8 INJECTION INTRAMUSCULAR; INTRAVENOUS AS NEEDED
Status: ACTIVE | OUTPATIENT
Start: 2022-08-18 | End: 2022-08-18

## 2022-08-18 RX ORDER — DIPHENHYDRAMINE HYDROCHLORIDE 50 MG/ML
50 INJECTION, SOLUTION INTRAMUSCULAR; INTRAVENOUS
Status: DISCONTINUED | OUTPATIENT
Start: 2022-08-18 | End: 2022-08-19 | Stop reason: HOSPADM

## 2022-08-18 RX ADMIN — PERTUZUMAB, TRASTUZUMAB, AND HYALURONIDASE-ZZXF 10 ML: 600; 600; 2000 INJECTION, SOLUTION SUBCUTANEOUS at 09:00

## 2022-08-18 NOTE — PROGRESS NOTES
[facility] Progress Note    Date: 2022    Name: Tyrel Zhou    MRN: [id number]         : 1965    Ms. Landin Arrived ambulatory and in no distress for cycle 15 day 1 of Phesgo regimen. Follow Up: Proceed with treatment    Assessment was completed and documented in flowsheets. No acute concerns at this time. Labs drawn and processed peripherally. Chemotherapy Flowsheet 2022   Cycle C15   Date 2022   Drug / Regimen Phesgo   Pre Meds none ordered   Notes RIGHT THIGH         Ms. Landin's vitals were reviewed. Patient Vitals for the past 12 hrs:   Temp Pulse Resp BP   22 0749 97.8 °F (36.6 °C) 62 18 116/80         Lab results were obtained and reviewed. Recent Results (from the past 12 hour(s))   CBC WITH AUTOMATED DIFF    Collection Time: 22  7:53 AM   Result Value Ref Range    WBC 4.7 3.6 - 11.0 K/uL    RBC 3.67 (L) 3.80 - 5.20 M/uL    HGB 11.5 11.5 - 16.0 g/dL    HCT 33.4 (L) 35.0 - 47.0 %    MCV 91.0 80.0 - 99.0 FL    MCH 31.3 26.0 - 34.0 PG    MCHC 34.4 30.0 - 36.5 g/dL    RDW 13.3 11.5 - 14.5 %    PLATELET 290 117 - 227 K/uL    MPV 9.5 8.9 - 12.9 FL    NRBC 0.0 0  WBC    ABSOLUTE NRBC 0.00 0.00 - 0.01 K/uL    NEUTROPHILS 58 32 - 75 %    LYMPHOCYTES 27 12 - 49 %    MONOCYTES 12 5 - 13 %    EOSINOPHILS 3 0 - 7 %    BASOPHILS 0 0 - 1 %    IMMATURE GRANULOCYTES 0 0.0 - 0.5 %    ABS. NEUTROPHILS 2.7 1.8 - 8.0 K/UL    ABS. LYMPHOCYTES 1.3 0.8 - 3.5 K/UL    ABS. MONOCYTES 0.6 0.0 - 1.0 K/UL    ABS. EOSINOPHILS 0.2 0.0 - 0.4 K/UL    ABS. BASOPHILS 0.0 0.0 - 0.1 K/UL    ABS. IMM.  GRANS. 0.0 0.00 - 0.04 K/UL    DF AUTOMATED     METABOLIC PANEL, COMPREHENSIVE    Collection Time: 22  7:53 AM   Result Value Ref Range    Sodium 137 136 - 145 mmol/L    Potassium 4.0 3.5 - 5.1 mmol/L    Chloride 103 97 - 108 mmol/L    CO2 30 21 - 32 mmol/L    Anion gap 4 (L) 5 - 15 mmol/L    Glucose 85 65 - 100 mg/dL    BUN 12 6 - 20 MG/DL    Creatinine 0.69 0.55 - 1.02 MG/DL BUN/Creatinine ratio 17 12 - 20      GFR est AA >60 >60 ml/min/1.73m2    GFR est non-AA >60 >60 ml/min/1.73m2    Calcium 10.2 (H) 8.5 - 10.1 MG/DL    Bilirubin, total 0.4 0.2 - 1.0 MG/DL    ALT (SGPT) 96 (H) 12 - 78 U/L    AST (SGOT) 60 (H) 15 - 37 U/L    Alk. phosphatase 260 (H) 45 - 117 U/L    Protein, total 8.1 6.4 - 8.2 g/dL    Albumin 3.5 3.5 - 5.0 g/dL    Globulin 4.6 (H) 2.0 - 4.0 g/dL    A-G Ratio 0.8 (L) 1.1 - 2.2       Given R thigh SQ  Medications Administered       pertuzumab 600 mg-trastuzumab 600 mg-hyaluronidase-zzxf 20,000 units/10 mL       Admin Date  08/18/2022 Action  Given Dose  10 mL Route  SubCUTAneous Administered By  Roopa Farias RN             Medication education and side effect management reinforced with patient. They verbalized understanding. Ms. Ema Garcia tolerated treatment well, patient was discharged from [place] [address] in stable condition. Patient is aware of upcoming appointments.       Future Appointments   Date Time Provider Ryan Daly   8/29/2022  9:00 AM Sin Chan, MICHELLE [facility] BS AMB   9/8/2022  8:00 AM F1 RAFI LONG [place] [facility] . [facility]   9/8/2022  8:15 AM Daily Garcia  [address] BS AMB   9/29/2022  8:00 AM F1 [address] [place] [address]'S H   11/16/2022  8:00 AM BONI PEOPLES BS AMB   55/32/6294  1:52 PM Elena Shen MD [facility] BS AMB   52/1/8864  5:29 PM Elena Shen MD [facility] BS AMB   2/15/2023  9:00 AM BONI PEOPLES BS AMB   2/15/2023  9:20 AM MD DEBBIE Khan BS AMB         Som Powell RN  August 18, 2022        Problem: Chemotherapy Treatment  Goal: *Chemotherapy regimen followed  Outcome: Progressing Towards Goal     Problem: Knowledge Deficit  Goal: *Verbalizes understanding of procedures and medications  Outcome: Progressing Towards Goal

## 2022-08-29 ENCOUNTER — OFFICE VISIT (OUTPATIENT)
Dept: PRIMARY CARE CLINIC | Age: 57
End: 2022-08-29
Payer: COMMERCIAL

## 2022-08-29 VITALS
WEIGHT: 130 LBS | RESPIRATION RATE: 16 BRPM | DIASTOLIC BLOOD PRESSURE: 88 MMHG | SYSTOLIC BLOOD PRESSURE: 130 MMHG | TEMPERATURE: 98 F | OXYGEN SATURATION: 98 % | HEART RATE: 80 BPM | BODY MASS INDEX: 20.89 KG/M2 | HEIGHT: 66 IN

## 2022-08-29 DIAGNOSIS — R79.89 ELEVATED LFTS: ICD-10-CM

## 2022-08-29 DIAGNOSIS — G89.29 CHRONIC RIGHT SHOULDER PAIN: ICD-10-CM

## 2022-08-29 DIAGNOSIS — Z11.59 NEED FOR HEPATITIS C SCREENING TEST: ICD-10-CM

## 2022-08-29 DIAGNOSIS — Z76.89 ENCOUNTER TO ESTABLISH CARE: ICD-10-CM

## 2022-08-29 DIAGNOSIS — M25.511 CHRONIC RIGHT SHOULDER PAIN: ICD-10-CM

## 2022-08-29 DIAGNOSIS — T45.1X5A CHEMOTHERAPY-INDUCED NEUROPATHY (HCC): ICD-10-CM

## 2022-08-29 DIAGNOSIS — C50.912 MALIGNANT NEOPLASM OF LEFT BREAST IN FEMALE, ESTROGEN RECEPTOR NEGATIVE, UNSPECIFIED SITE OF BREAST (HCC): Primary | ICD-10-CM

## 2022-08-29 DIAGNOSIS — G62.0 CHEMOTHERAPY-INDUCED NEUROPATHY (HCC): ICD-10-CM

## 2022-08-29 DIAGNOSIS — Z17.1 MALIGNANT NEOPLASM OF LEFT BREAST IN FEMALE, ESTROGEN RECEPTOR NEGATIVE, UNSPECIFIED SITE OF BREAST (HCC): Primary | ICD-10-CM

## 2022-08-29 DIAGNOSIS — Z78.0 POST-MENOPAUSAL: ICD-10-CM

## 2022-08-29 PROCEDURE — 99204 OFFICE O/P NEW MOD 45 MIN: CPT

## 2022-08-29 RX ORDER — DULOXETIN HYDROCHLORIDE 30 MG/1
30 CAPSULE, DELAYED RELEASE ORAL DAILY
Qty: 30 CAPSULE | Refills: 1 | Status: SHIPPED | OUTPATIENT
Start: 2022-08-29 | End: 2022-10-26 | Stop reason: SDUPTHER

## 2022-08-29 RX ORDER — DICLOFENAC SODIUM 10 MG/G
GEL TOPICAL 4 TIMES DAILY
Qty: 1 EACH | Refills: 1 | Status: SHIPPED | OUTPATIENT
Start: 2022-08-29

## 2022-08-29 RX ORDER — BISMUTH SUBSALICYLATE 262 MG
1 TABLET,CHEWABLE ORAL DAILY
COMMUNITY

## 2022-08-29 NOTE — PROGRESS NOTES
[facility]   [address]in Figueroa [address], [address] [facility]  P: [phone]  F: [phone]    SUBJECTIVE     HPI:     Alejo Zamora is a 61 y.o. female who is seen in the clinic for   Chief Complaint   Patient presents with    Establish Care    Shoulder Pain     Started in the last two months with radiation-            New patient to our practice, here to establish care. She has a history of left breast cancer. She takes a multivitamin, fish oil, and vitamin D daily. She is followed by Dr Martell Lanes (Oncologist), Dr Mitch Vázquez (Cardiologist) and Dr Rupert Ji (Breast Surgery). Left breast cancer: Diagnosed in October 2021. Chemotherapy completed. Radiation completed August 3rd. Currently receiving HP therapy. She was in a fixed position during Radiation Therapy with arms behind her head. Since Radiation, her right shoulder has limited range of motion. She feels soreness with flexion and abduction of the right shoulder. Left shoulder ROM intact, no tenderness. After starting cancer treatment, she has noticed neuropathy to bilateral lower extremities, left leg more affected than right. Worse at night, feet feel cold. She has discussed this with Dr Martell Lanes (Oncologist) and given the option of starting pregabalin or gabapentin. She has two HP treatments left until she is finished and would like to see if neuropathic pain resolves once treatment is complete. She is curious if there is anything else she can take for neuropathic pain. She was taking Alpha Lipoic Acid for neuropathic pain, but noticed LFT's went up. When she was taking this. She has since stopped and LFT's are trending in the right direction, but neuropathic pain is bothersome for her. Her OB-GYN is Dr. Milo Delgado with [facility]. She is post menopausal. She is due for a PAP smear. She is scheduling an appointment with their office soon.      Patient Active Problem List    Diagnosis    Impaired taste Elevated LFTs    S/P lumpectomy, left breast    Hypokalemia    Anxiety about health    Chemotherapy-induced neuropathy (HCC)    Chemotherapy-induced nausea    Chemotherapy-induced fatigue    Port-A-Cath in place    Encounter for monitoring cardiotoxic drug therapy    Post-menopausal    Malignant neoplasm of left breast in female, estrogen receptor negative ([facility]     10/27/21: LEFT breast bx. PATH: IDC, 5mm, favor grade 3, ER-/VT-/HER2+(3)/Ki-67 70%. DCIS with high-grade nuclear features and comedonecrosis. Microcalcifications not present. 04/21/2022: LEFT breast lumpectomy and LEFT excisional SLN. PATH:   LEFT BREAST: Benign skin. No residual invasive or in situ carcinoma status post neoadjuvant. LEFT breast skin #2: benign skin and subcutis. RIGHT BREAST: skin and tissue, 21g, benign mammary tissue and skin  LEFT axillary: 4/4 benign LNs with scar. 1 LN biopsy site and clip. Past Medical History:   Diagnosis Date    Malignant neoplasm of left breast in female, estrogen receptor negative ([facility] 10/13/2021     Past Surgical History:   Procedure Laterality Date    HX BREAST LUMPECTOMY Left 4/21/2022    LEFT BREAST REDUCTION LUMPECTOMY WITH BRACKETED MAG SEED LOCALIZATION, LEFT BREAST SENTINEL NODE BIOPSY LEFT BREAST ONCOPLASTIC REDUCTION WITH RIGHT BREAST REDUCTION FOR SYMMETRY AND PORT REMOVAL performed by Brionna Torre MD at [place] [address]    HX BREAST REDUCTION Bilateral 4/21/2022    .  performed by Shanel Rojas MD at [place] [address]    HX VASCULAR ACCESS Right 10/2021    PORT INSERTION    VT BREAST SURGERY PROCEDURE UNLISTED Left 10/06/21    Biopsy     Social History     Socioeconomic History    Marital status:      Spouse name: Not on file    Number of children: Not on file    Years of education: Not on file    Highest education level: Not on file   Occupational History    Not on file   Tobacco Use    Smoking status: Never    Smokeless tobacco: Never   Vaping Use Vaping Use: Never used   Substance and Sexual Activity    Alcohol use: Not Currently     Alcohol/week: 0.0 standard drinks    Drug use: Never    Sexual activity: Yes     Partners: Male     Birth control/protection: None   Other Topics Concern    Not on file   Social History Narrative    Not on file     Social Determinants of Health     Financial Resource Strain: Low Risk     Difficulty of Paying Living Expenses: Not hard at all   Food Insecurity: No Food Insecurity    Worried About Running Out of Food in the Last Year: Never true    Ran Out of Food in the Last Year: Never true   Transportation Needs: Not on file   Physical Activity: Unknown    Days of Exercise per Week: 2 days    Minutes of Exercise per Session: Not on file   Stress: Not on file   Social Connections: Unknown    Frequency of Communication with Friends and Family: More than three times a week    Frequency of Social Gatherings with Friends and Family: More than three times a week    Attends Gnosticism Services: Not on file    Active Member of [address] [place] [address] [employer] or Organizations: Not on file    Attends Club or Organization Meetings: Not on file    Marital Status:    Intimate Partner Violence: Not At Risk    Fear of Current or Ex-Partner: No    Emotionally Abused: No    Physically Abused: No    Sexually Abused: No   Housing Stability: Not on file     Family History   Problem Relation Age of Onset    Cancer Mother         BREAST    Dementia Mother     Heart Disease Father     Anesth Problems Neg Hx      Immunization History   Administered Date(s) Administered    COVID-19, PFIZER PURPLE top, DILUTE for use, (age 15 y+), IM, 30mcg/0.3mL 10/06/2021, 04/01/2022    Tdap 12/02/2015      No Known Allergies    Hospital Outpatient Visit on 08/18/2022   Component Date Value Ref Range Status    WBC 08/18/2022 4.7  3.6 - 11.0 K/uL Final    RBC 08/18/2022 3.67 (A) 3.80 - 5.20 M/uL Final    HGB 08/18/2022 11.5  11.5 - 16.0 g/dL Final    HCT 08/18/2022 33.4 (A) 35.0 - 47.0 % Final    MCV 08/18/2022 91.0  80.0 - 99.0 FL Final    MCH 08/18/2022 31.3  26.0 - 34.0 PG Final    MCHC 08/18/2022 34.4  30.0 - 36.5 g/dL Final    RDW 08/18/2022 13.3  11.5 - 14.5 % Final    PLATELET 47/63/9892 780  150 - 400 K/uL Final    MPV 08/18/2022 9.5  8.9 - 12.9 FL Final    NRBC 08/18/2022 0.0  0  WBC Final    ABSOLUTE NRBC 08/18/2022 0.00  0.00 - 0.01 K/uL Final    NEUTROPHILS 08/18/2022 58  32 - 75 % Final    LYMPHOCYTES 08/18/2022 27  12 - 49 % Final    MONOCYTES 08/18/2022 12  5 - 13 % Final    EOSINOPHILS 08/18/2022 3  0 - 7 % Final    BASOPHILS 08/18/2022 0  0 - 1 % Final    IMMATURE GRANULOCYTES 08/18/2022 0  0.0 - 0.5 % Final    ABS. NEUTROPHILS 08/18/2022 2.7  1.8 - 8.0 K/UL Final    ABS. LYMPHOCYTES 08/18/2022 1.3  0.8 - 3.5 K/UL Final    ABS. MONOCYTES 08/18/2022 0.6  0.0 - 1.0 K/UL Final    ABS. EOSINOPHILS 08/18/2022 0.2  0.0 - 0.4 K/UL Final    ABS. BASOPHILS 08/18/2022 0.0  0.0 - 0.1 K/UL Final    ABS. IMM. GRANS. 08/18/2022 0.0  0.00 - 0.04 K/UL Final    DF 08/18/2022 AUTOMATED    Final    Sodium 08/18/2022 137  136 - 145 mmol/L Final    Potassium 08/18/2022 4.0  3.5 - 5.1 mmol/L Final    Chloride 08/18/2022 103  97 - 108 mmol/L Final    CO2 08/18/2022 30  21 - 32 mmol/L Final    Anion gap 08/18/2022 4 (A) 5 - 15 mmol/L Final    Glucose 08/18/2022 85  65 - 100 mg/dL Final    BUN 08/18/2022 12  6 - 20 MG/DL Final    Creatinine 08/18/2022 0.69  0.55 - 1.02 MG/DL Final    BUN/Creatinine ratio 08/18/2022 17  12 - 20   Final    GFR est AA 08/18/2022 >60  >60 ml/min/1.73m2 Final    GFR est non-AA 08/18/2022 >60  >60 ml/min/1.73m2 Final    Estimated GFR is calculated using the IDMS-traceable Modification of Diet in Renal Disease (MDRD) Study equation, reported for both  Americans (GFRAA) and non- Americans (GFRNA), and normalized to 1.73m2 body surface area. The physician must decide which value applies to the patient.     Calcium 08/18/2022 10.2 (A) 8.5 - 10.1 MG/DL Final Bilirubin, total 08/18/2022 0.4  0.2 - 1.0 MG/DL Final    ALT (SGPT) 08/18/2022 96 (A) 12 - 78 U/L Final    AST (SGOT) 08/18/2022 60 (A) 15 - 37 U/L Final    Alk.  phosphatase 08/18/2022 260 (A) 45 - 117 U/L Final    Protein, total 08/18/2022 8.1  6.4 - 8.2 g/dL Final    Albumin 08/18/2022 3.5  3.5 - 5.0 g/dL Final    Globulin 08/18/2022 4.6 (A) 2.0 - 4.0 g/dL Final    A-G Ratio 08/18/2022 0.8 (A) 1.1 - 2.2   Final   Ancillary Procedure on 08/08/2022   Component Date Value Ref Range Status    IVSd 08/08/2022 0.8  0.6 - 0.9 cm Final    LVIDd 08/08/2022 4.8  3.9 - 5.3 cm Final    LVIDs 08/08/2022 2.6  cm Final    LVPWd 08/08/2022 0.9  0.6 - 0.9 cm Final    Global Longitudinal Strain 08/08/2022 -18.7  % Final    LVOT Cardiac Output 08/08/2022 4.3  liter/minute Final    LVOT Cardiac Output 08/08/2022 4.3  liter/minute Final    LVOT Cardiac Output 08/08/2022 5.0  liter/minute Final    LVOT Cardiac Output 08/08/2022 5.0  liter/minute Final    LVOT Cardiac Output 08/08/2022 4.6  liter/minute Final    LVOT Cardiac Output 08/08/2022 4.6  liter/minute Final    LV Ejection Fraction A2C 08/08/2022 57  % Final    LV Ejection Fraction A4C 08/08/2022 61  % Final    LVOT Peak Gradient 08/08/2022 7  mmHg Final    LVOT Peak Velocity 08/08/2022 1.3  m/s Final    LV IVRT 08/08/2022 85.6  ms Final    RVSP 08/08/2022 19  mmHg Final    RV Free Wall Peak S' 08/08/2022 17  cm/s Final    LA Diameter 08/08/2022 3.1  cm Final    LA Volume A/L 08/08/2022 36  mL Final    LA Volume 2C 08/08/2022 29  22 - 52 mL Final    LA Volume 4C 08/08/2022 39  22 - 52 mL Final    LA Volume BP 08/08/2022 34  22 - 52 mL Final    Est. RA Pressure 08/08/2022 3  mmHg Final    AV Peak Gradient 08/08/2022 7  mmHg Final    AV Peak Velocity 08/08/2022 1.3  m/s Final    MV \"A\" Wave Duration 08/08/2022 138.9  msec Final    MV A Velocity 08/08/2022 0.72  m/s Final    MV E Wave Deceleration Time 08/08/2022 186.5  ms Final    MV E Velocity 08/08/2022 0.63  m/s Final    LV E' Lateral Velocity 08/08/2022 10  cm/s Final    LV E' Septal Velocity 08/08/2022 8  cm/s Final    TAPSE 08/08/2022 2.2  1.7 cm Final    TR Peak Gradient 08/08/2022 16  mmHg Final    TR Max Velocity 08/08/2022 2.01  m/s Final    Ascending Aorta 08/08/2022 3.5  cm Final    Aortic Root 08/08/2022 3.3  cm Final    Fractional Shortening 2D 08/08/2022 46  28 - 44 % Final    LVIDd Index 08/08/2022 2.89  cm/m2 Final    LVIDs Index 08/08/2022 1.57  cm/m2 Final    LV RWT Ratio 08/08/2022 0.38   Final    LV Mass 2D 08/08/2022 137.1  67 - 162 g Final    LV Mass 2D Index 08/08/2022 82.6  43 - 95 g/m2 Final    MV E/A 08/08/2022 0.88   Final    E/E' Ratio (Averaged) 08/08/2022 7.09   Final    E/E' Lateral 08/08/2022 6.30   Final    E/E' Septal 08/08/2022 7.88   Final    LA Volume Index BP 08/08/2022 20  16 - 34 ml/m2 Final    LA Volume Index A/L 08/08/2022 22  16 - 34 mL/m2 Final    LA Volume Index 2C 08/08/2022 17  16 - 34 mL/m2 Final    LA Volume Index 4C 08/08/2022 23  16 - 34 mL/m2 Final    LA Size Index 08/08/2022 1.87  cm/m2 Final    LA/AO Root Ratio 08/08/2022 0.94   Final    Ao Root Index 08/08/2022 1.99  cm/m2 Final    Ascending Aorta Index 08/08/2022 2.11  cm/m2 Final    AV Velocity Ratio 08/08/2022 1.00   Final    EF BP 08/08/2022 59  55 - 100 % Final   Hospital Outpatient Visit on 07/28/2022   Component Date Value Ref Range Status    WBC 07/28/2022 4.2  3.6 - 11.0 K/uL Final    RBC 07/28/2022 3.48 (A) 3.80 - 5.20 M/uL Final    HGB 07/28/2022 10.7 (A) 11.5 - 16.0 g/dL Final    HCT 07/28/2022 31.9 (A) 35.0 - 47.0 % Final    MCV 07/28/2022 91.7  80.0 - 99.0 FL Final    MCH 07/28/2022 30.7  26.0 - 34.0 PG Final    MCHC 07/28/2022 33.5  30.0 - 36.5 g/dL Final    RDW 07/28/2022 13.2  11.5 - 14.5 % Final    PLATELET 87/37/5693 975  150 - 400 K/uL Final    MPV 07/28/2022 9.4  8.9 - 12.9 FL Final    NRBC 07/28/2022 0.0  0  WBC Final    ABSOLUTE NRBC 07/28/2022 0.00  0.00 - 0.01 K/uL Final    NEUTROPHILS 07/28/2022 50  32 - 75 % Final    LYMPHOCYTES 07/28/2022 32  12 - 49 % Final    MONOCYTES 07/28/2022 14 (A) 5 - 13 % Final    EOSINOPHILS 07/28/2022 4  0 - 7 % Final    BASOPHILS 07/28/2022 0  0 - 1 % Final    IMMATURE GRANULOCYTES 07/28/2022 0  0.0 - 0.5 % Final    ABS. NEUTROPHILS 07/28/2022 2.1  1.8 - 8.0 K/UL Final    ABS. LYMPHOCYTES 07/28/2022 1.3  0.8 - 3.5 K/UL Final    ABS. MONOCYTES 07/28/2022 0.6  0.0 - 1.0 K/UL Final    ABS. EOSINOPHILS 07/28/2022 0.2  0.0 - 0.4 K/UL Final    ABS. BASOPHILS 07/28/2022 0.0  0.0 - 0.1 K/UL Final    ABS. IMM. GRANS. 07/28/2022 0.0  0.00 - 0.04 K/UL Final    DF 07/28/2022 AUTOMATED    Final    Sodium 07/28/2022 135 (A) 136 - 145 mmol/L Final    Potassium 07/28/2022 3.7  3.5 - 5.1 mmol/L Final    Chloride 07/28/2022 101  97 - 108 mmol/L Final    CO2 07/28/2022 29  21 - 32 mmol/L Final    Anion gap 07/28/2022 5  5 - 15 mmol/L Final    Glucose 07/28/2022 92  65 - 100 mg/dL Final    BUN 07/28/2022 14  6 - 20 MG/DL Final    Creatinine 07/28/2022 0.65  0.55 - 1.02 MG/DL Final    BUN/Creatinine ratio 07/28/2022 22 (A) 12 - 20   Final    GFR est AA 07/28/2022 >60  >60 ml/min/1.73m2 Final    GFR est non-AA 07/28/2022 >60  >60 ml/min/1.73m2 Final    Estimated GFR is calculated using the IDMS-traceable Modification of Diet in Renal Disease (MDRD) Study equation, reported for both  Americans (GFRAA) and non- Americans (GFRNA), and normalized to 1.73m2 body surface area. The physician must decide which value applies to the patient. Calcium 07/28/2022 10.2 (A) 8.5 - 10.1 MG/DL Final    Bilirubin, total 07/28/2022 0.5  0.2 - 1.0 MG/DL Final    ALT (SGPT) 07/28/2022 177 (A) 12 - 78 U/L Final    AST (SGOT) 07/28/2022 116 (A) 15 - 37 U/L Final    Alk.  phosphatase 07/28/2022 294 (A) 45 - 117 U/L Final    Protein, total 07/28/2022 8.0  6.4 - 8.2 g/dL Final    Albumin 07/28/2022 3.5  3.5 - 5.0 g/dL Final    Globulin 07/28/2022 4.5 (A) 2.0 - 4.0 g/dL Final    A-G Ratio 07/28/2022 0.8 (A) 1.1 - 2.2 Final   Hospital Outpatient Visit on 07/07/2022   Component Date Value Ref Range Status    WBC 07/07/2022 4.1  3.6 - 11.0 K/uL Final    RBC 07/07/2022 3.59 (A) 3.80 - 5.20 M/uL Final    HGB 07/07/2022 10.8 (A) 11.5 - 16.0 g/dL Final    HCT 07/07/2022 33.0 (A) 35.0 - 47.0 % Final    MCV 07/07/2022 91.9  80.0 - 99.0 FL Final    MCH 07/07/2022 30.1  26.0 - 34.0 PG Final    MCHC 07/07/2022 32.7  30.0 - 36.5 g/dL Final    RDW 07/07/2022 13.1  11.5 - 14.5 % Final    PLATELET 18/88/0624 632  150 - 400 K/uL Final    MPV 07/07/2022 9.0  8.9 - 12.9 FL Final    NRBC 07/07/2022 0.0  0  WBC Final    ABSOLUTE NRBC 07/07/2022 0.00  0.00 - 0.01 K/uL Final    NEUTROPHILS 07/07/2022 50  32 - 75 % Final    LYMPHOCYTES 07/07/2022 35  12 - 49 % Final    MONOCYTES 07/07/2022 12  5 - 13 % Final    EOSINOPHILS 07/07/2022 3  0 - 7 % Final    BASOPHILS 07/07/2022 0  0 - 1 % Final    IMMATURE GRANULOCYTES 07/07/2022 0  0.0 - 0.5 % Final    ABS. NEUTROPHILS 07/07/2022 2.0  1.8 - 8.0 K/UL Final    ABS. LYMPHOCYTES 07/07/2022 1.5  0.8 - 3.5 K/UL Final    ABS. MONOCYTES 07/07/2022 0.5  0.0 - 1.0 K/UL Final    ABS. EOSINOPHILS 07/07/2022 0.1  0.0 - 0.4 K/UL Final    ABS. BASOPHILS 07/07/2022 0.0  0.0 - 0.1 K/UL Final    ABS. IMM.  GRANS. 07/07/2022 0.0  0.00 - 0.04 K/UL Final    DF 07/07/2022 AUTOMATED    Final    Sodium 07/07/2022 136  136 - 145 mmol/L Final    Potassium 07/07/2022 3.7  3.5 - 5.1 mmol/L Final    Chloride 07/07/2022 102  97 - 108 mmol/L Final    CO2 07/07/2022 31  21 - 32 mmol/L Final    Anion gap 07/07/2022 3 (A) 5 - 15 mmol/L Final    Glucose 07/07/2022 99  65 - 100 mg/dL Final    BUN 07/07/2022 14  6 - 20 MG/DL Final    Creatinine 07/07/2022 0.69  0.55 - 1.02 MG/DL Final    BUN/Creatinine ratio 07/07/2022 20  12 - 20   Final    GFR est AA 07/07/2022 >60  >60 ml/min/1.73m2 Final    GFR est non-AA 07/07/2022 >60  >60 ml/min/1.73m2 Final    Estimated GFR is calculated using the IDMS-traceable Modification of Diet in Renal Disease (MDRD) Study equation, reported for both  Americans (GFRAA) and non- Americans (GFRNA), and normalized to 1.73m2 body surface area. The physician must decide which value applies to the patient. Calcium 07/07/2022 9.9  8.5 - 10.1 MG/DL Final    Bilirubin, total 07/07/2022 0.5  0.2 - 1.0 MG/DL Final    ALT (SGPT) 07/07/2022 122 (A) 12 - 78 U/L Final    AST (SGOT) 07/07/2022 85 (A) 15 - 37 U/L Final    Alk.  phosphatase 07/07/2022 336 (A) 45 - 117 U/L Final    Protein, total 07/07/2022 8.0  6.4 - 8.2 g/dL Final    Albumin 07/07/2022 3.5  3.5 - 5.0 g/dL Final    Globulin 07/07/2022 4.5 (A) 2.0 - 4.0 g/dL Final    A-G Ratio 07/07/2022 0.8 (A) 1.1 - 2.2   Final   Ancillary Procedure on 06/08/2022   Component Date Value Ref Range Status    IVSd 06/08/2022 0.9  0.6 - 0.9 cm Final    LVIDd 06/08/2022 4.2  3.9 - 5.3 cm Final    LVIDs 06/08/2022 3.2  cm Final    LVPWd 06/08/2022 1.0 (A) 0.6 - 0.9 cm Final    Global Longitudinal Strain 06/08/2022 -17.1  % Final    EF BP 06/08/2022 56  55 - 100 % Final    LV Ejection Fraction A2C 06/08/2022 58  % Final    LV Ejection Fraction A4C 06/08/2022 56  % Final    LV EDV A2C 06/08/2022 46  mL Final    LV EDV A4C 06/08/2022 62  mL Final    LV EDV BP 06/08/2022 53 (A) 56 - 104 mL Final    LV ESV A2C 06/08/2022 19  mL Final    LV ESV A4C 06/08/2022 27  mL Final    LV ESV BP 06/08/2022 23  19 - 49 mL Final    LA Diameter 06/08/2022 2.9  cm Final    LA Volume A/L 06/08/2022 40  mL Final    LA Volume 2C 06/08/2022 32  22 - 52 mL Final    LA Volume 4C 06/08/2022 35  22 - 52 mL Final    LA Volume BP 06/08/2022 36  22 - 52 mL Final    Aortic Root 06/08/2022 3.2  cm Final    Fractional Shortening 2D 06/08/2022 24  28 - 44 % Final    LV ESV Index BP 06/08/2022 14  mL/m2 Final    LV EDV Index BP 06/08/2022 32  mL/m2 Final    LV ESV Index A4C 06/08/2022 16  mL/m2 Final    LV EDV Index A4C 06/08/2022 37  mL/m2 Final    LV ESV Index A2C 06/08/2022 11  mL/m2 Final    LV EDV Index A2C 06/08/2022 27  mL/m2 Final    LVIDd Index 06/08/2022 2.50  cm/m2 Final    LVIDs Index 06/08/2022 1.90  cm/m2 Final    LV RWT Ratio 06/08/2022 0.48   Final    LV Mass 2D 06/08/2022 127.8  67 - 162 g Final    LV Mass 2D Index 06/08/2022 76.1  43 - 95 g/m2 Final    LA Volume Index BP 06/08/2022 21  16 - 34 ml/m2 Final    LA Volume Index A/L 06/08/2022 24  16 - 34 mL/m2 Final    LA Volume Index 2C 06/08/2022 19  16 - 34 mL/m2 Final    LA Volume Index 4C 06/08/2022 21  16 - 34 mL/m2 Final    LA Size Index 06/08/2022 1.73  cm/m2 Final    LA/AO Root Ratio 06/08/2022 0.91   Final    Ao Root Index 06/08/2022 1.90  cm/m2 Final      ECHO ADULT COMPLETE  Formatting of this result is different from the original.      Left Ventricle: Normal left ventricular systolic function. EF by 2D   Simpsons Biplane is 59%. Left ventricle size is normal. Normal wall   thickness. Normal wall motion. Global longitudinal strain is -18.7%,   06/08/2022 -17.1%, 03/11/2022 -22.3%, 12/16/2021 -20.3%, 10/21/2021   -15.4%. .    Mitral Valve: Mild regurgitation. Tricuspid Valve: Mild regurgitation. Pulmonic Valve: Mild regurgitation. Current Outpatient Medications   Medication Sig Dispense Refill    fish oil-omega-3 fatty acids (Fish OiL) 340-1,000 mg capsule Take 1 Capsule by mouth daily. multivitamin (ONE A DAY) tablet Take 1 Tablet by mouth daily. The past medical history, past surgical history, and family history were reviewed and updated in the medical record. Lab values/Imaging were reviewed. The medications were reviewed and updated in the medical record. Immunizations were reviewed and updated in the medical record. All relevant preventative screenings reviewed and updated in the medical record. REVIEW OF SYSTEMS   Review of Systems   Constitutional:  Negative for chills, fever and malaise/fatigue. Respiratory:  Negative for cough, shortness of breath and wheezing.     Cardiovascular:  Negative for chest pain, palpitations and leg swelling. Gastrointestinal:  Negative for abdominal pain, constipation, diarrhea, heartburn, nausea and vomiting. Musculoskeletal:  Positive for back pain and joint pain. Negative for myalgias and neck pain. Neurological:  Positive for sensory change. Negative for headaches. Psychiatric/Behavioral:  Negative for depression and suicidal ideas. PHYSICAL EXAM   /88 (BP 1 Location: Right arm, BP Patient Position: Sitting, BP Cuff Size: Adult)   Pulse 80   Temp 98 °F (36.7 °C) (Temporal)   Resp 16   Ht 5' 6\" (1.676 m)   Wt 130 lb (59 kg)   SpO2 98%   BMI 20.98 kg/m²      Physical Exam  Constitutional:       General: She is not in acute distress. Appearance: She is normal weight. She is not toxic-appearing. Cardiovascular:      Rate and Rhythm: Normal rate and regular rhythm. Pulses: Normal pulses. Heart sounds: Normal heart sounds. Pulmonary:      Effort: Pulmonary effort is normal.      Breath sounds: Normal breath sounds. Musculoskeletal:      Right shoulder: Tenderness present. No swelling or crepitus. Decreased range of motion. Right lower leg: No edema. Left lower leg: No edema. Skin:     General: Skin is warm and dry. Neurological:      Mental Status: She is alert and oriented to person, place, and time. Sensory: Sensory deficit present. Motor: No weakness. Gait: Gait normal.      Comments: Decreased sensation to feet bilaterally. LLE decreased sensation  to ankle area. Psychiatric:         Mood and Affect: Mood normal.         Behavior: Behavior normal.         Thought Content: Thought content normal.         Judgment: Judgment normal.          ASSESSMENT/ PLAN   Below is the assessment and plan developed based on review of pertinent history, physical exam, labs, studies, and medications.       1. Malignant neoplasm of left breast in female, estrogen receptor negative, unspecified site of breast ([facility]  - Per Oncologist, Dr Angelika Wolff  - Receiving HP therapy, two more treatments scheduled. 2. Chronic right shoulder pain  -     Ice, heat as needed. She would like to schedule PT to start after HP completed. Advised to continue ROM exercises she has been doing at home.   - REFERRAL TO PHYSICAL THERAPY  -     diclofenac (VOLTAREN) 1 % gel; Apply  to affected area four (4) times daily. , Normal, Disp-1 Each, R-1  3. Chemotherapy induced neuropathy  - I prescribed Cymbalta 30mg to be taken daily for 30 days. Potential side effects were discussed. - Asked patient to let me know how she is tolerating this in 1 week, can discuss increasing dose at that time if needed. 4. Elevated LFTs  - Trending in the right direction. Advised to continue to not take Alpha Lipoic Acid. - CMP, CBC ordered by Oncologist team with HP therapy treatment. Will follow. 5. Post-menopausal  - Patient states she is in the process of scheduling Pap smear, annual exam with OB-GYN Dr Megan Arnett.  6. Need for hepatitis C screening test  -     HEPATITIS C AB; Future  7. Encounter to establish care  -     LIPID PANEL; Future  -     THYROID CASCADE PROFILE; Future  -     MICROALBUMIN, UR, RAND W/ MICROALB/CREAT RATIO; Future             Disclaimer:  Advised patient to call back or return to office if symptoms worsen/change/persist.  Discussed expected course/resolution/complications of diagnosis in detail with patient. Medication risks/benefits/alternatives discussed with patient. Patient was given an after visit summary which includes diagnoses, current medications, & vitals. Discussed patient instructions and advised to read to all patient instructions regarding care. Patient expressed understanding with the diagnosis and plan.        Avinash Alvarado NP  8/29/2022

## 2022-08-29 NOTE — PROGRESS NOTES
Chief Complaint   Patient presents with    Establish Care    Shoulder Pain     Started in the last two months with radiation-         Health Maintenance Due   Topic    Hepatitis C Screening     Pneumococcal 0-64 years (1 - PCV)    DTaP/Tdap/Td series (1 - Tdap)    Lipid Screen     COVID-19 Vaccine (3 - Booster for Pfizer series)        1. Have you been to the ER, urgent care clinic since your last visit? Hospitalized since your last visit? No    2. Have you seen or consulted any other health care providers outside of the [address] [place] [address] since your last visit? Include any pap smears or colon screening. No    There were no vitals taken for this visit.

## 2022-09-01 RX ORDER — SODIUM CHLORIDE 0.9 % (FLUSH) 0.9 %
10 SYRINGE (ML) INJECTION AS NEEDED
Status: CANCELLED | OUTPATIENT
Start: 2022-09-08

## 2022-09-01 RX ORDER — ONDANSETRON 2 MG/ML
8 INJECTION INTRAMUSCULAR; INTRAVENOUS AS NEEDED
Status: CANCELLED | OUTPATIENT
Start: 2022-09-08

## 2022-09-01 RX ORDER — ACETAMINOPHEN 325 MG/1
650 TABLET ORAL
Status: CANCELLED | OUTPATIENT
Start: 2022-09-08

## 2022-09-01 RX ORDER — DIPHENHYDRAMINE HYDROCHLORIDE 50 MG/ML
50 INJECTION, SOLUTION INTRAMUSCULAR; INTRAVENOUS
Status: CANCELLED | OUTPATIENT
Start: 2022-09-08

## 2022-09-01 RX ORDER — SODIUM CHLORIDE 9 MG/ML
25 INJECTION, SOLUTION INTRAVENOUS CONTINUOUS
Status: CANCELLED | OUTPATIENT
Start: 2022-09-08

## 2022-09-01 RX ORDER — HYDROCORTISONE SODIUM SUCCINATE 100 MG/2ML
100 INJECTION, POWDER, FOR SOLUTION INTRAMUSCULAR; INTRAVENOUS AS NEEDED
Status: CANCELLED | OUTPATIENT
Start: 2022-09-08

## 2022-09-01 RX ORDER — HEPARIN 100 UNIT/ML
300-500 SYRINGE INTRAVENOUS AS NEEDED
Status: CANCELLED
Start: 2022-09-08

## 2022-09-01 RX ORDER — DIPHENHYDRAMINE HYDROCHLORIDE 50 MG/ML
50 INJECTION, SOLUTION INTRAMUSCULAR; INTRAVENOUS AS NEEDED
Status: CANCELLED
Start: 2022-09-08

## 2022-09-01 RX ORDER — SODIUM CHLORIDE 9 MG/ML
10 INJECTION INTRAMUSCULAR; INTRAVENOUS; SUBCUTANEOUS AS NEEDED
Status: CANCELLED | OUTPATIENT
Start: 2022-09-08

## 2022-09-01 RX ORDER — ACETAMINOPHEN 325 MG/1
650 TABLET ORAL AS NEEDED
Status: CANCELLED
Start: 2022-09-08

## 2022-09-01 RX ORDER — EPINEPHRINE 1 MG/ML
0.3 INJECTION, SOLUTION, CONCENTRATE INTRAVENOUS AS NEEDED
Status: CANCELLED | OUTPATIENT
Start: 2022-09-08

## 2022-09-01 RX ORDER — DIPHENHYDRAMINE HYDROCHLORIDE 50 MG/ML
25 INJECTION, SOLUTION INTRAMUSCULAR; INTRAVENOUS AS NEEDED
Status: CANCELLED
Start: 2022-09-08

## 2022-09-01 RX ORDER — ALBUTEROL SULFATE 0.83 MG/ML
2.5 SOLUTION RESPIRATORY (INHALATION) AS NEEDED
Status: CANCELLED
Start: 2022-09-08

## 2022-09-08 ENCOUNTER — OFFICE VISIT (OUTPATIENT)
Dept: ONCOLOGY | Age: 57
End: 2022-09-08
Payer: COMMERCIAL

## 2022-09-08 ENCOUNTER — HOSPITAL ENCOUNTER (OUTPATIENT)
Dept: INFUSION THERAPY | Age: 57
Discharge: HOME | End: 2022-09-08
Payer: COMMERCIAL

## 2022-09-08 VITALS
DIASTOLIC BLOOD PRESSURE: 76 MMHG | TEMPERATURE: 97.9 F | HEART RATE: 72 BPM | SYSTOLIC BLOOD PRESSURE: 110 MMHG | RESPIRATION RATE: 18 BRPM

## 2022-09-08 VITALS
BODY MASS INDEX: 20.65 KG/M2 | DIASTOLIC BLOOD PRESSURE: 76 MMHG | OXYGEN SATURATION: 98 % | HEART RATE: 72 BPM | WEIGHT: 128.5 LBS | HEIGHT: 66 IN | TEMPERATURE: 97.9 F | SYSTOLIC BLOOD PRESSURE: 110 MMHG

## 2022-09-08 DIAGNOSIS — C50.912 MALIGNANT NEOPLASM OF LEFT BREAST IN FEMALE, ESTROGEN RECEPTOR NEGATIVE, UNSPECIFIED SITE OF BREAST (HCC): Primary | ICD-10-CM

## 2022-09-08 DIAGNOSIS — Z17.1 MALIGNANT NEOPLASM OF LEFT BREAST IN FEMALE, ESTROGEN RECEPTOR NEGATIVE, UNSPECIFIED SITE OF BREAST (HCC): Primary | ICD-10-CM

## 2022-09-08 DIAGNOSIS — Z79.899 ENCOUNTER FOR MONITORING CARDIOTOXIC DRUG THERAPY: ICD-10-CM

## 2022-09-08 DIAGNOSIS — Z76.89 ENCOUNTER TO ESTABLISH CARE: ICD-10-CM

## 2022-09-08 DIAGNOSIS — Z11.59 NEED FOR HEPATITIS C SCREENING TEST: ICD-10-CM

## 2022-09-08 DIAGNOSIS — Z51.81 ENCOUNTER FOR MONITORING CARDIOTOXIC DRUG THERAPY: ICD-10-CM

## 2022-09-08 DIAGNOSIS — Z78.0 POST-MENOPAUSAL: ICD-10-CM

## 2022-09-08 DIAGNOSIS — Z98.890 S/P LUMPECTOMY, LEFT BREAST: ICD-10-CM

## 2022-09-08 LAB
CHOLEST SERPL-MCNC: 205 MG/DL
HCV AB SERPL QL IA: NONREACTIVE
HDLC SERPL-MCNC: 78 MG/DL
HDLC SERPL: 2.6 {RATIO} (ref 0–5)
LDLC SERPL CALC-MCNC: 104.2 MG/DL (ref 0–100)
TRIGL SERPL-MCNC: 114 MG/DL (ref ?–150)
VLDLC SERPL CALC-MCNC: 22.8 MG/DL

## 2022-09-08 PROCEDURE — 96402 CHEMO HORMON ANTINEOPL SQ/IM: CPT

## 2022-09-08 PROCEDURE — 86803 HEPATITIS C AB TEST: CPT

## 2022-09-08 PROCEDURE — 99214 OFFICE O/P EST MOD 30 MIN: CPT | Performed by: INTERNAL MEDICINE

## 2022-09-08 PROCEDURE — 74011250636 HC RX REV CODE- 250/636: Performed by: INTERNAL MEDICINE

## 2022-09-08 PROCEDURE — 84443 ASSAY THYROID STIM HORMONE: CPT

## 2022-09-08 PROCEDURE — 36415 COLL VENOUS BLD VENIPUNCTURE: CPT

## 2022-09-08 PROCEDURE — 80061 LIPID PANEL: CPT

## 2022-09-08 RX ADMIN — PERTUZUMAB, TRASTUZUMAB, AND HYALURONIDASE-ZZXF 10 ML: 600; 600; 2000 INJECTION, SOLUTION SUBCUTANEOUS at 10:09

## 2022-09-08 NOTE — PROGRESS NOTES
[facility] at [address] [place], [id number] [place] [address], [address]: [phone]  F: [phone]    Reason for Visit:   Gera Garcia is a 61 y.o. female seen today in office for follow up of Left Breast Cancer. Treatment History:   LEFT Breast Biopsy 10/6/21: PATH - Invasive ductal carcinoma, favor grade 3  ER/[place] negative, HER2+  Breast MRI 10/13/21: RIGHT BREAST: Background parenchymal enhancement: Mild. There is no suspicious masslike or nonmasslike enhancement within the right breast to indicate breast carcinoma. There are no suspicious internal mammary or axillary chain lymph nodes. LEFT BREAST: Background parenchymal enhancement: Mild There is a rounded mass with central fluid attenuation/necrosis in the lateral aspect of the left breast, deep 3rd, measuring approximately 3.3 cm in diameter with enhancement extending along the adjacent dermis suggesting skin invasion. There is nonmasslike enhancement extending both inferior, medial and anterior to the primary mass over approximately 6 x 5.5 x 4 cm. There are discontinuous areas of fluid attenuation associated with the nonmasslike enhancement which may represent cystic spaces and/or necrosis. There is an enlarged, left axillary lymph node with other adjacent, prominent lymph nodes. There is a lymph node node deep to the pectoralis minor muscle which measures 1.1 x 0.7 cm  Left Axillary LN Biopsy 10/19/21: PATH - Metastatic breast cancer, measuring up to 17 mm in a single core  ER/[place]J negative, HER2+  Neoadjuvant TCHP x 6 cycles from 10/21/21 - 2/10/22  DR Taxotere to 60 mg/m2 and Carbo to AUC 4 with Cycle 6 due to side effects  Breast Biopsy 10/27/21: PATH - 5 mm IDC with DCIS  Breast MRI 2/21/22: Right Breast: No suspicious enhancing foci. No axillary or internal mammary chain lymphadenopathy.  A subclavian ported catheter, implanted in the posterior third of the upper inner quadrant, terminates in appropriate position Left a message for the patient to return the call to the clinic. Clinic number provided.      in the SVC. Left Breast: No residual enhancement in the mass in the posterior third of the outer slightly upper left breast. The mass is no longer measurable. There is a biopsy clip within scarring. No residual enhancement in the previously seen, extensive, non-masslike enhancement in the middle third of the central, lower, left breast. A biopsy clip remains. Axillary flori metastases have significantly decreased in size and are no longer pathologically enlarged. A biopsy clip is within an inferior axillary lymph node  Maintenance HP (Phesgo) 3/3/22 - Current   Left Lumpectomy 4/21/22: pCR, 0/4 LNs  XRT 6/16/22 - 8/3/22 with Dr. Eladio Lew: Clinical 2 ER/DE negative Her2+    History of Present Illness:   Joe Donahue is a 61 y.o. female seen today in office for follow up of left breast cancer ER/DE negative Her2 +. She finished 6 cycles of neoadjuvant TCHP on 2/10/22. She started maintenance HP (Phesgo) on 3/3/22. She had a left lumpectomy on 4/21/22 and had path CR. She started XRT on 6/16/22 and finished on 8/3/22. She is here today for Cycle 16 (tenth dose of maintenance subQ HP). She reports that she feels well overall today. She states that taste buds are still impaired and hair is taking a long time to grow back. She is tolerating HP well overall thus far without significant side effects. Her appetite is good and energy levels are a little low overall. She still has some mild neuropathy in toes on bilateral feet that is a little better since starting on Cymbalta per PCP. She denies fever, chills, mouth sores, cough, SOB, CP, nausea, vomiting, diarrhea, and constipation. She denies pain today. She is here alone today.      Past Medical History:   Diagnosis Date    Malignant neoplasm of left breast in female, estrogen receptor negative ([facility] [address] 10/13/2021      Past Surgical History:   Procedure Laterality Date    HX BREAST LUMPECTOMY Left 4/21/2022    LEFT BREAST REDUCTION LUMPECTOMY WITH BRACKETED MAG SEED LOCALIZATION, LEFT BREAST SENTINEL NODE BIOPSY LEFT BREAST ONCOPLASTIC REDUCTION WITH RIGHT BREAST REDUCTION FOR SYMMETRY AND PORT REMOVAL performed by Eleanor Tafoya MD at [place] [id number]    HX BREAST REDUCTION Bilateral 4/21/2022    . performed by Maurice Oconnell MD at [facility]    HX VASCULAR ACCESS Right 10/2021    PORT INSERTION    ID BREAST SURGERY PROCEDURE UNLISTED Left 10/06/21    Biopsy      Social History     Tobacco Use    Smoking status: Never    Smokeless tobacco: Never   Substance Use Topics    Alcohol use: Not Currently     Alcohol/week: 0.0 standard drinks      Family History   Problem Relation Age of Onset    Cancer Mother         BREAST    Dementia Mother     Heart Disease Father     Anesth Problems Neg Hx      Current Outpatient Medications   Medication Sig    fish oil-omega-3 fatty acids 340-1,000 mg capsule Take 1 Capsule by mouth daily. multivitamin (ONE A DAY) tablet Take 1 Tablet by mouth daily. diclofenac (VOLTAREN) 1 % gel Apply  to affected area four (4) times daily. DULoxetine (CYMBALTA) 30 mg capsule Take 1 Capsule by mouth daily. No current facility-administered medications for this visit. No Known Allergies     Review of Systems:  A complete review of systems was obtained, negative except as described above and as reported on ROS sheet scanned into system. Physical Exam:     Visit Vitals  /76 (BP 1 Location: Left upper arm, BP Patient Position: Sitting)   Pulse 72   Temp 97.9 °F (36.6 °C) (Temporal)   Ht 5' 6\" (1.676 m)   Wt 128 lb 8 oz (58.3 kg)   SpO2 98%   BMI 20.74 kg/m²     ECOG PS: 0  General: No distress  Eyes: Anicteric sclerae  HENT: Atraumatic, wearing a mask  Neck: Supple  Respiratory: CTAB, normal respiratory effort  CV: Normal rate, regular rhythm, no murmurs. GI: Soft, nontender, non-distended   MS: Normal gait and station. Skin: No rashes, ecchymoses, or petechiae. Normal temperature, turgor, and texture.    Psych: Alert, oriented, appropriate affect, normal judgment/insight  Neuro: Non focal    Results:     Lab Results   Component Value Date/Time    WBC 4.7 08/18/2022 07:53 AM    HGB 11.5 08/18/2022 07:53 AM    HCT 33.4 (L) 08/18/2022 07:53 AM    PLATELET 200 22/81/1474 07:53 AM    MCV 91.0 08/18/2022 07:53 AM    ABS. NEUTROPHILS 2.7 08/18/2022 07:53 AM     Lab Results   Component Value Date/Time    Sodium 137 08/18/2022 07:53 AM    Potassium 4.0 08/18/2022 07:53 AM    Chloride 103 08/18/2022 07:53 AM    CO2 30 08/18/2022 07:53 AM    Glucose 85 08/18/2022 07:53 AM    BUN 12 08/18/2022 07:53 AM    Creatinine 0.69 08/18/2022 07:53 AM    GFR est AA >60 08/18/2022 07:53 AM    GFR est non-AA >60 08/18/2022 07:53 AM    Calcium 10.2 (H) 08/18/2022 07:53 AM     Lab Results   Component Value Date/Time    Bilirubin, total 0.4 08/18/2022 07:53 AM    ALT (SGPT) 96 (H) 08/18/2022 07:53 AM    Alk. phosphatase 260 (H) 08/18/2022 07:53 AM    Protein, total 8.1 08/18/2022 07:53 AM    Albumin 3.5 08/18/2022 07:53 AM    Globulin 4.6 (H) 08/18/2022 07:53 AM     10/6/21  FINAL PATHOLOGIC DIAGNOSIS   Breast, left mass, core biopsy:   Invasive ductal carcinoma, favor grade 3 (see synoptic table)   INVASIVE CARCINOMA OF THE BREAST: Biopsy   TUMOR   Tumor Site: Clock position - 3 o'clock   Histologic Type: Invasive carcinoma of no special type (ductal)   Glandular (Acinar) / Tubular Differentiation: Score 3   Nuclear Pleomorphism: Score 3   Mitotic Rate: Score 3   Overall Grade: Grade 3 (scores of 8 or 9)   Tumor Size: 13 mm   Ductal Carcinoma In Situ (DCIS): Not identified   Lymphovascular Invasion: Not identified   Microcalcifications: Not identified     Breast MRI 10/13/21  IMPRESSION:  RIGHT BREAST:  1. BI-RADS Category 1 - Negative. Crystal Isabelle LEFT BREAST:  1. Rounded mass with dermal extension measuring approximately 3.3 cm in diameter  in the lateral, deep 3rd of the left breast. There are areas of fluid/necrosis  within this mass.   BI-RADS Category 6 - Known biopsy-proven malignancy. 2. Nonmasslike enhancement in the left breast which is inferior, medial and  anterior to the primary mass. This nonmasslike enhancement extends over  approximately 6 x 5.5 x 4 cm. There are areas of fluid attenuation which could  represent cysts and/or necrosis within this nonmasslike enhancement. BI-RADS Category 5 - Highly suggestive of malignancy. 3. There is axillary adenopathy, including a lymph node deep to pectoralis  minor. BI-RADS Category 5 - Highly suggestive of malignancy    10/19/21  FINAL PATHOLOGIC DIAGNOSIS   Lymph node, left axillary, core biopsy:   Metastatic breast cancer, measuring up to 17 mm in a single core     10/21/21    ECHO ADULT COMPLETE 10/21/2021 10/21/2021    Interpretation Summary  · LV: Estimated LVEF is 55 - 60%. Normal cavity size, wall thickness and systolic function (ejection fraction normal). Wall motion: normal. Abnormal left ventricular strain. Global longitudinal strain is -15.4%. · TV: Pulmonary hypertension not suggested by Doppler findings. 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 No residual enhancement in multicentric, multifocal left breast carcinoma. 2. Significantly decreased left axillary flori metastases. Left axillary lymph  nodes are no longer pathologically enlarged. 3. No suspicious right breast enhancement or lymphadenopathy. 4. BI-RADS Assessment Category 6: Known biopsy proven malignancy. 03/11/22    ECHO ADULT FOLLOW-UP OR LIMITED 03/15/2022 3/15/2022    Interpretation Summary    Left Ventricle: Left ventricle size is normal. Increased wall thickness. Normal wall motion.  Normal left ventricular systolic function. EF by 2D Simpsons Biplane is 60%. Global longitudinal strain is normal with a value of -22.3%. Normal diastolic function. Signed by: Alissa Bravo MD on 3/15/2022 10:36 AM    4/21/22  FINAL PATHOLOGIC DIAGNOSIS   1. Left breast skin, excision:   Benign skin   2. Left axillary sentinel node #1, excision:   One benign lymph node with scar, biopsy site and clip (0/1)   3. Left axillary sentinel node #2, excision:   One benign lymph node without scar (0/1)   4. Left axillary sentinel node #3, excision:   Two benign lymph nodes without scar (0/2)   5. Left breast, lumpectomy:   No residual invasive or in situ carcinoma status post neoadjuvant therapy (ypT0)   Two fibrocalcific tumor beds with biopsy sites   6. Left breast skin #2, excision:   Benign skin and subcutis   7. Right breast skin and tissue, 21 g, excision:   Benign mammary tissue and skin     06/08/22    ECHO ADULT FOLLOW-UP OR LIMITED 06/10/2022 6/10/2022    Interpretation Summary    Left Ventricle: Normal left ventricular systolic function. EF by 2D Simpsons Biplane is 56%. Left ventricle size is normal. Normal wall thickness. Normal wall motion. Global longitudinal strain is normal. GLS today: -17.1%, 3/11/2022 -22.3%, 12/6/2021 -20.3%, 10/21/2021 -15.4%. Signed by: Alissa Bravo MD on 6/10/2022 12:51 PM    08/08/22    ECHO ADULT COMPLETE 08/17/2022 8/17/2022    Interpretation Summary  Formatting of this result is different from the original.      Left Ventricle: Normal left ventricular systolic function. EF by 2D Simpsons Biplane is 59%. Left ventricle size is normal. Normal wall thickness. Normal wall motion. Global longitudinal strain is -18.7%, 06/08/2022 -17.1%, 03/11/2022 -22.3%, 12/16/2021 -20.3%, 10/21/2021 -15.4%. .    Mitral Valve: Mild regurgitation. Tricuspid Valve: Mild regurgitation. Pulmonic Valve: Mild regurgitation.     Signed by: Ted Mauro MD on 8/17/2022 11:09 PM    CT C/A/P 8/9/22  IMPRESSION:  Soft tissue mass in the left chest wall just anterior to the pectoralis muscle  is nonspecific, was not present on the breast MR performed in February   Otherwise, there is no evidence of mass lesion or metastatic foci is in the  chest, abdomen or pelvis. No acute intraperitoneal/intrathoracic process is identified. Records reviewed and summarized above. Pathology report(s) reviewed above. Radiology report(s) reviewed above. Assessment:/PLAN     1) Clinical Stage 2B T3N0 LEFT Breast Cancer ER/AK negative Her2+ post Lumpectomy 4/21/22 with pCR  MRI Breast 10/13/21 showed a rounded mass with central fluid attenuation/necrosis in the lateral aspect of the left breast, deep 3rd, measuring approximately 3.3 cm in diameter with enhancement extending along the adjacent dermis suggesting skin invasion. There is nonmasslike enhancement extending both inferior, medial and anterior to the primary mass over approximately 6 x 5.5 x 4 cm. There are discontinuous areas of fluid attenuation associated with the nonmasslike enhancement which may represent cystic spaces and/or necrosis. There is an enlarged, left axillary lymph node with other adjacent, prominent lymph nodes. There is a lymph node node deep to the pectoralis minor muscle which measures 1.1 x 0.7 cm  She had a left axillary LN biopsy on 10/19/21 and path showed metastatic beast cancer. Pre chemo ECHO showed EF 55-60% with abnormal left ventricular strain. Referred to Cardiology for further evaluation - has follow up ECHO on 12/6/21 per Cardio. Patient started neoadjuvant TCHP chemotherapy on 10/25/21. She completed 6 cycles of TCHP on 2/10/22. DR Taxotere to 60 mg/m2 and Carbo to AUC 4 with Cycle 6 due to side effects.    She had a post chemo Breast MRI on 2/21/22 that showed no residual enhancement in multicentric, multifocal left breast carcinoma, significantly decreased left axillary flori metastases, left axillary lymph nodes are no longer pathologically enlarged, and no suspicious right breast enhancement or lymphadenopathy. Personally reviewed MRI. Discussed MRI with patient. She started maintenance HP on 3/3/22. She had a follow up ECHO on 3/11/22 per Cardio and EF was 60%. She had a left lumpectomy on 4/21/22 and had pCR. Discussed path with patient. She had a follow up ECHO on 6/8/22 that was good with EF 56%. Patient is here today for Cycle 16 (tenth cycle of maintenance HP). She started XRT on 6/16/22 and finished on 8/3/22 with Dr Amara Menjivar. She had follow up CTs C/A/P on 8/9/22 that showed a soft tissue mass in the left chest wall just anterior to the pectoralis muscle is nonspecific. She saw Breast Surgeon and had US in office that showed post op scar tissue with no worrisome findings. She is tolerating HP well overall without significant side effects. No adjuvant hormonal therapy as ER/CA negative. She is clinically healthy overall and stable today. 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 Labs (CBC and CMP) reviewed today. Continue maintenance Herceptin+Perjeta. Follow up in 3 weeks in [facility]/office. Patient agrees with plan. 2) Postmenopausal  Continue routine GYN follow up. 3) Elevated LFTs  Unclear etiology - no recent ETOH or Tylenol use. She had follow up CTS on 8/9/22 that showed a few scattered hypodensities in the hepatic parenchyma far too small to characterize, otherwise negative in A/P.    4) Management of High Risk Medications - Chemotherapy  Toxicities from chemo included Grade 1 Nausea,  Grade 1 Neuropathy, Grade 1 Fatigue, Grade 1 Decreased Appetite. DR Taxotere to 60 mg/m2 and Carbo to AUC 4 with Cycle 6. Chemo side effects improving. Pre chemo ECHO from 10/21/21 reviewed - EF 55-60% with abnormal left ventricular strain. Referred to Cardiology for further evaluation/management of strain. She had a follow up ECHO on 12/6/21 per Cardiology - EF 58% with no strain.   She had a follow up ECHO on 3/11/22 per Cardiology - EF 60%, GLS normal.   She had a follow up ECHO on 6/8/22 per Cardiology - EF 56%, GLS normal.   She had a follow up ECHO on 8/8/22 per Cardiology - EF 59%, GLS normal.   Continue ECHOs every 3 months. Labs (CBC and CMP) reviewed today. Will monitor for side effects. 5) Psychosocial  Mood good, coping well. 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 Her  is very supportive. SW/NN support as needed. She is here alone today. Call if questions. Follow up in 3 weeks in [facility]/office. I personally saw and evaluated the patient and performed the key components of medical decision making. The history, physical exam, and documentation were performed by Uzair Davey NP. I reviewed and verified the above documentation and modified it as needed. Specifically pt doing well   On HP and tolerating fine. ER/[place] negative. Had path CR. LFTs have been up but trending down. CT good. Saw breast surgery for mass area on CT and all good. Labs personally reviewed   Continue with HP        I appreciate the opportunity to participate in Ms. Duran Landin's care.     Signed By: Benjamín Dailey DO

## 2022-09-08 NOTE — PROGRESS NOTES
Jenny Acosta is a 61 y.o. female  Chief Complaint   Patient presents with    Chemotherapy    Breast Cancer     1. Have you been to the ER, urgent care clinic since your last visit? Hospitalized since your last visit? No    2. Have you seen or consulted any other health care providers outside of the [address] [place] [address] since your last visit? Include any pap smears or colon screening.  No

## 2022-09-08 NOTE — PROGRESS NOTES
[facility] - Chemotherapy Progress Note    0800 Pt admit to [facility] for Phesgo C16 ambulatory in stable condition. Assessment completed. No new concerns voiced. Pt denies COVID symptoms and recent exposure. Labs drawn via PCP order. Pt aware that we cannot do timed urine tests in [facility] and that she will need to have that done somewhere else. Recent Results (from the past 12 hour(s))   LIPID PANEL    Collection Time: 09/08/22  8:13 AM   Result Value Ref Range    Cholesterol, total 205 (H) <200 MG/DL    Triglyceride 114 <150 MG/DL    HDL Cholesterol 78 MG/DL    LDL, calculated 104.2 (H) 0 - 100 MG/DL    VLDL, calculated 22.8 MG/DL    CHOL/HDL Ratio 2.6 0.0 - 5.0     HEPATITIS C AB    Collection Time: 09/08/22  8:13 AM   Result Value Ref Range    Hep C virus Ab Interp. NONREACTIVE NR         Medications:  Phesgo SQ in left thigh    Patient Vitals for the past 12 hrs:   Temp Pulse Resp BP   09/08/22 0803 97.9 °F (36.6 °C) 72 18 110/76       1015 Pt tolerated treatment well. D/c home ambulatory in no distress. Pt aware of next appointment scheduled for 9/29/22.

## 2022-09-09 LAB — TSH SERPL-ACNC: 1.66 UIU/ML (ref 0.45–4.5)

## 2022-09-23 RX ORDER — ONDANSETRON 2 MG/ML
8 INJECTION INTRAMUSCULAR; INTRAVENOUS AS NEEDED
Status: CANCELLED | OUTPATIENT
Start: 2022-09-29

## 2022-09-23 RX ORDER — DIPHENHYDRAMINE HYDROCHLORIDE 50 MG/ML
50 INJECTION, SOLUTION INTRAMUSCULAR; INTRAVENOUS AS NEEDED
Status: CANCELLED
Start: 2022-09-29

## 2022-09-23 RX ORDER — ACETAMINOPHEN 325 MG/1
650 TABLET ORAL
Status: CANCELLED | OUTPATIENT
Start: 2022-09-29

## 2022-09-23 RX ORDER — DIPHENHYDRAMINE HYDROCHLORIDE 50 MG/ML
50 INJECTION, SOLUTION INTRAMUSCULAR; INTRAVENOUS
Status: CANCELLED | OUTPATIENT
Start: 2022-09-29

## 2022-09-23 RX ORDER — SODIUM CHLORIDE 9 MG/ML
25 INJECTION, SOLUTION INTRAVENOUS CONTINUOUS
Status: CANCELLED | OUTPATIENT
Start: 2022-09-29

## 2022-09-23 RX ORDER — HEPARIN 100 UNIT/ML
300-500 SYRINGE INTRAVENOUS AS NEEDED
Status: CANCELLED
Start: 2022-09-29

## 2022-09-23 RX ORDER — SODIUM CHLORIDE 0.9 % (FLUSH) 0.9 %
10 SYRINGE (ML) INJECTION AS NEEDED
Status: CANCELLED | OUTPATIENT
Start: 2022-09-29

## 2022-09-23 RX ORDER — EPINEPHRINE 1 MG/ML
0.3 INJECTION, SOLUTION, CONCENTRATE INTRAVENOUS AS NEEDED
Status: CANCELLED | OUTPATIENT
Start: 2022-09-29

## 2022-09-23 RX ORDER — HYDROCORTISONE SODIUM SUCCINATE 100 MG/2ML
100 INJECTION, POWDER, FOR SOLUTION INTRAMUSCULAR; INTRAVENOUS AS NEEDED
Status: CANCELLED | OUTPATIENT
Start: 2022-09-29

## 2022-09-23 RX ORDER — SODIUM CHLORIDE 9 MG/ML
10 INJECTION INTRAMUSCULAR; INTRAVENOUS; SUBCUTANEOUS AS NEEDED
Status: CANCELLED | OUTPATIENT
Start: 2022-09-29

## 2022-09-23 RX ORDER — ACETAMINOPHEN 325 MG/1
650 TABLET ORAL AS NEEDED
Status: CANCELLED
Start: 2022-09-29

## 2022-09-23 RX ORDER — DIPHENHYDRAMINE HYDROCHLORIDE 50 MG/ML
25 INJECTION, SOLUTION INTRAMUSCULAR; INTRAVENOUS AS NEEDED
Status: CANCELLED
Start: 2022-09-29

## 2022-09-23 RX ORDER — ALBUTEROL SULFATE 0.83 MG/ML
2.5 SOLUTION RESPIRATORY (INHALATION) AS NEEDED
Status: CANCELLED
Start: 2022-09-29

## 2022-09-29 ENCOUNTER — OFFICE VISIT (OUTPATIENT)
Dept: ONCOLOGY | Age: 57
End: 2022-09-29
Payer: COMMERCIAL

## 2022-09-29 ENCOUNTER — HOSPITAL ENCOUNTER (OUTPATIENT)
Dept: INFUSION THERAPY | Age: 57
Discharge: HOME | End: 2022-09-29
Payer: COMMERCIAL

## 2022-09-29 VITALS
WEIGHT: 130.7 LBS | OXYGEN SATURATION: 98 % | DIASTOLIC BLOOD PRESSURE: 75 MMHG | BODY MASS INDEX: 21 KG/M2 | HEIGHT: 66 IN | TEMPERATURE: 97.5 F | SYSTOLIC BLOOD PRESSURE: 123 MMHG | HEART RATE: 77 BPM

## 2022-09-29 VITALS
TEMPERATURE: 97.5 F | RESPIRATION RATE: 18 BRPM | DIASTOLIC BLOOD PRESSURE: 84 MMHG | HEART RATE: 65 BPM | SYSTOLIC BLOOD PRESSURE: 134 MMHG

## 2022-09-29 DIAGNOSIS — C50.912 MALIGNANT NEOPLASM OF LEFT BREAST IN FEMALE, ESTROGEN RECEPTOR NEGATIVE, UNSPECIFIED SITE OF BREAST (HCC): Primary | ICD-10-CM

## 2022-09-29 DIAGNOSIS — Z78.0 POST-MENOPAUSAL: ICD-10-CM

## 2022-09-29 DIAGNOSIS — G62.0 CHEMOTHERAPY-INDUCED NEUROPATHY (HCC): ICD-10-CM

## 2022-09-29 DIAGNOSIS — T45.1X5A CHEMOTHERAPY-INDUCED NEUROPATHY (HCC): ICD-10-CM

## 2022-09-29 DIAGNOSIS — Z17.1 MALIGNANT NEOPLASM OF LEFT BREAST IN FEMALE, ESTROGEN RECEPTOR NEGATIVE, UNSPECIFIED SITE OF BREAST (HCC): Primary | ICD-10-CM

## 2022-09-29 DIAGNOSIS — Z85.3 PERSONAL HISTORY OF BREAST CANCER: ICD-10-CM

## 2022-09-29 DIAGNOSIS — R79.89 ELEVATED LFTS: ICD-10-CM

## 2022-09-29 DIAGNOSIS — Z98.890 S/P LUMPECTOMY, LEFT BREAST: ICD-10-CM

## 2022-09-29 DIAGNOSIS — Z51.81 ENCOUNTER FOR MONITORING CARDIOTOXIC DRUG THERAPY: ICD-10-CM

## 2022-09-29 DIAGNOSIS — Z79.899 ENCOUNTER FOR MONITORING CARDIOTOXIC DRUG THERAPY: ICD-10-CM

## 2022-09-29 LAB
ALBUMIN SERPL-MCNC: 3.4 G/DL (ref 3.5–5)
ALBUMIN/GLOB SERPL: 0.8 {RATIO} (ref 1.1–2.2)
ALP SERPL-CCNC: 246 U/L (ref 45–117)
ALT SERPL-CCNC: 109 U/L (ref 12–78)
ANION GAP SERPL CALC-SCNC: 3 MMOL/L (ref 5–15)
AST SERPL-CCNC: 65 U/L (ref 15–37)
BASOPHILS # BLD: 0 K/UL (ref 0–0.1)
BASOPHILS NFR BLD: 1 % (ref 0–1)
BILIRUB SERPL-MCNC: 0.4 MG/DL (ref 0.2–1)
BUN SERPL-MCNC: 12 MG/DL (ref 6–20)
BUN/CREAT SERPL: 16 (ref 12–20)
CALCIUM SERPL-MCNC: 9.8 MG/DL (ref 8.5–10.1)
CHLORIDE SERPL-SCNC: 103 MMOL/L (ref 97–108)
CO2 SERPL-SCNC: 30 MMOL/L (ref 21–32)
CREAT SERPL-MCNC: 0.75 MG/DL (ref 0.55–1.02)
DIFFERENTIAL METHOD BLD: ABNORMAL
EOSINOPHIL # BLD: 0.2 K/UL (ref 0–0.4)
EOSINOPHIL NFR BLD: 3 % (ref 0–7)
ERYTHROCYTE [DISTWIDTH] IN BLOOD BY AUTOMATED COUNT: 12.5 % (ref 11.5–14.5)
GLOBULIN SER CALC-MCNC: 4.3 G/DL (ref 2–4)
GLUCOSE SERPL-MCNC: 88 MG/DL (ref 65–100)
HCT VFR BLD AUTO: 33.7 % (ref 35–47)
HGB BLD-MCNC: 11.4 G/DL (ref 11.5–16)
IMM GRANULOCYTES # BLD AUTO: 0 K/UL (ref 0–0.04)
IMM GRANULOCYTES NFR BLD AUTO: 0 % (ref 0–0.5)
LYMPHOCYTES # BLD: 1.4 K/UL (ref 0.8–3.5)
LYMPHOCYTES NFR BLD: 31 % (ref 12–49)
MCH RBC QN AUTO: 31.5 PG (ref 26–34)
MCHC RBC AUTO-ENTMCNC: 33.8 G/DL (ref 30–36.5)
MCV RBC AUTO: 93.1 FL (ref 80–99)
MONOCYTES # BLD: 0.5 K/UL (ref 0–1)
MONOCYTES NFR BLD: 11 % (ref 5–13)
NEUTS SEG # BLD: 2.4 K/UL (ref 1.8–8)
NEUTS SEG NFR BLD: 54 % (ref 32–75)
NRBC # BLD: 0 K/UL (ref 0–0.01)
NRBC BLD-RTO: 0 PER 100 WBC
PLATELET # BLD AUTO: 177 K/UL (ref 150–400)
PMV BLD AUTO: 9.5 FL (ref 8.9–12.9)
POTASSIUM SERPL-SCNC: 4.2 MMOL/L (ref 3.5–5.1)
PROT SERPL-MCNC: 7.7 G/DL (ref 6.4–8.2)
RBC # BLD AUTO: 3.62 M/UL (ref 3.8–5.2)
SODIUM SERPL-SCNC: 136 MMOL/L (ref 136–145)
WBC # BLD AUTO: 4.4 K/UL (ref 3.6–11)

## 2022-09-29 PROCEDURE — 96401 CHEMO ANTI-NEOPL SQ/IM: CPT

## 2022-09-29 PROCEDURE — 36415 COLL VENOUS BLD VENIPUNCTURE: CPT

## 2022-09-29 PROCEDURE — 80053 COMPREHEN METABOLIC PANEL: CPT

## 2022-09-29 PROCEDURE — 74011250636 HC RX REV CODE- 250/636: Performed by: INTERNAL MEDICINE

## 2022-09-29 PROCEDURE — 99215 OFFICE O/P EST HI 40 MIN: CPT | Performed by: INTERNAL MEDICINE

## 2022-09-29 PROCEDURE — 85025 COMPLETE CBC W/AUTO DIFF WBC: CPT

## 2022-09-29 RX ADMIN — PERTUZUMAB, TRASTUZUMAB, AND HYALURONIDASE-ZZXF 10 ML: 600; 600; 2000 INJECTION, SOLUTION SUBCUTANEOUS at 09:17

## 2022-09-29 NOTE — PROGRESS NOTES
Kali Blanchard is a 61 y.o. female  Chief Complaint   Patient presents with    Chemotherapy    Breast Cancer     1. Have you been to the ER, urgent care clinic since your last visit? Hospitalized since your last visit? No    2. Have you seen or consulted any other health care providers outside of the [address] [place] [address] since your last visit? Include any pap smears or colon screening.  No

## 2022-09-29 NOTE — PROGRESS NOTES
[facility] [place] at [address], [address] [place] [address] Road, [place]: [phone]  F: [phone]    Reason for Visit:   Inocente Luque is a 61 y.o. female seen today in office for follow up of Left Breast Cancer. Treatment History:   LEFT Breast Biopsy 10/6/21: PATH - Invasive ductal carcinoma, favor grade 3  ER/OR negative, HER2+  Breast MRI 10/13/21: RIGHT BREAST: Background parenchymal enhancement: Mild. There is no suspicious masslike or nonmasslike enhancement within the right breast to indicate breast carcinoma. There are no suspicious internal mammary or axillary chain lymph nodes. LEFT BREAST: Background parenchymal enhancement: Mild There is a rounded mass with central fluid attenuation/necrosis in the lateral aspect of the left breast, deep 3rd, measuring approximately 3.3 cm in diameter with enhancement extending along the adjacent dermis suggesting skin invasion. There is nonmasslike enhancement extending both inferior, medial and anterior to the primary mass over approximately 6 x 5.5 x 4 cm. There are discontinuous areas of fluid attenuation associated with the nonmasslike enhancement which may represent cystic spaces and/or necrosis. There is an enlarged, left axillary lymph node with other adjacent, prominent lymph nodes. There is a lymph node node deep to the pectoralis minor muscle which measures 1.1 x 0.7 cm  Left Axillary LN Biopsy 10/19/21: PATH - Metastatic breast cancer, measuring up to 17 mm in a single core  ER/OR negative, HER2+  Neoadjuvant TCHP x 6 cycles from 10/21/21 - 2/10/22  DR Taxotere to 60 mg/m2 and Carbo to AUC 4 with Cycle 6 due to side effects  Breast Biopsy 10/27/21: PATH - 5 mm IDC with DCIS  Breast MRI 2/21/22: Right Breast: No suspicious enhancing foci. No axillary or internal mammary chain lymphadenopathy.  A subclavian ported catheter, implanted in the posterior third of the upper inner quadrant, terminates in appropriate position in the SVC. Left Breast: No residual enhancement in the mass in the posterior third of the outer slightly upper left breast. The mass is no longer measurable. There is a biopsy clip within scarring. No residual enhancement in the previously seen, extensive, non-masslike enhancement in the middle third of the central, lower, left breast. A biopsy clip remains. Axillary flori metastases have significantly decreased in size and are no longer pathologically enlarged. A biopsy clip is within an inferior axillary lymph node  Maintenance HP (Phesgo) 3/3/22 - 9/29/22  Left Lumpectomy 4/21/22: pCR, 0/4 LNs  XRT 6/16/22 - 8/3/22 with Dr. Angelica Keating:   Clinical: 2 ER/IL negative Her2+  Pathologic: ypT0 (pCR)    History of Present Illness:   Kali Blanchard is a 61 y.o. female seen today in office for follow up of left breast cancer ER/IL negative Her2 +. She finished 6 cycles of neoadjuvant TCHP on 2/10/22. She started maintenance HP (Phesgo) on 3/3/22. She had a left lumpectomy on 4/21/22 and had path CR. She started XRT on 6/16/22 and finished on 8/3/22. She is here today for Cycle 17 (last dose of maintenance subQ HP). She reports that she feels well overall today. She is tolerating HP well overall without significant side effects. Her appetite is good and energy levels are a little low overall. She still has some mild neuropathy in toes on bilateral feet that is a little better since starting on Cymbalta per PCP. She denies fever, chills, mouth sores, cough, SOB, CP, nausea, vomiting, diarrhea, and constipation. She denies pain today. CBC and CMP are still pending today. She is here alone today.      Past Medical History:   Diagnosis Date    Malignant neoplasm of left breast in female, estrogen receptor negative ([facility] [address] 10/13/2021      Past Surgical History:   Procedure Laterality Date    HX BREAST LUMPECTOMY Left 4/21/2022    LEFT BREAST REDUCTION LUMPECTOMY WITH BRACKETED MAG SEED LOCALIZATION, LEFT BREAST SENTINEL NODE BIOPSY LEFT BREAST ONCOPLASTIC REDUCTION WITH RIGHT BREAST REDUCTION FOR SYMMETRY AND PORT REMOVAL performed by Juliana Caballero MD at [place] [address]    HX BREAST REDUCTION Bilateral 4/21/2022    . performed by Corine Lefort, MD at [place] [address]    HX VASCULAR ACCESS Right 10/2021    PORT INSERTION    UT BREAST SURGERY PROCEDURE UNLISTED Left 10/06/21    Biopsy      Social History     Tobacco Use    Smoking status: Never    Smokeless tobacco: Never   Substance Use Topics    Alcohol use: Not Currently     Alcohol/week: 0.0 standard drinks      Family History   Problem Relation Age of Onset    Cancer Mother         BREAST    Dementia Mother     Heart Disease Father     Anesth Problems Neg Hx      Current Outpatient Medications   Medication Sig    fish oil-omega-3 fatty acids 340-1,000 mg capsule Take 1 Capsule by mouth daily. multivitamin (ONE A DAY) tablet Take 1 Tablet by mouth daily. diclofenac (VOLTAREN) 1 % gel Apply  to affected area four (4) times daily. DULoxetine (CYMBALTA) 30 mg capsule Take 1 Capsule by mouth daily. No current facility-administered medications for this visit. Facility-Administered Medications Ordered in Other Visits   Medication Dose Route Frequency    pertuzumab 600 mg-trastuzumab 600 mg-hyaluronidase-zzxf 20,000 units/10 mL  10 mL SubCUTAneous ONCE      No Known Allergies     Review of Systems:  A complete review of systems was obtained, negative except as described above and as reported on ROS sheet scanned into system. Physical Exam:     Visit Vitals  /75 (BP 1 Location: Left upper arm, BP Patient Position: Sitting)   Pulse 77   Temp 97.5 °F (36.4 °C) (Temporal)   Ht 5' 6\" (1.676 m)   Wt 130 lb 11.2 oz (59.3 kg)   SpO2 98%   BMI 21.10 kg/m²     ECOG PS: 0  General: No distress  Eyes: Anicteric sclerae  HENT: Atraumatic, wearing a mask  Neck: Supple  Respiratory: CTAB, normal respiratory effort  CV: Normal rate, regular rhythm, no murmurs. GI: Soft, nontender, non-distended   MS: Normal gait and station. Skin: No rashes, ecchymoses, or petechiae. Normal temperature, turgor, and texture. Psych: Alert, oriented, appropriate affect, normal judgment/insight  Neuro: Non focal    Results:     Lab Results   Component Value Date/Time    WBC 4.7 08/18/2022 07:53 AM    HGB 11.5 08/18/2022 07:53 AM    HCT 33.4 (L) 08/18/2022 07:53 AM    PLATELET 570 81/17/4875 07:53 AM    MCV 91.0 08/18/2022 07:53 AM    ABS. NEUTROPHILS 2.7 08/18/2022 07:53 AM     Lab Results   Component Value Date/Time    Sodium 137 08/18/2022 07:53 AM    Potassium 4.0 08/18/2022 07:53 AM    Chloride 103 08/18/2022 07:53 AM    CO2 30 08/18/2022 07:53 AM    Glucose 85 08/18/2022 07:53 AM    BUN 12 08/18/2022 07:53 AM    Creatinine 0.69 08/18/2022 07:53 AM    GFR est AA >60 08/18/2022 07:53 AM    GFR est non-AA >60 08/18/2022 07:53 AM    Calcium 10.2 (H) 08/18/2022 07:53 AM     Lab Results   Component Value Date/Time    Bilirubin, total 0.4 08/18/2022 07:53 AM    ALT (SGPT) 96 (H) 08/18/2022 07:53 AM    Alk. phosphatase 260 (H) 08/18/2022 07:53 AM    Protein, total 8.1 08/18/2022 07:53 AM    Albumin 3.5 08/18/2022 07:53 AM    Globulin 4.6 (H) 08/18/2022 07:53 AM     10/6/21  FINAL PATHOLOGIC DIAGNOSIS   Breast, left mass, core biopsy:   Invasive ductal carcinoma, favor grade 3 (see synoptic table)   INVASIVE CARCINOMA OF THE BREAST: Biopsy   TUMOR   Tumor Site: Clock position - 3 o'clock   Histologic Type: Invasive carcinoma of no special type (ductal)   Glandular (Acinar) / Tubular Differentiation: Score 3   Nuclear Pleomorphism: Score 3   Mitotic Rate: Score 3   Overall Grade: Grade 3 (scores of 8 or 9)   Tumor Size: 13 mm   Ductal Carcinoma In Situ (DCIS): Not identified   Lymphovascular Invasion: Not identified   Microcalcifications: Not identified     Breast MRI 10/13/21  IMPRESSION:  RIGHT BREAST:  1. BI-RADS Category 1 - Negative. [facility]:  1.  Rounded mass with dermal extension measuring approximately 3.3 cm in diameter  in the lateral, deep 3rd of the left breast. There are areas of fluid/necrosis  within this mass. BI-RADS Category 6 - Known biopsy-proven malignancy. 2. Nonmasslike enhancement in the left breast which is inferior, medial and  anterior to the primary mass. This nonmasslike enhancement extends over  approximately 6 x 5.5 x 4 cm. There are areas of fluid attenuation which could  represent cysts and/or necrosis within this nonmasslike enhancement. BI-RADS Category 5 - Highly suggestive of malignancy. 3. There is axillary adenopathy, including a lymph node deep to pectoralis  minor. BI-RADS Category 5 - Highly suggestive of malignancy    10/19/21  FINAL PATHOLOGIC DIAGNOSIS   Lymph node, left axillary, core biopsy:   Metastatic breast cancer, measuring up to 17 mm in a single core     10/21/21    ECHO ADULT COMPLETE 10/21/2021 10/21/2021    Interpretation Summary  · LV: Estimated LVEF is 55 - 60%. Normal cavity size, wall thickness and systolic function (ejection fraction normal). Wall motion: normal. Abnormal left ventricular strain. Global longitudinal strain is -15.4%. · TV: Pulmonary hypertension not suggested by Doppler findings. 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 No residual enhancement in multicentric, multifocal left breast carcinoma. 2. Significantly decreased left axillary flori metastases. Left axillary lymph  nodes are no longer pathologically enlarged. 3. No suspicious right breast enhancement or lymphadenopathy. 4. BI-RADS Assessment Category 6: Known biopsy proven malignancy.      03/11/22    ECHO ADULT FOLLOW-UP OR LIMITED 03/15/2022 3/15/2022    Interpretation Summary    Left Ventricle: Left ventricle size is normal. Increased wall thickness. Normal wall motion. Normal left ventricular systolic function. EF by 2D Simpsons Biplane is 60%. Global longitudinal strain is normal with a value of -22.3%. Normal diastolic function. Signed by: Мария Ferreira MD on 3/15/2022 10:36 AM    4/21/22  FINAL PATHOLOGIC DIAGNOSIS   1. Left breast skin, excision:   Benign skin   2. Left axillary sentinel node #1, excision:   One benign lymph node with scar, biopsy site and clip (0/1)   3. Left axillary sentinel node #2, excision:   One benign lymph node without scar (0/1)   4. Left axillary sentinel node #3, excision:   Two benign lymph nodes without scar (0/2)   5. Left breast, lumpectomy:   No residual invasive or in situ carcinoma status post neoadjuvant therapy (ypT0)   Two fibrocalcific tumor beds with biopsy sites   6. Left breast skin #2, excision:   Benign skin and subcutis   7. Right breast skin and tissue, 21 g, excision:   Benign mammary tissue and skin     06/08/22    ECHO ADULT FOLLOW-UP OR LIMITED 06/10/2022 6/10/2022    Interpretation Summary    Left Ventricle: Normal left ventricular systolic function. EF by 2D Simpsons Biplane is 56%. Left ventricle size is normal. Normal wall thickness. Normal wall motion. Global longitudinal strain is normal. GLS today: -17.1%, 3/11/2022 -22.3%, 12/6/2021 -20.3%, 10/21/2021 -15.4%. Signed by: Мария Ferreira MD on 6/10/2022 12:51 PM    08/08/22    ECHO ADULT COMPLETE 08/17/2022 8/17/2022    Interpretation Summary  Formatting of this result is different from the original.      Left Ventricle: Normal left ventricular systolic function. EF by 2D Simpsons Biplane is 59%. Left ventricle size is normal. Normal wall thickness. Normal wall motion. Global longitudinal strain is -18.7%, 06/08/2022 -17.1%, 03/11/2022 -22.3%, 12/16/2021 -20.3%, 10/21/2021 -15.4%. .    Mitral Valve: Mild regurgitation. Tricuspid Valve: Mild regurgitation. Pulmonic Valve: Mild regurgitation. Signed by: Naman Chang MD on 8/17/2022 11:09 PM    CT C/A/P 8/9/22  IMPRESSION:  Soft tissue mass in the left chest wall just anterior to the pectoralis muscle  is nonspecific, was not present on the breast MR performed in February   Otherwise, there is no evidence of mass lesion or metastatic foci is in the  chest, abdomen or pelvis. No acute intraperitoneal/intrathoracic process is identified. Records reviewed and summarized above. Pathology report(s) reviewed above. Radiology report(s) reviewed above. Assessment:/PLAN     1) Clinical Stage 2B T3N0 LEFT Breast Cancer ER/VA negative Her2+ post Lumpectomy 4/21/22 with pCR  MRI Breast 10/13/21 showed a rounded mass with central fluid attenuation/necrosis in the lateral aspect of the left breast, deep 3rd, measuring approximately 3.3 cm in diameter with enhancement extending along the adjacent dermis suggesting skin invasion. There is nonmasslike enhancement extending both inferior, medial and anterior to the primary mass over approximately 6 x 5.5 x 4 cm. There are discontinuous areas of fluid attenuation associated with the nonmasslike enhancement which may represent cystic spaces and/or necrosis. There is an enlarged, left axillary lymph node with other adjacent, prominent lymph nodes. There is a lymph node node deep to the pectoralis minor muscle which measures 1.1 x 0.7 cm  She had a left axillary LN biopsy on 10/19/21 and path showed metastatic beast cancer. Pre chemo ECHO showed EF 55-60% with abnormal left ventricular strain. Referred to Cardiology for further evaluation - has follow up ECHO on 12/6/21 per Cardio. Patient started neoadjuvant TCHP chemotherapy on 10/25/21. She completed 6 cycles of TCHP on 2/10/22.  Taxotere to 60 mg/m2 and Carbo to AUC 4 with Cycle 6 due to side effects.    She had a post chemo Breast MRI on 2/21/22 that showed no residual enhancement in multicentric, multifocal left breast carcinoma, significantly decreased left axillary flori metastases, left axillary lymph nodes are no longer pathologically enlarged, and no suspicious right breast enhancement or lymphadenopathy. Personally reviewed MRI. Discussed MRI with patient. She started maintenance HP on 3/3/22. She had a follow up ECHO on 3/11/22 per Cardio and EF was 60%. She had a left lumpectomy on 4/21/22 and had pCR. Discussed path with patient. She had a follow up ECHO on 6/8/22 that was good with EF 56%. She started XRT on 6/16/22 and finished on 8/3/22 with Dr Wes Ward. She had follow up CTs C/A/P on 8/9/22 that showed a soft tissue mass in the left chest wall just anterior to the pectoralis muscle is nonspecific. She saw Breast Surgeon and had US in office that showed post op scar tissue with no worrisome findings. Patient is here today for Cycle 17 (last cycle of maintenance HP). She is tolerating HP well overall without significant side effects. No adjuvant hormonal therapy as ER/MT negative. She is clinically healthy overall and stable today. 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 3D bilateral dx mammogram ordered today for 2/22. Breast MRI already ordered by Breast Surgery. Labs (CBC and CMP) reviewed today. Continue maintenance Herceptin+Perjeta. Follow up in 3 months. Patient agrees with plan. 2) Postmenopausal  Continue routine GYN follow up. 3) Elevated LFTs  Unclear etiology - no recent ETOH or Tylenol use. She had follow up CTS on 8/9/22 that showed a few scattered hypodensities in the hepatic parenchyma far too small to characterize, otherwise negative in A/P.    4) Management of High Risk Medications - Chemotherapy  Toxicities from chemo included Grade 1 Nausea,  Grade 1 Neuropathy, Grade 1 Fatigue, Grade 1 Decreased Appetite. DR Taxotere to 60 mg/m2 and Carbo to AUC 4 with Cycle 6.  Chemo side effects improving. Pre chemo ECHO from 10/21/21 reviewed - EF 55-60% with abnormal left ventricular strain. Referred to Cardiology for further evaluation/management of strain. She had a follow up ECHO on 12/6/21 per Cardiology - EF 58% with no strain. She had a follow up ECHO on 3/11/22 per Cardiology - EF 60%, GLS normal.   She had a follow up ECHO on 6/8/22 per Cardiology - EF 56%, GLS normal.   She had a follow up ECHO on 8/8/22 per Cardiology - EF 59%, GLS normal.   Continue ECHOs every 6 months x 2 years post HP. Labs (CBC and CMP) reviewed today. Will monitor for side effects. 5) Psychosocial  Mood good, coping well. 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 Her  is very supportive. SW/NN support as needed. She is here alone today. Call if questions. Follow up in 3 months. I personally saw and evaluated the patient and performed the key components of medical decision making. The history, physical exam, and documentation were performed by José Miguel Graf NP. I reviewed and verified the above documentation and modified it as needed. Specifically pt doing well   On HP and tolerating fine. Last treatment today. ER/CA negative. No hormonal therapy  Discussed neratinib today and decided not needed. Had path CR. LFTs have been up but trending down. CTs good. Labs personally reviewed   Mammo up to date  Continue with last HP    Labs and routine HM with PCP  Fu in 3 months    I appreciate the opportunity to participate in Ms. Jaylene Landin's care.     Signed By: Kate Dandy, DO

## 2022-09-29 NOTE — PROGRESS NOTES
[facility] Chemo Progress Note    Date: September 29, 2022      0745 Ms. Landin Arrived to [facility] [place] for  P[facility]go ambulatory in stable condition. Assessment was completed, no acute issues at this time, no new complaints voiced. Labs drawn peripherally from rt ac and sent for processing. Went to provider appointment with Medical Oncology.    : Returned from provider appointment.  : Labs reviewed. Criteria for treatment was met. Ms. Jessica Chawla vitals were reviewed. Visit Vitals  /84   Pulse 65   Temp 97.5 °F (36.4 °C)   Resp 18   Breastfeeding No          Lab results were obtained and reviewed. chemotherapy was initiated. Medications Administered       pertuzumab 600 mg-trastuzumab 600 mg-hyaluronidase-zzxf 20,000 units/10 mL       Admin Date  09/29/2022 Action  Given Dose  10 mL Route  SubCUTAneous Administered By  Brenden Estrada RN                     Given right thigh sc     Two nurses verified prior to administering: Drug name, Drug dose, Infusion volume or drug volume when prepared in a syringe, Rate of administration, Route of administration, Expiration dates and/or times, Appearance and physical integrity of the drugs, Rate set on infusion pump, when used, and Sequencing of drug administration. 3562 Patient tolerated treatment well. Patient was discharged in stable condition. Patient is aware of next scheduled [facility] appointment on not needed, this was her last cycle.     Future Appointments   Date Time Provider Ryan Daly   11/16/2022  8:00 AM BONI PEOPLES BS AMB   39/50/0294  2:89 PM Nichelle Grayson MD [facility] BS AMB   23/5/6913  1:85 PM Nichelle Grayson MD [facility] BS AMB   12/28/2022  8:30 AM Jay Mcclellan  N Mariela Purdy BS AMB   2/15/2023  9:00 AM BONI PEOPLES BS AMB   2/15/2023  9:20 AM MD DEBBIE Lubin BS AMB         Bruno Sepulveda, RN, RN  September 29, 2022

## 2022-10-20 ENCOUNTER — APPOINTMENT (OUTPATIENT)
Dept: INFUSION THERAPY | Age: 57
End: 2022-10-20

## 2022-12-16 NOTE — PROGRESS NOTES
[facility]  [address], [address], [address] [place] [address] NOTE      12/16/2022:  Patient will be discharged from lymphedema therapy at this time. Criteria for termination of care:    Patient has not returned to therapy. Patient was seen in our clinic for two visits 7/20/22 and 8/16/2022 and was fitted with compression products and educated in a home program. Patient did not return for additional therapy as she preferred to return to the clinic on an as needed basis due to work commitments and medical appointments. Her plan of care ended 10/15/22 and since she did not return to therapy, she will be discharged from lymphedema services at this time. If you need any further information, please contact us at [phone].     Thank you for this referral.  Marion Michael, PT, CLT

## 2023-01-16 NOTE — PROGRESS NOTES
[facility] [place] at [address], [address] [place] [address] Road, [place]: [phone]  F: [phone]    Reason for Visit:   Halle Gtz is a 61 y.o. female seen today in office for follow up of Left Breast Cancer. Treatment History:   LEFT Breast Biopsy 10/6/21: PATH - Invasive ductal carcinoma, favor grade 3  ER/PA negative, HER2+  Breast MRI 10/13/21: RIGHT BREAST: Background parenchymal enhancement: Mild. There is no suspicious masslike or nonmasslike enhancement within the right breast to indicate breast carcinoma. There are no suspicious internal mammary or axillary chain lymph nodes. LEFT BREAST: Background parenchymal enhancement: Mild There is a rounded mass with central fluid attenuation/necrosis in the lateral aspect of the left breast, deep 3rd, measuring approximately 3.3 cm in diameter with enhancement extending along the adjacent dermis suggesting skin invasion. There is nonmasslike enhancement extending both inferior, medial and anterior to the primary mass over approximately 6 x 5.5 x 4 cm. There are discontinuous areas of fluid attenuation associated with the nonmasslike enhancement which may represent cystic spaces and/or necrosis. There is an enlarged, left axillary lymph node with other adjacent, prominent lymph nodes. There is a lymph node node deep to the pectoralis minor muscle which measures 1.1 x 0.7 cm  Left Axillary LN Biopsy 10/19/21: PATH - Metastatic breast cancer, measuring up to 17 mm in a single core  ER/PA negative, HER2+  Neoadjuvant TCHP x 6 cycles from 10/21/21 - 2/10/22  DR Taxotere to 60 mg/m2 and Carbo to AUC 4 with Cycle 6 due to side effects  Breast Biopsy 10/27/21: PATH - 5 mm IDC with DCIS  Breast MRI 2/21/22: Right Breast: No suspicious enhancing foci. No axillary or internal mammary chain lymphadenopathy.  A subclavian ported catheter, implanted in the posterior third of the upper inner quadrant, terminates in appropriate position in the SVC. Left Breast: No residual enhancement in the mass in the posterior third of the outer slightly upper left breast. The mass is no longer measurable. There is a biopsy clip within scarring. No residual enhancement in the previously seen, extensive, non-masslike enhancement in the middle third of the central, lower, left breast. A biopsy clip remains. Axillary flori metastases have significantly decreased in size and are no longer pathologically enlarged. A biopsy clip is within an inferior axillary lymph node  Maintenance HP (Phesgo) 3/3/22 - 9/29/22 04/21/2022: LEFT Breast Lumpectomy and LEFT Excisional SLN. PATH:   LEFT BREAST: Benign skin. No residual invasive or in situ carcinoma status post neoadjuvant. LEFT breast skin #2: benign skin and subcutis. RIGHT BREAST: skin and tissue, 21g, benign mammary tissue and skin  LEFT Axillary: 4/4 benign LNs with scar. 1 LN biopsy site and clip  XRT 6/16/22 - 8/3/22 with Dr. Kyle Guzman:   Clinical: 2 ER/ME negative Her2+  Pathologic: ypT0 (pCR)    History of Present Illness:   Stefani Virgen is a 61 y.o. female seen today in office for follow up of left breast cancer ER/ME negative Her2 +. She finished 6 cycles of neoadjuvant TCHP on 2/10/22. She started maintenance HP (Phesgo) on 3/3/22. She had a left lumpectomy on 4/21/22 and had path CR. She started XRT on 6/16/22 and finished on 8/3/22. She finished maintenance Herceptin on 9.29/22. She is here today for 3 month follow up. She reports that she feels well overall today. Her appetite is good and energy levels are improving overall. She still has some mild neuropathy in toes on bilateral feet. She is no longer taking Cymbalta as did not like the way it made her feel. She states that her left breast is sore and she has occasional zingers. She also complains of limited ROM in upper arm/shoulder joints. She denies fever, chills, mouth sores, cough, SOB, CP, nausea, vomiting, diarrhea, and constipation.  She is here alone today. Past Medical History:   Diagnosis Date    Malignant neoplasm of left breast in female, estrogen receptor negative ([facility] [address] 10/13/2021      Past Surgical History:   Procedure Laterality Date    HX BREAST LUMPECTOMY Left 4/21/2022    LEFT BREAST REDUCTION LUMPECTOMY WITH BRACKETED MAG SEED LOCALIZATION, LEFT BREAST SENTINEL NODE BIOPSY LEFT BREAST ONCOPLASTIC REDUCTION WITH RIGHT BREAST REDUCTION FOR SYMMETRY AND PORT REMOVAL performed by Libby Quach MD at [place] [address]    HX BREAST REDUCTION Bilateral 4/21/2022    . performed by Manny Ortega MD at [place] [address]    HX VASCULAR ACCESS Right 10/2021    PORT INSERTION    AL UNLISTED PROCEDURE BREAST Left 10/06/21    Biopsy      Social History     Tobacco Use    Smoking status: Never    Smokeless tobacco: Never   Substance Use Topics    Alcohol use: Not Currently     Alcohol/week: 0.0 standard drinks      Family History   Problem Relation Age of Onset    Cancer Mother         BREAST    Dementia Mother     Heart Disease Father     Anesth Problems Neg Hx      Current Outpatient Medications   Medication Sig    multivitamin (ONE A DAY) tablet Take 1 Tablet by mouth daily. No current facility-administered medications for this visit. No Known Allergies     Review of Systems:  A complete review of systems was obtained, negative except as described above and as reported on ROS sheet scanned into system. Physical Exam:     Visit Vitals  /81 (BP 1 Location: Right arm, BP Patient Position: Sitting, BP Cuff Size: Adult)   Pulse 71   Temp 97.7 °F (36.5 °C) (Temporal)   Resp 15   Ht 5' 6\" (1.676 m)   Wt 143 lb 8 oz (65.1 kg)   SpO2 97%   BMI 23.16 kg/m²     ECOG PS: 0  General: No distress  Eyes: Anicteric sclerae  HENT: Atraumatic   Neck: Supple  Respiratory: CTAB, normal respiratory effort  CV: Normal rate, regular rhythm, no murmurs. GI: Soft, nontender, non-distended   MS: Normal gait and station.  Limited ROM in bilateral upper arms/shoulders  Skin: No rashes, ecchymoses, or petechiae. Normal temperature, turgor, and texture. Psych: Alert, oriented, appropriate affect, normal judgment/insight  Breast: Left lumpectomy site well healed with posterior skin thickening/XRT changes. No palpable masses on left or right breast  Neuro: Non focal    Results:     Lab Results   Component Value Date/Time    WBC 4.4 09/29/2022 07:58 AM    HGB 11.4 (L) 09/29/2022 07:58 AM    HCT 33.7 (L) 09/29/2022 07:58 AM    PLATELET 796 49/24/6597 07:58 AM    MCV 93.1 09/29/2022 07:58 AM    ABS. NEUTROPHILS 2.4 09/29/2022 07:58 AM     Lab Results   Component Value Date/Time    Sodium 136 09/29/2022 07:58 AM    Potassium 4.2 09/29/2022 07:58 AM    Chloride 103 09/29/2022 07:58 AM    CO2 30 09/29/2022 07:58 AM    Glucose 88 09/29/2022 07:58 AM    BUN 12 09/29/2022 07:58 AM    Creatinine 0.75 09/29/2022 07:58 AM    GFR est AA >60 09/29/2022 07:58 AM    GFR est non-AA >60 09/29/2022 07:58 AM    Calcium 9.8 09/29/2022 07:58 AM     Lab Results   Component Value Date/Time    Bilirubin, total 0.4 09/29/2022 07:58 AM    ALT (SGPT) 109 (H) 09/29/2022 07:58 AM    Alk. phosphatase 246 (H) 09/29/2022 07:58 AM    Protein, total 7.7 09/29/2022 07:58 AM    Albumin 3.4 (L) 09/29/2022 07:58 AM    Globulin 4.3 (H) 09/29/2022 07:58 AM     10/6/21  FINAL PATHOLOGIC DIAGNOSIS   Breast, left mass, core biopsy:   Invasive ductal carcinoma, favor grade 3 (see synoptic table)   INVASIVE CARCINOMA OF THE BREAST: Biopsy   TUMOR   Tumor Site: Clock position - 3 o'clock   Histologic Type:  Invasive carcinoma of no special type (ductal)   Glandular (Acinar) / Tubular Differentiation: Score 3   Nuclear Pleomorphism: Score 3   Mitotic Rate: Score 3   Overall Grade: Grade 3 (scores of 8 or 9)   Tumor Size: 13 mm   Ductal Carcinoma In Situ (DCIS): Not identified   Lymphovascular Invasion: Not identified   Microcalcifications: Not identified     Breast MRI 10/13/21  IMPRESSION:  RIGHT BREAST:  1. BI-RADS Category 1 - Negative. Rama Arellano LEFT BREAST:  1. Rounded mass with dermal extension measuring approximately 3.3 cm in diameter  in the lateral, deep 3rd of the left breast. There are areas of fluid/necrosis  within this mass. BI-RADS Category 6 - Known biopsy-proven malignancy. 2. Nonmasslike enhancement in the left breast which is inferior, medial and  anterior to the primary mass. This nonmasslike enhancement extends over  approximately 6 x 5.5 x 4 cm. There are areas of fluid attenuation which could  represent cysts and/or necrosis within this nonmasslike enhancement. BI-RADS Category 5 - Highly suggestive of malignancy. 3. There is axillary adenopathy, including a lymph node deep to pectoralis  minor. BI-RADS Category 5 - Highly suggestive of malignancy    10/19/21  FINAL PATHOLOGIC DIAGNOSIS   Lymph node, left axillary, core biopsy:   Metastatic breast cancer, measuring up to 17 mm in a single core     10/21/21    ECHO ADULT COMPLETE 10/21/2021 10/21/2021    Interpretation Summary  · LV: Estimated LVEF is 55 - 60%. Normal cavity size, wall thickness and systolic function (ejection fraction normal). Wall motion: normal. Abnormal left ventricular strain. Global longitudinal strain is -15.4%. · TV: Pulmonary hypertension not suggested by Doppler findings. 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 No residual enhancement in multicentric, multifocal left breast carcinoma. 2. Significantly decreased left axillary flori metastases. Left axillary lymph  nodes are no longer pathologically enlarged. 3. No suspicious right breast enhancement or lymphadenopathy.   4. BI-RADS Assessment Category 6: Known biopsy proven malignancy. 03/11/22    ECHO ADULT FOLLOW-UP OR LIMITED 03/15/2022 3/15/2022    Interpretation Summary    Left Ventricle: Left ventricle size is normal. Increased wall thickness. Normal wall motion. Normal left ventricular systolic function. EF by 2D Simpsons Biplane is 60%. Global longitudinal strain is normal with a value of -22.3%. Normal diastolic function. Signed by: Jackelyn Mariee MD on 3/15/2022 10:36 AM    4/21/22  FINAL PATHOLOGIC DIAGNOSIS   1. Left breast skin, excision:   Benign skin   2. Left axillary sentinel node #1, excision:   One benign lymph node with scar, biopsy site and clip (0/1)   3. Left axillary sentinel node #2, excision:   One benign lymph node without scar (0/1)   4. Left axillary sentinel node #3, excision:   Two benign lymph nodes without scar (0/2)   5. Left breast, lumpectomy:   No residual invasive or in situ carcinoma status post neoadjuvant therapy (ypT0)   Two fibrocalcific tumor beds with biopsy sites   6. Left breast skin #2, excision:   Benign skin and subcutis   7. Right breast skin and tissue, 21 g, excision:   Benign mammary tissue and skin     06/08/22    ECHO ADULT FOLLOW-UP OR LIMITED 06/10/2022 6/10/2022    Interpretation Summary    Left Ventricle: Normal left ventricular systolic function. EF by 2D Simpsons Biplane is 56%. Left ventricle size is normal. Normal wall thickness. Normal wall motion. Global longitudinal strain is normal. GLS today: -17.1%, 3/11/2022 -22.3%, 12/6/2021 -20.3%, 10/21/2021 -15.4%. Signed by: Jackelyn Mariee MD on 6/10/2022 12:51 PM    08/08/22    ECHO ADULT COMPLETE 08/17/2022 8/17/2022    Interpretation Summary  Formatting of this result is different from the original.      Left Ventricle: Normal left ventricular systolic function. EF by 2D Simpsons Biplane is 59%. Left ventricle size is normal. Normal wall thickness. Normal wall motion.  Global longitudinal strain is -18.7%, 06/08/2022 -17.1%, 03/11/2022 -22.3%, 12/16/2021 -20.3%, 10/21/2021 -15.4%. .    Mitral Valve: Mild regurgitation. Tricuspid Valve: Mild regurgitation. Pulmonic Valve: Mild regurgitation. Signed by: Mandy Camargo MD on 8/17/2022 11:09 PM    CT C/A/P 8/9/22  IMPRESSION:  Soft tissue mass in the left chest wall just anterior to the pectoralis muscle  is nonspecific, was not present on the breast MR performed in February   Otherwise, there is no evidence of mass lesion or metastatic foci is in the  chest, abdomen or pelvis. No acute intraperitoneal/intrathoracic process is identified. Records reviewed and summarized above. Pathology report(s) reviewed above. Radiology report(s) reviewed above. Assessment:/PLAN     1) Clinical Stage 2B T3N0 LEFT Breast Cancer ER/KS negative Her2+ post Lumpectomy 4/21/22 with pCR  MRI Breast 10/13/21 showed a rounded mass with central fluid attenuation/necrosis in the lateral aspect of the left breast, deep 3rd, measuring approximately 3.3 cm in diameter with enhancement extending along the adjacent dermis suggesting skin invasion. There is nonmasslike enhancement extending both inferior, medial and anterior to the primary mass over approximately 6 x 5.5 x 4 cm. There are discontinuous areas of fluid attenuation associated with the nonmasslike enhancement which may represent cystic spaces and/or necrosis. There is an enlarged, left axillary lymph node with other adjacent, prominent lymph nodes. There is a lymph node node deep to the pectoralis minor muscle which measures 1.1 x 0.7 cm  She had a left axillary LN biopsy on 10/19/21 and path showed metastatic beast cancer. Pre chemo ECHO showed EF 55-60% with abnormal left ventricular strain. Referred to Cardiology for further evaluation - has follow up ECHO on 12/6/21 per Cardio. Patient started neoadjuvant TCHP chemotherapy on 10/25/21. She completed 6 cycles of TCHP on 2/10/22.   Taxotere to 60 mg/m2 and Carbo to AUC 4 with Cycle 6 due to side effects. She had a post chemo Breast MRI on 2/21/22 that showed no residual enhancement in multicentric, multifocal left breast carcinoma, significantly decreased left axillary flori metastases, left axillary lymph nodes are no longer pathologically enlarged, and no suspicious right breast enhancement or lymphadenopathy. Personally reviewed MRI. Discussed MRI with patient. She started maintenance HP on 3/3/22. She had a follow up ECHO on 3/11/22 per Cardio and EF was 60%. She had a left lumpectomy on 4/21/22 and had pCR. Discussed path with patient. She had a follow up ECHO on 6/8/22 that was good with EF 56%. She started XRT on 6/16/22 and finished on 8/3/22 with Dr Ryan Rosa. She had follow up CTs C/A/P on 8/9/22 that showed a soft tissue mass in the left chest wall just anterior to the pectoralis muscle is nonspecific. She saw Breast Surgeon and had US in office that showed post op scar tissue with no worrisome findings. She finished maintenance Herceptin on 9/29/22. She is here today for 3 month follow up. No adjuvant hormonal therapy as ER/[place] negative. She is clinically healthy overall and stable today. 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 3D bilateral dx mammogram is scheduled for 2/1/23. Breast MRI is also scheduled for 2/1/23 per Surgery. She has follow ECHO on 2/15/23 with Cardiology. Referred to PT today for limited ROM/post op pain. Continue course of surveillance. Follow up in 3 months. Patient agrees with plan. 2) Postmenopausal  Continue routine GYN follow up.    3) Hx of Elevated LFTs  Unclear etiology - no recent ETOH or Tylenol use. She had follow up CTS on 8/9/22 that showed a few scattered hypodensities in the hepatic parenchyma far too small to characterize, otherwise negative in A/P.    4) Psychosocial  Mood good, coping well. 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 Her  is very supportive.    SW/NN support as needed. She is here alone today. Call if questions. Follow up in 3 months. I have personally performed a face to face diagnostic evaluation on this patient. I have reviewed and agree with care plan today. My findings are as follows:  Doing well overall. Not on any therapy from here    General: alert, cooperative, no distress   Mental  status: normal mood, behavior, speech, dress, motor activity, and thought processes, able to follow commands   HENT: NCAT   Neck: no visualized mass   Resp: no respiratory distress   Neuro: no gross deficits   Skin: no discoloration or lesions of concern on visible areas   Psychiatric: normal affect, consistent with stated mood, no evidence of hallucinations       Mammo up to date  Labs and routine HM with PCP  Continue course of surveillance    I appreciate the opportunity to participate in Ms. Yudy Landin's care.     Signed By: Sera Tolliver DO

## 2023-01-17 ENCOUNTER — OFFICE VISIT (OUTPATIENT)
Dept: ONCOLOGY | Age: 58
End: 2023-01-17
Payer: COMMERCIAL

## 2023-01-17 ENCOUNTER — DOCUMENTATION ONLY (OUTPATIENT)
Dept: ONCOLOGY | Age: 58
End: 2023-01-17

## 2023-01-17 VITALS
WEIGHT: 143.5 LBS | TEMPERATURE: 97.7 F | HEART RATE: 71 BPM | SYSTOLIC BLOOD PRESSURE: 127 MMHG | HEIGHT: 66 IN | OXYGEN SATURATION: 97 % | BODY MASS INDEX: 23.06 KG/M2 | DIASTOLIC BLOOD PRESSURE: 81 MMHG | RESPIRATION RATE: 15 BRPM

## 2023-01-17 DIAGNOSIS — Z98.890 S/P LUMPECTOMY, LEFT BREAST: ICD-10-CM

## 2023-01-17 DIAGNOSIS — Z78.0 POST-MENOPAUSAL: ICD-10-CM

## 2023-01-17 DIAGNOSIS — C50.912 MALIGNANT NEOPLASM OF LEFT BREAST IN FEMALE, ESTROGEN RECEPTOR NEGATIVE, UNSPECIFIED SITE OF BREAST (HCC): Primary | ICD-10-CM

## 2023-01-17 DIAGNOSIS — Z17.1 MALIGNANT NEOPLASM OF LEFT BREAST IN FEMALE, ESTROGEN RECEPTOR NEGATIVE, UNSPECIFIED SITE OF BREAST (HCC): Primary | ICD-10-CM

## 2023-01-17 DIAGNOSIS — M25.60 LIMITED JOINT RANGE OF MOTION (ROM): ICD-10-CM

## 2023-01-17 NOTE — PROGRESS NOTES
Referral to PT faxed to [facility] PT office, they will review and contact patient to schedule.

## 2023-01-17 NOTE — LETTER
1/17/2023    Patient: Ronny Duvall   YOB: 1965   Date of Visit: 1/17/2023     Ivonne Azul NP  [address] [place] [address]  Via In [facility] [address]    Dear Ivonne Azul NP,      Thank you for referring Ms. Surinder Landin to [address] [place] [address] for evaluation. My notes for this consultation are attached. If you have questions, please do not hesitate to call me. I look forward to following your patient along with you.       Sincerely,    John Duron, DO

## 2023-01-17 NOTE — PROGRESS NOTES
Verified Name and  of the patient. Chief Complaint   Patient presents with    Follow-up     3 month follow-up malignant neoplasm of left breast       Health maintenance will be addressed with Primary Care Provider at next visit. Vitals:    23 1040   BP: 127/81   Pulse: 71   Resp: 15   Temp: 97.7 °F (36.5 °C)   TempSrc: Temporal   SpO2: 97%   Weight: 143 lb 8 oz (65.1 kg)   Height: 5' 6\" (1.676 m)   PainSc:   0 - No pain       1. Have you been to the ER, urgent care clinic since your last visit? Hospitalized since your last visit? No    2. Have you seen or consulted any other health care providers outside of the [address] [place] [address] since your last visit? Include any pap smears or colon screening.  No

## 2023-01-20 DIAGNOSIS — C50.912 MALIGNANT NEOPLASM OF LEFT BREAST IN FEMALE, ESTROGEN RECEPTOR NEGATIVE, UNSPECIFIED SITE OF BREAST (HCC): Primary | ICD-10-CM

## 2023-01-20 DIAGNOSIS — Z17.1 MALIGNANT NEOPLASM OF LEFT BREAST IN FEMALE, ESTROGEN RECEPTOR NEGATIVE, UNSPECIFIED SITE OF BREAST (HCC): Primary | ICD-10-CM

## 2023-01-20 RX ORDER — ALPRAZOLAM 0.5 MG/1
0.5 TABLET ORAL
Qty: 12 TABLET | Refills: 0 | Status: SHIPPED | OUTPATIENT
Start: 2023-01-20

## 2023-01-26 ENCOUNTER — HOSPITAL ENCOUNTER (OUTPATIENT)
Dept: PHYSICAL THERAPY | Age: 58
Discharge: HOME | End: 2023-01-26
Payer: COMMERCIAL

## 2023-01-26 PROCEDURE — 97110 THERAPEUTIC EXERCISES: CPT | Performed by: PHYSICAL THERAPIST

## 2023-01-26 PROCEDURE — 97162 PT EVAL MOD COMPLEX 30 MIN: CPT | Performed by: PHYSICAL THERAPIST

## 2023-01-26 PROCEDURE — 97530 THERAPEUTIC ACTIVITIES: CPT | Performed by: PHYSICAL THERAPIST

## 2023-01-26 NOTE — PROGRESS NOTES
PT ONCOLOGY EVAL/DAILY NOTE    Patient Name: Sofia Wayen  Date:2023  : 1965  [x]  Patient  Verified  Payor: Ivania Carey / Plan: [facility] HMO/CHOICE PLUS/POS / Product Type: HMO /    In time:4:00 PM  Out time: 5:00 PM  Total Treatment Time (min): 60  Total Timed Codes (min): 45   Visit #:  20    Treatment Area: Pain in left shoulder [M25.512]  Neck pain [M54.2]  Left arm pain [M79.602]    SUBJECTIVE    Current symptoms/Complaints: Pt is going for an MRI next week and worried about getting into the right position d/t B shoulder tightness. Pt denies any injuries prior to starting breast cancer treatments. Pt reports continued tenderness in L axilla since lymph node biopsy in 10/2021. Pt states she has good support system,  has trouble reaching, emptying , and ends up using right UE instead. Pt had extensive lymph node removal in L axilla and nodes near and underneath clavicle. Subjective functional status:     Present Symptoms/History:   Past Medical History:   Diagnosis Date    Malignant neoplasm of left breast in female, estrogen receptor negative ([facility] [place] [address] 10/13/2021            Past Surgical History:   Procedure Laterality Date    HX BREAST LUMPECTOMY Left 2022     LEFT BREAST REDUCTION LUMPECTOMY WITH BRACKETED MAG SEED LOCALIZATION, LEFT BREAST SENTINEL NODE BIOPSY LEFT BREAST ONCOPLASTIC REDUCTION WITH RIGHT BREAST REDUCTION FOR SYMMETRY AND PORT REMOVAL performed by Jose Alberto Ham MD at [place] [address]    HX BREAST REDUCTION Bilateral 2022     .  performed by Pedro Pablo Rosa MD at [address] [place] [address] Right 10/2021     PORT INSERTION    DE UNLISTED PROCEDURE BREAST Left 10/06/21     Biopsy     Referring Provider: Jeff Pereira NP   Medical Oncologist: Dr Albertina Borges   Primary Care Physician: Ngozi Uribe NP  Next Appointment: April with Dr Albertina Borges  Diagnosis: L shoulder pain and stiffness  Precautions/Indications: lymphedema risk  History of Present Illness:  Treatment History:   LEFT Breast Biopsy 10/6/21: PATH - Invasive ductal carcinoma, favor grade 3  ER/MA negative, HER2+  Breast MRI 10/13/21: RIGHT BREAST: Background parenchymal enhancement: Mild. There is no suspicious masslike or nonmasslike enhancement within the right breast to indicate breast carcinoma. There are no suspicious internal mammary or axillary chain lymph nodes. LEFT BREAST: Background parenchymal enhancement: Mild There is a rounded mass with central fluid attenuation/necrosis in the lateral aspect of the left breast, deep 3rd, measuring approximately 3.3 cm in diameter with enhancement extending along the adjacent dermis suggesting skin invasion. There is nonmasslike enhancement extending both inferior, medial and anterior to the primary mass over approximately 6 x 5.5 x 4 cm. There are discontinuous areas of fluid attenuation associated with the nonmasslike enhancement which may represent cystic spaces and/or necrosis. There is an enlarged, left axillary lymph node with other adjacent, prominent lymph nodes. There is a lymph node node deep to the pectoralis minor muscle which measures 1.1 x 0.7 cm  Left Axillary LN Biopsy 10/19/21: PATH - Metastatic breast cancer, measuring up to 17 mm in a single core  ER/MA negative, HER2+  Neoadjuvant TCHP x 6 cycles from 10/21/21 - 2/10/22  DR Taxotere to 60 mg/m2 and Carbo to AUC 4 with Cycle 6 due to side effects  Breast Biopsy 10/27/21: PATH - 5 mm IDC with DCIS  Breast MRI 2/21/22: Right Breast: No suspicious enhancing foci. No axillary or internal mammary chain lymphadenopathy. A subclavian ported catheter, implanted in the posterior third of the upper inner quadrant, terminates in appropriate position in the SVC. Left Breast: No residual enhancement in the mass in the posterior third of the outer slightly upper left breast. The mass is no longer measurable.  There is a biopsy clip within scarring. No residual enhancement in the previously seen, extensive, non-masslike enhancement in the middle third of the central, lower, left breast. A biopsy clip remains. Axillary flori metastases have significantly decreased in size and are no longer pathologically enlarged. A biopsy clip is within an inferior axillary lymph node  Maintenance HP ([place]) 3/3/22 - 9/29/22 04/21/2022: LEFT Breast Lumpectomy and LEFT Excisional SLN. PATH:   LEFT BREAST: Benign skin. No residual invasive or in situ carcinoma status post neoadjuvant. LEFT breast skin #2: benign skin and subcutis. RIGHT BREAST: skin and tissue, 21g, benign mammary tissue and skin  LEFT Axillary: 4/4 benign LNs with scar. 1 LN biopsy site and clip  XRT 6/16/22 - 8/3/22 with Dr. Deisy Aggarwal:   Clinical: 2 ER/OK negative Her2+  Pathologic: ypT0 (pCR)    Pain Intensity:2 on a scale of 0-10  Pain Aggravating Factors: using L UE for reaching, pushing pulling  Pain Relieving Factors: \"not using\"          Has your activity changed since diagnosis?    yes  Limitations/Prior level of functioning: Pt did not have tightness and pain prior to lymph node removal in 4/2022  Dominant Hand: right handed  Personal Goals: \"get motion back\"  Home Situation (including environment, stairs, family support, if living alone, any DME used at home):  pt states good support system, no a.d required  Work Status: works full time, mostly desk work  Current meds: see chart  Barriers:    [x]N/A []pain []financial []time []transportation []other  Motivation: high  Substance use:   [x]N/A  []Alcohol []Tobacco []other:   Cognition: A & O x 4    Other:    OBJECTIVE       Skin/Soft Tissue: Skin of L upper quadrant dry, no signs of infection, incisions healed well    ROM:                                                                       R                     L     Scapulohumoral Control / Rhythm:     normal                   impaired  Able to eccentrically lower with good control?  Left: no            Right:    yes        Upper Extremity AROM:                                                                                R                                   L  Shoulder Flexion                     175                               112 with end range discomfort/tightness                                                     Shoulder Abduction                170                                  95 with end range discomfort/tightness  Shoulder Extension                 15                               11          Shoulder ER                            40                               21    with end range discomfort/tightness    Shoulder IR                             35                               20    with end range discomfort/tightness                                                                                                                                                         UPPER QUARTER                           MUSCLE STRENGTH  KEY                                                              L            R  0 - No Contraction        C1, C2 Neck Flex       4                                       1 - Trace                       C3 Side Flex          4           4                                                 2 - Poor                         C4 Sh Elev               4           4                                             3 - Fair                          C5 Deltoid/Biceps  4-              4                                   4 - Good                       C6 Wrist Ext           4            4                                            5 - Normal                     C7 Triceps             3+         4                                                                                  C8 Thumb Ext        4   4                                                                                          T1 Hand Inst            4             4 MMT: See above                                      L                      R  Shoulder flexion          2+                    4-           Shoulder ER                2                      4-    Shoulder IR                 2                      4-       Neurological: Sensations: Light touch:decreased to LT L axilla                   Palpation: TTP L subscap/lat/teres, UT/LS, axilla  Posture: IR at L  g-h jt, L protracted scapula               30 min Therapeutic Exercise:  [x] See flow sheet :   Rationale: increase ROM and increase strength to improve the patients ability to perform ADL's required for return to work and/or community      15 min Therapeutic Activity:  []  See flow sheet :   Rationale:  Pt education on side effects of breast cancer treatments, scapulohumeral mechanics, lymphedema risk reduction                With   [] TE   [] TA   [] neuro   [] other: Patient Education: [] Review HEP    [] Progressed/Changed HEP based on:   [x] positioning   [] body mechanics   [] transfers   [] heat/ice application    [x] other: posture     Other Objective/Functional Measures:       Pain Level (0-10 scale) post treatment: 1-2    ASSESSMENT/Changes in Function: Pt is a 61 y.o female who presents to PT with c/o's of painful use of L UE and weakness following treatments for breast cancer. Pt exhibits decreased L upper quadrant motion and strength, impaired posture.  Patient will benefit from skilled PT services to address functional mobility deficits, address ROM deficits, address strength deficits, analyze and address soft tissue restrictions, analyze and cue movement patterns, analyze and modify body mechanics/ergonomics, assess and modify postural abnormalities, and instruct in home and community integration to address rehab goals per plan of care  Goals:  See Plan of Care    PLAN  []  Upgrade activities as tolerated     [x]  Continue plan of care  []  Update interventions per flow sheet       [] Discharge due to:_  []  Other:_      Ezio Ortega, PT 1/26/2023  4:02 PM

## 2023-01-26 NOTE — PROGRESS NOTES
Physical Therapy at [facility],   a part of [facility]  [address]  [place], [address]  Phone: [phone]  Fax: [phone]    Plan of Care/Statement of Necessity for Physical Therapy Services  2-15    Patient name: Miracle Marquez  : 1965  Provider#: [id number]  Referral source: Tricia Ledezma NP      Medical/Treatment Diagnosis: Pain in left shoulder [M25.512]  Neck pain [M54.2]  Left arm pain [M79.602]     Prior Hospitalization: see medical history     Comorbidities: L breast cancer  Prior Level of Function: No difficulties or pain with use of L shoulder  Medications: Verified on Patient Summary List    Start of Care: 2023      Onset Date: 2022       The Plan of Care and following information is based on the information from the initial evaluation. Assessment/ key information: Pt is a 61 y.o female who presents to PT with c/o's of painful use of L UE and weakness following treatments for breast cancer. Pt exhibits decreased L upper quadrant motion and strength, impaired posture.  Patient will benefit from skilled PT services to address functional mobility deficits, address ROM deficits, address strength deficits, analyze and address soft tissue restrictions, analyze and cue movement patterns, analyze and modify body mechanics/ergonomics, assess and modify postural abnormalities, and instruct in home and community integration to address rehab goals per plan of care    Evaluation Complexity History MEDIUM  Complexity : 1-2 comorbidities / personal factors will impact the outcome/ POC ; Examination MEDIUM Complexity : 3 Standardized tests and measures addressing body structure, function, activity limitation and / or participation in recreation  ;Presentation LOW Complexity : Stable, uncomplicated  ;Clinical Decision Making MEDIUM Complexity : FOTO score of 26-74  Overall Complexity Rating: MEDIUM     Problem List: pain affecting function, decrease ROM, decrease strength, decrease ADL/ functional abilitiies, and decrease flexibility/ joint mobility   Treatment Plan may include any combination of the following: Therapeutic exercise, Neuromuscular reeducation, Manual therapy, Therapeutic activity, and Electric stim unattended   Patient / Family readiness to learn indicated by: asking questions and trying to perform skills  Persons(s) to be included in education: patient (P)  Barriers to Learning/Limitations: None  Patient Goal (s): more motion and no pain, get back to my gardening and exercise routine  Patient Self Reported Health Status: good  Rehabilitation Potential: good     Short Term Goals: To be accomplished in 2 treatments:  1) Pt will be Independent with skin care routine to decrease risk of infection. 2) Pt will know which signs and symptoms to report immediately to physician concerning their condition. Long Term Goals: To be accomplished in 20 treatments:  1) Pt will be have reduced pain and stiffness  when using L  UE for lifting, pushing, pulling, gripping, and ADL's involving use of L UE above shoulder level  2) Pt will be Independent with HEP for pain management, utilizing correct breath pattern, strengthening, and motion exercises. 3) Pt will utilize correct breath and movement patterns during all ADL's and exercises involving reaching above shoulder level in order to return to gardening, regular exercise sessions 3-4 xwk. 4) Pt will have increased motion for  L shoulder elevation, ER, and Abduction in order to perform all ADL's and to return to return to gardening, regular exercise sessions 3-4 xwk. Frequency / Duration: Patient to be seen 1-2 times per week for 20 treatments.      Patient/ Caregiver education and instruction: activity modification and exercises  [x]  Plan of care has been reviewed with RYLIE Brooks PT 1/26/2023   ________________________________________________________________________    I certify that the above Therapy Services are being furnished while the patient is under my care. I agree with the treatment plan and certify that this therapy is necessary.     [de-identified] Signature:____________________  Date:____________Time: _________      Karla Romero NP

## 2023-01-31 ENCOUNTER — HOSPITAL ENCOUNTER (OUTPATIENT)
Dept: PHYSICAL THERAPY | Age: 58
Discharge: HOME | End: 2023-01-31
Payer: COMMERCIAL

## 2023-01-31 PROCEDURE — 97140 MANUAL THERAPY 1/> REGIONS: CPT | Performed by: PHYSICAL THERAPIST

## 2023-01-31 PROCEDURE — 97110 THERAPEUTIC EXERCISES: CPT | Performed by: PHYSICAL THERAPIST

## 2023-01-31 NOTE — PROGRESS NOTES
PT DAILY TREATMENT NOTE 2-15    Patient Name: Julio Waite  Date:2023  : 1965  [x]  Patient  Verified  Payor: [employer]  / Plan: [employer] HMO/CHOICE PLUS/POS / Product Type: HMO /    In time: 11:05 AM  Out time: 12:15 PM  Total Treatment Time (min): 70  Visit #:   2    Treatment Area: Pain in left shoulder [M25.512]  Neck pain [M54.2]  Left arm pain [M79.602]    SUBJECTIVE  Pain Level (0-10 scale): 3  Any medication changes, allergies to medications, adverse drug reactions, diagnosis change, or new procedure performed?: [x] No    [] Yes (see summary sheet for update)  Subjective functional status/changes:   [] No changes reported  \"I think I already have more motion and less pain! \" Pt reports that she felt \"much better after last tx, her MRI is scheduled for tomorrow.     OBJECTIVE    Modality rationale: decrease pain and increase tissue extensibility to improve the patients ability to perform ADL's required for return to work and/or community     Min Type Additional Details       [] Estim: []Att   []Unatt    []TENS instruct                  []IFC  []Premod   []NMES                     []Other:  []w/US   []w/ice   []w/heat  Position:  Location:       []  Traction: [] Cervical       []Lumbar                       [] Prone          []Supine                       []Intermittent   []Continuous Lbs:  [] before manual  [] after manual  []w/heat    []  Ultrasound: []Continuous   [] Pulsed                       at: []1MHz   []3MHz Location:  W/cm2:    [] Paraffin         Location:   []w/heat   10 []  Ice     [x]  Heat  post tx  []  Ice massage Position:supine reclined  Location:C spine    []  Laser  []  Other: Position:  Location:      []  Vasopneumatic Device Pressure:       [] lo [] med [] hi   Temperature:      [x] Skin assessment post-treatment:  [x]intact [x]redness- no adverse reaction    []redness - adverse reaction:         30 min Therapeutic Exercise:  [x] See flow sheet : Rationale: increase ROM and increase strength to improve the patients ability to perform ADL's required for return to work and/or community           30 min Manual Therapy:  PROM L shoulder all planes to tolerance, TrP L UT/LS, pec minor, periscapular STM, scapular distraction, MFR L axilla, intercostals   Rationale: decrease pain, increase ROM, increase tissue extensibility, decrease trigger points, and increase postural awareness  to improve the patients ability to effectively use extremity during functional tasks (reaching, grooming, dressing, walking)             With   [] TE   [] TA   [] Neuro   [] SC   [] other: Patient Education: [x] Review HEP    [] Progressed/Changed HEP based on:   [] positioning   [] body mechanics   [] transfers   [] heat/ice application    [] other:      Other Objective/Functional Measures:   L shoulder flexion: 125 degrees Abd=101 degrees  ER=33 degrees    Pain Level (0-10 scale) post treatment: 0-1    ASSESSMENT/Changes in Function:   Pt exhibited improved L shoulder AROM and responded well to added manual treatment and stretches today with report of feeling much better. Patient will continue to benefit from skilled PT services to modify and progress therapeutic interventions, address functional mobility deficits, address ROM deficits, address strength deficits, analyze and address soft tissue restrictions, analyze and cue movement patterns, assess and modify postural abnormalities, and instruct in home and community integration to attain remaining goals.      []  See Plan of Care  []  See progress note/recertification  []  See Discharge Summary         Progress towards goals / Updated goals:       PLAN  [x]  Upgrade activities as tolerated     [x]  Continue plan of care  []  Update interventions per flow sheet       []  Discharge due to:_  []  Other:_      Racheal Lunsford, PT 1/31/2023

## 2023-02-01 ENCOUNTER — HOSPITAL ENCOUNTER (OUTPATIENT)
Dept: MRI IMAGING | Age: 58
Discharge: HOME | End: 2023-02-01
Attending: SURGERY
Payer: COMMERCIAL

## 2023-02-01 ENCOUNTER — TELEPHONE (OUTPATIENT)
Dept: SURGERY | Age: 58
End: 2023-02-01

## 2023-02-01 ENCOUNTER — HOSPITAL ENCOUNTER (OUTPATIENT)
Dept: MAMMOGRAPHY | Age: 58
Discharge: HOME | End: 2023-02-01
Attending: NURSE PRACTITIONER
Payer: COMMERCIAL

## 2023-02-01 VITALS — BODY MASS INDEX: 22.6 KG/M2 | WEIGHT: 140 LBS

## 2023-02-01 DIAGNOSIS — R93.89 ABNORMAL CHEST CT: ICD-10-CM

## 2023-02-01 DIAGNOSIS — C50.912 MALIGNANT NEOPLASM OF LEFT BREAST IN FEMALE, ESTROGEN RECEPTOR NEGATIVE, UNSPECIFIED SITE OF BREAST (HCC): ICD-10-CM

## 2023-02-01 DIAGNOSIS — Z17.1 MALIGNANT NEOPLASM OF LEFT BREAST IN FEMALE, ESTROGEN RECEPTOR NEGATIVE, UNSPECIFIED SITE OF BREAST (HCC): ICD-10-CM

## 2023-02-01 DIAGNOSIS — Z98.890 S/P LUMPECTOMY, LEFT BREAST: ICD-10-CM

## 2023-02-01 DIAGNOSIS — Z85.3 PERSONAL HISTORY OF BREAST CANCER: ICD-10-CM

## 2023-02-01 PROCEDURE — 74011000250 HC RX REV CODE- 250: Performed by: SURGERY

## 2023-02-01 PROCEDURE — 74011000258 HC RX REV CODE- 258: Performed by: SURGERY

## 2023-02-01 PROCEDURE — 77049 MRI BREAST C-+ W/CAD BI: CPT

## 2023-02-01 PROCEDURE — 74011250636 HC RX REV CODE- 250/636

## 2023-02-01 PROCEDURE — A9576 INJ PROHANCE MULTIPACK: HCPCS

## 2023-02-01 PROCEDURE — 77062 BREAST TOMOSYNTHESIS BI: CPT

## 2023-02-01 RX ORDER — SODIUM CHLORIDE 0.9 % (FLUSH) 0.9 %
10 SYRINGE (ML) INJECTION ONCE
Status: COMPLETED | OUTPATIENT
Start: 2023-02-01 | End: 2023-02-01

## 2023-02-01 RX ADMIN — SODIUM CHLORIDE, PRESERVATIVE FREE 10 ML: 5 INJECTION INTRAVENOUS at 09:52

## 2023-02-01 RX ADMIN — SODIUM CHLORIDE 100 ML: 9 INJECTION, SOLUTION INTRAVENOUS at 09:53

## 2023-02-01 RX ADMIN — GADOTERIDOL 12 ML: 279.3 INJECTION, SOLUTION INTRAVENOUS at 09:53

## 2023-02-06 ENCOUNTER — APPOINTMENT (OUTPATIENT)
Dept: PHYSICAL THERAPY | Age: 58
End: 2023-02-06
Payer: COMMERCIAL

## 2023-02-08 ENCOUNTER — APPOINTMENT (OUTPATIENT)
Dept: PHYSICAL THERAPY | Age: 58
End: 2023-02-08
Payer: COMMERCIAL

## 2023-02-13 ENCOUNTER — HOSPITAL ENCOUNTER (OUTPATIENT)
Dept: PHYSICAL THERAPY | Age: 58
Discharge: HOME | End: 2023-02-13
Payer: COMMERCIAL

## 2023-02-13 PROCEDURE — 97140 MANUAL THERAPY 1/> REGIONS: CPT

## 2023-02-13 PROCEDURE — 97110 THERAPEUTIC EXERCISES: CPT

## 2023-02-13 NOTE — PROGRESS NOTES
PT DAILY TREATMENT NOTE 2-15    Patient Name: Ed Espinoza  Date:2023  : 1965  [x]  Patient  Verified  Payor: Milvia Spaulding / Plan: [employer] HMO/CHOICE PLUS/POS / Product Type: HMO /    In time: 3:00 PM  Out time: 4:00 PM  Total Treatment Time (min): 60 (50 timed)  Visit #:   3    Treatment Area: Pain in left shoulder [M25.512]  Neck pain [M54.2]  Left arm pain [M79.602]    SUBJECTIVE  Pain Level (0-10 scale): 0/10 at rest, increased with movement  Any medication changes, allergies to medications, adverse drug reactions, diagnosis change, or new procedure performed?: [x] No    [] Yes (see summary sheet for update)  Subjective functional status/changes:   [] No changes reported  Patient reports her MRI went well and was able to lay prone instead of putting arm above head.      OBJECTIVE    Modality rationale: decrease pain and increase tissue extensibility to improve the patients ability to perform ADL's required for return to work and/or community     Min Type Additional Details       [] Estim: []Att   []Unatt    []TENS instruct                  []IFC  []Premod   []NMES                     []Other:  []w/US   []w/ice   []w/heat  Position:  Location:       []  Traction: [] Cervical       []Lumbar                       [] Prone          []Supine                       []Intermittent   []Continuous Lbs:  [] before manual  [] after manual  []w/heat    []  Ultrasound: []Continuous   [] Pulsed                       at: []1MHz   []3MHz Location:  W/cm2:    [] Paraffin         Location:   []w/heat   10 []  Ice     [x]  Heat  post tx  []  Ice massage Position:supine reclined  Location:C spine    []  Laser  []  Other: Position:  Location:      []  Vasopneumatic Device Pressure:       [] lo [] med [] hi   Temperature:      [x] Skin assessment post-treatment:  [x]intact [x]redness- no adverse reaction    []redness - adverse reaction:         25 min Therapeutic Exercise:  [x] See flow sheet : Rationale: increase ROM and increase strength to improve the patients ability to perform ADL's required for return to work and/or community           25 min Manual Therapy:  PROM L shoulder all planes to tolerance, TrP L UT/LS, pec minor, periscapular STM, scapular distraction, MFR L axilla, intercostals   Rationale: decrease pain, increase ROM, increase tissue extensibility, decrease trigger points, and increase postural awareness  to improve the patients ability to effectively use extremity during functional tasks (reaching, grooming, dressing, walking)             With   [] TE   [] TA   [] Neuro   [] SC   [] other: Patient Education: [x] Review HEP    [] Progressed/Changed HEP based on:   [] positioning   [] body mechanics   [] transfers   [] heat/ice application    [] other:      Other Objective/Functional Measures:       Pain Level (0-10 scale) post treatment: \"good\"    ASSESSMENT/Changes in Function:   Patient's shoulder ROM continues to improve. Experiences pain at end range PROM. Tolerated exercises without increased pain. Patient will continue to benefit from skilled PT services to modify and progress therapeutic interventions, address functional mobility deficits, address ROM deficits, address strength deficits, analyze and address soft tissue restrictions, analyze and cue movement patterns, assess and modify postural abnormalities, and instruct in home and community integration to attain remaining goals.      []  See Plan of Care  []  See progress note/recertification  []  See Discharge Summary         Progress towards goals / Updated goals:       PLAN  [x]  Upgrade activities as tolerated     [x]  Continue plan of care  []  Update interventions per flow sheet       []  Discharge due to:_  []  Other:_      Sloane Roper, PTA 2/13/2023

## 2023-02-15 ENCOUNTER — HOSPITAL ENCOUNTER (OUTPATIENT)
Dept: PHYSICAL THERAPY | Age: 58
Discharge: HOME | End: 2023-02-15
Payer: COMMERCIAL

## 2023-02-15 ENCOUNTER — ANCILLARY PROCEDURE (OUTPATIENT)
Dept: CARDIOLOGY CLINIC | Age: 58
End: 2023-02-15
Payer: COMMERCIAL

## 2023-02-15 VITALS
DIASTOLIC BLOOD PRESSURE: 81 MMHG | HEIGHT: 66 IN | WEIGHT: 140 LBS | SYSTOLIC BLOOD PRESSURE: 127 MMHG | BODY MASS INDEX: 22.5 KG/M2

## 2023-02-15 DIAGNOSIS — Z17.1 MALIGNANT NEOPLASM OF LEFT BREAST IN FEMALE, ESTROGEN RECEPTOR NEGATIVE, UNSPECIFIED SITE OF BREAST (HCC): ICD-10-CM

## 2023-02-15 DIAGNOSIS — Z51.81 ENCOUNTER FOR MONITORING CARDIOTOXIC DRUG THERAPY: ICD-10-CM

## 2023-02-15 DIAGNOSIS — R93.1 ABNORMAL ECHOCARDIOGRAM: ICD-10-CM

## 2023-02-15 DIAGNOSIS — C50.912 MALIGNANT NEOPLASM OF LEFT BREAST IN FEMALE, ESTROGEN RECEPTOR NEGATIVE, UNSPECIFIED SITE OF BREAST (HCC): ICD-10-CM

## 2023-02-15 DIAGNOSIS — Z79.899 ENCOUNTER FOR MONITORING CARDIOTOXIC DRUG THERAPY: ICD-10-CM

## 2023-02-15 LAB
ECHO AO ASC DIAM: 3.7 CM
ECHO AO ASCENDING AORTA INDEX: 2.15 CM/M2
ECHO AO ROOT DIAM: 3.4 CM
ECHO AO ROOT INDEX: 1.98 CM/M2
ECHO LA DIAMETER INDEX: 1.98 CM/M2
ECHO LA DIAMETER: 3.4 CM
ECHO LA TO AORTIC ROOT RATIO: 1
ECHO LA VOL 2C: 50 ML (ref 22–52)
ECHO LA VOL 4C: 23 ML (ref 22–52)
ECHO LA VOL BP: 37 ML (ref 22–52)
ECHO LA VOL/BSA BIPLANE: 22 ML/M2 (ref 16–34)
ECHO LA VOLUME AREA LENGTH: 39 ML
ECHO LA VOLUME INDEX A2C: 29 ML/M2 (ref 16–34)
ECHO LA VOLUME INDEX A4C: 13 ML/M2 (ref 16–34)
ECHO LA VOLUME INDEX AREA LENGTH: 23 ML/M2 (ref 16–34)
ECHO LV EDV A2C: 63 ML
ECHO LV EDV A4C: 54 ML
ECHO LV EDV BP: 62 ML (ref 56–104)
ECHO LV EDV INDEX A4C: 31 ML/M2
ECHO LV EDV INDEX BP: 36 ML/M2
ECHO LV EDV NDEX A2C: 37 ML/M2
ECHO LV EJECTION FRACTION A2C: 68 %
ECHO LV EJECTION FRACTION A4C: 67 %
ECHO LV EJECTION FRACTION BIPLANE: 66 % (ref 55–100)
ECHO LV ESV A2C: 20 ML
ECHO LV ESV A4C: 18 ML
ECHO LV ESV BP: 21 ML (ref 19–49)
ECHO LV ESV INDEX A2C: 12 ML/M2
ECHO LV ESV INDEX A4C: 10 ML/M2
ECHO LV ESV INDEX BP: 12 ML/M2
ECHO LV FRACTIONAL SHORTENING: 44 % (ref 28–44)
ECHO LV GLOBAL LONGITUDINAL STRAIN (GLS): -21 %
ECHO LV INTERNAL DIMENSION DIASTOLE INDEX: 2.5 CM/M2
ECHO LV INTERNAL DIMENSION DIASTOLIC: 4.3 CM (ref 3.9–5.3)
ECHO LV INTERNAL DIMENSION SYSTOLIC INDEX: 1.4 CM/M2
ECHO LV INTERNAL DIMENSION SYSTOLIC: 2.4 CM
ECHO LV IVSD: 1.1 CM (ref 0.6–0.9)
ECHO LV MASS 2D: 162.9 G (ref 67–162)
ECHO LV MASS INDEX 2D: 94.7 G/M2 (ref 43–95)
ECHO LV POSTERIOR WALL DIASTOLIC: 1.1 CM (ref 0.6–0.9)
ECHO LV RELATIVE WALL THICKNESS RATIO: 0.51
ECHO LVOT AREA: 4.2 CM2
ECHO LVOT DIAM: 2.3 CM
ECHO RA END SYSTOLIC VOLUME APICAL 4 CHAMBER INDEX BSA: 16 ML/M2
ECHO RA VOLUME AREA LENGTH APICAL 4 CHAMBER: 28 ML
ECHO RA VOLUME: 27 ML
ECHO RV BASAL DIMENSION: 3.3 CM

## 2023-02-15 PROCEDURE — 97110 THERAPEUTIC EXERCISES: CPT

## 2023-02-15 PROCEDURE — 97140 MANUAL THERAPY 1/> REGIONS: CPT

## 2023-02-15 NOTE — PROGRESS NOTES
PT DAILY TREATMENT NOTE 2-15    Patient Name: Kristal Gabmoa  Date:2/15/2023  : 1965  [x]  Patient  Verified  Payor: [employer] / Plan: [employer] HMO/CHOICE PLUS/POS / Product Type: HMO /    In time: 4:00 PM  Out time: 5:05 PM  Total Treatment Time (min): 65 (55 timed)  Visit #:   4    Treatment Area: Pain in left shoulder [M25.512]  Neck pain [M54.2]  Left arm pain [M79.602]    SUBJECTIVE  Pain Level (0-10 scale): 0/10 at rest, increased with movement  Any medication changes, allergies to medications, adverse drug reactions, diagnosis change, or new procedure performed?: [x] No    [] Yes (see summary sheet for update)  Subjective functional status/changes:   [] No changes reported  Patient reports feeling about the same today.     OBJECTIVE    Modality rationale: decrease pain and increase tissue extensibility to improve the patients ability to perform ADL's required for return to work and/or community     Min Type Additional Details       [] Estim: []Att   []Unatt    []TENS instruct                  []IFC  []Premod   []NMES                     []Other:  []w/US   []w/ice   []w/heat  Position:  Location:       []  Traction: [] Cervical       []Lumbar                       [] Prone          []Supine                       []Intermittent   []Continuous Lbs:  [] before manual  [] after manual  []w/heat    []  Ultrasound: []Continuous   [] Pulsed                       at: []1MHz   []3MHz Location:  W/cm2:    [] Paraffin         Location:   []w/heat   10 []  Ice     [x]  Heat  post tx  []  Ice massage Position:supine reclined  Location:C spine    []  Laser  []  Other: Position:  Location:      []  Vasopneumatic Device Pressure:       [] lo [] med [] hi   Temperature:      [x] Skin assessment post-treatment:  [x]intact [x]redness- no adverse reaction    []redness - adverse reaction:         25 min Therapeutic Exercise:  [x] See flow sheet :   Rationale: increase ROM and increase strength to improve the patients ability to perform ADL's required for return to work and/or community           30 min Manual Therapy:  PROM L shoulder all planes to tolerance, TrP L UT/LS, pec minor, periscapular STM, scapular distraction, MFR L axilla, intercostals   Rationale: decrease pain, increase ROM, increase tissue extensibility, decrease trigger points, and increase postural awareness  to improve the patients ability to effectively use extremity during functional tasks (reaching, grooming, dressing, walking)             With   [] TE   [] TA   [] Neuro   [] SC   [] other: Patient Education: [x] Review HEP    [] Progressed/Changed HEP based on:   [] positioning   [] body mechanics   [] transfers   [] heat/ice application    [] other:      Other Objective/Functional Measures:       Pain Level (0-10 scale) post treatment: \"good\"    ASSESSMENT/Changes in Function:   Tight at end range flexion PROM, but no increased pain this visit. Tolerated progressed exercises well with good form. Patient will continue to benefit from skilled PT services to modify and progress therapeutic interventions, address functional mobility deficits, address ROM deficits, address strength deficits, analyze and address soft tissue restrictions, analyze and cue movement patterns, assess and modify postural abnormalities, and instruct in home and community integration to attain remaining goals.      []  See Plan of Care  []  See progress note/recertification  []  See Discharge Summary         Progress towards goals / Updated goals:       PLAN  [x]  Upgrade activities as tolerated     [x]  Continue plan of care  []  Update interventions per flow sheet       []  Discharge due to:_  []  Other:_      [employer], PTA 2/15/2023

## 2023-02-20 ENCOUNTER — APPOINTMENT (OUTPATIENT)
Dept: PHYSICAL THERAPY | Age: 58
End: 2023-02-20
Payer: COMMERCIAL

## 2023-02-22 ENCOUNTER — APPOINTMENT (OUTPATIENT)
Dept: PHYSICAL THERAPY | Age: 58
End: 2023-02-22
Payer: COMMERCIAL

## 2023-02-27 ENCOUNTER — HOSPITAL ENCOUNTER (OUTPATIENT)
Dept: PHYSICAL THERAPY | Age: 58
Discharge: HOME | End: 2023-02-27
Payer: COMMERCIAL

## 2023-02-27 PROCEDURE — 97140 MANUAL THERAPY 1/> REGIONS: CPT

## 2023-02-27 PROCEDURE — 97110 THERAPEUTIC EXERCISES: CPT

## 2023-02-27 NOTE — PROGRESS NOTES
PT DAILY TREATMENT NOTE 2-15    Patient Name: Landy Oliveros  Date:2023  : 1965  [x]  Patient  Verified  Payor: Tara Natasha / Plan: [employer] HMO/CHOICE PLUS/POS / Product Type: HMO /    In time: 4:00 PM  Out time: 5:05 PM  Total Treatment Time (min): 65 (55 timed)  Visit #:   5    Treatment Area: Pain in left shoulder [M25.512]  Neck pain [M54.2]  Left arm pain [M79.602]    SUBJECTIVE  Pain Level (0-10 scale): 0/10 at rest, tight  Any medication changes, allergies to medications, adverse drug reactions, diagnosis change, or new procedure performed?: [x] No    [] Yes (see summary sheet for update)  Subjective functional status/changes:   [] No changes reported  Patient reports she feels tight today. Put house on the market last week and didn't have a lot of time for her exercises.     OBJECTIVE    Modality rationale: decrease pain and increase tissue extensibility to improve the patients ability to perform ADL's required for return to work and/or community     Min Type Additional Details       [] Estim: []Att   []Unatt    []TENS instruct                  []IFC  []Premod   []NMES                     []Other:  []w/US   []w/ice   []w/heat  Position:  Location:       []  Traction: [] Cervical       []Lumbar                       [] Prone          []Supine                       []Intermittent   []Continuous Lbs:  [] before manual  [] after manual  []w/heat    []  Ultrasound: []Continuous   [] Pulsed                       at: []1MHz   []3MHz Location:  W/cm2:    [] Paraffin         Location:   []w/heat   10 []  Ice     [x]  Heat  post tx  []  Ice massage Position:supine reclined  Location:C spine    []  Laser  []  Other: Position:  Location:      []  Vasopneumatic Device Pressure:       [] lo [] med [] hi   Temperature:      [x] Skin assessment post-treatment:  [x]intact [x]redness- no adverse reaction    []redness - adverse reaction:         25 min Therapeutic Exercise:  [x] See flow sheet :   Rationale: increase ROM and increase strength to improve the patients ability to perform ADL's required for return to work and/or community           30 min Manual Therapy:  PROM L shoulder all planes to tolerance, TrP L UT/LS, pec minor, periscapular STM, scapular distraction, MFR L axilla, intercostals. scapular mobs   Rationale: decrease pain, increase ROM, increase tissue extensibility, decrease trigger points, and increase postural awareness  to improve the patients ability to effectively use extremity during functional tasks (reaching, grooming, dressing, walking)             With   [] TE   [] TA   [] Neuro   [] SC   [] other: Patient Education: [x] Review HEP    [] Progressed/Changed HEP based on:   [] positioning   [] body mechanics   [] transfers   [] heat/ice application    [] other:      Other Objective/Functional Measures:       Pain Level (0-10 scale) post treatment: \"good\"    ASSESSMENT/Changes in Function:   Increased tightness and tenderness in upper traps, scapulothoracic area, and end range shoulder flexion. Improved symptoms following treatment. Tolerated exercises well without increased pain, but challenged by doorway pec stretch. Patient will continue to benefit from skilled PT services to modify and progress therapeutic interventions, address functional mobility deficits, address ROM deficits, address strength deficits, analyze and address soft tissue restrictions, analyze and cue movement patterns, assess and modify postural abnormalities, and instruct in home and community integration to attain remaining goals.      []  See Plan of Care  []  See progress note/recertification  []  See Discharge Summary         Progress towards goals / Updated goals:       PLAN  [x]  Upgrade activities as tolerated     [x]  Continue plan of care  []  Update interventions per flow sheet       []  Discharge due to:_  []  Other:_      Amita Melton, PTA 2/27/2023

## 2023-03-01 ENCOUNTER — APPOINTMENT (OUTPATIENT)
Dept: PHYSICAL THERAPY | Age: 58
End: 2023-03-01
Payer: COMMERCIAL

## 2023-03-06 ENCOUNTER — HOSPITAL ENCOUNTER (OUTPATIENT)
Dept: PHYSICAL THERAPY | Age: 58
Discharge: HOME | End: 2023-03-06
Payer: COMMERCIAL

## 2023-03-06 PROCEDURE — 97110 THERAPEUTIC EXERCISES: CPT

## 2023-03-06 PROCEDURE — 97140 MANUAL THERAPY 1/> REGIONS: CPT

## 2023-03-06 NOTE — PROGRESS NOTES
PT DAILY TREATMENT NOTE 2-15    Patient Name: Jomar Grayson  Date:3/6/2023  : 1965  [x]  Patient  Verified  Payor: [employer] / Plan: [employer] HMO/CHOICE PLUS/POS / Product Type: HMO /    In time: 4:00 PM  Out time: 5:05 PM  Total Treatment Time (min): 65 (55 timed)  Visit #:   6    Treatment Area: Pain in left shoulder [M25.512]  Neck pain [M54.2]  Left arm pain [M79.602]    SUBJECTIVE  Pain Level (0-10 scale): 0/10 at rest, tight  Any medication changes, allergies to medications, adverse drug reactions, diagnosis change, or new procedure performed?: [x] No    [] Yes (see summary sheet for update)  Subjective functional status/changes:   [] No changes reported  Patient reports feeling okay today.     OBJECTIVE    Modality rationale: decrease pain and increase tissue extensibility to improve the patients ability to perform ADL's required for return to work and/or community     Min Type Additional Details       [] Estim: []Att   []Unatt    []TENS instruct                  []IFC  []Premod   []NMES                     []Other:  []w/US   []w/ice   []w/heat  Position:  Location:       []  Traction: [] Cervical       []Lumbar                       [] Prone          []Supine                       []Intermittent   []Continuous Lbs:  [] before manual  [] after manual  []w/heat    []  Ultrasound: []Continuous   [] Pulsed                       at: []1MHz   []3MHz Location:  W/cm2:    [] Paraffin         Location:   []w/heat   10 []  Ice     [x]  Heat  post tx  []  Ice massage Position:supine reclined  Location:C spine    []  Laser  []  Other: Position:  Location:      []  Vasopneumatic Device Pressure:       [] lo [] med [] hi   Temperature:      [x] Skin assessment post-treatment:  [x]intact [x]redness- no adverse reaction    []redness - adverse reaction:         25 min Therapeutic Exercise:  [x] See flow sheet :   Rationale: increase ROM and increase strength to improve the patients ability to perform ADL's required for return to work and/or community           30 min Manual Therapy:  PROM L shoulder all planes to tolerance, TrP L UT/LS, pec minor, periscapular STM, scapular distraction, MFR L axilla, intercostals. scapular mobs   Rationale: decrease pain, increase ROM, increase tissue extensibility, decrease trigger points, and increase postural awareness  to improve the patients ability to effectively use extremity during functional tasks (reaching, grooming, dressing, walking)             With   [] TE   [] TA   [] Neuro   [] SC   [] other: Patient Education: [x] Review HEP    [] Progressed/Changed HEP based on:   [] positioning   [] body mechanics   [] transfers   [] heat/ice application    [] other:      Other Objective/Functional Measures:       Pain Level (0-10 scale) post treatment: \"good\"    ASSESSMENT/Changes in Function:   Pt with improved TTP in scapular musculature this visit. Continued tightness in PROM shoulder flexion. Patient will continue to benefit from skilled PT services to modify and progress therapeutic interventions, address functional mobility deficits, address ROM deficits, address strength deficits, analyze and address soft tissue restrictions, analyze and cue movement patterns, assess and modify postural abnormalities, and instruct in home and community integration to attain remaining goals.      []  See Plan of Care  []  See progress note/recertification  []  See Discharge Summary         Progress towards goals / Updated goals:       PLAN  [x]  Upgrade activities as tolerated     [x]  Continue plan of care  []  Update interventions per flow sheet       []  Discharge due to:_  []  Other:_      Anoop Barclay, PTA 3/6/2023

## 2023-03-07 ENCOUNTER — APPOINTMENT (OUTPATIENT)
Dept: PHYSICAL THERAPY | Age: 58
End: 2023-03-07
Payer: COMMERCIAL

## 2023-03-08 ENCOUNTER — HOSPITAL ENCOUNTER (OUTPATIENT)
Dept: PHYSICAL THERAPY | Age: 58
Discharge: HOME | End: 2023-03-08
Payer: COMMERCIAL

## 2023-03-08 PROCEDURE — 97110 THERAPEUTIC EXERCISES: CPT | Performed by: PHYSICAL THERAPIST

## 2023-03-08 PROCEDURE — 97140 MANUAL THERAPY 1/> REGIONS: CPT | Performed by: PHYSICAL THERAPIST

## 2023-03-08 NOTE — PROGRESS NOTES
PT DAILY TREATMENT NOTE 2-15    Patient Name: Katherine Lee  Date:3/8/2023  : 1965  [x]  Patient  Verified  Payor: [employer]j [employer] / Plan: [id number] HMO/CHOICE PLUS/POS / Product Type: HMO /    In time: 4:10 PM  Out time: 5:10 PM  Total Treatment Time (min): 60  Visit #:   7    Treatment Area: Pain in left shoulder [M25.512]  Neck pain [M54.2]  Left arm pain [M79.602]    SUBJECTIVE  Pain Level (0-10 scale): 0/10 at rest, tight  Any medication changes, allergies to medications, adverse drug reactions, diagnosis change, or new procedure performed?: [x] No    [] Yes (see summary sheet for update)  Subjective functional status/changes:   [] No changes reported  Patient reports \"continued stiffness in L side, I want it to feel normal again, I also continue to have     OBJECTIVE    Modality rationale: decrease pain and increase tissue extensibility to improve the patients ability to perform ADL's required for return to work and/or community     Min Type Additional Details       [] Estim: []Att   []Unatt    []TENS instruct                  []IFC  []Premod   []NMES                     []Other:  []w/US   []w/ice   []w/heat  Position:  Location:       []  Traction: [] Cervical       []Lumbar                       [] Prone          []Supine                       []Intermittent   []Continuous Lbs:  [] before manual  [] after manual  []w/heat    []  Ultrasound: []Continuous   [] Pulsed                       at: []1MHz   []3MHz Location:  W/cm2:    [] Paraffin         Location:   []w/heat   10 []  Ice     [x]  Heat  post tx  []  Ice massage Position:supine reclined  Location:C spine    []  Laser  []  Other: Position:  Location:      []  Vasopneumatic Device Pressure:       [] lo [] med [] hi   Temperature:      [x] Skin assessment post-treatment:  [x]intact [x]redness- no adverse reaction    []redness - adverse reaction:         25 min Therapeutic Exercise:  [x] See flow sheet :   Rationale: increase ROM and increase strength to improve the patients ability to perform ADL's required for return to work and/or community           30 min Manual Therapy:  PROM L shoulder all planes to tolerance, TrP L UT/LS, pec minor, periscapular STM, scapular distraction, MFR L axilla, intercostals. scapular mobs   Rationale: decrease pain, increase ROM, increase tissue extensibility, decrease trigger points, and increase postural awareness  to improve the patients ability to effectively use extremity during functional tasks (reaching, grooming, dressing, walking)             With   [] TE   [] TA   [] Neuro   [] SC   [] other: Patient Education: [x] Review HEP    [] Progressed/Changed HEP based on:   [] positioning   [] body mechanics   [] transfers   [] heat/ice application    [] other:      Other Objective/Functional Measures:       Pain Level (0-10 scale) post treatment: \"good\"    ASSESSMENT/Changes in Function:   Pt with improved TTP in scapular musculature this visit. Continued tightness in PROM shoulder flexion. Patient will continue to benefit from skilled PT services to modify and progress therapeutic interventions, address functional mobility deficits, address ROM deficits, address strength deficits, analyze and address soft tissue restrictions, analyze and cue movement patterns, assess and modify postural abnormalities, and instruct in home and community integration to attain remaining goals.      []  See Plan of Care  []  See progress note/recertification  []  See Discharge Summary         Progress towards goals / Updated goals:       PLAN  [x]  Upgrade activities as tolerated     [x]  Continue plan of care  []  Update interventions per flow sheet       []  Discharge due to:_  []  Other:_      Rik Rosen, PT, PTA 3/8/2023

## 2023-04-04 ENCOUNTER — OFFICE VISIT (OUTPATIENT)
Dept: PRIMARY CARE CLINIC | Age: 58
End: 2023-04-04
Payer: COMMERCIAL

## 2023-04-04 PROCEDURE — 99214 OFFICE O/P EST MOD 30 MIN: CPT

## 2023-04-04 NOTE — PROGRESS NOTES
[facility]   [address], [facility]1 [facility]  P: [phone]  F: [phone]    SUBJECTIVE     HPI:     Nasrin Smith is a 61 y.o. female who is seen in the clinic for   Chief Complaint   Patient presents with    Follow-up      She is an established patient here for a follow up. She has a history of left breast cancer. Left breast cancer: Followed by Dr Gerardo Perez, Oncologist. Diagnosed in October 2021. Chemotherapy completed, radiation completed August 3rd. HP therapy completed in 9/2022. She had altered sense of taste following chemotherapy and this has improved. 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 Patient stopped taking this as she did not like the way it made her feel. She notes that neuropathy has improved as well, no longer has a cold sensation in her feet. Left axillae can be painful and swollen at times. She wears the compression sleeve given to her by Oncology team as needed which helps. She is wondering if discomfort/swelling is normal and something she will have long term. She is wondering if she can take a medication to promote hair growth. She has tried Rogaine OTC. Recently had echocardiogram with Dr Noah Cagle, Cardiologist. EF 66%. She does not plan to follow up with Dr Noah Cagle as this result is normal and she has no other cardiac concerns. She has a hx of chronic left shoulder pain. She completed all of the appointments with PT and has noticed a benefit in her ROM and would like to continue PT. Her OB-GYN is Dr. Carter Abrams with [facility]. Pap smears and mammograms are UTD. She is post menopausal. She has never had a DEXA scan. Total cholesterol 205,  on labs from 9/2022. She is fasting today. She limits her intake of dairy.  She has gained 14 lbs since her taste has returned to normal.     Health screenings:  Pap smear UTD  Mammogram UTD  Never had Colonoscopy  Never had DEXA scan    Patient Active Problem List    Diagnosis    Impaired taste    Elevated LFTs    S/P lumpectomy, left breast    Hypokalemia    Anxiety about health    Chemotherapy-induced neuropathy (HCC)    Chemotherapy-induced nausea    Chemotherapy-induced fatigue    Port-A-Cath in place    Encounter for monitoring cardiotoxic drug therapy    Post-menopausal    Malignant neoplasm of left breast in female, estrogen receptor negative ([facility]     10/27/21: LEFT breast bx. PATH: IDC, 5mm, favor grade 3, ER-/CA-/HER2+(3)/Ki-67 70%. DCIS with high-grade nuclear features and comedonecrosis. Microcalcifications not present. 04/21/2022: LEFT breast lumpectomy and LEFT excisional SLN. PATH:   LEFT BREAST: Benign skin. No residual invasive or in situ carcinoma status post neoadjuvant. LEFT breast skin #2: benign skin and subcutis. RIGHT BREAST: skin and tissue, 21g, benign mammary tissue and skin  LEFT axillary: 4/4 benign LNs with scar. 1 LN biopsy site and clip. Past Medical History:   Diagnosis Date    History of chemotherapy     Malignant neoplasm of left breast in female, estrogen receptor negative ([facility] 10/13/2021    Radiation therapy complication      Past Surgical History:   Procedure Laterality Date    HX BREAST LUMPECTOMY Left 4/21/2022    LEFT BREAST REDUCTION LUMPECTOMY WITH BRACKETED MAG SEED LOCALIZATION, LEFT BREAST SENTINEL NODE BIOPSY LEFT BREAST ONCOPLASTIC REDUCTION WITH RIGHT BREAST REDUCTION FOR SYMMETRY AND PORT REMOVAL performed by Ashwin Garcias MD at [place] [address]    HX BREAST REDUCTION Bilateral 4/21/2022    .  performed by Mayela Luna MD at [place] [address]    HX VASCULAR ACCESS Right 10/2021    PORT INSERTION    CA UNLISTED PROCEDURE BREAST Left 10/06/21    Biopsy     Social History     Socioeconomic History    Marital status:      Spouse name: Not on file    Number of children: Not on file    Years of education: Not on file    Highest education level: Not on file   Occupational History    Not on file   Tobacco Use    Smoking status: Never    Smokeless tobacco: Never   Vaping Use    Vaping Use: Never used   Substance and Sexual Activity    Alcohol use: Not Currently     Alcohol/week: 0.0 standard drinks    Drug use: Never    Sexual activity: Yes     Partners: Male     Birth control/protection: None   Other Topics Concern    Not on file   Social History Narrative    Not on file     Social Determinants of Health     Financial Resource Strain: Low Risk     Difficulty of Paying Living Expenses: Not hard at all   Food Insecurity: No Food Insecurity    Worried About Running Out of Food in the Last Year: Never true    Ran Out of Food in the Last Year: Never true   Transportation Needs: Not on file   Physical Activity: Unknown    Days of Exercise per Week: 2 days    Minutes of Exercise per Session: Not on file   Stress: Not on file   Social Connections: Not on file   Intimate Partner Violence: Not At Risk    Fear of Current or Ex-Partner: No    Emotionally Abused: No    Physically Abused: No    Sexually Abused: No   Housing Stability: Not on file     Family History   Problem Relation Age of Onset    Cancer Mother         BREAST    Dementia Mother     Heart Disease Father     Anesth Problems Neg Hx      Immunization History   Administered Date(s) Administered    COVID-19, PFIZER PURPLE top, DILUTE for use, (age 15 y+), IM, 30mcg/0.3mL 10/06/2021, 04/01/2022    Tdap 12/02/2015      No Known Allergies    Ancillary Procedure on 02/15/2023   Component Date Value Ref Range Status    IVSd 02/15/2023 1.1 (A)  0.6 - 0.9 cm Final    LVIDd 02/15/2023 4.3  3.9 - 5.3 cm Final    LVIDs 02/15/2023 2.4  cm Final    LVOT Diameter 02/15/2023 2.3  cm Final    LVPWd 02/15/2023 1.1 (A)  0.6 - 0.9 cm Final    Global Longitudinal Strain 02/15/2023 -21.0  % Final    EF BP 02/15/2023 66  55 - 100 % Final    LV Ejection Fraction A2C 02/15/2023 68  % Final    LV Ejection Fraction A4C 02/15/2023 67  % Final    LV EDV A2C 02/15/2023 63  mL Final    LV EDV A4C 02/15/2023 54  mL Final    LV EDV BP 02/15/2023 62  56 - 104 mL Final    LV ESV A2C 02/15/2023 20  mL Final    LV ESV A4C 02/15/2023 18  mL Final    LV ESV BP 02/15/2023 21  19 - 49 mL Final    LA Diameter 02/15/2023 3.4  cm Final    LA Volume A/L 02/15/2023 39  mL Final    LA Volume 2C 02/15/2023 50  22 - 52 mL Final    LA Volume 4C 02/15/2023 23  22 - 52 mL Final    LA Volume BP 02/15/2023 37  22 - 52 mL Final    Ascending Aorta 02/15/2023 3.7  cm Final    Aortic Root 02/15/2023 3.4  cm Final    Fractional Shortening 2D 02/15/2023 44  28 - 44 % Final    LV ESV Index BP 02/15/2023 12  mL/m2 Final    LV EDV Index BP 02/15/2023 36  mL/m2 Final    LV ESV Index A4C 02/15/2023 10  mL/m2 Final    LV EDV Index A4C 02/15/2023 31  mL/m2 Final    LV ESV Index A2C 02/15/2023 12  mL/m2 Final    LV EDV Index A2C 02/15/2023 37  mL/m2 Final    LVIDd Index 02/15/2023 2.50  cm/m2 Final    LVIDs Index 02/15/2023 1.40  cm/m2 Final    LV RWT Ratio 02/15/2023 0.51   Final    LV Mass 2D 02/15/2023 162.9 (A)  67 - 162 g Final    LV Mass 2D Index 02/15/2023 94.7  43 - 95 g/m2 Final    LA Volume Index BP 02/15/2023 22  16 - 34 ml/m2 Final    LA Volume Index A/L 02/15/2023 23  16 - 34 mL/m2 Final    LVOT Area 02/15/2023 4.2  cm2 Final    LA Volume Index 2C 02/15/2023 29  16 - 34 mL/m2 Final    LA Volume Index 4C 02/15/2023 13 (A)  16 - 34 mL/m2 Final    LA Size Index 02/15/2023 1.98  cm/m2 Final    LA/AO Root Ratio 02/15/2023 1.00   Final    Ao Root Index 02/15/2023 1.98  cm/m2 Final    Ascending Aorta Index 02/15/2023 2.15  cm/m2 Final    RV Basal Dimension 02/15/2023 3.3  cm Final    RA Volume 02/15/2023 27  ml Final    RA Volume Index A4C 02/15/2023 16  mL/m2 Final    RA Volume A/L 02/15/2023 28  mL Final      ECHO ADULT FOLLOW-UP OR LIMITED    Left Ventricle: Normal left ventricular systolic function. EF by 2D   Simpsons Biplane is 66%.  Left ventricle size is normal. Mildly increased   wall thickness. Normal wall motion. Global longitudinal strain is normal   with a value of -21.0%, 08/08/2022 -18.7%, 06/08/2022 -17.1%, 03/11/2022   -22.3%, 12/16/2021 -20.3%, 10/21/2021 -15.4%. Current Outpatient Medications   Medication Sig Dispense Refill    multivitamin (ONE A DAY) tablet Take 1 Tablet by mouth daily. ALPRAZolam (XANAX) 0.5 mg tablet Take 1 Tablet by mouth three (3) times daily as needed for Anxiety (take as directed). Max Daily Amount: 1.5 mg. Indications: anxious (Patient not taking: Reported on 4/4/2023) 12 Tablet 0           The past medical history, past surgical history, and family history were reviewed and updated in the medical record. Lab values/Imaging were reviewed. The medications were reviewed and updated in the medical record. Immunizations were reviewed and updated in the medical record. All relevant preventative screenings reviewed and updated in the medical record. REVIEW OF SYSTEMS   Review of Systems   Constitutional:  Negative for chills, diaphoresis, fever, malaise/fatigue and weight loss. Respiratory:  Negative for cough, shortness of breath and wheezing. Cardiovascular:  Negative for chest pain, palpitations and leg swelling. Gastrointestinal:  Negative for constipation, diarrhea, nausea and vomiting. Genitourinary:  Negative for dysuria. Musculoskeletal:  Negative for falls and myalgias. Neurological:  Positive for tingling. Negative for headaches. Psychiatric/Behavioral:  Negative for depression. The patient is not nervous/anxious. PHYSICAL EXAM   /80 (BP 1 Location: Right upper arm, BP Patient Position: Sitting, BP Cuff Size: Adult)   Pulse 66   Temp 97.1 °F (36.2 °C) (Temporal)   Resp 18   Ht 5' 6\" (1.676 m)   Wt 154 lb 6.4 oz (70 kg)   SpO2 97%   BMI 24.92 kg/m²      Physical Exam  Vitals and nursing note reviewed. Constitutional:       General: She is not in acute distress. Appearance: Normal appearance. She is normal weight. She is not ill-appearing or toxic-appearing. Eyes:      Extraocular Movements: Extraocular movements intact. Conjunctiva/sclera: Conjunctivae normal.      Pupils: Pupils are equal, round, and reactive to light. Cardiovascular:      Rate and Rhythm: Normal rate and regular rhythm. Pulses: Normal pulses. Heart sounds: Normal heart sounds. No murmur heard. Pulmonary:      Effort: Pulmonary effort is normal. No respiratory distress. Breath sounds: Normal breath sounds. No stridor. No wheezing or rales. Musculoskeletal:      Right shoulder: No swelling, deformity, effusion, tenderness, bony tenderness or crepitus. Normal range of motion. Left shoulder: Tenderness present. No swelling, deformity, effusion, bony tenderness or crepitus. Decreased range of motion. Skin:     General: Skin is warm and dry. Capillary Refill: Capillary refill takes less than 2 seconds. Neurological:      General: No focal deficit present. Mental Status: She is alert and oriented to person, place, and time. Mental status is at baseline. Cranial Nerves: No cranial nerve deficit. Sensory: No sensory deficit. Motor: No weakness. Gait: Gait normal.      Comments: Neuropathy,bilateral feet   Psychiatric:         Mood and Affect: Mood normal.         Behavior: Behavior normal.          ASSESSMENT/ PLAN   Below is the assessment and plan developed based on review of pertinent history, physical exam, labs, studies, and medications. 1. Malignant neoplasm of left breast in female, estrogen receptor negative, unspecified site of breast ([facility]  - Radiation, chemotherapy, and HP all completed. She is followed by Dr Blayne Meneses, Oncologist. Has an upcoming appointment in 2 weeks. - Breast MRI ordered by Dr Ofe Quiroz, Breast Surgery, reviewed with patient.  Follow up in 2024.  - Patient has some anxiety about recurrence now that treatment is completed. - Advised to follow up with Dr Stacey Monzon re lumpectomy site swelling and frequency of wearing compression sleeve. - For hair growth, recommend continuing multivitamin. Can add Biotin supplement. Discussed that hair growth may take up to 1 year. 2. Chemotherapy-induced neuropathy ([address]  -     Not currently taking any medication for this, states it is not too bothersome. She will let me know if this changes in the future. Will check labs, pending.   - METABOLIC PANEL, COMPREHENSIVE; Future  -     CBC WITH AUTOMATED DIFF; Future  3. Chronic left shoulder pain  -     Patient has noticed a benefit since starting PT. Would like to continue. Referral placed. - REFERRAL TO PHYSICAL THERAPY  4. Mixed hyperlipidemia  -     Will recheck labs when patient is fasting.   - LIPID PANEL; Future  5. Colon cancer screening  -     I referred her to Dr Kaveh John, Gastroenterology, for screening.   - Given she has never had colonoscopy, will check FIT test.   - REFERRAL TO [address] [place] [address]; Future  6. Post-menopausal  -     Discussed indications for osteoporosis screening in menopause. Patient agreeable to have DEXA scan. Orders placed. - DEXA BONE DENSITY STUDY AXIAL; Future             Disclaimer:  Advised patient to call back or return to office if symptoms worsen/change/persist.  Discussed expected course/resolution/complications of diagnosis in detail with patient. Medication risks/benefits/alternatives discussed with patient. Patient was given an after visit summary which includes diagnoses, current medications, & vitals. Discussed patient instructions and advised to read to all patient instructions regarding care. Patient expressed understanding with the diagnosis and plan.        Amparomelody Bhatia NP  4/4/2023

## 2023-04-04 NOTE — PROGRESS NOTES
Identified pt with two pt identifiers(name and ). Chief Complaint   Patient presents with    Follow-up        3 most recent PHQ Screens 2023   Little interest or pleasure in doing things Not at all   Feeling down, depressed, irritable, or hopeless Not at all   Total Score PHQ 2 0   Trouble falling or staying asleep, or sleeping too much -   Feeling tired or having little energy -   Poor appetite, weight loss, or overeating -   Feeling bad about yourself - or that you are a failure or have let yourself or your family down -   Trouble concentrating on things such as school, work, reading, or watching TV -   Moving or speaking so slowly that other people could have noticed; or the opposite being so fidgety that others notice -   Thoughts of being better off dead, or hurting yourself in some way -   PHQ 9 Score -        Vitals:    23 1003   BP: 121/80   Pulse: 66   Resp: 18   Temp: 97.1 °F (36.2 °C)   TempSrc: Temporal   SpO2: 97%   Weight: 154 lb 6.4 oz (70 kg)   Height: 5' 6\" (1.676 m)   PainSc:   0 - No pain       Health Maintenance Due   Topic Date Due    COVID-19 Vaccine (3 - Booster for Pfizer series) 2022    Colorectal Cancer Screening Combo  Never done        1. Have you been to the ER, urgent care clinic since your last visit? Hospitalized since your last visit? No    2. Have you seen or consulted any other health care providers outside of the [address] [place] [address] since your last visit? Include any pap smears or colon screening. No    3. For patients aged 39-70: Has the patient had a colonoscopy / FIT/ Cologuard? No      If the patient is female:    4. For patients aged 41-77: Has the patient had a mammogram within the past 2 years? Yes - no Care Gap present      5. For patients aged 21-65: Has the patient had a pap smear?  Yes - no Care Gap present

## 2023-04-05 LAB
ALBUMIN SERPL-MCNC: 3.8 G/DL (ref 3.5–5)
ALBUMIN/GLOB SERPL: 1 (ref 1.1–2.2)
ALP SERPL-CCNC: 156 U/L (ref 45–117)
ALT SERPL-CCNC: 46 U/L (ref 12–78)
ANION GAP SERPL CALC-SCNC: 2 MMOL/L (ref 5–15)
AST SERPL-CCNC: 40 U/L (ref 15–37)
BASOPHILS # BLD: 0 K/UL (ref 0–0.1)
BASOPHILS NFR BLD: 0 % (ref 0–1)
BILIRUB SERPL-MCNC: 0.5 MG/DL (ref 0.2–1)
BUN SERPL-MCNC: 13 MG/DL (ref 6–20)
BUN/CREAT SERPL: 18 (ref 12–20)
CALCIUM SERPL-MCNC: 9.6 MG/DL (ref 8.5–10.1)
CHLORIDE SERPL-SCNC: 104 MMOL/L (ref 97–108)
CHOLEST SERPL-MCNC: 222 MG/DL
CO2 SERPL-SCNC: 31 MMOL/L (ref 21–32)
CREAT SERPL-MCNC: 0.71 MG/DL (ref 0.55–1.02)
DIFFERENTIAL METHOD BLD: ABNORMAL
EOSINOPHIL # BLD: 0.1 K/UL (ref 0–0.4)
EOSINOPHIL NFR BLD: 3 % (ref 0–7)
ERYTHROCYTE [DISTWIDTH] IN BLOOD BY AUTOMATED COUNT: 12.7 % (ref 11.5–14.5)
GLOBULIN SER CALC-MCNC: 3.9 G/DL (ref 2–4)
GLUCOSE SERPL-MCNC: 95 MG/DL (ref 65–100)
HCT VFR BLD AUTO: 39.5 % (ref 35–47)
HDLC SERPL-MCNC: 100 MG/DL
HDLC SERPL: 2.2 (ref 0–5)
HGB BLD-MCNC: 12.8 G/DL (ref 11.5–16)
IMM GRANULOCYTES # BLD AUTO: 0 K/UL (ref 0–0.04)
IMM GRANULOCYTES NFR BLD AUTO: 0 % (ref 0–0.5)
LDLC SERPL CALC-MCNC: 98.4 MG/DL (ref 0–100)
LYMPHOCYTES # BLD: 1.8 K/UL (ref 0.8–3.5)
LYMPHOCYTES NFR BLD: 39 % (ref 12–49)
MCH RBC QN AUTO: 30.8 PG (ref 26–34)
MCHC RBC AUTO-ENTMCNC: 32.4 G/DL (ref 30–36.5)
MCV RBC AUTO: 95 FL (ref 80–99)
MONOCYTES # BLD: 0.5 K/UL (ref 0–1)
MONOCYTES NFR BLD: 10 % (ref 5–13)
NEUTS SEG # BLD: 2.2 K/UL (ref 1.8–8)
NEUTS SEG NFR BLD: 48 % (ref 32–75)
NRBC # BLD: 0 K/UL (ref 0–0.01)
NRBC BLD-RTO: 0 PER 100 WBC
PLATELET # BLD AUTO: 132 K/UL (ref 150–400)
PMV BLD AUTO: 11.8 FL (ref 8.9–12.9)
POTASSIUM SERPL-SCNC: 4.1 MMOL/L (ref 3.5–5.1)
PROT SERPL-MCNC: 7.7 G/DL (ref 6.4–8.2)
RBC # BLD AUTO: 4.16 M/UL (ref 3.8–5.2)
SODIUM SERPL-SCNC: 137 MMOL/L (ref 136–145)
TRIGL SERPL-MCNC: 118 MG/DL (ref ?–150)
VLDLC SERPL CALC-MCNC: 23.6 MG/DL
WBC # BLD AUTO: 4.6 K/UL (ref 3.6–11)

## 2023-04-17 ENCOUNTER — OFFICE VISIT (OUTPATIENT)
Dept: ONCOLOGY | Age: 58
End: 2023-04-17
Payer: COMMERCIAL

## 2023-04-17 VITALS
RESPIRATION RATE: 18 BRPM | DIASTOLIC BLOOD PRESSURE: 85 MMHG | SYSTOLIC BLOOD PRESSURE: 137 MMHG | WEIGHT: 156.1 LBS | TEMPERATURE: 97.9 F | OXYGEN SATURATION: 99 % | HEART RATE: 68 BPM | BODY MASS INDEX: 25.09 KG/M2 | HEIGHT: 66 IN

## 2023-04-17 DIAGNOSIS — Z78.0 POST-MENOPAUSAL: ICD-10-CM

## 2023-04-17 DIAGNOSIS — R79.89 ELEVATED LFTS: ICD-10-CM

## 2023-04-17 DIAGNOSIS — Z98.890 S/P LUMPECTOMY, LEFT BREAST: ICD-10-CM

## 2023-04-17 DIAGNOSIS — Z17.1 MALIGNANT NEOPLASM OF LEFT BREAST IN FEMALE, ESTROGEN RECEPTOR NEGATIVE, UNSPECIFIED SITE OF BREAST (HCC): Primary | ICD-10-CM

## 2023-04-17 DIAGNOSIS — C50.912 MALIGNANT NEOPLASM OF LEFT BREAST IN FEMALE, ESTROGEN RECEPTOR NEGATIVE, UNSPECIFIED SITE OF BREAST (HCC): Primary | ICD-10-CM

## 2023-04-17 PROCEDURE — 99213 OFFICE O/P EST LOW 20 MIN: CPT | Performed by: INTERNAL MEDICINE

## 2023-04-17 NOTE — PROGRESS NOTES
Verified Name and  of the patient. Chief Complaint   Patient presents with    Follow-up     Malignant neoplasm of left breast        Health maintenance will be addressed with Primary Care Provider at next visit. Vitals:    23 0930 23 0933   BP: (!) 144/83 137/85   Pulse: 68    Resp: 18    Temp: 97.9 °F (36.6 °C)    TempSrc: Temporal    SpO2: 99%    Weight: 156 lb 1.6 oz (70.8 kg)    Height: 5' 6\" (1.676 m)    PainSc:   0 - No pain        1. Have you been to the ER, urgent care clinic since your last visit? Hospitalized since your last visit? No    2. Have you seen or consulted any other health care providers outside of the [address] [place] [address] since your last visit? Include any pap smears or colon screening.  No

## 2023-04-24 ENCOUNTER — HOSPITAL ENCOUNTER (OUTPATIENT)
Dept: CT IMAGING | Age: 58
Discharge: HOME | End: 2023-04-24
Attending: INTERNAL MEDICINE
Payer: COMMERCIAL

## 2023-04-24 DIAGNOSIS — Z17.1 MALIGNANT NEOPLASM OF LEFT BREAST IN FEMALE, ESTROGEN RECEPTOR NEGATIVE, UNSPECIFIED SITE OF BREAST (HCC): ICD-10-CM

## 2023-04-24 DIAGNOSIS — Z98.890 S/P LUMPECTOMY, LEFT BREAST: ICD-10-CM

## 2023-04-24 DIAGNOSIS — R79.89 ELEVATED LFTS: ICD-10-CM

## 2023-04-24 DIAGNOSIS — Z78.0 POST-MENOPAUSAL: ICD-10-CM

## 2023-04-24 DIAGNOSIS — C50.912 MALIGNANT NEOPLASM OF LEFT BREAST IN FEMALE, ESTROGEN RECEPTOR NEGATIVE, UNSPECIFIED SITE OF BREAST (HCC): ICD-10-CM

## 2023-04-24 PROCEDURE — 74011000250 HC RX REV CODE- 250: Performed by: INTERNAL MEDICINE

## 2023-04-24 PROCEDURE — 74177 CT ABD & PELVIS W/CONTRAST: CPT

## 2023-04-24 PROCEDURE — 74011000636 HC RX REV CODE- 636: Performed by: INTERNAL MEDICINE

## 2023-04-24 RX ORDER — BARIUM SULFATE 20 MG/ML
900 SUSPENSION ORAL
Status: COMPLETED | OUTPATIENT
Start: 2023-04-24 | End: 2023-04-24

## 2023-04-24 RX ADMIN — BARIUM SULFATE 900 ML: 20 SUSPENSION ORAL at 08:29

## 2023-04-24 RX ADMIN — IOHEXOL 100 ML: 350 INJECTION, SOLUTION INTRAVENOUS at 08:29

## 2023-05-03 ENCOUNTER — TELEPHONE (OUTPATIENT)
Dept: ONCOLOGY | Age: 58
End: 2023-05-03

## 2023-07-07 ENCOUNTER — TELEPHONE (OUTPATIENT)
Dept: PRIMARY CARE CLINIC | Facility: CLINIC | Age: 58
End: 2023-07-07

## 2023-07-14 ENCOUNTER — TELEPHONE (OUTPATIENT)
Age: 58
End: 2023-07-14

## 2023-07-14 DIAGNOSIS — C50.912 MALIGNANT NEOPLASM OF LEFT BREAST IN FEMALE, ESTROGEN RECEPTOR NEGATIVE, UNSPECIFIED SITE OF BREAST (HCC): Primary | ICD-10-CM

## 2023-07-14 DIAGNOSIS — Z17.1 MALIGNANT NEOPLASM OF LEFT BREAST IN FEMALE, ESTROGEN RECEPTOR NEGATIVE, UNSPECIFIED SITE OF BREAST (HCC): Primary | ICD-10-CM

## 2023-07-14 DIAGNOSIS — C50.912 MALIGNANT NEOPLASM OF LEFT BREAST IN FEMALE, ESTROGEN RECEPTOR NEGATIVE, UNSPECIFIED SITE OF BREAST (HCC): ICD-10-CM

## 2023-07-14 DIAGNOSIS — D69.6 THROMBOCYTOPENIA (HCC): ICD-10-CM

## 2023-07-14 DIAGNOSIS — Z17.1 MALIGNANT NEOPLASM OF LEFT BREAST IN FEMALE, ESTROGEN RECEPTOR NEGATIVE, UNSPECIFIED SITE OF BREAST (HCC): ICD-10-CM

## 2023-07-14 NOTE — TELEPHONE ENCOUNTER
Call to patient, [phone], left VM on non self identifying phone line that RN was sending Dehylov message regarding labs for upcoming visit with Provider on 7/18/23. [facility] requesting preferred [facility] location for CBC as ordered by GRACIELA Garcia.       ---- Message from FREDRICK Guthrie - NP sent at 7/14/2023 10:41 AM EDT -----  Regarding: RE: Labs  Please tell patient I ordered CBC to follow up on low PLTs from 4/23. No other labs needed. Send to her [employer] of choice. Thanks!

## 2023-07-17 NOTE — PROGRESS NOTES
[facility] at [address] , [address]  W: [phone]  F: [phone]    Reason for Visit:   Kia Parra is a 60 y.o. female who is seen for fu breast cancer    Treatment History:      LEFT Breast Biopsy 10/6/21: Eduardo Godwin - Invasive ductal carcinoma, favor grade 3    ER/HI negative, HER2+    Breast MRI 10/13/21: RIGHT BREAST:  Background parenchymal enhancement: Mild. There is no suspicious masslike or nonmasslike enhancement within the right  breast to indicate breast carcinoma. There are no suspicious internal mammary or axillary chain lymph nodes. LEFT BREAST: Background parenchymal enhancement: Mild  There is a rounded mass with central fluid attenuation/necrosis in the lateral aspect of  the left breast, deep 3rd, measuring approximately 3.3 cm in diameter with enhancement extending along the adjacent dermis suggesting skin invasion. There is nonmasslike enhancement extending both inferior, medial and anterior to the primary  mass over approximately 6 x 5.5 x 4 cm. There are discontinuous areas of fluid attenuation associated with the nonmasslike enhancement which  may represent cystic spaces and/or necrosis. There is an enlarged, left axillary lymph node with other adjacent, prominent lymph nodes. There is a lymph node node deep to the pectoralis  minor muscle which measures 1.1 x 0.7 cm    Left Axillary LN Biopsy 10/19/21: PATH - Metastatic breast cancer, measuring up to 17 mm in a single core    ER/HI negative, HER2+    Neoadjuvant TCHP x 6 cycles from 10/21/21 - 2/10/22    DR Taxotere to 60 mg/m2 and Carbo to AUC 4 with Cycle 6 due to side effects    Breast Biopsy 10/27/21: PATH - 5 mm IDC with DCIS    Breast MRI 2/21/22: Right Breast: No suspicious enhancing foci. No axillary or internal  mammary chain lymphadenopathy.  A subclavian ported catheter, implanted in the posterior third  of the upper inner quadrant, terminates in appropriate

## 2023-07-18 ENCOUNTER — OFFICE VISIT (OUTPATIENT)
Age: 58
End: 2023-07-18
Payer: COMMERCIAL

## 2023-07-18 VITALS
DIASTOLIC BLOOD PRESSURE: 74 MMHG | HEART RATE: 65 BPM | BODY MASS INDEX: 25.99 KG/M2 | OXYGEN SATURATION: 98 % | WEIGHT: 161 LBS | SYSTOLIC BLOOD PRESSURE: 127 MMHG | RESPIRATION RATE: 18 BRPM | TEMPERATURE: 98.3 F

## 2023-07-18 DIAGNOSIS — C50.912 MALIGNANT NEOPLASM OF LEFT BREAST IN FEMALE, ESTROGEN RECEPTOR NEGATIVE, UNSPECIFIED SITE OF BREAST (HCC): Primary | ICD-10-CM

## 2023-07-18 DIAGNOSIS — Z17.1 MALIGNANT NEOPLASM OF LEFT BREAST IN FEMALE, ESTROGEN RECEPTOR NEGATIVE, UNSPECIFIED SITE OF BREAST (HCC): Primary | ICD-10-CM

## 2023-07-18 DIAGNOSIS — Z98.890 S/P LUMPECTOMY, LEFT BREAST: ICD-10-CM

## 2023-07-18 DIAGNOSIS — R79.89 ELEVATED LFTS: ICD-10-CM

## 2023-07-18 LAB
BASOPHILS # BLD AUTO: 0 X10E3/UL (ref 0–0.2)
BASOPHILS NFR BLD AUTO: 0 %
EOSINOPHIL # BLD AUTO: 0.1 X10E3/UL (ref 0–0.4)
EOSINOPHIL NFR BLD AUTO: 3 %
ERYTHROCYTE [DISTWIDTH] IN BLOOD BY AUTOMATED COUNT: 12.6 % (ref 11.7–15.4)
HCT VFR BLD AUTO: 40.7 % (ref 34–46.6)
HGB BLD-MCNC: 13.4 G/DL (ref 11.1–15.9)
IMM GRANULOCYTES # BLD AUTO: 0 X10E3/UL (ref 0–0.1)
IMM GRANULOCYTES NFR BLD AUTO: 0 %
LYMPHOCYTES # BLD AUTO: 1.8 X10E3/UL (ref 0.7–3.1)
LYMPHOCYTES NFR BLD AUTO: 36 %
MCH RBC QN AUTO: 31.8 PG (ref 26.6–33)
MCHC RBC AUTO-ENTMCNC: 32.9 G/DL (ref 31.5–35.7)
MCV RBC AUTO: 96 FL (ref 79–97)
MONOCYTES # BLD AUTO: 0.4 X10E3/UL (ref 0.1–0.9)
MONOCYTES NFR BLD AUTO: 8 %
NEUTROPHILS # BLD AUTO: 2.7 X10E3/UL (ref 1.4–7)
NEUTROPHILS NFR BLD AUTO: 53 %
PLATELET # BLD AUTO: 161 X10E3/UL (ref 150–450)
RBC # BLD AUTO: 4.22 X10E6/UL (ref 3.77–5.28)
WBC # BLD AUTO: 5 X10E3/UL (ref 3.4–10.8)

## 2023-07-18 PROCEDURE — G8419 CALC BMI OUT NRM PARAM NOF/U: HCPCS | Performed by: INTERNAL MEDICINE

## 2023-07-18 PROCEDURE — 99213 OFFICE O/P EST LOW 20 MIN: CPT | Performed by: INTERNAL MEDICINE

## 2023-07-18 PROCEDURE — 4004F PT TOBACCO SCREEN RCVD TLK: CPT | Performed by: INTERNAL MEDICINE

## 2023-07-18 PROCEDURE — G8427 DOCREV CUR MEDS BY ELIG CLIN: HCPCS | Performed by: INTERNAL MEDICINE

## 2023-07-18 PROCEDURE — 3017F COLORECTAL CA SCREEN DOC REV: CPT | Performed by: INTERNAL MEDICINE

## 2023-07-18 NOTE — PROGRESS NOTES
Coretta Magana is a 60 y.o. female    Chief Complaint   Patient presents with    Follow-up     breast cancer       1. Have you been to the ER, urgent care clinic since your last visit? Hospitalized since your last visit? No    2. Have you seen or consulted any other health care providers outside of the [address] [place] [address] Avenue since your last visit? Include any pap smears or colon screening.  No

## 2023-07-28 LAB
ALBUMIN SERPL-MCNC: 4.5 G/DL (ref 3.8–4.9)
ALBUMIN/GLOB SERPL: 1.4 {RATIO} (ref 1.2–2.2)
ALP SERPL-CCNC: 179 IU/L (ref 44–121)
ALT SERPL-CCNC: 35 IU/L (ref 0–32)
AST SERPL-CCNC: 38 IU/L (ref 0–40)
BILIRUB SERPL-MCNC: 0.3 MG/DL (ref 0–1.2)
BUN SERPL-MCNC: 16 MG/DL (ref 6–24)
BUN/CREAT SERPL: 21 (ref 9–23)
CALCIUM SERPL-MCNC: 9.8 MG/DL (ref 8.7–10.2)
CHLORIDE SERPL-SCNC: 96 MMOL/L (ref 96–106)
CO2 SERPL-SCNC: 26 MMOL/L (ref 20–29)
CREAT SERPL-MCNC: 0.77 MG/DL (ref 0.57–1)
EGFRCR SERPLBLD CKD-EPI 2021: 89 ML/MIN/1.73
GLOBULIN SER CALC-MCNC: 3.2 G/DL (ref 1.5–4.5)
GLUCOSE SERPL-MCNC: 98 MG/DL (ref 70–99)
POTASSIUM SERPL-SCNC: 3.8 MMOL/L (ref 3.5–5.2)
PROT SERPL-MCNC: 7.7 G/DL (ref 6–8.5)
SODIUM SERPL-SCNC: 134 MMOL/L (ref 134–144)

## 2023-09-29 DIAGNOSIS — C50.912 MALIGNANT NEOPLASM OF LEFT BREAST IN FEMALE, ESTROGEN RECEPTOR NEGATIVE, UNSPECIFIED SITE OF BREAST (HCC): Primary | ICD-10-CM

## 2023-09-29 DIAGNOSIS — R79.89 ELEVATED LFTS: ICD-10-CM

## 2023-09-29 DIAGNOSIS — Z17.1 MALIGNANT NEOPLASM OF LEFT BREAST IN FEMALE, ESTROGEN RECEPTOR NEGATIVE, UNSPECIFIED SITE OF BREAST (HCC): ICD-10-CM

## 2023-09-29 DIAGNOSIS — C50.912 MALIGNANT NEOPLASM OF LEFT BREAST IN FEMALE, ESTROGEN RECEPTOR NEGATIVE, UNSPECIFIED SITE OF BREAST (HCC): ICD-10-CM

## 2023-09-29 DIAGNOSIS — Z17.1 MALIGNANT NEOPLASM OF LEFT BREAST IN FEMALE, ESTROGEN RECEPTOR NEGATIVE, UNSPECIFIED SITE OF BREAST (HCC): Primary | ICD-10-CM

## 2023-10-17 LAB
ALBUMIN SERPL-MCNC: 4.7 G/DL (ref 3.8–4.9)
ALBUMIN/GLOB SERPL: 1.3 {RATIO} (ref 1.2–2.2)
ALP SERPL-CCNC: 177 IU/L (ref 44–121)
ALT SERPL-CCNC: 43 IU/L (ref 0–32)
AST SERPL-CCNC: 40 IU/L (ref 0–40)
BASOPHILS # BLD AUTO: 0 X10E3/UL (ref 0–0.2)
BASOPHILS NFR BLD AUTO: 0 %
BILIRUB SERPL-MCNC: 0.4 MG/DL (ref 0–1.2)
BUN SERPL-MCNC: 18 MG/DL (ref 6–24)
BUN/CREAT SERPL: 23 (ref 9–23)
CALCIUM SERPL-MCNC: 10 MG/DL (ref 8.7–10.2)
CHLORIDE SERPL-SCNC: 99 MMOL/L (ref 96–106)
CO2 SERPL-SCNC: 24 MMOL/L (ref 20–29)
CREAT SERPL-MCNC: 0.77 MG/DL (ref 0.57–1)
EGFRCR SERPLBLD CKD-EPI 2021: 89 ML/MIN/1.73
EOSINOPHIL # BLD AUTO: 0.1 X10E3/UL (ref 0–0.4)
EOSINOPHIL NFR BLD AUTO: 1 %
ERYTHROCYTE [DISTWIDTH] IN BLOOD BY AUTOMATED COUNT: 12.4 % (ref 11.7–15.4)
GLOBULIN SER CALC-MCNC: 3.5 G/DL (ref 1.5–4.5)
GLUCOSE SERPL-MCNC: 102 MG/DL (ref 70–99)
HCT VFR BLD AUTO: 40.7 % (ref 34–46.6)
HGB BLD-MCNC: 14.1 G/DL (ref 11.1–15.9)
IMM GRANULOCYTES # BLD AUTO: 0 X10E3/UL (ref 0–0.1)
IMM GRANULOCYTES NFR BLD AUTO: 0 %
LYMPHOCYTES # BLD AUTO: 2.1 X10E3/UL (ref 0.7–3.1)
LYMPHOCYTES NFR BLD AUTO: 30 %
MCH RBC QN AUTO: 32.4 PG (ref 26.6–33)
MCHC RBC AUTO-ENTMCNC: 34.6 G/DL (ref 31.5–35.7)
MCV RBC AUTO: 94 FL (ref 79–97)
MONOCYTES # BLD AUTO: 0.8 X10E3/UL (ref 0.1–0.9)
MONOCYTES NFR BLD AUTO: 11 %
NEUTROPHILS # BLD AUTO: 4.2 X10E3/UL (ref 1.4–7)
NEUTROPHILS NFR BLD AUTO: 58 %
PLATELET # BLD AUTO: 166 X10E3/UL (ref 150–450)
POTASSIUM SERPL-SCNC: 3.6 MMOL/L (ref 3.5–5.2)
PROT SERPL-MCNC: 8.2 G/DL (ref 6–8.5)
RBC # BLD AUTO: 4.35 X10E6/UL (ref 3.77–5.28)
SODIUM SERPL-SCNC: 140 MMOL/L (ref 134–144)
WBC # BLD AUTO: 7.2 X10E3/UL (ref 3.4–10.8)

## 2023-10-24 ENCOUNTER — OFFICE VISIT (OUTPATIENT)
Age: 58
End: 2023-10-24
Payer: COMMERCIAL

## 2023-10-24 VITALS
TEMPERATURE: 98.2 F | WEIGHT: 171 LBS | SYSTOLIC BLOOD PRESSURE: 128 MMHG | DIASTOLIC BLOOD PRESSURE: 79 MMHG | HEART RATE: 75 BPM | BODY MASS INDEX: 27.6 KG/M2 | RESPIRATION RATE: 16 BRPM | OXYGEN SATURATION: 99 %

## 2023-10-24 DIAGNOSIS — R79.89 ELEVATED LFTS: ICD-10-CM

## 2023-10-24 DIAGNOSIS — Z78.0 POSTMENOPAUSAL: ICD-10-CM

## 2023-10-24 DIAGNOSIS — Z98.890 S/P LUMPECTOMY, LEFT BREAST: ICD-10-CM

## 2023-10-24 DIAGNOSIS — C50.912 MALIGNANT NEOPLASM OF LEFT BREAST IN FEMALE, ESTROGEN RECEPTOR NEGATIVE, UNSPECIFIED SITE OF BREAST (HCC): Primary | ICD-10-CM

## 2023-10-24 DIAGNOSIS — Z17.1 MALIGNANT NEOPLASM OF LEFT BREAST IN FEMALE, ESTROGEN RECEPTOR NEGATIVE, UNSPECIFIED SITE OF BREAST (HCC): Primary | ICD-10-CM

## 2023-10-24 DIAGNOSIS — K76.9 LIVER LESION: ICD-10-CM

## 2023-10-24 PROCEDURE — G8427 DOCREV CUR MEDS BY ELIG CLIN: HCPCS | Performed by: INTERNAL MEDICINE

## 2023-10-24 PROCEDURE — G8419 CALC BMI OUT NRM PARAM NOF/U: HCPCS | Performed by: INTERNAL MEDICINE

## 2023-10-24 PROCEDURE — 3017F COLORECTAL CA SCREEN DOC REV: CPT | Performed by: INTERNAL MEDICINE

## 2023-10-24 PROCEDURE — G8484 FLU IMMUNIZE NO ADMIN: HCPCS | Performed by: INTERNAL MEDICINE

## 2023-10-24 PROCEDURE — 4004F PT TOBACCO SCREEN RCVD TLK: CPT | Performed by: INTERNAL MEDICINE

## 2023-10-24 PROCEDURE — 99213 OFFICE O/P EST LOW 20 MIN: CPT | Performed by: INTERNAL MEDICINE

## 2023-10-24 NOTE — PROGRESS NOTES
Sara Merida is a 60 y.o. female    Chief Complaint   Patient presents with    Follow-up     breast cancer       1. Have you been to the ER, urgent care clinic since your last visit? Hospitalized since your last visit? No    2. Have you seen or consulted any other health care providers outside of the [address] [place] [address] Avenue since your last visit? Include any pap smears or colon screening.  No
Mammogram including 3-D Tomosynthesis    INDICATION:  Left breast carcinoma, post lumpectomy in 2022. COMPARISON:  2021, 2022. BREAST COMPOSITION: The breasts are heterogeneously dense, which may obscure  small masses. FINDINGS: Bilateral digital diagnostic mammography and tomography was performed,  and is interpreted in conjunction with a computer assisted detection (CAD)  system. There is a lumpectomy change in the left upper outer quadrant and  post-treatment skin thickening in the left breast. Post bilateral breast  reductions. No suspicious mass or calcification. Impression  1. BI-RADS 2: Benign. No mammographic evidence of malignancy. 2. Recommendation: Annual diagnostic mammography or tomography per lumpectomy  protocol. The results will be conveyed to the patient. MRI Result (most recent):  MRI BREAST BILATERAL W WO CONTRAST 02/01/2023    Narrative  This is a summary report. The complete report is available in the patient's medical record. If you cannot access the medical record, please contact the sending organization for a detailed fax or copy. EXAM:  MRI BREAST BI W WO CONT    INDICATION: Left breast carcinoma treated with neoadjuvant chemotherapy, left  lobectomy in April 2022, and postsurgical radiation. Bilateral reduction  mammoplasty in April, 2022. Evaluate for recurrence or new neoplasm. COMPARISON: MRI breast on 2/21/2022 and 10/13/2021. EXAM: MRI of right and left breast without and with IV contrast Gadolinium . TECHNIQUE: MRI breast without and with IV contrast. Bilateral breast MRI was  performed using a dedicated breast coil without compression with the patient in  the prone position. Precontrast T1-weighted images with fat suppression were  obtained followed by bolus injection of 12 mL of ProHance  performed on a 3  Tamar magnet. Postcontrast dynamic and high-resolution images were acquired. Axial T2 fat-saturated imaging was also performed.  The images were

## 2023-10-31 ENCOUNTER — HOSPITAL ENCOUNTER (OUTPATIENT)
Facility: HOSPITAL | Age: 58
Discharge: HOME | End: 2023-11-03
Payer: COMMERCIAL

## 2023-10-31 DIAGNOSIS — Z17.1 MALIGNANT NEOPLASM OF LEFT BREAST IN FEMALE, ESTROGEN RECEPTOR NEGATIVE, UNSPECIFIED SITE OF BREAST (HCC): ICD-10-CM

## 2023-10-31 DIAGNOSIS — C50.912 MALIGNANT NEOPLASM OF LEFT BREAST IN FEMALE, ESTROGEN RECEPTOR NEGATIVE, UNSPECIFIED SITE OF BREAST (HCC): ICD-10-CM

## 2023-10-31 DIAGNOSIS — R79.89 ELEVATED LFTS: ICD-10-CM

## 2023-10-31 PROCEDURE — 2500000003 HC RX 250 WO HCPCS: Performed by: NURSE PRACTITIONER

## 2023-10-31 PROCEDURE — 71260 CT THORAX DX C+: CPT

## 2023-10-31 PROCEDURE — 6360000004 HC RX CONTRAST MEDICATION: Performed by: NURSE PRACTITIONER

## 2023-10-31 RX ADMIN — BARIUM SULFATE 900 ML: 20 SUSPENSION ORAL at 08:57

## 2023-10-31 RX ADMIN — IOPAMIDOL 100 ML: 755 INJECTION, SOLUTION INTRAVENOUS at 08:57

## 2024-02-02 ENCOUNTER — HOSPITAL ENCOUNTER (OUTPATIENT)
Facility: HOSPITAL | Age: 59
Discharge: HOME | End: 2024-02-02
Payer: COMMERCIAL

## 2024-02-02 DIAGNOSIS — C50.912 MALIGNANT NEOPLASM OF LEFT BREAST IN FEMALE, ESTROGEN RECEPTOR NEGATIVE, UNSPECIFIED SITE OF BREAST (HCC): ICD-10-CM

## 2024-02-02 DIAGNOSIS — Z17.1 MALIGNANT NEOPLASM OF LEFT BREAST IN FEMALE, ESTROGEN RECEPTOR NEGATIVE, UNSPECIFIED SITE OF BREAST (HCC): ICD-10-CM

## 2024-02-02 DIAGNOSIS — Z98.890 S/P LUMPECTOMY, LEFT BREAST: ICD-10-CM

## 2024-02-02 PROCEDURE — G0279 TOMOSYNTHESIS, MAMMO: HCPCS

## 2024-02-02 PROCEDURE — 77066 DX MAMMO INCL CAD BI: CPT

## 2024-02-07 ENCOUNTER — OFFICE VISIT (OUTPATIENT)
Age: 59
End: 2024-02-07
Payer: COMMERCIAL

## 2024-02-07 DIAGNOSIS — Z85.3 HISTORY OF LEFT BREAST CANCER: Primary | ICD-10-CM

## 2024-02-07 PROCEDURE — 99213 OFFICE O/P EST LOW 20 MIN: CPT | Performed by: SURGERY

## 2024-02-07 NOTE — PROGRESS NOTES
HISTORY OF PRESENT ILLNESS  Luh Taylor is a 58 y.o. female     HPI ESTABLISHED patient here for annual follow up for LEFT breast cancer s/p LEFT lumpectomy, SLNBx, RIGHT reduction on 04/21/22. Denies any changes or breast concerns.      Mammogram Result (most recent):  [facility] DIGITAL DIAGNOSTIC BILATERAL 02/02/2024    Narrative  EXAM:  [facility] DIGITAL DIAGNOSTIC BILATERAL    INDICATION: No current breast complaint. Left breast cancer status post  lumpectomy in 2022.    COMPARISON: Prior studies dating back to 2021.    TECHNIQUE: Diagnostic bilateral digital MLO and CC views. Left breast spot  magnification views. 3-D/tomosynthesis views. Computer aided detection (CAD) was  utilized.    BREAST COMPOSITION: The breast tissue is heterogeneously dense, which could  obscure detection of small masses (approximately 51-75% glandular).    FINDINGS: Left breast lumpectomy changes are stable. There is no suspicious mass  or suspicious calcifications in the right or left breast. No skin thickening or  nipple retraction.    Impression  Stable left breast lumpectomy changes. No evidence for malignancy in  the right or left breast.    Recommendation:  Bilateral diagnostic mammography in one year.    BI-RADS Assessment Category 2: Benign finding.        The findings and recommendations were conveyed to the patient at the time of the  exam.      Review of Systems   All other systems reviewed and are negative.        Physical Exam       ASSESSMENT and PLAN  {Assessment and Plan Chronic Disease:[id number]}

## 2024-02-07 NOTE — PROGRESS NOTES
HISTORY OF PRESENT ILLNESS  Luh Taylor is a 58 y.o. female.    HPI  ESTABLISHED patient here for annual follow up for LEFT breast cancer s/p LEFT lumpectomy, SLNBx, RIGHT reduction on 04/21/22. Denies any changes or breast concerns.        Mammogram Result (most recent):  [facility] ESPERANZA DIGITAL DIAGNOSTIC BILATERAL 02/02/2024     Narrative  EXAM:  [facility] ESPERANZA DIGITAL DIAGNOSTIC BILATERAL     INDICATION: No current breast complaint. Left breast cancer status post  lumpectomy in 2022.     COMPARISON: Prior studies dating back to 2021.     TECHNIQUE: Diagnostic bilateral digital MLO and CC views. Left breast spot  magnification views. 3-D/tomosynthesis views. Computer aided detection (CAD) was  utilized.     BREAST COMPOSITION: The breast tissue is heterogeneously dense, which could  obscure detection of small masses (approximately 51-75% glandular).     FINDINGS: Left breast lumpectomy changes are stable. There is no suspicious mass  or suspicious calcifications in the right or left breast. No skin thickening or  nipple retraction.     Impression  Stable left breast lumpectomy changes. No evidence for malignancy in  the right or left breast.     Recommendation:  Bilateral diagnostic mammography in one year.     BI-RADS Assessment Category 2: Benign finding.           The findings and recommendations were conveyed to the patient at the time of the  exam.    10/27/21: LEFT breast bx. PATH: IDC, 5mm, favor grade 3, ER-/WA-/HER2+(3)/Ki-67 70%. DCIS with high-grade nuclear features and comedonecrosis. Microcalcifications not present.      04/21/2022: LEFT breast lumpectomy and LEFT excisional SLN. PATH:   - LEFT BREAST: Benign skin. No residual invasive or in situ carcinoma status post neoadjuvant. LEFT breast skin #2: benign skin and subcutis.  - RIGHT BREAST: skin and tissue, 21g, benign mammary tissue and skin  - LEFT axillary: 4/4 benign LNs with scar. 1 LN biopsy site and clip.    Past Medical History:   Diagnosis Date

## 2024-04-02 DIAGNOSIS — R79.89 ELEVATED LFTS: Primary | ICD-10-CM

## 2024-04-03 DIAGNOSIS — R79.89 ELEVATED LFTS: Primary | ICD-10-CM

## 2024-04-03 DIAGNOSIS — R79.89 ELEVATED LFTS: ICD-10-CM

## 2024-04-20 LAB
ALBUMIN SERPL-MCNC: 4.4 G/DL (ref 3.8–4.9)
ALBUMIN/GLOB SERPL: 1.3 {RATIO} (ref 1.2–2.2)
ALP SERPL-CCNC: 165 IU/L (ref 44–121)
ALT SERPL-CCNC: 27 IU/L (ref 0–32)
AST SERPL-CCNC: 29 IU/L (ref 0–40)
BILIRUB SERPL-MCNC: 0.5 MG/DL (ref 0–1.2)
BUN SERPL-MCNC: 9 MG/DL (ref 6–24)
BUN/CREAT SERPL: 13 (ref 9–23)
CALCIUM SERPL-MCNC: 9.4 MG/DL (ref 8.7–10.2)
CHLORIDE SERPL-SCNC: 100 MMOL/L (ref 96–106)
CO2 SERPL-SCNC: 26 MMOL/L (ref 20–29)
CREAT SERPL-MCNC: 0.72 MG/DL (ref 0.57–1)
EGFRCR SERPLBLD CKD-EPI 2021: 96 ML/MIN/1.73
GLOBULIN SER CALC-MCNC: 3.4 G/DL (ref 1.5–4.5)
GLUCOSE SERPL-MCNC: 89 MG/DL (ref 70–99)
POTASSIUM SERPL-SCNC: 3.8 MMOL/L (ref 3.5–5.2)
PROT SERPL-MCNC: 7.8 G/DL (ref 6–8.5)
SODIUM SERPL-SCNC: 138 MMOL/L (ref 134–144)

## 2024-04-22 NOTE — PROGRESS NOTES
[facility] [place] [facility]  [address], [address] [place] [address]  W: [phone]  F: [phone]    Reason for Visit:   Luh Taylor is a 58 y.o. female seen today in office for follow up of Left Breast Cancer    Treatment History:   LEFT Breast Biopsy 10/6/21: PATH - Invasive ductal carcinoma, favor grade 3  ER/DE negative, HER2+  Breast MRI 10/13/21: RIGHT BREAST:  Background parenchymal enhancement: Mild. There is no suspicious masslike or nonmasslike enhancement within the right  breast to indicate breast carcinoma. There are no suspicious internal mammary or axillary chain lymph nodes.  LEFT BREAST: Background parenchymal enhancement: Mild  There is a rounded mass with central fluid attenuation/necrosis in the lateral aspect of  the left breast, deep 3rd, measuring approximately 3.3 cm in diameter with enhancement extending along the adjacent dermis suggesting skin invasion.  There is nonmasslike enhancement extending both inferior, medial and anterior to the primary  mass over approximately 6 x 5.5 x 4 cm. There are discontinuous areas of fluid attenuation associated with the nonmasslike enhancement which  may represent cystic spaces and/or necrosis.  There is an enlarged, left axillary lymph node with other adjacent, prominent lymph nodes. There is a lymph node node deep to the pectoralis  minor muscle which measures 1.1 x 0.7 cm  Left Axillary LN Biopsy 10/19/21: PATH - Metastatic breast cancer, measuring up to 17 mm in a single core  ER/DE negative, HER2+  Neoadjuvant TCHP x 6 cycles from 10/21/21 - 2/10/22  DR Taxotere to 60 mg/m2 and Carbo to AUC 4 with Cycle 6 due to side effects  Breast Biopsy 10/27/21: PATH - 5 mm IDC with DCIS  Breast MRI 2/21/22: Right Breast: No suspicious enhancing foci. No axillary or internal  mammary chain lymphadenopathy. A subclavian ported catheter, implanted in the posterior third  of the upper inner quadrant, terminates in

## 2024-04-25 ENCOUNTER — OFFICE VISIT (OUTPATIENT)
Age: 59
End: 2024-04-25
Payer: COMMERCIAL

## 2024-04-25 VITALS
SYSTOLIC BLOOD PRESSURE: 122 MMHG | WEIGHT: 176 LBS | DIASTOLIC BLOOD PRESSURE: 79 MMHG | TEMPERATURE: 98.2 F | RESPIRATION RATE: 18 BRPM | OXYGEN SATURATION: 95 % | HEART RATE: 73 BPM | BODY MASS INDEX: 28.41 KG/M2

## 2024-04-25 DIAGNOSIS — L65.9 HAIR THINNING: ICD-10-CM

## 2024-04-25 DIAGNOSIS — Z98.890 S/P LUMPECTOMY, LEFT BREAST: ICD-10-CM

## 2024-04-25 DIAGNOSIS — Z17.1 MALIGNANT NEOPLASM OF LEFT BREAST IN FEMALE, ESTROGEN RECEPTOR NEGATIVE, UNSPECIFIED SITE OF BREAST (HCC): Primary | ICD-10-CM

## 2024-04-25 DIAGNOSIS — C50.912 MALIGNANT NEOPLASM OF LEFT BREAST IN FEMALE, ESTROGEN RECEPTOR NEGATIVE, UNSPECIFIED SITE OF BREAST (HCC): Primary | ICD-10-CM

## 2024-04-25 DIAGNOSIS — R79.89 ELEVATED LFTS: ICD-10-CM

## 2024-04-25 PROCEDURE — 99213 OFFICE O/P EST LOW 20 MIN: CPT | Performed by: INTERNAL MEDICINE

## 2024-04-25 RX ORDER — MAGNESIUM 30 MG
30 TABLET ORAL PRN
COMMUNITY

## 2024-04-25 RX ORDER — M-VIT,TX,IRON,MINS/CALC/FOLIC 27MG-0.4MG
1 TABLET ORAL DAILY
COMMUNITY

## 2024-04-25 RX ORDER — VITAMIN B COMPLEX
1 CAPSULE ORAL DAILY
COMMUNITY

## 2024-04-25 RX ORDER — NICOTINE POLACRILEX 2 MG
GUM BUCCAL
COMMUNITY

## 2024-04-25 RX ORDER — CALCIUM CARBONATE 500(1250)
500 TABLET ORAL DAILY
COMMUNITY

## 2024-04-25 RX ORDER — OMEGA-3S/DHA/EPA/FISH OIL/D3 300MG-1000
400 CAPSULE ORAL DAILY
COMMUNITY

## 2024-04-25 NOTE — PROGRESS NOTES
Luhmaximus Taylor is a 58 y.o. female    Chief Complaint   Patient presents with    Follow-up      Left Breast Cancer       1. Have you been to the ER, urgent care clinic since your last visit?  Hospitalized since your last visit?No    2. Have you seen or consulted any other health care providers outside of the [facility] since your last visit?  Include any pap smears or colon screening. No

## 2024-08-02 ENCOUNTER — HOSPITAL ENCOUNTER (OUTPATIENT)
Facility: HOSPITAL | Age: 59
Discharge: HOME | End: 2024-08-02
Payer: COMMERCIAL

## 2024-08-02 DIAGNOSIS — C50.912 MALIGNANT NEOPLASM OF LEFT BREAST IN FEMALE, ESTROGEN RECEPTOR NEGATIVE, UNSPECIFIED SITE OF BREAST (HCC): ICD-10-CM

## 2024-08-02 DIAGNOSIS — Z17.1 MALIGNANT NEOPLASM OF LEFT BREAST IN FEMALE, ESTROGEN RECEPTOR NEGATIVE, UNSPECIFIED SITE OF BREAST (HCC): ICD-10-CM

## 2024-08-02 DIAGNOSIS — Z98.890 S/P LUMPECTOMY, LEFT BREAST: ICD-10-CM

## 2024-08-02 PROCEDURE — 2580000003 HC RX 258: Performed by: NURSE PRACTITIONER

## 2024-08-02 PROCEDURE — A9579 GAD-BASE MR CONTRAST NOS,1ML: HCPCS | Performed by: NURSE PRACTITIONER

## 2024-08-02 PROCEDURE — 6360000004 HC RX CONTRAST MEDICATION: Performed by: NURSE PRACTITIONER

## 2024-08-02 PROCEDURE — C8908 MRI W/O FOL W/CONT, BREAST,: HCPCS

## 2024-08-02 RX ORDER — SODIUM CHLORIDE 0.9 % (FLUSH) 0.9 %
10 SYRINGE (ML) INJECTION ONCE
Status: COMPLETED | OUTPATIENT
Start: 2024-08-02 | End: 2024-08-02

## 2024-08-02 RX ORDER — 0.9 % SODIUM CHLORIDE 0.9 %
100 INTRAVENOUS SOLUTION INTRAVENOUS ONCE
Status: COMPLETED | OUTPATIENT
Start: 2024-08-02 | End: 2024-08-02

## 2024-08-02 RX ADMIN — GADOTERIDOL 17 ML: 279.3 INJECTION, SOLUTION INTRAVENOUS at 09:36

## 2024-08-02 RX ADMIN — SODIUM CHLORIDE, PRESERVATIVE FREE 10 ML: 5 INJECTION INTRAVENOUS at 09:36

## 2024-08-02 RX ADMIN — SODIUM CHLORIDE 100 ML: 9 INJECTION, SOLUTION INTRAVENOUS at 09:37

## 2024-10-28 ENCOUNTER — OFFICE VISIT (OUTPATIENT)
Age: 59
End: 2024-10-28
Payer: COMMERCIAL

## 2024-10-28 VITALS
SYSTOLIC BLOOD PRESSURE: 147 MMHG | OXYGEN SATURATION: 97 % | TEMPERATURE: 98.3 F | BODY MASS INDEX: 28.89 KG/M2 | HEART RATE: 74 BPM | RESPIRATION RATE: 18 BRPM | DIASTOLIC BLOOD PRESSURE: 86 MMHG | WEIGHT: 179 LBS

## 2024-10-28 DIAGNOSIS — Z17.1 MALIGNANT NEOPLASM OF LEFT BREAST IN FEMALE, ESTROGEN RECEPTOR NEGATIVE, UNSPECIFIED SITE OF BREAST (HCC): Primary | ICD-10-CM

## 2024-10-28 DIAGNOSIS — Z98.890 S/P LUMPECTOMY, LEFT BREAST: ICD-10-CM

## 2024-10-28 DIAGNOSIS — R79.89 ELEVATED LFTS: ICD-10-CM

## 2024-10-28 DIAGNOSIS — M25.50 ARTHRALGIA, UNSPECIFIED JOINT: ICD-10-CM

## 2024-10-28 DIAGNOSIS — M79.10 GENERALIZED MUSCLE ACHE: ICD-10-CM

## 2024-10-28 DIAGNOSIS — R51.9 CHRONIC NONINTRACTABLE HEADACHE, UNSPECIFIED HEADACHE TYPE: ICD-10-CM

## 2024-10-28 DIAGNOSIS — C50.912 MALIGNANT NEOPLASM OF LEFT BREAST IN FEMALE, ESTROGEN RECEPTOR NEGATIVE, UNSPECIFIED SITE OF BREAST (HCC): Primary | ICD-10-CM

## 2024-10-28 DIAGNOSIS — G89.29 CHRONIC NONINTRACTABLE HEADACHE, UNSPECIFIED HEADACHE TYPE: ICD-10-CM

## 2024-10-28 PROCEDURE — 99214 OFFICE O/P EST MOD 30 MIN: CPT | Performed by: INTERNAL MEDICINE

## 2024-10-28 RX ORDER — MINOXIDIL 5 %
SOLUTION, NON-ORAL TOPICAL 2 TIMES DAILY
COMMUNITY

## 2024-10-28 NOTE — PROGRESS NOTES
Luh Tyalor is a 59 y.o. female    Chief Complaint   Patient presents with    Follow-up     Left Breast Cancer       1. Have you been to the ER, urgent care clinic since your last visit?  Hospitalized since your last visit?No    2. Have you seen or consulted any other health care providers outside of the [facility] since your last visit?  Include any pap smears or colon screening. No    
Result (most recent):  CT CHEST ABDOMEN PELVIS W CONTRAST 10/31/2023    Narrative  INDICATION: Malignant neoplasm of unspecified site of left female breast;  Estrogen receptor negative status (ER-); Other specified abnormal findings of  blood chemistry    CT of the chest, abdomen and pelvis is performed with 5 mm collimation. Study is  performed with PO and 100 cc of nonionic IV Isovue-370. Sagittal and coronal  reformatted images were also performed.    CT dose reduction was achieved with the use of the standardized protocol  tailored for this examination and automatic exposure control for dose  modulation.    Direct comparison is made to prior CT examinations dated April 2023, August 2022.    Chest:    Breast\chest wall: Stable postoperative changes are noted within the left  breast.    Lungs: There is stable biapical scarring. Stable, very mild presumed radiation  therapy changes are noted within the anterior left upper lobe. No pulmonary  nodule or mass is visualized.    Lymph nodes: There is no mediastinal, hilar or axillary lymphadenopathy.    Pleura: There is no pleural fluid.    Heart: The heart is normal in size and there is no pericardial fluid.    Bones: No lytic or sclerotic osseous lesion is visualized.    Abdomen/pelvis:    Liver: The liver is stable. There are several subcentimeter hepatic  hypodensities, unchanged. No suspicious hepatic lesion is visualized.    Spleen: The spleen is normal.    Pancreas: The pancreas is normal.    Adrenals: The adrenals are normal.    Gallbladder: Gallbladder is normal.    Kidneys: The kidneys are normal. There is no hydronephrosis.    Lymph nodes: There is no miguel hepatis, mesenteric, retroperitoneal or pelvic  lymphadenopathy.    Bowel: No dilated or thickened loop of large or small bowel is seen.    Appendix: The appendix is normal.    Urinary bladder: Urinary bladder is partially filled and grossly normal.    Bones: No lytic or sclerotic osseous lesion is

## 2025-02-03 ENCOUNTER — HOSPITAL ENCOUNTER (OUTPATIENT)
Facility: HOSPITAL | Age: 60
Discharge: HOME | End: 2025-02-06
Attending: INTERNAL MEDICINE
Payer: COMMERCIAL

## 2025-02-03 VITALS — WEIGHT: 175 LBS | HEIGHT: 66 IN | BODY MASS INDEX: 28.12 KG/M2

## 2025-02-03 DIAGNOSIS — C50.912 MALIGNANT NEOPLASM OF LEFT BREAST IN FEMALE, ESTROGEN RECEPTOR NEGATIVE, UNSPECIFIED SITE OF BREAST (HCC): ICD-10-CM

## 2025-02-03 DIAGNOSIS — Z98.890 S/P LUMPECTOMY, LEFT BREAST: ICD-10-CM

## 2025-02-03 DIAGNOSIS — Z17.1 MALIGNANT NEOPLASM OF LEFT BREAST IN FEMALE, ESTROGEN RECEPTOR NEGATIVE, UNSPECIFIED SITE OF BREAST (HCC): ICD-10-CM

## 2025-02-03 PROCEDURE — G0279 TOMOSYNTHESIS, MAMMO: HCPCS

## 2025-04-22 NOTE — PROGRESS NOTES
[facility]   [address], [address]  [place] [address]  P: [phone]  F: [phone]    SUBJECTIVE     HPI:     Luh Taylor is a 59 y.o. female who is seen in the clinic to establish care.        The patient presents to the office today to establish care and annual exam. Patient has not been seen by PCP in 2 years, patient only seeing her oncologist. Patient was diagnosed with stage II breast cancer in 2021 and underwent a lumpectomy, chemotherapy, and radiation. Patient is currently seeing Derm for Minoxdil treatments for hair regrowth.     Patient reports seeing her Oncologist every 6 months and having 3D mammograms every 6 months as well for continued monitoring. Patient requesting PCP to order next mammogram.     Patient reports she had her last DEXA scan 2 years ago and showed Osteopenia with regular follow up. Patient reports she has not had a pap smear in 4 years and has never had a colonoscopy.     Patient reports some neuropathy in her feet and toes but it has gotten better over the last couple of years since stopping treatments. Patient reports it doesn't bother her as much any more.     Patient denies SOB, chest pain, palpitations, nausea, vomiting, diarrhea, or constipation.     Patient Active Problem List   Diagnosis    Chemotherapy-induced neuropathy    Port-A-Cath in place    Malignant neoplasm of left breast in female, estrogen receptor negative (HCC)    Post-menopausal    Chemotherapy-induced fatigue    Anxiety about health    Chemotherapy-induced nausea    Encounter for monitoring cardiotoxic drug therapy    Hypokalemia    S/P lumpectomy, left breast    Impaired taste    Elevated LFTs    Postmenopausal    Liver lesion        Past Medical History:   Diagnosis Date    Breast cancer (HCC) 10/2021    History of chemotherapy     History of therapeutic radiation     Malignant neoplasm of left breast in female, estrogen receptor negative (HCC) 10/13/2021    Osteopenia

## 2025-04-24 ENCOUNTER — OFFICE VISIT (OUTPATIENT)
Dept: PRIMARY CARE CLINIC | Facility: CLINIC | Age: 60
End: 2025-04-24
Payer: COMMERCIAL

## 2025-04-24 VITALS
HEART RATE: 66 BPM | OXYGEN SATURATION: 96 % | HEIGHT: 66 IN | BODY MASS INDEX: 29.89 KG/M2 | WEIGHT: 186 LBS | TEMPERATURE: 97.3 F | RESPIRATION RATE: 17 BRPM | SYSTOLIC BLOOD PRESSURE: 138 MMHG | DIASTOLIC BLOOD PRESSURE: 81 MMHG

## 2025-04-24 DIAGNOSIS — E55.9 VITAMIN D DEFICIENCY: ICD-10-CM

## 2025-04-24 DIAGNOSIS — Z00.00 ANNUAL PHYSICAL EXAM: ICD-10-CM

## 2025-04-24 DIAGNOSIS — Z00.00 ANNUAL PHYSICAL EXAM: Primary | ICD-10-CM

## 2025-04-24 DIAGNOSIS — Z85.3 HISTORY OF BREAST CANCER: ICD-10-CM

## 2025-04-24 DIAGNOSIS — Z12.11 SCREENING FOR COLON CANCER: ICD-10-CM

## 2025-04-24 DIAGNOSIS — Z12.4 SCREENING FOR CERVICAL CANCER: ICD-10-CM

## 2025-04-24 DIAGNOSIS — R79.89 ELEVATED LFTS: ICD-10-CM

## 2025-04-24 LAB
25(OH)D3 SERPL-MCNC: 118 NG/ML (ref 30–100)
ALBUMIN SERPL-MCNC: 3.8 G/DL (ref 3.5–5)
ALBUMIN/GLOB SERPL: 0.8 (ref 1.1–2.2)
ALP SERPL-CCNC: 153 U/L (ref 45–117)
ALT SERPL-CCNC: 47 U/L (ref 12–78)
ANION GAP SERPL CALC-SCNC: 5 MMOL/L (ref 2–12)
AST SERPL-CCNC: 37 U/L (ref 15–37)
BASOPHILS # BLD: 0.02 K/UL (ref 0–0.1)
BASOPHILS NFR BLD: 0.3 % (ref 0–1)
BILIRUB SERPL-MCNC: 0.8 MG/DL (ref 0.2–1)
BUN SERPL-MCNC: 14 MG/DL (ref 6–20)
BUN/CREAT SERPL: 18 (ref 12–20)
CALCIUM SERPL-MCNC: 9.7 MG/DL (ref 8.5–10.1)
CHLORIDE SERPL-SCNC: 100 MMOL/L (ref 97–108)
CHOLEST SERPL-MCNC: 246 MG/DL
CO2 SERPL-SCNC: 31 MMOL/L (ref 21–32)
CREAT SERPL-MCNC: 0.76 MG/DL (ref 0.55–1.02)
DIFFERENTIAL METHOD BLD: NORMAL
EOSINOPHIL # BLD: 0.13 K/UL (ref 0–0.4)
EOSINOPHIL NFR BLD: 2.1 % (ref 0–7)
ERYTHROCYTE [DISTWIDTH] IN BLOOD BY AUTOMATED COUNT: 12 % (ref 11.5–14.5)
EST. AVERAGE GLUCOSE BLD GHB EST-MCNC: 105 MG/DL
GLOBULIN SER CALC-MCNC: 4.5 G/DL (ref 2–4)
GLUCOSE SERPL-MCNC: 98 MG/DL (ref 65–100)
HBA1C MFR BLD: 5.3 % (ref 4–5.6)
HCT VFR BLD AUTO: 44 % (ref 35–47)
HDLC SERPL-MCNC: 72 MG/DL
HDLC SERPL: 3.4 (ref 0–5)
HGB BLD-MCNC: 14.9 G/DL (ref 11.5–16)
IMM GRANULOCYTES # BLD AUTO: 0.01 K/UL (ref 0–0.04)
IMM GRANULOCYTES NFR BLD AUTO: 0.2 % (ref 0–0.5)
LDLC SERPL CALC-MCNC: 127.4 MG/DL (ref 0–100)
LYMPHOCYTES # BLD: 1.97 K/UL (ref 0.8–3.5)
LYMPHOCYTES NFR BLD: 32.2 % (ref 12–49)
MCH RBC QN AUTO: 31.3 PG (ref 26–34)
MCHC RBC AUTO-ENTMCNC: 33.9 G/DL (ref 30–36.5)
MCV RBC AUTO: 92.4 FL (ref 80–99)
MONOCYTES # BLD: 0.59 K/UL (ref 0–1)
MONOCYTES NFR BLD: 9.7 % (ref 5–13)
NEUTS SEG # BLD: 3.39 K/UL (ref 1.8–8)
NEUTS SEG NFR BLD: 55.5 % (ref 32–75)
NRBC # BLD: 0 K/UL (ref 0–0.01)
NRBC BLD-RTO: 0 PER 100 WBC
PLATELET # BLD AUTO: 188 K/UL (ref 150–400)
PMV BLD AUTO: 11.9 FL (ref 8.9–12.9)
POTASSIUM SERPL-SCNC: 3.8 MMOL/L (ref 3.5–5.1)
PROT SERPL-MCNC: 8.3 G/DL (ref 6.4–8.2)
RBC # BLD AUTO: 4.76 M/UL (ref 3.8–5.2)
SODIUM SERPL-SCNC: 136 MMOL/L (ref 136–145)
TRIGL SERPL-MCNC: 233 MG/DL
VLDLC SERPL CALC-MCNC: 46.6 MG/DL
WBC # BLD AUTO: 6.1 K/UL (ref 3.6–11)

## 2025-04-24 PROCEDURE — 99213 OFFICE O/P EST LOW 20 MIN: CPT | Performed by: NURSE PRACTITIONER

## 2025-04-24 PROCEDURE — 99396 PREV VISIT EST AGE 40-64: CPT | Performed by: NURSE PRACTITIONER

## 2025-04-24 SDOH — HEALTH STABILITY: PHYSICAL HEALTH: ON AVERAGE, HOW MANY DAYS PER WEEK DO YOU ENGAGE IN MODERATE TO STRENUOUS EXERCISE (LIKE A BRISK WALK)?: 0 DAYS

## 2025-04-24 SDOH — ECONOMIC STABILITY: FOOD INSECURITY: WITHIN THE PAST 12 MONTHS, YOU WORRIED THAT YOUR FOOD WOULD RUN OUT BEFORE YOU GOT MONEY TO BUY MORE.: NEVER TRUE

## 2025-04-24 SDOH — ECONOMIC STABILITY: FOOD INSECURITY: WITHIN THE PAST 12 MONTHS, THE FOOD YOU BOUGHT JUST DIDN'T LAST AND YOU DIDN'T HAVE MONEY TO GET MORE.: NEVER TRUE

## 2025-04-24 ASSESSMENT — PATIENT HEALTH QUESTIONNAIRE - PHQ9
SUM OF ALL RESPONSES TO PHQ QUESTIONS 1-9: 0
SUM OF ALL RESPONSES TO PHQ QUESTIONS 1-9: 0
1. LITTLE INTEREST OR PLEASURE IN DOING THINGS: NOT AT ALL
2. FEELING DOWN, DEPRESSED OR HOPELESS: NOT AT ALL
SUM OF ALL RESPONSES TO PHQ QUESTIONS 1-9: 0
SUM OF ALL RESPONSES TO PHQ QUESTIONS 1-9: 0

## 2025-04-24 ASSESSMENT — ENCOUNTER SYMPTOMS
RHINORRHEA: 0
EYE DISCHARGE: 0
ABDOMINAL PAIN: 0
SORE THROAT: 0
COUGH: 0
NAUSEA: 0
CONSTIPATION: 0
SHORTNESS OF BREATH: 0
BACK PAIN: 0
VOMITING: 0
DIARRHEA: 0
CHEST TIGHTNESS: 0
COLOR CHANGE: 0

## 2025-04-24 NOTE — PROGRESS NOTES
Health Decision Maker has been checked with the patient          Chief Complaint   Patient presents with    New Patient    Annual Exam       \"Have you been to the ER, urgent care clinic since your last visit?  Hospitalized since your last visit?\"    NO    “Have you seen or consulted any other health care providers outside of [facility] since your last visit?”    NO      Vitals:    04/24/25 0807   Resp: 17   TempSrc: Temporal   Weight: 84.4 kg (186 lb)   Height: 1.676 m (5' 6\")          “Have you had a pap smear?”    Yes, in 2021 [facility]             “Have you had a colorectal cancer screening such as a colonoscopy/FIT/Cologuard?      NO           Click Here for Release of Records Request

## 2025-04-25 ENCOUNTER — RESULTS FOLLOW-UP (OUTPATIENT)
Dept: PRIMARY CARE CLINIC | Facility: CLINIC | Age: 60
End: 2025-04-25

## 2025-04-25 ENCOUNTER — TELEPHONE (OUTPATIENT)
Age: 60
End: 2025-04-25

## 2025-04-25 DIAGNOSIS — E78.2 MIXED HYPERLIPIDEMIA: Primary | ICD-10-CM

## 2025-04-25 LAB — TSH SERPL DL<=0.05 MIU/L-ACNC: 1.57 UIU/ML (ref 0.45–4.5)

## 2025-04-25 RX ORDER — ATORVASTATIN CALCIUM 20 MG/1
20 TABLET, FILM COATED ORAL DAILY
Qty: 90 TABLET | Refills: 1 | Status: SHIPPED | OUTPATIENT
Start: 2025-04-25

## 2025-04-25 NOTE — TELEPHONE ENCOUNTER
Pt called and requested to cancel her upcoming appt. Offered to reschedule. Pt stated she has to check her schedule and will call back. RADHA

## 2025-04-28 DIAGNOSIS — E78.2 MIXED HYPERLIPIDEMIA: ICD-10-CM

## 2025-04-28 RX ORDER — ATORVASTATIN CALCIUM 20 MG/1
20 TABLET, FILM COATED ORAL DAILY
Qty: 90 TABLET | Refills: 1 | Status: SHIPPED | OUTPATIENT
Start: 2025-04-28 | End: 2025-04-29 | Stop reason: SDUPTHER

## 2025-04-29 DIAGNOSIS — E78.2 MIXED HYPERLIPIDEMIA: ICD-10-CM

## 2025-04-29 RX ORDER — ATORVASTATIN CALCIUM 20 MG/1
20 TABLET, FILM COATED ORAL DAILY
Qty: 90 TABLET | Refills: 1 | Status: SHIPPED | OUTPATIENT
Start: 2025-04-29

## 2025-04-29 NOTE — TELEPHONE ENCOUNTER
Original rx was sent to [facility], however the patient's insurance is not contracted with that pharmacy.  Medication resent to [facility]

## (undated) DEVICE — DECANTER BAG 9": Brand: MEDLINE INDUSTRIES, INC.

## (undated) DEVICE — SUTURE PERMAHAND SZ 2-0 L30IN NONABSORBABLE BLK SILK W/O A305H

## (undated) DEVICE — INTENDED FOR TISSUE SEPARATION, AND OTHER PROCEDURES THAT REQUIRE A SHARP SURGICAL BLADE TO PUNCTURE OR CUT.: Brand: BARD-PARKER ® CARBON RIB-BACK BLADES

## (undated) DEVICE — REM POLYHESIVE ADULT PATIENT RETURN ELECTRODE: Brand: VALLEYLAB

## (undated) DEVICE — Z DUP USE 2701075 SYSTEM SKIN CLSR 42CM DERMBND PRINEO

## (undated) DEVICE — Z DISCONTINUED NO SUB IDED GLOVE SURG SZ 6 L12IN FNGR THK13MIL WHT ISOLEX POLYISOPRENE

## (undated) DEVICE — HYPODERMIC SAFETY NEEDLE: Brand: MAGELLAN

## (undated) DEVICE — DRAIN SURG 15FR SIL RND CHN W/ TRCR FULL FLUT DBL WRP TRAD

## (undated) DEVICE — C-ARM: Brand: UNBRANDED

## (undated) DEVICE — PACK,BASIC,SIRUS,V: Brand: MEDLINE

## (undated) DEVICE — DRAPE,CHEST,FENES,15X10,STERIL: Brand: MEDLINE

## (undated) DEVICE — DRAPE,LAPAROTOMY,T,PEDI,STERILE: Brand: MEDLINE

## (undated) DEVICE — SUTURE MCRYL SZ 4-0 L27IN ABSRB UD L19MM PS-2 1/2 CIR PRIM Y426H

## (undated) DEVICE — SUTURE VCRL SZ 3-0 L27IN ABSRB UD L26MM SH 1/2 CIR J416H

## (undated) DEVICE — SMOKE EVACUATION TUBING WITH 8 IN INTEGRAL WAND AND SPONGE GUARD: Brand: BUFFALO FILTER

## (undated) DEVICE — SYR 10ML LUER LOK 1/5ML GRAD --

## (undated) DEVICE — TOWEL SURG W17XL27IN STD BLU COT NONFENESTRATED PREWASHED

## (undated) DEVICE — PENCIL SMK EVAC L10FT DIA95MM TBNG NONSTICK W ADPT TO 22MM

## (undated) DEVICE — BRA SURG POST-OP XL 46IN --

## (undated) DEVICE — NEEDLE HYPO 25GA L1.5IN BVL ORIENTED ECLIPSE

## (undated) DEVICE — SUTURE ETHLN SZ 2-0 L18IN NONABSORBABLE BLK L26MM FS 3/8 664G

## (undated) DEVICE — SUTURE PERMAHAND SZ 3-0 L18IN NONABSORBABLE BLK L26MM SH C013D

## (undated) DEVICE — SUTURE MCRYL SZ 3-0 L27IN ABSRB UD L24MM PS-1 3/8 CIR PRIM Y936H

## (undated) DEVICE — TUBING SUCT L9FT FOR AUTOFUSE INFLTR SYS

## (undated) DEVICE — GLOVE ORANGE PI 7 1/2   MSG9075

## (undated) DEVICE — SUTURE STRATAFIX SPRL SZ 4-0 L12IN ABSRB UD FS-2 L19MM 3/8 SXMP2B409

## (undated) DEVICE — INSULATED BLADE ELECTRODE: Brand: EDGE

## (undated) DEVICE — SYSTEM SKIN CLOSURE 42CM DERMABOND PRINEO

## (undated) DEVICE — SUTURE PDS II SZ 2-0 L27IN ABSRB VLT L36MM CT-1 1/2 CIR Z339H

## (undated) DEVICE — MARKER,SKIN,WI/RULER AND LABELS: Brand: MEDLINE

## (undated) DEVICE — SUTURE MCRYL SZ 3-0 L18IN ABSRB UD L19MM PS-2 3/8 CIR PRIM Y497G

## (undated) DEVICE — SPONGE HEMOSTAT CELLULS 4X8IN -- SURGICEL

## (undated) DEVICE — HANDLE LT SNAP ON ULT DURABLE LENS FOR TRUMPF ALC DISPOSABLE

## (undated) DEVICE — Device

## (undated) DEVICE — BASIN ST MAJOR-NO CAUTERY: Brand: MEDLINE INDUSTRIES, INC.

## (undated) DEVICE — SOL INJ SOD CL 0.9% 500ML BG --

## (undated) DEVICE — SOLUTION IRRIG 1000ML 0.9% SOD CHL USP POUR PLAS BTL

## (undated) DEVICE — GOWN,NON-REINFORCED,XXL: Brand: MEDLINE

## (undated) DEVICE — SPONGE LAP 18X18IN STRL -- 5/PK

## (undated) DEVICE — DRAPE,REIN 53X77,STERILE: Brand: MEDLINE

## (undated) DEVICE — RESERVOIR,SUCTION,100CC,SILICONE: Brand: MEDLINE

## (undated) DEVICE — SPONGE GZ W4XL4IN COT 12 PLY TYP VII WVN C FLD DSGN

## (undated) DEVICE — PREP SKN CHLRAPRP APL 26ML STR --

## (undated) DEVICE — SYR 20ML LL STRL LF --

## (undated) DEVICE — SUTURE MCRYL SZ 4-0 L18IN ABSRB UD P-3 L13MM 3/8 CIR PRIM Y494G

## (undated) DEVICE — GARMENT,MEDLINE,DVT,INT,CALF,MED, GEN2: Brand: MEDLINE

## (undated) DEVICE — TRAY PREP DRY W/ PREM GLV 2 APPL 6 SPNG 2 UNDPD 1 OVERWRAP

## (undated) DEVICE — SOLUTION IV SODIUM CHL 0.9% 1000 ML 10/CA

## (undated) DEVICE — SUT SLK 2-0SH 30IN BLK --

## (undated) DEVICE — ROCKER SWITCH PENCIL BLADE ELECTRODE, HOLSTER: Brand: EDGE

## (undated) DEVICE — UNDERPAD INCON STD 36X23IN --

## (undated) DEVICE — SUT SLK 2-0 24IN SH DA BLK --

## (undated) DEVICE — PLASTICS CHEST BREAST ASU: Brand: MEDLINE INDUSTRIES, INC.